# Patient Record
Sex: FEMALE | Race: WHITE | NOT HISPANIC OR LATINO | Employment: OTHER | URBAN - METROPOLITAN AREA
[De-identification: names, ages, dates, MRNs, and addresses within clinical notes are randomized per-mention and may not be internally consistent; named-entity substitution may affect disease eponyms.]

---

## 2017-02-03 DIAGNOSIS — K21.9 GASTROESOPHAGEAL REFLUX DISEASE, ESOPHAGITIS PRESENCE NOT SPECIFIED: ICD-10-CM

## 2017-02-03 RX ORDER — PANTOPRAZOLE SODIUM 40 MG/1
TABLET, DELAYED RELEASE ORAL
Qty: 90 TABLET | Refills: 3 | Status: SHIPPED | OUTPATIENT
Start: 2017-02-03 | End: 2018-04-25 | Stop reason: SDUPTHER

## 2017-03-17 RX ORDER — MONTELUKAST SODIUM 10 MG/1
TABLET ORAL
Qty: 30 TABLET | Refills: 5 | Status: SHIPPED | OUTPATIENT
Start: 2017-03-17 | End: 2017-10-05 | Stop reason: SDUPTHER

## 2017-06-16 DIAGNOSIS — I10 ESSENTIAL HYPERTENSION: ICD-10-CM

## 2017-06-16 DIAGNOSIS — I10 UNCONTROLLED HYPERTENSION: ICD-10-CM

## 2017-06-16 RX ORDER — ENALAPRIL MALEATE 20 MG/1
TABLET ORAL
Qty: 180 TABLET | Refills: 0 | Status: SHIPPED | OUTPATIENT
Start: 2017-06-16 | End: 2017-08-08 | Stop reason: SDUPTHER

## 2017-06-16 RX ORDER — METOPROLOL TARTRATE 50 MG/1
TABLET ORAL
Qty: 270 TABLET | Refills: 0 | Status: SHIPPED | OUTPATIENT
Start: 2017-06-16 | End: 2017-11-22 | Stop reason: SDUPTHER

## 2017-06-16 RX ORDER — CLONIDINE HYDROCHLORIDE 0.1 MG/1
TABLET ORAL
Qty: 180 TABLET | Refills: 0 | Status: SHIPPED | OUTPATIENT
Start: 2017-06-16 | End: 2018-04-25 | Stop reason: SDUPTHER

## 2017-06-19 ENCOUNTER — PATIENT MESSAGE (OUTPATIENT)
Dept: FAMILY MEDICINE | Facility: CLINIC | Age: 72
End: 2017-06-19

## 2017-06-20 ENCOUNTER — OFFICE VISIT (OUTPATIENT)
Dept: ALLERGY | Facility: CLINIC | Age: 72
End: 2017-06-20
Payer: MEDICARE

## 2017-06-20 ENCOUNTER — LAB VISIT (OUTPATIENT)
Dept: LAB | Facility: HOSPITAL | Age: 72
End: 2017-06-20
Attending: ALLERGY & IMMUNOLOGY
Payer: MEDICARE

## 2017-06-20 VITALS
HEIGHT: 66 IN | WEIGHT: 164.88 LBS | HEART RATE: 64 BPM | SYSTOLIC BLOOD PRESSURE: 170 MMHG | BODY MASS INDEX: 26.5 KG/M2 | DIASTOLIC BLOOD PRESSURE: 90 MMHG

## 2017-06-20 DIAGNOSIS — L30.9 DERMATITIS: ICD-10-CM

## 2017-06-20 DIAGNOSIS — J32.9 RECURRENT SINUS INFECTIONS: ICD-10-CM

## 2017-06-20 DIAGNOSIS — J31.0 RHINITIS, CHRONIC: Primary | ICD-10-CM

## 2017-06-20 DIAGNOSIS — J31.0 RHINITIS, CHRONIC: ICD-10-CM

## 2017-06-20 LAB
IGA SERPL-MCNC: 139 MG/DL
IGG SERPL-MCNC: 1139 MG/DL
IGM SERPL-MCNC: 143 MG/DL

## 2017-06-20 PROCEDURE — 1159F MED LIST DOCD IN RCRD: CPT | Mod: S$GLB,,, | Performed by: ALLERGY & IMMUNOLOGY

## 2017-06-20 PROCEDURE — 86317 IMMUNOASSAY INFECTIOUS AGENT: CPT

## 2017-06-20 PROCEDURE — 86003 ALLG SPEC IGE CRUDE XTRC EA: CPT | Mod: 59

## 2017-06-20 PROCEDURE — 86003 ALLG SPEC IGE CRUDE XTRC EA: CPT

## 2017-06-20 PROCEDURE — 82787 IGG 1 2 3 OR 4 EACH: CPT | Mod: 59

## 2017-06-20 PROCEDURE — 82784 ASSAY IGA/IGD/IGG/IGM EACH: CPT | Mod: 59

## 2017-06-20 PROCEDURE — 82785 ASSAY OF IGE: CPT

## 2017-06-20 PROCEDURE — 99204 OFFICE O/P NEW MOD 45 MIN: CPT | Mod: S$GLB,,, | Performed by: ALLERGY & IMMUNOLOGY

## 2017-06-20 PROCEDURE — 36415 COLL VENOUS BLD VENIPUNCTURE: CPT | Mod: PO

## 2017-06-20 PROCEDURE — 82784 ASSAY IGA/IGD/IGG/IGM EACH: CPT

## 2017-06-20 PROCEDURE — 1125F AMNT PAIN NOTED PAIN PRSNT: CPT | Mod: S$GLB,,, | Performed by: ALLERGY & IMMUNOLOGY

## 2017-06-20 PROCEDURE — 99999 PR PBB SHADOW E&M-EST. PATIENT-LVL III: CPT | Mod: PBBFAC,,, | Performed by: ALLERGY & IMMUNOLOGY

## 2017-06-20 RX ORDER — DOXYCYCLINE HYCLATE 100 MG
100 TABLET ORAL 2 TIMES DAILY
Qty: 42 TABLET | Refills: 0 | Status: SHIPPED | OUTPATIENT
Start: 2017-06-20 | End: 2017-07-11

## 2017-06-20 NOTE — PROGRESS NOTES
Subjective:       Patient ID: Megha Ladd is a 71 y.o. female.    Chief Complaint:  Other (eczema, starting 11/23 sinus infection, treated with amoxicillin and clyndiamycin, then saw ENT in APril and was given Augmentin and Clyndamycin again.  Has greenish brown nasal drainage.  Sinus pressure and headaches  Had Prevnar and Pneumovax.)      70 yo woman presents for new patient evaluation of recurrent to chronic sinus infection. She states she started in October with congestion, pressure and drainage. She saw PCP in November and given amoxil and helped some but did not clear. Then in December went to an ENT and given clinda 300. She got nauseated and felt bad on it so had to stop. She continued with symptoms and in April went back to ENT and given Augmentin but she remembered she had bad reaction with severe burning of throat, esophagus and stomach years ago so he changed to clinda again. She only took 3 doses and got dizzy, nausea, joint pain and facial pain so stopped. She went to different ENT and told she needed surgery and felt he did not listen. She went to Dr petersen for ENT for 30 years but he just retired. She still has terrible PND< congestion, ears itch, stuffy nose and some sneeze but no runny nose. No itchy eyes or nose. Some SOB but not now. She has h/o eczema and has had on ears, eyes lids and sides of head and Cetaphil cream has helped. Zyrtec prn also helped eczema. She takes montelukast daily, Advair 115 HFA 1 puff daily and increases to 2 puff BID when has SOB. In 1970's she had allergy test and did shots and did help. Doxycycline and amoxil are 2 antibiotics she has tolerate din past. She has no known foods insect or latex allergy. Never had sinus surgery.         Environmental History: see history section for home environment  Review of Systems   Constitutional: Positive for fatigue. Negative for appetite change, chills and fever.   HENT: Positive for congestion, postnasal drip, sinus pressure  and sneezing. Negative for ear discharge, ear pain, facial swelling, nosebleeds, rhinorrhea, sore throat, trouble swallowing and voice change.    Eyes: Negative for discharge, redness, itching and visual disturbance.   Respiratory: Positive for cough and shortness of breath. Negative for choking, chest tightness and wheezing.    Cardiovascular: Negative for chest pain, palpitations and leg swelling.   Gastrointestinal: Negative for abdominal distention, abdominal pain, constipation, diarrhea, nausea and vomiting.   Genitourinary: Negative for difficulty urinating.   Musculoskeletal: Negative for arthralgias, gait problem, joint swelling and myalgias.   Skin: Positive for rash. Negative for color change.   Neurological: Negative for dizziness, syncope, weakness, light-headedness and headaches.   Hematological: Negative for adenopathy. Does not bruise/bleed easily.   Psychiatric/Behavioral: Negative for agitation, behavioral problems, confusion and sleep disturbance. The patient is not nervous/anxious.         Objective:    Physical Exam   Constitutional: She is oriented to person, place, and time. She appears well-developed and well-nourished. No distress.   HENT:   Head: Normocephalic and atraumatic.   Right Ear: Hearing, tympanic membrane, external ear and ear canal normal.   Left Ear: Hearing, tympanic membrane, external ear and ear canal normal.   Nose: No mucosal edema, rhinorrhea, sinus tenderness or septal deviation. No epistaxis. Right sinus exhibits no maxillary sinus tenderness and no frontal sinus tenderness. Left sinus exhibits no maxillary sinus tenderness and no frontal sinus tenderness.   Mouth/Throat: Uvula is midline, oropharynx is clear and moist and mucous membranes are normal. No uvula swelling.   Eyes: Conjunctivae are normal. Right eye exhibits no discharge. Left eye exhibits no discharge.   Neck: Normal range of motion. No thyromegaly present.   Cardiovascular: Normal rate, regular rhythm and  normal heart sounds.    No murmur heard.  Pulmonary/Chest: Effort normal and breath sounds normal. No respiratory distress. She has no wheezes.   Abdominal: Soft. She exhibits no distension. There is no tenderness.   Musculoskeletal: Normal range of motion. She exhibits no edema or tenderness.   Lymphadenopathy:     She has no cervical adenopathy.   Neurological: She is alert and oriented to person, place, and time.   Skin: Skin is warm and dry. No rash noted. No erythema.   Psychiatric: She has a normal mood and affect. Her behavior is normal. Judgment and thought content normal.   Nursing note and vitals reviewed.      Laboratory:   none performed   Assessment:       1. Rhinitis, chronic    2. Recurrent sinus infections    3. Dermatitis         Plan:       1. Labs today immunocaps and immune system  2. Doxycycline 100 BID for 21 days for chronic sinusitis  3. continue Flonase 1 SNE BID and montelukast daily  4. Phone review

## 2017-06-21 LAB — IGE SERPL-ACNC: <35 IU/ML

## 2017-06-22 ENCOUNTER — TELEPHONE (OUTPATIENT)
Dept: FAMILY MEDICINE | Facility: CLINIC | Age: 72
End: 2017-06-22

## 2017-06-22 DIAGNOSIS — I10 HYPERTENSION, ESSENTIAL: Primary | ICD-10-CM

## 2017-06-22 LAB
A ALTERNATA IGE QN: <0.35 KU/L
A FUMIGATUS IGE QN: <0.35 KU/L
ALLERGEN MAPLE/SYCAMORE IGE: <0.35 KU/L
ALLERGEN PENICILLIUM IGE: <0.35 KU/L
ALLERGEN WALNUT TREE IGE: <0.35 KU/L
ALLERGEN WHITE PINE TREE IGE: <0.35 KU/L
ALLERGEN WILLOW IGE: <0.35 KU/L
BAHIA GRASS IGE QN: <0.35 KU/L
BALD CYPRESS IGE QN: <0.35 KU/L
BERMUDA GRASS IGE QN: <0.35 KU/L
C GLOBOSUM IGE QN: <0.35 KU/L
C HERBARUM IGE QN: <0.35 KU/L
C LUNATA IGE QN: <0.35 KU/L
CAT DANDER IGE QN: <0.35 KU/L
COMMON RAGWEED IGE QN: <0.35 KU/L
COTTONWOOD IGE QN: <0.35 KU/L
D FARINAE IGE QN: <0.35 KU/L
D PTERONYSS IGE QN: <0.35 KU/L
DEPRECATED A ALTERNATA IGE RAST QL: NORMAL
DEPRECATED A FUMIGATUS IGE RAST QL: NORMAL
DEPRECATED BAHIA GRASS IGE RAST QL: NORMAL
DEPRECATED BALD CYPRESS IGE RAST QL: NORMAL
DEPRECATED BERMUDA GRASS IGE RAST QL: NORMAL
DEPRECATED C GLOBOSUM IGE RAST QL: NORMAL
DEPRECATED C HERBARUM IGE RAST QL: NORMAL
DEPRECATED C LUNATA IGE RAST QL: NORMAL
DEPRECATED CAT DANDER IGE RAST QL: NORMAL
DEPRECATED COMMON RAGWEED IGE RAST QL: NORMAL
DEPRECATED COTTONWOOD IGE RAST QL: NORMAL
DEPRECATED D FARINAE IGE RAST QL: NORMAL
DEPRECATED D PTERONYSS IGE RAST QL: NORMAL
DEPRECATED DOG DANDER IGE RAST QL: NORMAL
DEPRECATED ENGL PLANTAIN IGE RAST QL: NORMAL
DEPRECATED JOHNSON GRASS IGE RAST QL: NORMAL
DEPRECATED MARSH ELDER IGE RAST QL: NORMAL
DEPRECATED MUGWORT IGE RAST QL: NORMAL
DEPRECATED PECAN/HICK TREE IGE RAST QL: NORMAL
DEPRECATED ROACH IGE RAST QL: NORMAL
DEPRECATED S ROSTRATA IGE RAST QL: NORMAL
DEPRECATED SALTWORT IGE RAST QL: NORMAL
DEPRECATED SILVER BIRCH IGE RAST QL: NORMAL
DEPRECATED TIMOTHY IGE RAST QL: NORMAL
DEPRECATED WHITE OAK IGE RAST QL: NORMAL
DOG DANDER IGE QN: <0.35 KU/L
ENGL PLANTAIN IGE QN: <0.35 KU/L
IGG1 SER-MCNC: 786 MG/DL
IGG2 SER-MCNC: 221 MG/DL
IGG3 SER-MCNC: 23 MG/DL
IGG4 SER-MCNC: 59 MG/DL
JOHNSON GRASS IGE QN: <0.35 KU/L
MAPLE/SYCAMORE CLASS: NORMAL
MARSH ELDER IGE QN: <0.35 KU/L
MUGWORT IGE QN: <0.35 KU/L
PECAN/HICK TREE IGE QN: <0.35 KU/L
PENICILLIUM CLASS: NORMAL
RAGWEED, WESTERN IGE: <0.35 KU/L
RAGWEED, WESTERN, CLASS: NORMAL
ROACH IGE QN: <0.35 KU/L
S ROSTRATA IGE QN: <0.35 KU/L
SALTWORT IGE QN: <0.35 KU/L
SILVER BIRCH IGE QN: <0.35 KU/L
TIMOTHY IGE QN: <0.35 KU/L
WALNUT TREE CLASS: NORMAL
WHITE OAK IGE QN: <0.35 KU/L
WHITE PINE CLASS: NORMAL
WILLOW CLASS: NORMAL

## 2017-06-22 RX ORDER — AMLODIPINE BESYLATE 2.5 MG/1
2.5 TABLET ORAL DAILY
Qty: 30 TABLET | Refills: 5 | Status: SHIPPED | OUTPATIENT
Start: 2017-06-22 | End: 2017-07-27 | Stop reason: SINTOL

## 2017-06-22 NOTE — TELEPHONE ENCOUNTER
Amlodipine 2.5mg daily sent to [facility].  We may need a higher dose so I suggest bp check with me in 4 weeks.

## 2017-06-22 NOTE — TELEPHONE ENCOUNTER
informed.  Please send in amlodipine to [facility] [address].       Alfred Mejia MD 16 hours ago (6:12 PM)      Looks like we need to put her on amlodipine.     Documentation       Alexia Menchaca LPN routed conversation to Alfred Mejia MD 18 hours ago (4:21 PM)      You routed conversation to Alfred Mejia MD 19 hours ago (2:33 PM)      You 19 hours ago (2:32 PM)      Patient had an apt with Dr Jj 6-20-17. Since we called saying she needed a blood pressure check she asked them to take it. 1709/90 pulse 64

## 2017-06-28 LAB
C DIPHTHERIAE AB SER IA-ACNC: 0.13 IU/ML
C TETANI AB SER-ACNC: 0.94 IU/ML
DEPRECATED S PNEUM 1 IGG SER-MCNC: 16.2 MCG/ML
DEPRECATED S PNEUM12 IGG SER-MCNC: 0.4 MCG/ML
DEPRECATED S PNEUM14 IGG SER-MCNC: 2 MCG/ML
DEPRECATED S PNEUM19 IGG SER-MCNC: 2.9 MCG/ML
DEPRECATED S PNEUM23 IGG SER-MCNC: 4.5 MCG/ML
DEPRECATED S PNEUM3 IGG SER-MCNC: 0.8 MCG/ML
DEPRECATED S PNEUM4 IGG SER-MCNC: 0.7 MCG/ML
DEPRECATED S PNEUM5 IGG SER-MCNC: 8.1 MCG/ML
DEPRECATED S PNEUM8 IGG SER-MCNC: 2.2 MCG/ML
DEPRECATED S PNEUM9 IGG SER-MCNC: 2.4 MCG/ML
HAEM INFLU B IGG SER-MCNC: 0.97 MG/L
S PNEUM DA 18C IGG SER-MCNC: 2.5 MCG/ML
S PNEUM DA 6B IGG SER-MCNC: 1.6 MCG/ML
S PNEUM DA 7F IGG SER-MCNC: 15.6 MCG/ML
S PNEUM DA 9V IGG SER-MCNC: 1 MCG/ML

## 2017-06-29 ENCOUNTER — DOCUMENTATION ONLY (OUTPATIENT)
Dept: FAMILY MEDICINE | Facility: CLINIC | Age: 72
End: 2017-06-29

## 2017-06-29 ENCOUNTER — TELEPHONE (OUTPATIENT)
Dept: ALLERGY | Facility: CLINIC | Age: 72
End: 2017-06-29

## 2017-06-29 NOTE — TELEPHONE ENCOUNTER
Please let her know all allergy tests are negative and immune system is entirely normal. Hopefully this is just bad infection which has never gotten treated adequately and the doxycycline will do that. She should finish all antibiotics and . continue Flonase 1 SNE BID and montelukast daily

## 2017-06-29 NOTE — PROGRESS NOTES
Pre-Visit Chart Review  For Appointment Scheduled on 7-31-17    Health Maintenance Due   Topic Date Due    DEXA SCAN  06/19/2017    Mammogram  08/06/2017

## 2017-07-05 ENCOUNTER — DOCUMENTATION ONLY (OUTPATIENT)
Dept: FAMILY MEDICINE | Facility: CLINIC | Age: 72
End: 2017-07-05

## 2017-07-05 NOTE — PROGRESS NOTES
Pre-Visit Chart Review  For Appointment Scheduled on 8-8-17    Health Maintenance Due   Topic Date Due    DEXA SCAN  06/19/2017    Mammogram  08/06/2017

## 2017-07-26 ENCOUNTER — TELEPHONE (OUTPATIENT)
Dept: FAMILY MEDICINE | Facility: CLINIC | Age: 72
End: 2017-07-26

## 2017-07-26 NOTE — TELEPHONE ENCOUNTER
Patient dropped off note- spoke to patient- she started amlodipne 7/6/17- before that she had been on Doxycycline for 3 wks per Dr. Jj- she took last dose 7/15/17- on 7/21/17 patient started with diarrhea, nausea, joint/muscle pain, sharp stomach pain, flushing, fatigue and increased night urination- fluids include water, broth, jello and apple juice- diet has been crackers, pretzels-tried eating but gets diarrhea and nausea right away-taking probiotics- she read side effects of Amlodipine and her symptoms are included - she wants to know if she should stop the med.

## 2017-08-08 ENCOUNTER — OFFICE VISIT (OUTPATIENT)
Dept: FAMILY MEDICINE | Facility: CLINIC | Age: 72
End: 2017-08-08
Payer: MEDICARE

## 2017-08-08 ENCOUNTER — DOCUMENTATION ONLY (OUTPATIENT)
Dept: FAMILY MEDICINE | Facility: CLINIC | Age: 72
End: 2017-08-08

## 2017-08-08 VITALS
SYSTOLIC BLOOD PRESSURE: 122 MMHG | HEART RATE: 56 BPM | OXYGEN SATURATION: 96 % | WEIGHT: 156.75 LBS | RESPIRATION RATE: 12 BRPM | TEMPERATURE: 98 F | HEIGHT: 66 IN | DIASTOLIC BLOOD PRESSURE: 94 MMHG | BODY MASS INDEX: 25.19 KG/M2

## 2017-08-08 DIAGNOSIS — I10 HYPERTENSION, POOR CONTROL: ICD-10-CM

## 2017-08-08 DIAGNOSIS — I10 ESSENTIAL HYPERTENSION: ICD-10-CM

## 2017-08-08 DIAGNOSIS — R10.9 ABDOMINAL PAIN, UNSPECIFIED LOCATION: ICD-10-CM

## 2017-08-08 DIAGNOSIS — Z12.31 ENCOUNTER FOR SCREENING MAMMOGRAM FOR BREAST CANCER: ICD-10-CM

## 2017-08-08 DIAGNOSIS — R11.0 NAUSEA: ICD-10-CM

## 2017-08-08 DIAGNOSIS — Z78.0 MENOPAUSE: ICD-10-CM

## 2017-08-08 DIAGNOSIS — R19.7 DIARRHEA, UNSPECIFIED TYPE: Primary | ICD-10-CM

## 2017-08-08 PROCEDURE — 1159F MED LIST DOCD IN RCRD: CPT | Mod: S$GLB,,, | Performed by: FAMILY MEDICINE

## 2017-08-08 PROCEDURE — 99499 UNLISTED E&M SERVICE: CPT | Mod: S$GLB,,, | Performed by: FAMILY MEDICINE

## 2017-08-08 PROCEDURE — 3080F DIAST BP >= 90 MM HG: CPT | Mod: S$GLB,,, | Performed by: FAMILY MEDICINE

## 2017-08-08 PROCEDURE — 99999 PR PBB SHADOW E&M-EST. PATIENT-LVL IV: CPT | Mod: PBBFAC,,, | Performed by: FAMILY MEDICINE

## 2017-08-08 PROCEDURE — 3008F BODY MASS INDEX DOCD: CPT | Mod: S$GLB,,, | Performed by: FAMILY MEDICINE

## 2017-08-08 PROCEDURE — 99214 OFFICE O/P EST MOD 30 MIN: CPT | Mod: S$GLB,,, | Performed by: FAMILY MEDICINE

## 2017-08-08 PROCEDURE — 1125F AMNT PAIN NOTED PAIN PRSNT: CPT | Mod: S$GLB,,, | Performed by: FAMILY MEDICINE

## 2017-08-08 PROCEDURE — 3074F SYST BP LT 130 MM HG: CPT | Mod: S$GLB,,, | Performed by: FAMILY MEDICINE

## 2017-08-08 RX ORDER — ENALAPRIL MALEATE 20 MG/1
20 TABLET ORAL 2 TIMES DAILY
Qty: 180 TABLET | Refills: 0 | Status: SHIPPED | OUTPATIENT
Start: 2017-08-08 | End: 2018-04-25 | Stop reason: SDUPTHER

## 2017-08-08 RX ORDER — LOPERAMIDE HYDROCHLORIDE AND SIMETHICONE 2; 125 MG/1; MG/1
2 TABLET ORAL 4 TIMES DAILY PRN
COMMUNITY
End: 2018-06-04

## 2017-08-08 RX ORDER — HYDROCHLOROTHIAZIDE 12.5 MG/1
12.5 TABLET ORAL DAILY
Qty: 30 TABLET | Refills: 11 | Status: SHIPPED | OUTPATIENT
Start: 2017-08-08 | End: 2017-11-28 | Stop reason: SDUPTHER

## 2017-08-08 NOTE — PATIENT INSTRUCTIONS
Controlling High Blood Pressure  High blood pressure (hypertension) is often called the silent killer. This is because many people who have it dont know it. High blood pressure is defined as 140/90 mm Hg or higher. Know your blood pressure and remember to check it regularly. Doing so can save your life. Here are some things you can do to help control your blood pressure.    Choose heart-healthy foods  · Select low-salt, low-fat foods. Limit sodium intake to 2,400 mg per day or the amount suggested by your healthcare provider.  · Limit canned, dried, cured, packaged, and fast foods. These can contain a lot of salt.  · Eat 8 to 10 servings of fruits and vegetables every day.  · Choose lean meats, fish, or chicken.  · Eat whole-grain pasta, brown rice, and beans.  · Eat 2 to 3 servings of low-fat or fat-free dairy products.  · Ask your doctor about the DASH eating plan. This plan helps reduce blood pressure.  · When you go to a restaurant, ask that your meal be prepared with no added salt.  Maintain a healthy weight  · Ask your healthcare provider how many calories to eat a day. Then stick to that number.  · Ask your healthcare provider what weight range is healthiest for you. If you are overweight, a weight loss of only 3% to 5% of your body weight can help lower blood pressure. Generally, a good weight loss goal is to lose 10% of your body weight in a year.  · Limit snacks and sweets.  · Get regular exercise.  Get up and get active  · Choose activities you enjoy. Find ones you can do with friends or family. This includes bicycling, dancing, walking, and jogging.  · Park farther away from building entrances.  · Use stairs instead of the elevator.  · When you can, walk or bike instead of driving.  · [place] leaves, garden, or do household repairs.  · Be active at a moderate to vigorous level of physical activity for at least 40 minutes for a minimum of 3 to 4 days a week.   Manage stress  · Make time to relax and enjoy  life. Find time to laugh.  · Communicate your concerns with your loved ones and your healthcare provider.  · Visit with family and friends, and keep up with hobbies.  Limit alcohol and quit smoking  · Men should have no more than 2 drinks per day.  · Women should have no more than 1 drink per day.  · Talk with your healthcare provider about quitting smoking. Smoking significantly increases your risk for heart disease and stroke. Ask your healthcare provider about community smoking cessation programs and other options.  Medicines  If lifestyle changes arent enough, your healthcare provider may prescribe high blood pressure medicine. Take all medicines as prescribed. If you have any questions about your medicines, ask your healthcare provider before stopping or changing them.   Date Last Reviewed: 4/27/2016  © 3632-4395 [employer] [place]. [address] [place] [address], [place] [address]. All rights reserved. This information is not intended as a substitute for professional medical care. Always follow your healthcare professional's instructions.

## 2017-08-08 NOTE — PROGRESS NOTES
Pre-Visit Chart Review  For Appointment Scheduled on 08/08/2017      Health Maintenance Due   Topic Date Due    DEXA SCAN  06/19/2017    Influenza Vaccine  08/01/2017    Mammogram  08/06/2017

## 2017-08-08 NOTE — PROGRESS NOTES
Pre-Visit Chart Review  For Appointment Scheduled on 8-8-17    Health Maintenance Due   Topic Date Due    DEXA SCAN  06/19/2017    Influenza Vaccine  08/01/2017    Mammogram  08/06/2017

## 2017-08-08 NOTE — PROGRESS NOTES
Subjective:       Patient ID: Megha Ladd is a 71 y.o. female.    Chief Complaint: Diarrhea and Hypertension    71-year-old female coming in for several problems.  She has been having problems with chronic sinusitis and over the last several months is been on a series of antibiotics both here and at her ENTs office that included amoxicillin, clindamycin, and most recently doxycycline.  Shortly after completing the doxycycline given by Dr. Jj she developed nausea and then approximately 8-9 days later began experiencing diarrhea.  During this same period of time her blood pressure was noted to be elevated and she was placed on amlodipine about a week before the nausea started.  When the diarrhea began she started reading side effects and found that the amlodipine could be responsible so she stopped taking the amlodipine after checking in with us.  The nausea and diarrhea continued however and she's been off of the amlodipine for nearly 2 weeks now.  She has not seen any blood in the diarrhea but it is very loose and watery and she's had generalized abdominal pain most notably in the epigastric and right upper quadrants areas.  She is also had some pain in the right subscapular area which she had attributed to her chronic back pain and she had an epidural injection in the lumbar area by Dr. Taylor about a week ago.  In addition to all of the above she is taking Celebrex along with Protonix to protect the stomach.  She's been using Imodium to slow the diarrhea.  She is on metoprolol taking 75 mg twice a day, enalapril taking 20 mg twice a day, and uses clonidine when necessary elevated blood pressure.  She is not on a diuretic.  Her pulse is in the low to mid 50s so she could not take verapamil or diltiazem and is still concerned that the amlodipine may have been responsible for her GI symptoms.    Past Medical History:  No date: Asthma  No date: GERD (gastroesophageal reflux disease)  No date: Hyperlipidemia  No  date: Hypertension  No date: Hypothyroid  No date: Lumbago  No date: Neuromuscular disorder  No date: Sinusitis  No date: Ulcer    Past Surgical History:  No date: APPENDECTOMY  No date:  SECTION  No date: LUMBAR EPIDURAL INJECTION  No date: PARTIAL HYSTERECTOMY      Current Outpatient Prescriptions:     acetaminophen (TYLENOL) 500 mg Cap, Take 1,000 mg by mouth 3 (three) times daily as needed. Takes with tramadol, Disp: , Rfl:     cartilage-collagen-bor-hyalur (MOVE FREE ULTRA, BORON,) 40-5-3.3 mg Tab, Take 1 tablet by mouth once daily., Disp: , Rfl:     celecoxib (CELEBREX) 200 MG capsule, Take 200 mg by mouth daily as needed. Every day, Disp: , Rfl:     cetirizine (ZYRTEC) 10 MG tablet, Take 10 mg by mouth daily as needed for Allergies., Disp: , Rfl:     cloNIDine (CATAPRES) 0.1 MG tablet, TAKE 1 TABLET TWICE DAILY AS NEEDED FOR BLOOD PRESSURE GREATER THAN 180 SYSTOLIC  DIASTOLIC., Disp: 180 tablet, Rfl: 0    cycloSPORINE (RESTASIS) 0.05 % ophthalmic emulsion, Place 1 drop into both eyes 2 (two) times daily as needed. Twice a day, Disp: , Rfl:     enalapril (VASOTEC) 20 MG tablet, Take 1 tablet (20 mg total) by mouth 2 (two) times daily., Disp: 180 tablet, Rfl: 0    fluticasone (FLONASE) 50 mcg/actuation nasal spray, SPRAY 1 SPRAY IN EACH NOSTRIL 2 TIMES A DAY (Patient taking differently: SPRAY 1 SPRAY IN EACH NOSTRIL qd), Disp: 16 g, Rfl: 11    fluticasone-salmeterol (ADVAIR HFA) 115-21 mcg/actuation HFAA, Inhale 2 puffs into the lungs 2 (two) times daily. (Patient taking differently: Inhale 2 puffs into the lungs 2 (two) times daily as needed. ), Disp: 3 Inhaler, Rfl: 3    guaifenesin (MUCINEX) 600 mg tp12, Take 1 tablet by mouth 2 (two) times daily as needed., Disp: , Rfl:     LACTOBACILLUS RHAMNOSUS GG (CULTURELLE ORAL), Take 1 capsule by mouth daily as needed. 20 Billion, Disp: , Rfl:     loperamide-simethicone (IMODIUM MULTI-SYMPTOM RELIEF) 2-125 mg Tab, Take 2 tablets by mouth 4  (four) times daily as needed (up to 4 tabs in 24 hours)., Disp: , Rfl:     metaxalone (SKELAXIN) 800 MG tablet, Take 800 mg by mouth every 8 (eight) hours as needed. Every 6 hours PRN, Disp: , Rfl:     metoprolol tartrate (LOPRESSOR) 50 MG tablet, TAKE 1 AND 1/2 TABLETS TWICE DAILY, Disp: 270 tablet, Rfl: 0    montelukast (SINGULAIR) 10 mg tablet, TAKE ONE TABLET BY MOUTH IN THE EVENING, Disp: 30 tablet, Rfl: 5    ondansetron (ZOFRAN-ODT) 4 MG TbDL, Take 2 tablets (8 mg total) by mouth every 8 (eight) hours as needed. (Patient taking differently: Take 4 mg by mouth every 8 (eight) hours as needed. ), Disp: 60 tablet, Rfl: 0    pantoprazole (PROTONIX) 40 MG tablet, TAKE 1 TABLET ONE TIME DAILY, Disp: 90 tablet, Rfl: 3    pravastatin (PRAVACHOL) 40 MG tablet, Take 1 tablet (40 mg total) by mouth every other day. (Patient taking differently: Take 40 mg by mouth once daily. ), Disp: 45 tablet, Rfl: 3    pregabalin (LYRICA) 75 MG capsule, Take 75 mg by mouth 2 (two) times daily as needed. , Disp: , Rfl:     sodium chloride (JAYSHREE 128) 5 % ophthalmic ointment, Place into both eyes as needed. Twice a day, Disp: , Rfl:     thyroid, pork, (ARMOUR THYROID) 90 mg Tab, Take 1 tablet (90 mg total) by mouth once daily., Disp: 90 tablet, Rfl: 2    tramadol (ULTRAM) 50 mg tablet, Take 50 mg by mouth every 6 (six) hours as needed. Every 6 hours, Disp: , Rfl:     UNABLE TO FIND, Apply topically once daily. Loren Gaston MD medical strength skin healing facial redness repair for sensitive skin, Disp: , Rfl:         Review of Systems   Constitutional: Positive for fatigue. Negative for chills and fever.   Respiratory: Negative for cough, chest tightness and shortness of breath.    Cardiovascular: Negative for chest pain and palpitations.   Gastrointestinal: Positive for abdominal pain, diarrhea and nausea. Negative for abdominal distention, anal bleeding, blood in stool and vomiting.   Genitourinary: Negative for dysuria,  frequency and hematuria.   Skin: Negative for color change and rash.       Objective:      Physical Exam   Constitutional: She is oriented to person, place, and time. She appears well-developed and well-nourished. No distress.   Mildly elevated blood pressure  Normal weight with BMI 25.3.  She is down 8.6 pounds from her last visit with me August 29, 2016  She is in no distress   HENT:   Head: Normocephalic and atraumatic.   Cardiovascular: Normal rate, regular rhythm and normal heart sounds.  Exam reveals no gallop and no friction rub.    No murmur heard.  Pulmonary/Chest: Effort normal and breath sounds normal. No respiratory distress. She has no wheezes. She has no rales. She exhibits no tenderness.   Abdominal: Soft. Bowel sounds are normal. She exhibits no shifting dullness, no distension, no pulsatile liver, no fluid wave, no abdominal bruit, no ascites, no pulsatile midline mass and no mass. There is no hepatosplenomegaly. There is tenderness in the right upper quadrant and epigastric area. There is no rigidity, no rebound, no guarding, no CVA tenderness, no tenderness at McBurney's point and negative Box's sign (Tender in the right upper quadrant but does not have a true Box sign).   Neurological: She is alert and oriented to person, place, and time.   Skin: She is not diaphoretic.   Psychiatric: She has a normal mood and affect. Her behavior is normal.   Nursing note and vitals reviewed.      Assessment:       1. Diarrhea, unspecified type    2. Nausea    3. Abdominal pain, unspecified location    4. Hypertension, poor control    5. Encounter for screening mammogram for breast cancer    6. Menopause    7. Essential hypertension        Plan:       1. Diarrhea, unspecified type  Concerned for C. difficile colitis following multiple antibiotics and particularly clindamycin.  Symptoms are also consistent with possible cholecystitis.  Since symptoms did not resolve after more than a week off the amlodipine  I doubt that it was causative but she is uncomfortable with trying it again.  - CULTURE, STOOL; Future  - Clostridium difficile EIA; Future  - H. pylori antigen, stool; Future  - Giardia / Cryptosporidum, EIA; Future  - Rotavirus antigen, stool; Future  - Stool Exam-Ova,Cysts,Parasites; Future  - WBC, Stool; Future  - CBC auto differential; Future  - Comprehensive metabolic panel; Future    2. Nausea  - H. pylori antigen, stool; Future  - CBC auto differential; Future  - Comprehensive metabolic panel; Future    3. Abdominal pain, unspecified location  Recommend discontinue Celebrex until evaluation is completed.  She may continue the Protonix.  - Amylase; Future  - CBC auto differential; Future  - Comprehensive metabolic panel; Future  - US Abdomen Complete; Future    4. Hypertension, poor control  Continue enalapril and metoprolol and add low-dose HCTZ.  If successfully can be combined with the enalapril at a later date.  - hydrochlorothiazide (HYDRODIURIL) 12.5 MG Tab; Take 1 tablet (12.5 mg total) by mouth once daily.  Dispense: 30 tablet; Refill: 11    5. Encounter for screening mammogram for breast cancer  [facility]  - Mammo Digital Screening Bilat with CAD; Future    6. Menopause  - DXA Bone Density Spine And Hip; Future    7. Essential hypertension  - enalapril (VASOTEC) 20 MG tablet; Take 1 tablet (20 mg total) by mouth 2 (two) times daily.  Dispense: 180 tablet; Refill: 0         Patient readiness: acceptance and barriers:none    During the course of the visit the patient was educated and counseled about the following:     Hypertension:   Medication: add low dose hctz 12.5mg daily.  Dietary sodium restriction.  Regular aerobic exercise.    Goals: Hypertension: Reduce Blood Pressure    Did patient meet goals/outcomes: No    The following self management tools provided: blood pressure log    Patient Instructions (the written plan) was given to the patient/family.     Time spent with patient: 30  minutes

## 2017-08-08 NOTE — PROGRESS NOTES
Pre-Visit Chart Review  For Appointment Scheduled on 8-30-17    Health Maintenance Due   Topic Date Due    DEXA SCAN  06/19/2017    Influenza Vaccine  08/01/2017    Mammogram  08/06/2017

## 2017-08-11 ENCOUNTER — PATIENT MESSAGE (OUTPATIENT)
Dept: FAMILY MEDICINE | Facility: CLINIC | Age: 72
End: 2017-08-11

## 2017-08-11 ENCOUNTER — HOSPITAL ENCOUNTER (OUTPATIENT)
Dept: RADIOLOGY | Facility: CLINIC | Age: 72
Discharge: HOME | End: 2017-08-11
Attending: FAMILY MEDICINE
Payer: MEDICARE

## 2017-08-11 DIAGNOSIS — Z78.0 MENOPAUSE: ICD-10-CM

## 2017-08-11 DIAGNOSIS — R10.9 ABDOMINAL PAIN, UNSPECIFIED LOCATION: ICD-10-CM

## 2017-08-11 PROCEDURE — 76700 US EXAM ABDOM COMPLETE: CPT | Mod: 26,,, | Performed by: RADIOLOGY

## 2017-08-14 ENCOUNTER — PATIENT MESSAGE (OUTPATIENT)
Dept: FAMILY MEDICINE | Facility: CLINIC | Age: 72
End: 2017-08-14

## 2017-08-16 ENCOUNTER — PATIENT MESSAGE (OUTPATIENT)
Dept: FAMILY MEDICINE | Facility: CLINIC | Age: 72
End: 2017-08-16

## 2017-09-13 RX ORDER — FLUTICASONE PROPIONATE AND SALMETEROL XINAFOATE 115; 21 UG/1; UG/1
2 AEROSOL, METERED RESPIRATORY (INHALATION) 2 TIMES DAILY
Qty: 3 INHALER | Refills: 11 | Status: SHIPPED | OUTPATIENT
Start: 2017-09-13 | End: 2017-12-23 | Stop reason: SDUPTHER

## 2017-09-13 NOTE — TELEPHONE ENCOUNTER
Patient called back- wants 30 day supply-tried stopping the Advair and found out she does need it per .

## 2017-09-13 NOTE — TELEPHONE ENCOUNTER
----- Message from Estrella Fuller sent at 9/13/2017  3:58 PM CDT -----  Contact: spouse  1. What is the name of the medication you are requesting? ADVAIR HFA  2. What is the dose? 115-21 mcg/actuation   3. How do you take the medication? Orally, topically, etc?Inhalation  4. How often do you take this medication?Inhale 2 puffs into the lungs 2 (two) times daily  5. Do you need a 30 day or 90 day supply?90  6. How many refills are you requesting? Up to dr  7. What is your preferred pharmacy and location of the pharmacy?   [facility] [address] [place], [place] - [address] [facility]  [address] [place], [place] [address] [address]  Phone: [phone] Fax: [phone]      8. Who can we contact with further questions? [phone](please notify when sent)

## 2017-09-21 ENCOUNTER — OFFICE VISIT (OUTPATIENT)
Dept: ALLERGY | Facility: CLINIC | Age: 72
End: 2017-09-21
Payer: MEDICARE

## 2017-09-21 VITALS — BODY MASS INDEX: 24.73 KG/M2 | HEART RATE: 74 BPM | OXYGEN SATURATION: 98 % | HEIGHT: 66 IN | WEIGHT: 153.88 LBS

## 2017-09-21 DIAGNOSIS — J45.31 MILD PERSISTENT ASTHMA WITH ACUTE EXACERBATION: Primary | ICD-10-CM

## 2017-09-21 DIAGNOSIS — R19.7 DIARRHEA: Primary | ICD-10-CM

## 2017-09-21 DIAGNOSIS — J31.0 OTHER CHRONIC RHINITIS: ICD-10-CM

## 2017-09-21 PROCEDURE — 99999 PR PBB SHADOW E&M-EST. PATIENT-LVL II: CPT | Mod: PBBFAC,,, | Performed by: ALLERGY & IMMUNOLOGY

## 2017-09-21 PROCEDURE — 3008F BODY MASS INDEX DOCD: CPT | Mod: S$GLB,,, | Performed by: ALLERGY & IMMUNOLOGY

## 2017-09-21 PROCEDURE — 99499 UNLISTED E&M SERVICE: CPT | Mod: S$GLB,,, | Performed by: ALLERGY & IMMUNOLOGY

## 2017-09-21 PROCEDURE — 99214 OFFICE O/P EST MOD 30 MIN: CPT | Mod: S$GLB,,, | Performed by: ALLERGY & IMMUNOLOGY

## 2017-09-21 PROCEDURE — 1159F MED LIST DOCD IN RCRD: CPT | Mod: S$GLB,,, | Performed by: ALLERGY & IMMUNOLOGY

## 2017-09-21 RX ORDER — PREDNISONE 10 MG/1
30 TABLET ORAL DAILY
Qty: 9 TABLET | Refills: 0 | Status: SHIPPED | OUTPATIENT
Start: 2017-09-21 | End: 2017-09-24

## 2017-09-21 RX ORDER — LEVALBUTEROL TARTRATE 45 UG/1
1-2 AEROSOL, METERED ORAL EVERY 4 HOURS PRN
Qty: 1 INHALER | Refills: 2 | Status: SHIPPED | OUTPATIENT
Start: 2017-09-21 | End: 2017-11-28

## 2017-09-21 NOTE — PROGRESS NOTES
Subjective:       Patient ID: Megha Ladd is a 71 y.o. female.    Chief Complaint:  Wheezing (asthma symptoms, )      70 yo woman presents for continued evaluation of chronic rhinitis and asthma. She was last seen 6/20/17. She had chronic sinus infection then treated with 3 weeks doxy. She reports this worked great, sinus haven't been that clear in long time. She also had labs then with negative immunocaps and normal immune system. She did start in July with diarrhea. Has seen PCP and now GO and several tests. GI is looking for c rogers thinks Augmentin in past could have caused and if not then will get colonoscopy. She was feeling really well. In August she talked to Dr Mejia and decided to hold her Advair to see if she needed it anymore. Within days she got tight, SOB and cough. She still has wet cough and some wheeze and SOB but better since resume Advair. She takes the HFA 11 1 puff BID> she has had hoarseness from Advair in past, was worse with disc. She is now using a spacer and feels it helps. she does not have any xopenex, in past would fill and never need and throw away but does feel she needs now. She has some congestion right now but not bad sinus issues. She is also on montelukast daily.     Prior History taken 6/20/17: new patient evaluation of recurrent to chronic sinus infection. She states she started in October with congestion, pressure and drainage. She saw PCP in November and given amoxil and helped some but did not clear. Then in December went to an ENT and given clinda 300. She got nauseated and felt bad on it so had to stop. She continued with symptoms and in April went back to ENT and given Augmentin but she remembered she had bad reaction with severe burning of throat, esophagus and stomach years ago so he changed to clinda again. She only took 3 doses and got dizzy, nausea, joint pain and facial pain so stopped. She went to different ENT and told she needed surgery and felt he did not  listen. She went to Dr petersen for ENT for 30 years but he just retired. She still has terrible PND< congestion, ears itch, stuffy nose and some sneeze but no runny nose. No itchy eyes or nose. Some SOB but not now. She has h/o eczema and has had on ears, eyes lids and sides of head and Cetaphil cream has helped. Zyrtec prn also helped eczema. She takes montelukast daily, Advair 115 HFA 1 puff daily and increases to 2 puff BID when has SOB. In 1970's she had allergy test and did shots and did help. Doxycycline and amoxil are 2 antibiotics she has tolerate din past. She has no known foods insect or latex allergy. Never had sinus surgery.       Wheezing    Associated symptoms include coughing, a rash and shortness of breath. Pertinent negatives include no abdominal pain, chest pain, chills, diarrhea, ear pain, fever, headaches, rhinorrhea, sore throat or vomiting.       Environmental History: see history section for home environment  Review of Systems   Constitutional: Positive for fatigue. Negative for appetite change, chills and fever.   HENT: Positive for congestion, postnasal drip, sinus pressure and sneezing. Negative for ear discharge, ear pain, facial swelling, nosebleeds, rhinorrhea, sore throat, trouble swallowing and voice change.    Eyes: Negative for discharge, redness, itching and visual disturbance.   Respiratory: Positive for cough, shortness of breath and wheezing. Negative for choking and chest tightness.    Cardiovascular: Negative for chest pain, palpitations and leg swelling.   Gastrointestinal: Negative for abdominal distention, abdominal pain, constipation, diarrhea, nausea and vomiting.   Genitourinary: Negative for difficulty urinating.   Musculoskeletal: Negative for arthralgias, gait problem, joint swelling and myalgias.   Skin: Positive for rash. Negative for color change.   Neurological: Negative for dizziness, syncope, weakness, light-headedness and headaches.   Hematological: Negative for  adenopathy. Does not bruise/bleed easily.   Psychiatric/Behavioral: Negative for agitation, behavioral problems, confusion and sleep disturbance. The patient is not nervous/anxious.         Objective:    Physical Exam   Constitutional: She is oriented to person, place, and time. She appears well-developed and well-nourished. No distress.   HENT:   Head: Normocephalic and atraumatic.   Right Ear: Hearing, tympanic membrane, external ear and ear canal normal.   Left Ear: Hearing, tympanic membrane, external ear and ear canal normal.   Nose: No mucosal edema, rhinorrhea, sinus tenderness or septal deviation. No epistaxis. Right sinus exhibits no maxillary sinus tenderness and no frontal sinus tenderness. Left sinus exhibits no maxillary sinus tenderness and no frontal sinus tenderness.   Mouth/Throat: Uvula is midline, oropharynx is clear and moist and mucous membranes are normal. No uvula swelling.   Eyes: Conjunctivae are normal. Right eye exhibits no discharge. Left eye exhibits no discharge.   Neck: Normal range of motion. No thyromegaly present.   Cardiovascular: Normal rate, regular rhythm and normal heart sounds.    No murmur heard.  Pulmonary/Chest: Effort normal and breath sounds normal. No respiratory distress. She has no wheezes.   Abdominal: Soft. She exhibits no distension. There is no tenderness.   Musculoskeletal: Normal range of motion. She exhibits no edema or tenderness.   Lymphadenopathy:     She has no cervical adenopathy.   Neurological: She is alert and oriented to person, place, and time.   Skin: Skin is warm and dry. No rash noted. No erythema.   Psychiatric: She has a normal mood and affect. Her behavior is normal. Judgment and thought content normal.   Nursing note and vitals reviewed.      Laboratory:   none performed   Assessment:       1. Mild persistent asthma with acute exacerbation    2. Other chronic rhinitis         Plan:       1. Resume Advair 110 2 puffs BID via spacer  2. xopenex 2  puffs every 4 hours as needed  3. prednisone 30 mg daily for 3 days  4. continue Flonase 1 SNE BID and montelukast daily  5. Once back to baseline needs PFT

## 2017-10-05 DIAGNOSIS — E03.9 HYPOTHYROIDISM, UNSPECIFIED TYPE: Primary | ICD-10-CM

## 2017-10-05 DIAGNOSIS — E78.5 HYPERLIPIDEMIA, UNSPECIFIED HYPERLIPIDEMIA TYPE: ICD-10-CM

## 2017-10-05 RX ORDER — MONTELUKAST SODIUM 10 MG/1
TABLET ORAL
Qty: 30 TABLET | Refills: 5 | Status: SHIPPED | OUTPATIENT
Start: 2017-10-05 | End: 2018-04-11 | Stop reason: SDUPTHER

## 2017-10-05 RX ORDER — THYROID, PORCINE 90 MG/1
TABLET ORAL
Qty: 30 TABLET | Refills: 0 | Status: SHIPPED | OUTPATIENT
Start: 2017-10-05 | End: 2017-11-28 | Stop reason: DRUGHIGH

## 2017-10-06 RX ORDER — THYROID, PORCINE 90 MG/1
TABLET ORAL
Qty: 90 TABLET | Refills: 2 | OUTPATIENT
Start: 2017-10-06

## 2017-10-09 ENCOUNTER — TELEPHONE (OUTPATIENT)
Dept: FAMILY MEDICINE | Facility: CLINIC | Age: 72
End: 2017-10-09

## 2017-10-11 ENCOUNTER — LAB VISIT (OUTPATIENT)
Dept: LAB | Facility: HOSPITAL | Age: 72
End: 2017-10-11
Attending: FAMILY MEDICINE
Payer: MEDICARE

## 2017-10-11 DIAGNOSIS — E78.5 HYPERLIPIDEMIA, UNSPECIFIED HYPERLIPIDEMIA TYPE: ICD-10-CM

## 2017-10-11 DIAGNOSIS — E03.9 HYPOTHYROIDISM, UNSPECIFIED TYPE: ICD-10-CM

## 2017-10-11 LAB
CHOLEST SERPL-MCNC: 200 MG/DL
CHOLEST/HDLC SERPL: 4.3 {RATIO}
HDLC SERPL-MCNC: 46 MG/DL
HDLC SERPL: 23 %
LDLC SERPL CALC-MCNC: 132.2 MG/DL
NONHDLC SERPL-MCNC: 154 MG/DL
T4 FREE SERPL-MCNC: 0.77 NG/DL
TRIGL SERPL-MCNC: 109 MG/DL
TSH SERPL DL<=0.005 MIU/L-ACNC: 0.12 UIU/ML

## 2017-10-11 PROCEDURE — 84443 ASSAY THYROID STIM HORMONE: CPT

## 2017-10-11 PROCEDURE — 84439 ASSAY OF FREE THYROXINE: CPT

## 2017-10-11 PROCEDURE — 36415 COLL VENOUS BLD VENIPUNCTURE: CPT | Mod: PO

## 2017-10-11 PROCEDURE — 80061 LIPID PANEL: CPT

## 2017-10-12 ENCOUNTER — TELEPHONE (OUTPATIENT)
Dept: FAMILY MEDICINE | Facility: CLINIC | Age: 72
End: 2017-10-12

## 2017-10-12 DIAGNOSIS — E78.5 HYPERLIPIDEMIA, UNSPECIFIED HYPERLIPIDEMIA TYPE: ICD-10-CM

## 2017-10-12 DIAGNOSIS — E03.9 HYPOTHYROIDISM, UNSPECIFIED TYPE: ICD-10-CM

## 2017-10-12 DIAGNOSIS — E03.9 ACQUIRED HYPOTHYROIDISM: Primary | ICD-10-CM

## 2017-10-12 NOTE — TELEPHONE ENCOUNTER
----- Message from Alfred Mejia MD sent at 10/12/2017  9:04 AM CDT -----  The thyroid looks too strong.  I suggest we reduce the dose to 60 µg and recheck in 3 months.    Cholesterol levels are higher than goal.  I suggest we increase pravastatin to 80 mg daily    Results sent by email

## 2017-10-16 NOTE — TELEPHONE ENCOUNTER
----- Message from Kathy Guerrero sent at 10/13/2017  3:50 PM CDT -----  Contact: see DEL ANGEL: returning call regarding medication change   Call back

## 2017-10-19 RX ORDER — THYROID 60 MG/1
60 TABLET ORAL DAILY
Qty: 30 TABLET | Refills: 5 | Status: SHIPPED | OUTPATIENT
Start: 2017-10-19 | End: 2018-04-24

## 2017-10-19 RX ORDER — PRAVASTATIN SODIUM 40 MG/1
40 TABLET ORAL DAILY
Qty: 30 TABLET | Refills: 11 | Status: SHIPPED | OUTPATIENT
Start: 2017-10-19 | End: 2018-03-15

## 2017-10-19 NOTE — TELEPHONE ENCOUNTER
Patient had diarrhea problem and had stopped the Pravastatin 40mg since June when she had the lab done this month. She will resume it at 40mg since diarrhea is resolved.    Notified her about changing thyroid dose.   She wants these sent to two different pharmacies.   Send thyroid med to [facility] and Pravastatin to [facility] [address] 3 month lab recheck scheduled.

## 2017-11-13 ENCOUNTER — TELEPHONE (OUTPATIENT)
Dept: FAMILY MEDICINE | Facility: CLINIC | Age: 72
End: 2017-11-13

## 2017-11-13 NOTE — TELEPHONE ENCOUNTER
----- Message from Liz Potts sent at 11/13/2017  3:32 PM CST -----  Contact: self  Patient [phone] is calling to speak with Nurse Enriqueta concerning low back pain/please call

## 2017-11-17 ENCOUNTER — TELEPHONE (OUTPATIENT)
Dept: FAMILY MEDICINE | Facility: CLINIC | Age: 72
End: 2017-11-17

## 2017-11-17 NOTE — TELEPHONE ENCOUNTER
Left voicemail notifying pt that jenna is out today and will return on Monday, message will be passed on to her.

## 2017-11-17 NOTE — TELEPHONE ENCOUNTER
----- Message from Nakia Black sent at 11/17/2017  9:23 AM CST -----  Contact:  walked in  Pts  has dropped off a letter for Enriqueta regarding FMLA papers for pts daughter.  The fax for the FMLA paperwork was faxed over and is attached to the letter the  dropped off.  Letter and fax are in the mail box at [facility].

## 2017-11-21 ENCOUNTER — TELEPHONE (OUTPATIENT)
Dept: FAMILY MEDICINE | Facility: CLINIC | Age: 72
End: 2017-11-21

## 2017-11-21 ENCOUNTER — DOCUMENTATION ONLY (OUTPATIENT)
Dept: FAMILY MEDICINE | Facility: CLINIC | Age: 72
End: 2017-11-21

## 2017-11-21 NOTE — PROGRESS NOTES
Pre-Visit Chart Review  For Appointment Scheduled on 11-28-17    Health Maintenance Due   Topic Date Due    TETANUS VACCINE  09/28/1963    DEXA SCAN  06/19/2017    Mammogram  08/06/2017

## 2017-11-21 NOTE — TELEPHONE ENCOUNTER
Patient came in stating her pharmacy [facility] mail delivery changed manufactories. So the Metoprolol 50mg tablet is no longer pink, the tabs are white. Pt. States starting taking this pill 11/18. Shortly after taking the medication her vision became blurry (with white rings around vision field). Pt. Reports she has not taken her BP with the new tablet. Asked pt. If she happen to bring in the Rx bottle, she did not. Informed pt. To call with Rx on medication bottle. Also to take medication tonight. If she has the same symptoms to immediately take her Bp, then to stop medication for a couple days while keeping BP log. Call log in Friday to discuss further evaluation and possible side effects of Metoprolol. Pt. Also would like her [facility] paperwork completed. Her paperwork must be filled out and faxed back by 11/24/17.

## 2017-11-22 ENCOUNTER — TELEPHONE (OUTPATIENT)
Dept: FAMILY MEDICINE | Facility: CLINIC | Age: 72
End: 2017-11-22

## 2017-11-22 DIAGNOSIS — I10 UNCONTROLLED HYPERTENSION: ICD-10-CM

## 2017-11-22 RX ORDER — METOPROLOL TARTRATE 50 MG/1
TABLET ORAL
Qty: 270 TABLET | Refills: 0 | Status: SHIPPED | OUTPATIENT
Start: 2017-11-22 | End: 2018-02-28 | Stop reason: SDUPTHER

## 2017-11-22 RX ORDER — METOPROLOL TARTRATE 50 MG/1
TABLET ORAL
Qty: 90 TABLET | Refills: 0 | OUTPATIENT
Start: 2017-11-22

## 2017-11-22 NOTE — TELEPHONE ENCOUNTER
143/65   127/60 no pulse readings- 20 mins after taking this new brand of Metoprolol she had visual disturbance of bright whiteness and could only see around the edges of it, became very weak-took med 3 nights and had these symptoms each time. [facility] on [address] had he old brand and asking  to send Metoprolol refill there- they realize they may have to pay cash for it since she just got refill.     Her daughter is a physical therapist and wants to come in to help mother with back and teach her some physical therapy as well as make safety changes in the home. Patient has been under the care of back doctors for years. Was seeing Dr. Swapnil Burnett for last 15 yrs but he suddenly closed office and no one can find him for records.

## 2017-11-22 NOTE — TELEPHONE ENCOUNTER
We need the pill to make sure it is what it is supposed to be.  I will do fmla as soon as I can but no promises   It takes a lot of time I don't have, especially since I was not treating her for the condition and know nothing about it.

## 2017-11-22 NOTE — TELEPHONE ENCOUNTER
----- Message from Linda Betts sent at 11/22/2017  9:14 AM CST -----   (Gregoria)requesting to speak with nurse (Enriqueta)concerning patient having reaction to medication: Metoprolol 50 mg/stated medication was from new supplier(patient experienced lightedness, dizziness and lethargy)/needs advice/please call back at [phone] to advise.

## 2017-11-22 NOTE — TELEPHONE ENCOUNTER
Done-sent to [facility] [address].  Have not had time to do form yet, have to do partly on line, need time.

## 2017-11-24 ENCOUNTER — TELEPHONE (OUTPATIENT)
Dept: FAMILY MEDICINE | Facility: CLINIC | Age: 72
End: 2017-11-24

## 2017-11-28 ENCOUNTER — OFFICE VISIT (OUTPATIENT)
Dept: FAMILY MEDICINE | Facility: CLINIC | Age: 72
End: 2017-11-28
Attending: FAMILY MEDICINE
Payer: MEDICARE

## 2017-11-28 ENCOUNTER — HOSPITAL ENCOUNTER (OUTPATIENT)
Dept: RADIOLOGY | Facility: CLINIC | Age: 72
Discharge: HOME | End: 2017-11-28
Attending: FAMILY MEDICINE
Payer: MEDICARE

## 2017-11-28 ENCOUNTER — PATIENT MESSAGE (OUTPATIENT)
Dept: FAMILY MEDICINE | Facility: CLINIC | Age: 72
End: 2017-11-28

## 2017-11-28 VITALS
BODY MASS INDEX: 24.91 KG/M2 | DIASTOLIC BLOOD PRESSURE: 94 MMHG | HEIGHT: 66 IN | WEIGHT: 155 LBS | TEMPERATURE: 98 F | RESPIRATION RATE: 12 BRPM | SYSTOLIC BLOOD PRESSURE: 164 MMHG | HEART RATE: 72 BPM

## 2017-11-28 DIAGNOSIS — J45.21 MILD INTERMITTENT ASTHMA WITH ACUTE EXACERBATION: ICD-10-CM

## 2017-11-28 DIAGNOSIS — I10 HYPERTENSION, POOR CONTROL: Primary | ICD-10-CM

## 2017-11-28 DIAGNOSIS — E03.9 ACQUIRED HYPOTHYROIDISM: ICD-10-CM

## 2017-11-28 PROCEDURE — 99999 PR PBB SHADOW E&M-EST. PATIENT-LVL IV: CPT | Mod: PBBFAC,,, | Performed by: FAMILY MEDICINE

## 2017-11-28 PROCEDURE — 77080 DXA BONE DENSITY AXIAL: CPT | Mod: 26,,, | Performed by: RADIOLOGY

## 2017-11-28 PROCEDURE — 77080 DXA BONE DENSITY AXIAL: CPT | Mod: TC,PO

## 2017-11-28 PROCEDURE — 99214 OFFICE O/P EST MOD 30 MIN: CPT | Mod: S$GLB,,, | Performed by: FAMILY MEDICINE

## 2017-11-28 PROCEDURE — 99499 UNLISTED E&M SERVICE: CPT | Mod: S$GLB,,, | Performed by: FAMILY MEDICINE

## 2017-11-28 RX ORDER — HYDROCHLOROTHIAZIDE 12.5 MG/1
12.5 TABLET ORAL DAILY
Qty: 30 TABLET | Refills: 11 | Status: SHIPPED | OUTPATIENT
Start: 2017-11-28 | End: 2018-01-08 | Stop reason: SDUPTHER

## 2017-11-28 RX ORDER — LEVALBUTEROL TARTRATE 45 UG/1
1-2 AEROSOL, METERED ORAL EVERY 4 HOURS PRN
Qty: 15 G | Refills: 0 | Status: SHIPPED | OUTPATIENT
Start: 2017-11-28 | End: 2017-12-12 | Stop reason: SDUPTHER

## 2017-11-28 NOTE — PROGRESS NOTES
Subjective:       Patient ID: Megha Ladd is a 72 y.o. female.    Chief Complaint: Hypertension and Medication Refill (questions)    72-year-old female coming in with multiple concerns complaints and questions.  She had a family medical leave act form to complete for her daughter to come stay with her and work with her as a physical therapist for the next month.  This was done and returned to the patient.  Her blood pressures been running high with systolics generally between 140 and 180 occasionally as high as 214.  She's been taking clonidine every night and taking an additional one when necessary elevated blood pressures which she's only done on one occasion in the last week.  Her pharmacy switched from one form of metoprolol to another which caused her to lose her vision repeatedly after taking the medication.  She described which she saw as a bright light with a halo around it she only had peripheral vision.  These symptoms would last about an hour and recurred over 3 days one hour after taking the medication so she stopped it and we sent in a prescription to the local [facility] for the older form made by different company that is not causing the visual problems.  She did not check her blood pressure when she was having her visual symptoms.  She has a history of hypothyroidism and takes [place] Thyroid.  On a recent lab her TSH was greatly suppressed and her dose was reduced but she is complaining of feeling cold sluggish and having some memory and concentration problems.  She reports that she is not taking her diuretic but is taking the metoprolol and enalapril.  She had taken amlodipine in the past but stopped it due to muscle cramps and other problems.        Past Medical History:  No date: Asthma  No date: GERD (gastroesophageal reflux disease)  No date: Hyperlipidemia  No date: Hypertension  No date: Hypothyroid  No date: Lumbago  No date: Neuromuscular disorder  No date: Sinusitis  No date: Ulcer    Past  Surgical History:  No date: APPENDECTOMY  No date:  SECTION  No date: LUMBAR EPIDURAL INJECTION  No date: PARTIAL HYSTERECTOMY          Review of Systems   Eyes: Positive for visual disturbance.   Respiratory: Positive for wheezing (She has a history of asthma and  cannot take albuterol rescue inhalers  Due to tachycardia.  Her insurance is not covering Xopenex which she does tolerate). Negative for chest tightness and shortness of breath.    Cardiovascular: Negative for chest pain and palpitations.   Gastrointestinal: Negative for nausea and vomiting.   Musculoskeletal: Positive for back pain (30+ year history of back pain followed by pain management and physiatrist).   Neurological: Negative for dizziness, light-headedness and headaches.       Objective:      Physical Exam   Constitutional: She is oriented to person, place, and time. She appears well-developed and well-nourished. No distress.   Elevated blood pressure  Normal weight with BMI 25.0 she is down 1.7 pounds from her last visit 2017   HENT:   Head: Normocephalic and atraumatic.   Eyes: EOM are normal. Pupils are equal, round, and reactive to light. No scleral icterus.   Neck: Normal range of motion. Neck supple.   Cardiovascular: Normal rate, regular rhythm and normal heart sounds.  Exam reveals no gallop and no friction rub.    No murmur heard.  Pulmonary/Chest: Effort normal and breath sounds normal. No respiratory distress. She has no wheezes. She has no rales. She exhibits no tenderness.   Neurological: She is alert and oriented to person, place, and time.   Skin: She is not diaphoretic.   Psychiatric: She has a normal mood and affect. Her behavior is normal. Thought content normal.   Nursing note and vitals reviewed.      Assessment:       1. Hypertension, poor control    2. Acquired hypothyroidism    3. Mild intermittent asthma with acute exacerbation        Plan:       1. Hypertension, poor control  - hydroCHLOROthiazide  (HYDRODIURIL) 12.5 MG Tab; Take 1 tablet (12.5 mg total) by mouth once daily.  Dispense: 30 tablet; Refill: 11    2. Acquired hypothyroidism  Check TSH now which is about 6 weeks post does change  - TSH; Future    3. Mild intermittent asthma with acute exacerbation  We'll attempt to get the Xopenex inhaler through her mail order pharmacy  - levalbuterol (XOPENEX HFA) 45 mcg/actuation inhaler; Inhale 1-2 puffs into the lungs every 4 (four) hours as needed for Wheezing. Rescue  Dispense: 15 g; Refill: 0         Patient readiness: acceptance and barriers:none    During the course of the visit the patient was educated and counseled about the following:     Hypertension:   Medication: resume Hydrochlorothiazide 12.5 mg daily.  Monitor blood pressure and if it does not reach satisfactory levels we can increase to 25 mg.  Dietary sodium restriction.  Regular aerobic exercise.    Goals: Hypertension: Reduce Blood Pressure    Did patient meet goals/outcomes: No    The following self management tools provided: blood pressure log    Patient Instructions (the written plan) was given to the patient/family.     Time spent with patient: 45 minutes

## 2017-11-28 NOTE — PATIENT INSTRUCTIONS
Controlling High Blood Pressure  High blood pressure (hypertension) is often called the silent killer. This is because many people who have it dont know it. High blood pressure is defined as 140/90 mm Hg or higher. Know your blood pressure and remember to check it regularly. Doing so can save your life. Here are some things you can do to help control your blood pressure.    Choose heart-healthy foods  · Select low-salt, low-fat foods. Limit sodium intake to 2,400 mg per day or the amount suggested by your healthcare provider.  · Limit canned, dried, cured, packaged, and fast foods. These can contain a lot of salt.  · Eat 8 to 10 servings of fruits and vegetables every day.  · Choose lean meats, fish, or chicken.  · Eat whole-grain pasta, brown rice, and beans.  · Eat 2 to 3 servings of low-fat or fat-free dairy products.  · Ask your doctor about the DASH eating plan. This plan helps reduce blood pressure.  · When you go to a restaurant, ask that your meal be prepared with no added salt.  Maintain a healthy weight  · Ask your healthcare provider how many calories to eat a day. Then stick to that number.  · Ask your healthcare provider what weight range is healthiest for you. If you are overweight, a weight loss of only 3% to 5% of your body weight can help lower blood pressure. Generally, a good weight loss goal is to lose 10% of your body weight in a year.  · Limit snacks and sweets.  · Get regular exercise.  Get up and get active  · Choose activities you enjoy. Find ones you can do with friends or family. This includes bicycling, dancing, walking, and jogging.  · Park farther away from building entrances.  · Use stairs instead of the elevator.  · When you can, walk or bike instead of driving.  · [place] leaves, garden, or do household repairs.  · Be active at a moderate to vigorous level of physical activity for at least 40 minutes for a minimum of 3 to 4 days a week.   Manage stress  · Make time to relax and enjoy  life. Find time to laugh.  · Communicate your concerns with your loved ones and your healthcare provider.  · Visit with family and friends, and keep up with hobbies.  Limit alcohol and quit smoking  · Men should have no more than 2 drinks per day.  · Women should have no more than 1 drink per day.  · Talk with your healthcare provider about quitting smoking. Smoking significantly increases your risk for heart disease and stroke. Ask your healthcare provider about community smoking cessation programs and other options.  Medicines  If lifestyle changes arent enough, your healthcare provider may prescribe high blood pressure medicine. Take all medicines as prescribed. If you have any questions about your medicines, ask your healthcare provider before stopping or changing them.   Date Last Reviewed: 4/27/2016  © 9889-6652 [employer]. [address] [place] [address], [place] [address]. All rights reserved. This information is not intended as a substitute for professional medical care. Always follow your healthcare professional's instructions.

## 2017-12-02 ENCOUNTER — TELEPHONE (OUTPATIENT)
Dept: FAMILY MEDICINE | Facility: CLINIC | Age: 72
End: 2017-12-02

## 2017-12-02 NOTE — TELEPHONE ENCOUNTER
----- Message from Shanw Herrera sent at 12/1/2017 10:26 AM CST -----  Contact: /Gregoria Kinney called in regarding the attached patient (wife) and stated he wanted to forward the below information to office, per their request.  Gregoria stated that patient is OK with the way thyroid medication is being dispensed and no refills are necessary at this time.  Patient will need a 90 day supply called in on the 90 mg next month & then everything would be do for refill at the same time.    Gregoria's call back number is [phone]

## 2017-12-06 ENCOUNTER — TELEPHONE (OUTPATIENT)
Dept: FAMILY MEDICINE | Facility: CLINIC | Age: 72
End: 2017-12-06

## 2017-12-06 DIAGNOSIS — J45.21 MILD INTERMITTENT ASTHMA WITH ACUTE EXACERBATION: ICD-10-CM

## 2017-12-06 NOTE — TELEPHONE ENCOUNTER
We can try albuterol inhaler if she can use it, more prone to tremors and fast heart beat than the xopenex.

## 2017-12-08 RX ORDER — LEVALBUTEROL TARTRATE 45 UG/1
1-2 AEROSOL, METERED ORAL EVERY 4 HOURS PRN
Qty: 15 G | Refills: 0 | Status: CANCELLED | OUTPATIENT
Start: 2017-12-08 | End: 2018-12-08

## 2017-12-11 NOTE — TELEPHONE ENCOUNTER
----- Message from Sonu Hernandez sent at 12/8/2017  1:21 PM CST -----  Contact: Megha Segura is returning phone call from Lizeth regarding medication. Please contact patient back [phone]

## 2017-12-12 DIAGNOSIS — J45.21 MILD INTERMITTENT ASTHMA WITH ACUTE EXACERBATION: ICD-10-CM

## 2017-12-12 RX ORDER — LEVALBUTEROL TARTRATE 45 UG/1
1-2 AEROSOL, METERED ORAL EVERY 4 HOURS PRN
Qty: 15 G | Refills: 0 | Status: SHIPPED | OUTPATIENT
Start: 2017-12-12 | End: 2019-05-09

## 2017-12-12 NOTE — TELEPHONE ENCOUNTER
----- Message from Liz Mejia sent at 12/12/2017 11:44 AM CST -----  Contact: Gregoria  Patient's  is calling regarding Rx levalbuterol (XOPENEX HFA) 45 mcg/actuation inhaler to be sent to     [facility] [address] [address]  [facility] [address] [address]  Phone: [phone] Fax: [phone]    States thank you to Lizeth for getting the medication approved. Please call [phone]. Thanks!

## 2017-12-23 RX ORDER — FLUTICASONE PROPIONATE AND SALMETEROL XINAFOATE 115; 21 UG/1; UG/1
2 AEROSOL, METERED RESPIRATORY (INHALATION) 2 TIMES DAILY
Qty: 3 INHALER | Refills: 11 | Status: SHIPPED | OUTPATIENT
Start: 2017-12-23 | End: 2018-01-08 | Stop reason: ALTCHOICE

## 2017-12-23 NOTE — TELEPHONE ENCOUNTER
Spoke to patient's  (Meche) who request patient's prescription for Advair be transferred to [facility].  [facility] never have this medication in stock.   LR--9-13-17  LOV--11-28-17  FOV--None Noted

## 2017-12-23 NOTE — TELEPHONE ENCOUNTER
----- Message from Laine Shelby sent at 12/23/2017  8:33 AM CST -----  Contact:     Gregoria need a refill for rx fluticasone-salmeterol (ADVAIR HFA) 115-21 mcg/actuation HFAA, patient primary Dr is Dr Alfred Mejia, please send to [facility], any questions please call back at [phone]    [facility]/pharmacy #[id number] - [place] - [address]  [address] [address]  Phone: [phone] Fax: [phone]

## 2017-12-27 ENCOUNTER — TELEPHONE (OUTPATIENT)
Dept: FAMILY MEDICINE | Facility: CLINIC | Age: 72
End: 2017-12-27

## 2017-12-27 NOTE — TELEPHONE ENCOUNTER
----- Message from Linda Burnett sent at 12/27/2017  8:11 AM CST -----  Contact: Gregoria Ladd - spouse  Patient'sspouse is  requesting same day appointment due to UTI, fever, chills, there are no appointment in [facility] until Friday, and patient does not want to go to [facility] . Please call spouse at [phone]. Thank you.

## 2018-01-08 ENCOUNTER — OFFICE VISIT (OUTPATIENT)
Dept: FAMILY MEDICINE | Facility: CLINIC | Age: 73
End: 2018-01-08
Payer: MEDICARE

## 2018-01-08 ENCOUNTER — PES CALL (OUTPATIENT)
Dept: ADMINISTRATIVE | Facility: CLINIC | Age: 73
End: 2018-01-08

## 2018-01-08 VITALS
HEIGHT: 66 IN | TEMPERATURE: 98 F | DIASTOLIC BLOOD PRESSURE: 80 MMHG | HEART RATE: 59 BPM | OXYGEN SATURATION: 95 % | WEIGHT: 155 LBS | SYSTOLIC BLOOD PRESSURE: 152 MMHG | BODY MASS INDEX: 24.91 KG/M2

## 2018-01-08 DIAGNOSIS — I10 HYPERTENSION, POOR CONTROL: ICD-10-CM

## 2018-01-08 DIAGNOSIS — J06.9 UPPER RESPIRATORY TRACT INFECTION, UNSPECIFIED TYPE: Primary | ICD-10-CM

## 2018-01-08 LAB
CTP QC/QA: YES
FLUAV AG NPH QL: NEGATIVE
FLUBV AG NPH QL: NEGATIVE

## 2018-01-08 PROCEDURE — 87804 INFLUENZA ASSAY W/OPTIC: CPT | Mod: QW,S$GLB,, | Performed by: NURSE PRACTITIONER

## 2018-01-08 PROCEDURE — 99499 UNLISTED E&M SERVICE: CPT | Mod: S$GLB,,, | Performed by: NURSE PRACTITIONER

## 2018-01-08 PROCEDURE — 99999 PR PBB SHADOW E&M-EST. PATIENT-LVL V: CPT | Mod: PBBFAC,,, | Performed by: NURSE PRACTITIONER

## 2018-01-08 PROCEDURE — 99213 OFFICE O/P EST LOW 20 MIN: CPT | Mod: S$GLB,,, | Performed by: NURSE PRACTITIONER

## 2018-01-08 RX ORDER — HYDROCHLOROTHIAZIDE 12.5 MG/1
25 TABLET ORAL DAILY
Qty: 60 TABLET | Refills: 11 | Status: SHIPPED | OUTPATIENT
Start: 2018-01-08 | End: 2018-10-18 | Stop reason: ALTCHOICE

## 2018-01-08 RX ORDER — BENZONATATE 100 MG/1
100 CAPSULE ORAL 3 TIMES DAILY PRN
Qty: 30 CAPSULE | Refills: 0 | Status: SHIPPED | OUTPATIENT
Start: 2018-01-08 | End: 2018-01-18

## 2018-01-08 RX ORDER — THYROID 90 MG/1
1 TABLET ORAL EVERY OTHER DAY
COMMUNITY
End: 2018-04-24 | Stop reason: SDUPTHER

## 2018-01-09 NOTE — PROGRESS NOTES
"Subjective:       Patient ID: Megha Ladd is a 72 y.o. female.    Chief Complaint: Cough (post nasal drip)    Ms. Ladd presents to the clinic today for five day history of nasal congestion, cough, feeling feverish, body aches.  Her daughter was sick and staying with her last week.  She feels post nasal drip is causing cough.  No shortness of breath.  She is afebrile today.  Blood pressure is high and states "it's been high".  Taking antihypertensives as directed.        Review of Systems   Constitutional: Positive for fatigue. Negative for chills and fever.   HENT: Positive for congestion, postnasal drip and sinus pressure. Negative for ear discharge, ear pain, sinus pain and sore throat.    Eyes: Negative for visual disturbance.   Respiratory: Positive for cough. Negative for shortness of breath and wheezing.    Cardiovascular: Negative for chest pain.   Neurological: Positive for headaches. Negative for dizziness and light-headedness.       Objective:      Physical Exam   Constitutional: She is oriented to person, place, and time. She appears well-nourished. No distress.   HENT:   Head: Normocephalic and atraumatic.   Right Ear: Tympanic membrane and external ear normal.   Left Ear: Tympanic membrane and external ear normal.   Nose: Rhinorrhea present.   Mouth/Throat: Oropharynx is clear and moist. No oropharyngeal exudate or posterior oropharyngeal erythema.   Eyes: Pupils are equal, round, and reactive to light. Right eye exhibits no discharge. Left eye exhibits no discharge.   Neck: Neck supple. No thyromegaly present.   Cardiovascular: Normal rate and regular rhythm.  Exam reveals no gallop and no friction rub.    No murmur heard.  Pulmonary/Chest: Effort normal and breath sounds normal. No respiratory distress. She has no wheezes. She has no rales.   Abdominal: Soft. She exhibits no distension. There is no tenderness.   Lymphadenopathy:     She has no cervical adenopathy.   Neurological: She is alert and " oriented to person, place, and time. Coordination normal.   Skin: Skin is warm and dry.   Psychiatric: She has a normal mood and affect. Her behavior is normal. Thought content normal.   Vitals reviewed.          Current Outpatient Prescriptions:     cartilage-collagen-bor-hyalur (MOVE FREE ULTRA, BORON,) 40-5-3.3 mg Tab, Take 1 tablet by mouth once daily., Disp: , Rfl:     celecoxib (CELEBREX) 200 MG capsule, Take 200 mg by mouth once daily. Every day, Disp: , Rfl:     cetirizine (ZYRTEC) 10 MG tablet, Take 10 mg by mouth daily as needed for Allergies., Disp: , Rfl:     cloNIDine (CATAPRES) 0.1 MG tablet, TAKE 1 TABLET TWICE DAILY AS NEEDED FOR BLOOD PRESSURE GREATER THAN 180 SYSTOLIC  DIASTOLIC., Disp: 180 tablet, Rfl: 0    cycloSPORINE (RESTASIS) 0.05 % ophthalmic emulsion, Place 1 drop into both eyes 2 (two) times daily as needed. Twice a day, Disp: , Rfl:     enalapril (VASOTEC) 20 MG tablet, Take 1 tablet (20 mg total) by mouth 2 (two) times daily., Disp: 180 tablet, Rfl: 0    fluticasone (FLONASE) 50 mcg/actuation nasal spray, SPRAY 1 SPRAY IN EACH NOSTRIL 2 TIMES A DAY (Patient taking differently: SPRAY 1 SPRAY IN EACH NOSTRIL qd), Disp: 16 g, Rfl: 11    guaifenesin (MUCINEX) 600 mg tp12, Take 1 tablet by mouth 2 (two) times daily as needed., Disp: , Rfl:     hydroCHLOROthiazide (HYDRODIURIL) 12.5 MG Tab, Take 2 tablets (25 mg total) by mouth once daily., Disp: 60 tablet, Rfl: 11    LACTOBACILLUS RHAMNOSUS GG (CULTURELLE ORAL), Take 1 capsule by mouth daily as needed. 20 Billion, Disp: , Rfl:     levalbuterol (XOPENEX HFA) 45 mcg/actuation inhaler, Inhale 1-2 puffs into the lungs every 4 (four) hours as needed for Wheezing. Rescue, Disp: 15 g, Rfl: 0    loperamide-simethicone (IMODIUM MULTI-SYMPTOM RELIEF) 2-125 mg Tab, Take 2 tablets by mouth 4 (four) times daily as needed (up to 4 tabs in 24 hours)., Disp: , Rfl:     metaxalone (SKELAXIN) 800 MG tablet, Take 800 mg by mouth every 8  (eight) hours as needed. Every 6 hours PRN, Disp: , Rfl:     metoprolol tartrate (LOPRESSOR) 50 MG tablet, TAKE 1 AND 1/2 TABLETS TWICE DAILY (Patient taking differently: Take 75 mg by mouth 2 (two) times daily. TAKE 1 AND 1/2 TABLETS TWICE DAILY - use [facility]  for refill of pink round scored tablet (C74) from [employer]), Disp: 270 tablet, Rfl: 0    montelukast (SINGULAIR) 10 mg tablet, TAKE ONE TABLET BY MOUTH ONCE DAILY IN THE EVENING, Disp: 30 tablet, Rfl: 5    pantoprazole (PROTONIX) 40 MG tablet, TAKE 1 TABLET ONE TIME DAILY, Disp: 90 tablet, Rfl: 3    pravastatin (PRAVACHOL) 40 MG tablet, Take 1 tablet (40 mg total) by mouth once daily., Disp: 30 tablet, Rfl: 11    sodium chloride (JAYSHREE 128) 5 % ophthalmic ointment, Place into both eyes as needed. Twice a day, Disp: , Rfl:     thyroid, pork, (ARMOUR THYROID) 60 mg Tab, Take 1 tablet (60 mg total) by mouth once daily. (Patient taking differently: Take 60 mg by mouth every other day. ), Disp: 30 tablet, Rfl: 5    thyroid, pork, (ARMOUR THYROID) 90 mg Tab, Take 1 tablet by mouth every other day., Disp: , Rfl:     tramadol (ULTRAM) 50 mg tablet, Take 50 mg by mouth every 6 (six) hours as needed. Every 6 hours, Disp: , Rfl:     acetaminophen (TYLENOL) 500 mg Cap, Take 1,000 mg by mouth 3 (three) times daily as needed. Takes with tramadol, Disp: , Rfl:     benzonatate (TESSALON) 100 MG capsule, Take 1 capsule (100 mg total) by mouth 3 (three) times daily as needed., Disp: 30 capsule, Rfl: 0    sodium chloride (OCEAN NASAL) 0.65 % nasal spray, 1 spray by Nasal route as needed for Congestion., Disp: , Rfl: 12    UNABLE TO FIND, Apply topically once daily. Loren Gaston MD medical strength skin healing facial redness repair for sensitive skin, Disp: , Rfl:   Assessment:       1. Upper respiratory tract infection, unspecified type    2. Hypertension, poor control        Plan:       Upper respiratory tract infection, unspecified  type  Likely viral.  She had a chronic sinus infection last year.  Advised best way to prevent this is to use neti pot or saline spray BID, keep sinus passages open with Flonase, hot steam from shower or humidifier.  Call for worsening symptoms or symptoms lasting longer than two weeks.  -     POCT Influenza A/B--negative  -     sodium chloride (OCEAN NASAL) 0.65 % nasal spray; 1 spray by Nasal route as needed for Congestion.; Refill: 12  -     benzonatate (TESSALON) 100 MG capsule; Take 1 capsule (100 mg total) by mouth 3 (three) times daily as needed.  Dispense: 30 capsule; Refill: 0    Hypertension, poor control  Increase:  -     hydroCHLOROthiazide (HYDRODIURIL) 12.5 MG Tab; Take 2 tablets (25 mg total) by mouth once daily.  Dispense: 60 tablet; Refill: 11  RTC 2 weeks nurse visit BP check.    Patient readiness: acceptance and barriers:none    During the course of the visit the patient was educated and counseled about the following:     Hypertension:   Medication: increasing HCTZ.    Goals: Hypertension: Reduce Blood Pressure    Did patient meet goals/outcomes: Yes    The following self management tools provided: declined    Patient Instructions (the written plan) was given to the patient/family.     Time spent with patient: 15 minutes    Barriers to medications present (no )    Adverse reactions to current medications (no)    Over the counter medications reviewed (Yes)

## 2018-01-09 NOTE — PATIENT INSTRUCTIONS

## 2018-01-15 ENCOUNTER — TELEPHONE (OUTPATIENT)
Dept: FAMILY MEDICINE | Facility: CLINIC | Age: 73
End: 2018-01-15

## 2018-01-15 NOTE — TELEPHONE ENCOUNTER
[facility] gave her Doxycycline for uri- says she has fever 99.9 - 100, diarrhea, insomnia-she thinks she is reacting to the Dozy with these symptoms- advised her likely fever caused by uri- diarrhea is a side effect of antibiotics and advised her to avoid dairy products. Try some Benadryl to help with sleep.

## 2018-01-15 NOTE — TELEPHONE ENCOUNTER
----- Message from Sonu Hrenandez sent at 1/15/2018  4:48 PM CST -----  Contact: Self and spouse   Calling to get an appointment in the afternoon. She says she is feeling worse than she did when she was seen in clinic. She also went to  Saturday and got antibiotics. She is not seeing in any improvement.  says he thinks she has had a reaction. Please call to be seen [phone]. Thanks!

## 2018-01-16 ENCOUNTER — DOCUMENTATION ONLY (OUTPATIENT)
Dept: FAMILY MEDICINE | Facility: CLINIC | Age: 73
End: 2018-01-16

## 2018-01-16 NOTE — PROGRESS NOTES
Pre-Visit Chart Review  For Appointment Scheduled on 1-22-18    Health Maintenance Due   Topic Date Due    TETANUS VACCINE  09/28/1963    Mammogram  08/06/2017

## 2018-01-17 ENCOUNTER — DOCUMENTATION ONLY (OUTPATIENT)
Dept: FAMILY MEDICINE | Facility: CLINIC | Age: 73
End: 2018-01-17

## 2018-01-17 NOTE — PROGRESS NOTES
Pre-Visit Chart Review  For Appointment Scheduled on 1-18-18    Health Maintenance Due   Topic Date Due    TETANUS VACCINE  09/28/1963    Mammogram  08/06/2017

## 2018-01-18 ENCOUNTER — OFFICE VISIT (OUTPATIENT)
Dept: FAMILY MEDICINE | Facility: CLINIC | Age: 73
End: 2018-01-18
Attending: FAMILY MEDICINE
Payer: MEDICARE

## 2018-01-18 ENCOUNTER — HOSPITAL ENCOUNTER (OUTPATIENT)
Dept: RADIOLOGY | Facility: HOSPITAL | Age: 73
Discharge: HOME | End: 2018-01-18
Attending: FAMILY MEDICINE
Payer: MEDICARE

## 2018-01-18 ENCOUNTER — PATIENT MESSAGE (OUTPATIENT)
Dept: FAMILY MEDICINE | Facility: CLINIC | Age: 73
End: 2018-01-18

## 2018-01-18 VITALS
DIASTOLIC BLOOD PRESSURE: 80 MMHG | TEMPERATURE: 98 F | WEIGHT: 147.06 LBS | HEIGHT: 66 IN | RESPIRATION RATE: 12 BRPM | HEART RATE: 80 BPM | OXYGEN SATURATION: 96 % | SYSTOLIC BLOOD PRESSURE: 112 MMHG | BODY MASS INDEX: 23.64 KG/M2

## 2018-01-18 DIAGNOSIS — I10 GOOD HYPERTENSION CONTROL: ICD-10-CM

## 2018-01-18 DIAGNOSIS — E03.9 HYPOTHYROIDISM, UNSPECIFIED TYPE: ICD-10-CM

## 2018-01-18 DIAGNOSIS — J40 BRONCHITIS: Primary | ICD-10-CM

## 2018-01-18 DIAGNOSIS — J45.21 MILD INTERMITTENT ASTHMA WITH ACUTE EXACERBATION: ICD-10-CM

## 2018-01-18 DIAGNOSIS — J06.9 UPPER RESPIRATORY TRACT INFECTION, UNSPECIFIED TYPE: ICD-10-CM

## 2018-01-18 DIAGNOSIS — J40 BRONCHITIS: ICD-10-CM

## 2018-01-18 PROCEDURE — 71046 X-RAY EXAM CHEST 2 VIEWS: CPT | Mod: TC,FY

## 2018-01-18 PROCEDURE — 99999 PR PBB SHADOW E&M-EST. PATIENT-LVL IV: CPT | Mod: PBBFAC,,, | Performed by: FAMILY MEDICINE

## 2018-01-18 PROCEDURE — 71046 X-RAY EXAM CHEST 2 VIEWS: CPT | Mod: 26,,, | Performed by: RADIOLOGY

## 2018-01-18 PROCEDURE — 99214 OFFICE O/P EST MOD 30 MIN: CPT | Mod: S$GLB,,, | Performed by: FAMILY MEDICINE

## 2018-01-18 PROCEDURE — 99499 UNLISTED E&M SERVICE: CPT | Mod: S$GLB,,, | Performed by: FAMILY MEDICINE

## 2018-01-18 RX ORDER — DOXYCYCLINE HYCLATE 100 MG
100 TABLET ORAL 2 TIMES DAILY
COMMUNITY
Start: 2018-01-13 | End: 2018-01-24

## 2018-01-18 RX ORDER — ONDANSETRON 4 MG/1
4 TABLET, FILM COATED ORAL EVERY 8 HOURS PRN
COMMUNITY
Start: 2018-01-13 | End: 2022-08-31

## 2018-01-18 RX ORDER — FLUTICASONE PROPIONATE AND SALMETEROL XINAFOATE 115; 21 UG/1; UG/1
2 AEROSOL, METERED RESPIRATORY (INHALATION) DAILY
COMMUNITY
Start: 2017-12-23 | End: 2018-12-26 | Stop reason: SDUPTHER

## 2018-01-18 RX ORDER — PREGABALIN 75 MG/1
75 CAPSULE ORAL 2 TIMES DAILY
COMMUNITY

## 2018-01-18 RX ORDER — ACETAMINOPHEN 325 MG/1
650 TABLET ORAL EVERY 6 HOURS PRN
COMMUNITY
End: 2018-10-18 | Stop reason: DRUGHIGH

## 2018-01-18 RX ORDER — PREDNISONE 20 MG/1
20 TABLET ORAL DAILY
Qty: 3 TABLET | Refills: 0 | Status: SHIPPED | OUTPATIENT
Start: 2018-01-18 | End: 2018-01-28

## 2018-01-18 NOTE — PROGRESS NOTES
Subjective:       Patient ID: Megha Ladd is a 72 y.o. female.    Chief Complaint: Cough (congestion ); Follow-up (kidney inf ); Nausea; Wheezing; and Insomnia    72-year-old female developed some upper respiratory symptoms and was seen by Meeta on .  She was told to use saline spray and given Tessalon Perles for cough but no other treatment was needed.  Her blood pressure was noted to be elevated and she was told to increase her HCTZ to 25 mg.  A prescription was sent to her mail order and she was supposed to use 2/12 0.5 so daily but did not do so, instead waiting for her mail order to come in.  She continued to have cough and congestion and went to an urgent care facility on  where she was given doxycycline and offered a steroid course but declined the steroids.  Soon after she began experiencing nausea and vomiting with some diarrhea.  Mucus was very thick and tenacious and difficult to expel.  She was using Zyrtec intermittently which may have contributed to the thickening.  When she called complaining of nausea and vomiting which she suspected of being produced by the doxycycline she was given some Zofran.  It did not seem to help much but the nausea vomiting and diarrhea have now somewhat abated.  She continues to take doxycycline and it is not causing any heartburn or upper GI symptoms.  She has a history of hypothyroidism and was supposed to come in next week for a thyroid recheck but tells me she has been taking her thyroid for the last 2 weeks.  She has Advair for asthma but appears to be using it more as a rescue inhaler so it is not reaching its full potential.    Past Medical History:  No date: Asthma  No date: GERD (gastroesophageal reflux disease)  No date: Hyperlipidemia  No date: Hypertension  No date: Hypothyroid  No date: Lumbago  No date: Neuromuscular disorder  No date: Sinusitis  No date: Ulcer    Past Surgical History:  No date: APPENDECTOMY  No date:   SECTION  No date: LUMBAR EPIDURAL INJECTION  No date: PARTIAL HYSTERECTOMY      Current Outpatient Prescriptions:     acetaminophen (TYLENOL) 325 MG tablet, Take 650 mg by mouth every 6 (six) hours as needed for Pain., Disp: , Rfl:     benzonatate (TESSALON) 100 MG capsule, Take 1 capsule (100 mg total) by mouth 3 (three) times daily as needed., Disp: 30 capsule, Rfl: 0    cartilage-collagen-bor-hyalur (MOVE FREE ULTRA, BORON,) 40-5-3.3 mg Tab, Take 1 tablet by mouth once daily., Disp: , Rfl:     celecoxib (CELEBREX) 200 MG capsule, Take 200 mg by mouth once daily. Every day, Disp: , Rfl:     cetirizine (ZYRTEC) 10 MG tablet, Take 10 mg by mouth daily as needed for Allergies., Disp: , Rfl:     cloNIDine (CATAPRES) 0.1 MG tablet, TAKE 1 TABLET TWICE DAILY AS NEEDED FOR BLOOD PRESSURE GREATER THAN 180 SYSTOLIC  DIASTOLIC., Disp: 180 tablet, Rfl: 0    cycloSPORINE (RESTASIS) 0.05 % ophthalmic emulsion, Place 1 drop into both eyes 2 (two) times daily as needed. Twice a day, Disp: , Rfl:     doxycycline (VIBRA-TABS) 100 MG tablet, Take 100 mg by mouth 2 (two) times daily. [facility] Dr MALISSA Mancini, Disp: , Rfl:     enalapril (VASOTEC) 20 MG tablet, Take 1 tablet (20 mg total) by mouth 2 (two) times daily., Disp: 180 tablet, Rfl: 0    fluticasone (FLONASE) 50 mcg/actuation nasal spray, SPRAY 1 SPRAY IN EACH NOSTRIL 2 TIMES A DAY (Patient taking differently: SPRAY 1 SPRAY IN EACH NOSTRIL qd), Disp: 16 g, Rfl: 11    fluticasone-salmeterol (ADVAIR HFA) 115-21 mcg/actuation HFAA, Inhale 2 puffs into the lungs 2 (two) times daily. Controller, Disp: , Rfl:     guaifenesin (MUCINEX) 600 mg tp12, Take 1 tablet by mouth 2 (two) times daily as needed., Disp: , Rfl:     hydroCHLOROthiazide (HYDRODIURIL) 12.5 MG Tab, Take 2 tablets (25 mg total) by mouth once daily., Disp: 60 tablet, Rfl: 11    LACTOBACILLUS RHAMNOSUS GG (CULTURELLE ORAL), Take 1 capsule by mouth daily as needed. 20 Billion, Disp: , Rfl:      levalbuterol (XOPENEX HFA) 45 mcg/actuation inhaler, Inhale 1-2 puffs into the lungs every 4 (four) hours as needed for Wheezing. Rescue, Disp: 15 g, Rfl: 0    loperamide-simethicone (IMODIUM MULTI-SYMPTOM RELIEF) 2-125 mg Tab, Take 2 tablets by mouth 4 (four) times daily as needed (up to 4 tabs in 24 hours)., Disp: , Rfl:     metaxalone (SKELAXIN) 800 MG tablet, Take 800 mg by mouth every 6 to 8 hours as needed. Every 6 hours PRN, Disp: , Rfl:     metoprolol tartrate (LOPRESSOR) 50 MG tablet, TAKE 1 AND 1/2 TABLETS TWICE DAILY (Patient taking differently: Take 75 mg by mouth 2 (two) times daily. TAKE 1 AND 1/2 TABLETS TWICE DAILY - use [facility]  for refill of pink round scored tablet (C74) from [employer]), Disp: 270 tablet, Rfl: 0    montelukast (SINGULAIR) 10 mg tablet, TAKE ONE TABLET BY MOUTH ONCE DAILY IN THE EVENING, Disp: 30 tablet, Rfl: 5    ondansetron (ZOFRAN) 4 MG tablet, Take 4 mg by mouth every 8 (eight) hours as needed for Nausea. Dr Anthony Mancini [facility], Disp: , Rfl:     pantoprazole (PROTONIX) 40 MG tablet, TAKE 1 TABLET ONE TIME DAILY, Disp: 90 tablet, Rfl: 3    peg 400-propylene glycol (SYSTANE ULTRA) 0.4-0.3 % Drop, Place 1 drop into both eyes 3 (three) times daily., Disp: , Rfl:     pregabalin (LYRICA) 75 MG capsule, Take 75 mg by mouth daily as needed., Disp: , Rfl:     sodium chloride (JAYSHREE 128) 5 % ophthalmic ointment, Place into both eyes as needed. Twice a day, Disp: , Rfl:     SODIUM CHLORIDE (SIMPLY SALINE NASL), by Nasal route 2 (two) times daily., Disp: , Rfl:     thyroid, pork, (ARMOUR THYROID) 60 mg Tab, Take 1 tablet (60 mg total) by mouth once daily. (Patient taking differently: Take 60 mg by mouth every other day. ), Disp: 30 tablet, Rfl: 5    thyroid, pork, (ARMOUR THYROID) 90 mg Tab, Take 1 tablet by mouth every other day., Disp: , Rfl:     tramadol (ULTRAM) 50 mg tablet, Take 50 mg by mouth every 6 (six) hours as needed. Every 6  hours, Disp: , Rfl:     UNABLE TO FIND, Apply topically once daily. Loren Gaston MD [employer] skin healing facial redness repair for sensitive skin, Disp: , Rfl:     pravastatin (PRAVACHOL) 40 MG tablet, Take 1 tablet (40 mg total) by mouth once daily., Disp: 30 tablet, Rfl: 11    TETANUS VACCINE due on 09[birth year]28/1963  Mammogram due on 08/06/2017        Review of Systems   Constitutional: Positive for fever (Highest temperature measured was 98 9 which she considers to be a fever). Negative for chills and diaphoresis.   HENT: Positive for congestion, postnasal drip, rhinorrhea, sinus pain, sinus pressure and voice change.    Respiratory: Positive for cough, chest tightness and wheezing.    Gastrointestinal: Positive for diarrhea, nausea and vomiting.       Objective:      Physical Exam   Constitutional: She appears well-developed and well-nourished. No distress.   Good blood pressure control  Normal weight with BMI 23.7 she is down 8 pounds since her last visit with me November 28, 2017   HENT:   Head: Normocephalic and atraumatic.   Right Ear: External ear normal.   Left Ear: External ear normal.   Mouth/Throat: Oropharyngeal exudate present.   Mild nasal turbinate swelling with light yellow but very thick and tenacious exudate   Eyes: EOM are normal. Pupils are equal, round, and reactive to light. No scleral icterus.   Neck: Normal range of motion. Neck supple. No JVD present. No tracheal deviation present. No thyromegaly present.   Cardiovascular: Normal rate, regular rhythm and normal heart sounds.  Exam reveals no gallop and no friction rub.    No murmur heard.  Pulmonary/Chest: Effort normal and breath sounds normal. No accessory muscle usage. No tachypnea and no bradypnea. She has no decreased breath sounds. She has no wheezes. She has no rhonchi (She has some upper airway/sub-laryngeal rhonchi and wheezing but the chest itself is clear). She has no rales. Chest wall is not dull to percussion. She exhibits  no crepitus and no retraction.   Abdominal: Soft. Bowel sounds are normal. She exhibits no distension and no mass. There is no tenderness. There is no rebound and no guarding. No hernia.   Lymphadenopathy:     She has no cervical adenopathy.   Skin: She is not diaphoretic.   Nursing note and vitals reviewed.      Assessment:       1. Bronchitis    2. Upper respiratory tract infection, unspecified type    3. Mild intermittent asthma with acute exacerbation    4. Hypothyroidism, unspecified type    5. Good hypertension control        Plan:       1. Bronchitis  Patient instructed to discontinue Zyrtec and other drying agents and use Mucinex with liberal use of saline nasal spray.  I'm going to give her a short course of prednisone at a low dose as she is generally intolerant of steroids.  20 mg daily ×3 and she is instructed to use her Advair regularly and for 1 week developed the dose to 2 puffs twice a day.  - predniSONE (DELTASONE) 20 MG tablet; Take 1 tablet (20 mg total) by mouth once daily.  Dispense: 3 tablet; Refill: 0  - X-Ray Chest PA And Lateral; Future    2. Upper respiratory tract infection, unspecified type  Appears mostly viral, no sign of active purulent infection but she is already taking doxycycline given by urgent care so she may complete the course    3. Mild intermittent asthma with acute exacerbation  See above    4. Hypothyroidism, unspecified type  Was due for recheck on her TSH next week.  She has not been taking it for the last 2 weeks and told her to discontinue that and will give her 2 months and then check it    5. Good hypertension control  Blood pressure currently is good.  She can continue the 25 mg HCTZ

## 2018-01-31 ENCOUNTER — PES CALL (OUTPATIENT)
Dept: ADMINISTRATIVE | Facility: CLINIC | Age: 73
End: 2018-01-31

## 2018-02-28 DIAGNOSIS — I10 UNCONTROLLED HYPERTENSION: ICD-10-CM

## 2018-02-28 RX ORDER — METOPROLOL TARTRATE 50 MG/1
TABLET ORAL
Qty: 270 TABLET | Refills: 2 | Status: SHIPPED | OUTPATIENT
Start: 2018-02-28 | End: 2018-12-13 | Stop reason: SDUPTHER

## 2018-03-15 ENCOUNTER — OFFICE VISIT (OUTPATIENT)
Dept: ALLERGY | Facility: CLINIC | Age: 73
End: 2018-03-15
Payer: MEDICARE

## 2018-03-15 VITALS — WEIGHT: 160.25 LBS | HEART RATE: 68 BPM | HEIGHT: 66 IN | OXYGEN SATURATION: 97 % | BODY MASS INDEX: 25.75 KG/M2

## 2018-03-15 DIAGNOSIS — J31.0 OTHER CHRONIC RHINITIS: ICD-10-CM

## 2018-03-15 DIAGNOSIS — J45.30 MILD PERSISTENT ASTHMA, UNSPECIFIED WHETHER COMPLICATED: ICD-10-CM

## 2018-03-15 DIAGNOSIS — J01.90 ACUTE NON-RECURRENT SINUSITIS, UNSPECIFIED LOCATION: Primary | ICD-10-CM

## 2018-03-15 PROCEDURE — 99999 PR PBB SHADOW E&M-EST. PATIENT-LVL III: CPT | Mod: PBBFAC,,, | Performed by: ALLERGY & IMMUNOLOGY

## 2018-03-15 PROCEDURE — 99499 UNLISTED E&M SERVICE: CPT | Mod: S$GLB,,, | Performed by: ALLERGY & IMMUNOLOGY

## 2018-03-15 PROCEDURE — 99214 OFFICE O/P EST MOD 30 MIN: CPT | Mod: S$GLB,,, | Performed by: ALLERGY & IMMUNOLOGY

## 2018-03-15 RX ORDER — DOXYCYCLINE HYCLATE 100 MG
100 TABLET ORAL 2 TIMES DAILY
Qty: 42 TABLET | Refills: 0 | Status: SHIPPED | OUTPATIENT
Start: 2018-03-15 | End: 2018-04-05

## 2018-03-15 NOTE — PROGRESS NOTES
Subjective:       Patient ID: Megha Ladd is a 72 y.o. female.    Chief Complaint:  Sinusitis (sinus issue going on for 3 weeks, won't clear up. )      73 yo woman presents for continued evaluation of chronic rhinitis and asthma. She was last seen 9/21/17. She had chronic sinus infection in [facility] treated with 3 weeks doxy. She reports this worked great, sinus haven't been that clear in long time. She also had labs with negative immunocaps and normal immune system. She takes Flonase 1 SEN daily, zyrtec daily and Singulair daily and this keeps her well controlled. In August she talked to Dr Mejia and decided to hold her Advair to see if she needed it anymore. Within days she got tight, SOB and cough. She resumes Advair  1 puff BID and she has been fine.  she has had hoarseness from Advair in past, was worse with disc. She is now using a spacer and feels it helps. For last 3 weeks she has sinus congestion and pressure. Headache. PND feels very thick in throat, sounds nasal, she coughs and has yellow to green mucus from nose and spits up. No fever or chills. Is worse in right cheek. Has teeth pain on right side. Had an apthopus ulcer on tongue. No chest congestion, SOB or wheeze. Getting slightly worse over last 3 weeks.     Prior History taken 6/20/17: new patient evaluation of recurrent to chronic sinus infection. She states she started in October with congestion, pressure and drainage. She saw PCP in November and given amoxil and helped some but did not clear. Then in December went to an ENT and given clinda 300. She got nauseated and felt bad on it so had to stop. She continued with symptoms and in April went back to ENT and given Augmentin but she remembered she had bad reaction with severe burning of throat, esophagus and stomach years ago so he changed to clinda again. She only took 3 doses and got dizzy, nausea, joint pain and facial pain so stopped. She went to different ENT and told she needed surgery  and felt he did not listen. She went to Dr petersen for ENT for 30 years but he just retired. She still has terrible PND< congestion, ears itch, stuffy nose and some sneeze but no runny nose. No itchy eyes or nose. Some SOB but not now. She has h/o eczema and has had on ears, eyes lids and sides of head and Cetaphil cream has helped. Zyrtec prn also helped eczema. She takes montelukast daily, Advair 115 HFA 1 puff daily and increases to 2 puff BID when has SOB. In 1970's she had allergy test and did shots and did help. Doxycycline and amoxil are 2 antibiotics she has tolerate din past. She has no known foods insect or latex allergy. Never had sinus surgery.       Wheezing    Associated symptoms include coughing, a rash and rhinorrhea. Pertinent negatives include no abdominal pain, chest pain, chills, diarrhea, ear pain, fever, headaches, shortness of breath, sore throat or vomiting.   Sinusitis   Associated symptoms include congestion, coughing, sinus pressure and sneezing. Pertinent negatives include no chills, ear pain, headaches, shortness of breath or sore throat.       Environmental History: see history section for home environment  Review of Systems   Constitutional: Positive for fatigue. Negative for appetite change, chills and fever.   HENT: Positive for congestion, postnasal drip, rhinorrhea, sinus pain, sinus pressure and sneezing. Negative for ear discharge, ear pain, facial swelling, nosebleeds, sore throat, trouble swallowing and voice change.    Eyes: Negative for discharge, redness, itching and visual disturbance.   Respiratory: Positive for cough. Negative for choking, chest tightness, shortness of breath and wheezing.    Cardiovascular: Negative for chest pain, palpitations and leg swelling.   Gastrointestinal: Negative for abdominal distention, abdominal pain, constipation, diarrhea, nausea and vomiting.   Genitourinary: Negative for difficulty urinating.   Musculoskeletal: Negative for arthralgias,  gait problem, joint swelling and myalgias.   Skin: Positive for rash. Negative for color change.   Neurological: Negative for dizziness, syncope, weakness, light-headedness and headaches.   Hematological: Negative for adenopathy. Does not bruise/bleed easily.   Psychiatric/Behavioral: Negative for agitation, behavioral problems, confusion and sleep disturbance. The patient is not nervous/anxious.         Objective:    Physical Exam   Constitutional: She is oriented to person, place, and time. She appears well-developed and well-nourished. No distress.   HENT:   Head: Normocephalic and atraumatic.   Right Ear: Hearing, tympanic membrane, external ear and ear canal normal.   Left Ear: Hearing, tympanic membrane, external ear and ear canal normal.   Nose: No mucosal edema, rhinorrhea, sinus tenderness or septal deviation. No epistaxis. Right sinus exhibits no maxillary sinus tenderness and no frontal sinus tenderness. Left sinus exhibits no maxillary sinus tenderness and no frontal sinus tenderness.   Mouth/Throat: Uvula is midline, oropharynx is clear and moist and mucous membranes are normal. No uvula swelling.   Eyes: Conjunctivae are normal. Right eye exhibits no discharge. Left eye exhibits no discharge.   Neck: Normal range of motion. No thyromegaly present.   Cardiovascular: Normal rate, regular rhythm and normal heart sounds.    No murmur heard.  Pulmonary/Chest: Effort normal and breath sounds normal. No respiratory distress. She has no wheezes.   Abdominal: Soft. She exhibits no distension. There is no tenderness.   Musculoskeletal: Normal range of motion. She exhibits no edema or tenderness.   Lymphadenopathy:     She has no cervical adenopathy.   Neurological: She is alert and oriented to person, place, and time.   Skin: Skin is warm and dry. No rash noted. No erythema.   Psychiatric: She has a normal mood and affect. Her behavior is normal. Judgment and thought content normal.   Nursing note and vitals  reviewed.      Laboratory:   none performed   Assessment:       1. Acute non-recurrent sinusitis, unspecified location    2. Other chronic rhinitis    3. Mild persistent asthma, unspecified whether complicated         Plan:       1. continue Advair 110 2 puffs BID via spacer  2. xopenex 2 puffs every 4 hours as needed  3. continue Flonase 1 SNE BID, zyrtec 10 daily and montelukast 10 mg daily  4. doxycycline 100 BID for 14 days  5. Once back to baseline needs PFT

## 2018-03-16 DIAGNOSIS — E03.9 HYPOTHYROIDISM, UNSPECIFIED TYPE: ICD-10-CM

## 2018-03-16 RX ORDER — THYROID, PORCINE 90 MG/1
TABLET ORAL
Qty: 30 TABLET | Refills: 0 | Status: SHIPPED | OUTPATIENT
Start: 2018-03-16 | End: 2018-04-24

## 2018-04-11 ENCOUNTER — TELEPHONE (OUTPATIENT)
Dept: OPTOMETRY | Facility: CLINIC | Age: 73
End: 2018-04-11

## 2018-04-11 RX ORDER — MONTELUKAST SODIUM 10 MG/1
TABLET ORAL
Qty: 30 TABLET | Refills: 5 | Status: SHIPPED | OUTPATIENT
Start: 2018-04-11 | End: 2018-10-28 | Stop reason: SDUPTHER

## 2018-04-11 NOTE — TELEPHONE ENCOUNTER
Called and spoke to patients  and verified she was not seen by Dr. Sykes. He is aware he called the wrong office.   Derrick Ford, OA [facility]     ----- Message from Arsalan Sykes OD sent at 4/11/2018 10:28 AM CDT -----  Contact:  Gregoria Li, I've never seen this patient before. Please call  and inquire.       ----- Message -----  From: Guillermo Carter  Sent: 4/11/2018   9:51 AM  To: Arsalan Sykes OD        ----- Message -----  From: Lilian Marsh  Sent: 4/11/2018   9:17 AM  To: Mony Arriaza Staff    Patient was in and the doctor wrote a script for cycloSPORINE (RESTASIS) 0.05 % ophthalmic emulsion and you faxed over to [facility].  Normally she gets a 3 month supply but she only got a two month supply. Can you correct it this time, please call the  back at [phone].  Thank you!

## 2018-04-13 ENCOUNTER — TELEPHONE (OUTPATIENT)
Dept: FAMILY MEDICINE | Facility: CLINIC | Age: 73
End: 2018-04-13

## 2018-04-18 ENCOUNTER — TELEPHONE (OUTPATIENT)
Dept: FAMILY MEDICINE | Facility: CLINIC | Age: 73
End: 2018-04-18

## 2018-04-18 DIAGNOSIS — E78.5 HYPERLIPIDEMIA, UNSPECIFIED HYPERLIPIDEMIA TYPE: Primary | ICD-10-CM

## 2018-04-18 NOTE — TELEPHONE ENCOUNTER
Lipid order in.  Best fasting but can be done either way.  Non fasting tends to affect the triglycerides more than the cholesterol and that is less critical.

## 2018-04-18 NOTE — TELEPHONE ENCOUNTER
----- Message from Mirian Bailey sent at 4/18/2018  9:33 AM CDT -----  Contact:    needs to know if patient can get orders put in for cholesterol labs and needs to know if she needs to fast for the TSH lab       Please call to advise [phone]       Summary of Care Report The Summary of Care report has been created to help improve care coordination. Users with access to [employer] or [address] (Web-based application) may access additional patient information including the Discharge Summary. If you are not currently a [address] user and need more information, please call the number listed below in the [place] [address] section and ask to be connected with Medical Records. Facility Information Name Address Phone [address] [address] [phone] Patient Information Patient Name Sex  Lou Bravo ([id number]) Female 1962 Discharge Information Admitting Provider Service Area Unit Jennifer Parrish MD / [facility] [address] / [phone] Discharge Provider Discharge Date/Time Discharge Disposition Destination (none) 3/13/2017 (Pending) [facility] (none) Patient Language Language ENGLISH [13] Problem List as of 3/13/2017  Date Reviewed: 3/11/2017 Codes Priority Class Noted - Resolved Headache ICD-10-CM: R51 ICD-9-CM: 784.0   3/11/2017 - Present You are allergic to the following Allergen Reactions Percocet (Oxycodone-Acetaminophen) Itching Current Discharge Medication List  
  
START taking these medications Dose & Instructions Dispensing Information Comments  
 cyclobenzaprine 10 mg tablet Commonly known as:  FLEXERIL Dose:  5 mg Take 0.5 Tabs by mouth three (3) times daily as needed for Muscle Spasm(s). Quantity:  30 Tab Refills:  0  
   
 methylPREDNISolone 4 mg tablet Commonly known as:  Vignesh Lainez Take 1 tablets 3 times a day for 2 days, then take 1 tablet 2 times a day for 2 days, then take 1 tablet daily for 2 days then stop Quantity:  1 Dose Pack Refills:  0 CONTINUE these medications which have NOT CHANGED Dose & Instructions Dispensing Information Comments ALPRAZolam 2 mg XR tablet Commonly known as:  XANAX XR Dose:  2 mg Take 2 mg by mouth every morning. Refills:  0  
   
 * dextroamphetamine-amphetamine 30 mg tablet Commonly known as:  ADDERALL Dose:  30 mg Take 30 mg by mouth daily. Dextroamphetamine-amphetamine 30 mg tab administration instructions: Take one tab orally every morning and 1/2 tab (15 mg) every afternoon) Refills:  0  
   
 * dextroamphetamine-amphetamine 30 mg tablet Commonly known as:  ADDERALL Dose:  15 mg Take 15 mg by mouth daily (with lunch). Dextroamphetamine-amphetamine 30 mg tab administration instructions: Take one tab orally every morning and 1/2 tab (15 mg) every afternoon) Refills:  0 FLUoxetine 10 mg tablet Commonly known as:  PROzac Dose:  20 mg Take 20 mg by mouth daily. Refills:  0  
   
 ibuprofen 200 mg tablet Commonly known as:  MOTRIN Dose:  600 mg Take 600 mg by mouth every twelve (12) hours as needed for Pain. Refills:  0  
   
 levothyroxine 25 mcg tablet Commonly known as:  SYNTHROID Dose:  25 mcg Take 25 mcg by mouth Daily (before breakfast). Refills:  0  
   
 temazepam 30 mg capsule Commonly known as:  RESTORIL Take  by mouth nightly as needed for Sleep. Refills:  0  
   
 * Notice: This list has 2 medication(s) that are the same as other medications prescribed for you. Read the directions carefully, and ask your doctor or other care provider to review them with you. STOP taking these medications Comments  
 butalbital-acetaminophen-caff -40 mg per capsule Commonly known as:  FIORICET  
   
   
 traMADol 50 mg tablet Commonly known as:  ULTRAM  
   
   
  
  
  
Current Immunizations Name Date Influenza Vaccine (Quad) PF 3/13/2017 Follow-up Information Follow up With Details Comments Contact Info Phys Other, MD   Patient can only remember the practice name and not the physician Discharge Instructions None Chart Review Routing History Recipient Method Report Sent By Flavio Mcghee Fax: [phone] Fax [facility] IP AMB RESULT REPORT IMAGING Adolfo Kirkland [place], [place] [[id number]] 2/28/2017 12:43 PM 02/28/2017

## 2018-04-19 ENCOUNTER — TELEPHONE (OUTPATIENT)
Dept: FAMILY MEDICINE | Facility: CLINIC | Age: 73
End: 2018-04-19

## 2018-04-19 NOTE — TELEPHONE ENCOUNTER
----- Message from Shawn Herrera sent at 4/19/2018  8:10 AM CDT -----  Contact: /Gregoria Kinney called in regarding the attached patient (wife) and wanted to see if office could add an order, to existing labs, for patient to have her cholesterol checked?  Gregoria stated patient did not go get labs done yesterday because no one called them???  Gregoria's call back number is [phone]

## 2018-04-20 ENCOUNTER — LAB VISIT (OUTPATIENT)
Dept: LAB | Facility: HOSPITAL | Age: 73
End: 2018-04-20
Attending: FAMILY MEDICINE
Payer: MEDICARE

## 2018-04-20 DIAGNOSIS — E03.9 ACQUIRED HYPOTHYROIDISM: ICD-10-CM

## 2018-04-20 DIAGNOSIS — E78.5 HYPERLIPIDEMIA, UNSPECIFIED HYPERLIPIDEMIA TYPE: ICD-10-CM

## 2018-04-20 LAB
CHOLEST SERPL-MCNC: 229 MG/DL
CHOLEST/HDLC SERPL: 3.6 {RATIO}
HDLC SERPL-MCNC: 64 MG/DL
HDLC SERPL: 27.9 %
LDLC SERPL CALC-MCNC: 144.4 MG/DL
NONHDLC SERPL-MCNC: 165 MG/DL
T4 FREE SERPL-MCNC: 0.76 NG/DL
TRIGL SERPL-MCNC: 103 MG/DL
TSH SERPL DL<=0.005 MIU/L-ACNC: 4.03 UIU/ML

## 2018-04-20 PROCEDURE — 36415 COLL VENOUS BLD VENIPUNCTURE: CPT | Mod: PO

## 2018-04-20 PROCEDURE — 80061 LIPID PANEL: CPT

## 2018-04-20 PROCEDURE — 84439 ASSAY OF FREE THYROXINE: CPT

## 2018-04-20 PROCEDURE — 84443 ASSAY THYROID STIM HORMONE: CPT

## 2018-04-24 ENCOUNTER — TELEPHONE (OUTPATIENT)
Dept: FAMILY MEDICINE | Facility: CLINIC | Age: 73
End: 2018-04-24

## 2018-04-24 DIAGNOSIS — E03.9 ACQUIRED HYPOTHYROIDISM: Primary | ICD-10-CM

## 2018-04-24 RX ORDER — THYROID 90 MG/1
1 TABLET ORAL
Qty: 90 TABLET | Refills: 1 | Status: SHIPPED | OUTPATIENT
Start: 2018-04-24 | End: 2018-07-17 | Stop reason: SDUPTHER

## 2018-04-24 NOTE — TELEPHONE ENCOUNTER
[place] thyroid is unpredictable, that's why few endocrine specialists will use it.  Would recommend change to levothyroxine.  If not the next step up is armour 120

## 2018-04-24 NOTE — TELEPHONE ENCOUNTER
She is taking 60 alternating with 90 every other day, we need to change to 90 daily and discontinue the 60.  Recheck tsh in two months.

## 2018-04-24 NOTE — TELEPHONE ENCOUNTER
----- Message from Alfred Mejia MD sent at 4/23/2018  7:53 PM CDT -----  Her TSH is still borderline low but improved from 3 months previously.  No changes are needed unless she is having symptoms of hypothyroidism, cold intolerance, constipation, slowed thinking and memory problems, etc.    Cholesterol is mildly elevated.  [place] risk score is 12.2 so it is recommended that she go on a statin medication.    Results sent by email

## 2018-04-24 NOTE — TELEPHONE ENCOUNTER
Results discussed with - he reports patient is cold intolerant-always freezing- does have slowed thinking-no other symptoms. Patient declines statin- low fat diet mailed.

## 2018-04-25 DIAGNOSIS — I10 UNCONTROLLED HYPERTENSION: ICD-10-CM

## 2018-04-25 DIAGNOSIS — I10 ESSENTIAL HYPERTENSION: ICD-10-CM

## 2018-04-25 DIAGNOSIS — K21.9 GASTROESOPHAGEAL REFLUX DISEASE, ESOPHAGITIS PRESENCE NOT SPECIFIED: ICD-10-CM

## 2018-04-25 RX ORDER — ENALAPRIL MALEATE 20 MG/1
TABLET ORAL
Qty: 180 TABLET | Refills: 2 | Status: SHIPPED | OUTPATIENT
Start: 2018-04-25 | End: 2019-02-19 | Stop reason: SDUPTHER

## 2018-04-25 RX ORDER — PANTOPRAZOLE SODIUM 40 MG/1
TABLET, DELAYED RELEASE ORAL
Qty: 90 TABLET | Refills: 3 | Status: SHIPPED | OUTPATIENT
Start: 2018-04-25 | End: 2019-02-19 | Stop reason: SDUPTHER

## 2018-04-25 RX ORDER — CLONIDINE HYDROCHLORIDE 0.1 MG/1
TABLET ORAL
Qty: 180 TABLET | Refills: 3 | Status: SHIPPED | OUTPATIENT
Start: 2018-04-25 | End: 2018-06-21 | Stop reason: SDUPTHER

## 2018-06-04 ENCOUNTER — OFFICE VISIT (OUTPATIENT)
Dept: FAMILY MEDICINE | Facility: CLINIC | Age: 73
End: 2018-06-04
Payer: MEDICARE

## 2018-06-04 ENCOUNTER — DOCUMENTATION ONLY (OUTPATIENT)
Dept: FAMILY MEDICINE | Facility: CLINIC | Age: 73
End: 2018-06-04

## 2018-06-04 DIAGNOSIS — I10 UNCONTROLLED HYPERTENSION: Primary | ICD-10-CM

## 2018-06-04 DIAGNOSIS — H53.9 VISUAL DISTURBANCE: ICD-10-CM

## 2018-06-04 PROCEDURE — 99214 OFFICE O/P EST MOD 30 MIN: CPT | Mod: S$GLB,,, | Performed by: PHYSICIAN ASSISTANT

## 2018-06-04 PROCEDURE — 3078F DIAST BP <80 MM HG: CPT | Mod: CPTII,S$GLB,, | Performed by: PHYSICIAN ASSISTANT

## 2018-06-04 PROCEDURE — 99499 UNLISTED E&M SERVICE: CPT | Mod: S$PBB,,, | Performed by: PHYSICIAN ASSISTANT

## 2018-06-04 PROCEDURE — 99999 PR PBB SHADOW E&M-EST. PATIENT-LVL V: CPT | Mod: PBBFAC,,, | Performed by: PHYSICIAN ASSISTANT

## 2018-06-04 PROCEDURE — 3077F SYST BP >= 140 MM HG: CPT | Mod: CPTII,S$GLB,, | Performed by: PHYSICIAN ASSISTANT

## 2018-06-04 RX ORDER — NIFEDIPINE 30 MG/1
30 TABLET, EXTENDED RELEASE ORAL DAILY
Qty: 30 TABLET | Refills: 0 | Status: SHIPPED | OUTPATIENT
Start: 2018-06-04 | End: 2018-06-15

## 2018-06-04 NOTE — PROGRESS NOTES
Subjective:       Patient ID: Megha Ladd is a 72 y.o. female.    Chief Complaint: Hypertension    HPI   Patient is a 72 year old  female presenting to the clinic with concern of visual disturbance- glaring white lights since Friday that was occurring twice daily andlast 15-20 minutes. Patient reports she would go lay down and symptoms would eventually go away. She denies any headaches, blurry vision, nausea, vomiting. Patient felt like it may have been related to her pressure (although she did not check this) and decided to increase her clonidine 0.1mg to twice daily instead of taking it based on parameters >180 systolic, >105 diastolic as indicated on the bottle. She does report having an elevated BP at Dr. Taylor's office (pain management) 1 week ago while getting an injection.   Review of Systems   Constitutional: Negative for activity change, appetite change, chills, diaphoresis, fatigue and fever.   HENT: Negative for congestion, postnasal drip and rhinorrhea.    Respiratory: Negative.  Negative for cough, shortness of breath and wheezing.    Cardiovascular: Negative.  Negative for chest pain.   Gastrointestinal: Negative for abdominal pain, blood in stool, constipation, diarrhea, nausea and vomiting.   Genitourinary: Negative for dysuria, frequency, hematuria and urgency.   Musculoskeletal: Negative.    Skin: Negative.  Negative for color change and rash.   Neurological: Negative for dizziness and syncope.   Psychiatric/Behavioral: Negative for agitation, behavioral problems and confusion.       Objective:      Physical Exam   Constitutional: Vital signs are normal. She appears well-developed and well-nourished. No distress.   HENT:   Head: Normocephalic and atraumatic.   Right Ear: Hearing, tympanic membrane, external ear and ear canal normal.   Left Ear: Hearing, tympanic membrane, external ear and ear canal normal.   Nose: Nose normal.   Mouth/Throat: Uvula is midline, oropharynx is clear and moist  and mucous membranes are normal.   Eyes: Conjunctivae, EOM and lids are normal.   Cardiovascular: Normal rate, regular rhythm, S1 normal, S2 normal and normal heart sounds.  Exam reveals no gallop.    No murmur heard.  Pulses:       Radial pulses are 2+ on the right side, and 2+ on the left side.   Pulmonary/Chest: Effort normal and breath sounds normal. No respiratory distress. She has no wheezes. She has no rhonchi.   Skin: Skin is warm and dry. She is not diaphoretic.   Psychiatric: She has a normal mood and affect. Her speech is normal and behavior is normal. Judgment and thought content normal. Cognition and memory are normal.       Assessment:       1. Uncontrolled hypertension    2. Visual disturbance        Plan:       Megha was seen today for hypertension.    Diagnoses and all orders for this visit:    Uncontrolled hypertension  Stop clonidine 0.1mg BID  Continue HCTZ 25mg daily  Metoprolol 75mg BID  Continue Enalapril 20mg BID    After consulting with Dr. Mejia, advised patient to add:  Nifedipine 30mg daily. Of note, she did have n/v with amlodipine so if she is intolerant to nifedipine we may try hydralazine.    Visual disturbance  Has apt with Dr. Woods tomorrow @ [facility]    Other orders  -     NIFEdipine (PROCARDIA-XL) 30 MG (OSM) 24 hr tablet; Take 1 tablet (30 mg total) by mouth once daily.    Patient readiness: acceptance and barriers:none    During the course of the visit the patient was educated and counseled about the following:     Hypertension:   Medication: no change.    Goals: Hypertension: Reduce Blood Pressure    Did patient meet goals/outcomes: No    The following self management tools provided: declined    Patient Instructions (the written plan) was given to the patient/family.     Time spent with patient: 25 minutes    Barriers to medications present (no )    Adverse reactions to current medications (no)    Over the counter medications reviewed (Yes)

## 2018-06-04 NOTE — PROGRESS NOTES
Pre-Visit Chart Review  For Appointment Scheduled on 06/04/18    Health Maintenance Due   Topic Date Due    TETANUS VACCINE  09/28/1963    Mammogram  08/06/2017

## 2018-06-05 VITALS
TEMPERATURE: 98 F | BODY MASS INDEX: 25.26 KG/M2 | HEART RATE: 57 BPM | HEIGHT: 66 IN | WEIGHT: 157.19 LBS | DIASTOLIC BLOOD PRESSURE: 76 MMHG | SYSTOLIC BLOOD PRESSURE: 176 MMHG

## 2018-06-14 DIAGNOSIS — Z12.39 SCREENING FOR BREAST CANCER: Primary | ICD-10-CM

## 2018-06-15 ENCOUNTER — OFFICE VISIT (OUTPATIENT)
Dept: FAMILY MEDICINE | Facility: CLINIC | Age: 73
End: 2018-06-15
Attending: FAMILY MEDICINE
Payer: MEDICARE

## 2018-06-15 VITALS
HEIGHT: 66 IN | SYSTOLIC BLOOD PRESSURE: 166 MMHG | HEART RATE: 66 BPM | DIASTOLIC BLOOD PRESSURE: 88 MMHG | WEIGHT: 156.06 LBS | TEMPERATURE: 99 F | BODY MASS INDEX: 25.08 KG/M2 | RESPIRATION RATE: 14 BRPM | OXYGEN SATURATION: 96 %

## 2018-06-15 DIAGNOSIS — I10 HYPERTENSION, POOR CONTROL: Primary | ICD-10-CM

## 2018-06-15 PROCEDURE — 3079F DIAST BP 80-89 MM HG: CPT | Mod: CPTII,S$GLB,, | Performed by: FAMILY MEDICINE

## 2018-06-15 PROCEDURE — 3077F SYST BP >= 140 MM HG: CPT | Mod: CPTII,S$GLB,, | Performed by: FAMILY MEDICINE

## 2018-06-15 PROCEDURE — 99213 OFFICE O/P EST LOW 20 MIN: CPT | Mod: S$GLB,,, | Performed by: FAMILY MEDICINE

## 2018-06-15 PROCEDURE — 99499 UNLISTED E&M SERVICE: CPT | Mod: S$PBB,,, | Performed by: FAMILY MEDICINE

## 2018-06-15 PROCEDURE — 99999 PR PBB SHADOW E&M-EST. PATIENT-LVL IV: CPT | Mod: PBBFAC,,, | Performed by: FAMILY MEDICINE

## 2018-06-15 RX ORDER — HYDRALAZINE HYDROCHLORIDE 25 MG/1
25 TABLET, FILM COATED ORAL EVERY 12 HOURS
Qty: 60 TABLET | Refills: 5 | Status: SHIPPED | OUTPATIENT
Start: 2018-06-15 | End: 2018-07-17

## 2018-06-15 NOTE — PROGRESS NOTES
Subjective:       Patient ID: Megha Ladd is a 72 y.o. female.    Chief Complaint: Discuss B/P medication    72-year-old female coming in to follow-up on high blood pressure.  She was on enalapril 20 mg twice a day, metoprolol 50 mg 1-1/2 twice a day and using hydrochlorothiazide 12.52 daily with clonidine for when necessary elevations of blood pressure.  Blood pressure was not controlled she was having some ocular migraine so she presented to the office where she was confirmed to be high.  She was placed on nifedipine 30 mg daily but began experiencing dizziness lightheadedness blurred vision and orthostatic dizziness.  She stopped taking them nifedipine and started taking clonidine once daily.  She also reduce the hydrochlorothiazide to 12.5 mg a day because she was having too much bladder urgency on the twice a day dosing.  She's having no symptoms now but she is not checking her blood pressure at home because she lost her blood pressure cuff.    Past Medical History:  No date: Asthma  No date: GERD (gastroesophageal reflux disease)  No date: Hyperlipidemia  No date: Hypertension  No date: Hypothyroid  No date: Lumbago  No date: Neuromuscular disorder  No date: Sinusitis  No date: Ulcer    Past Surgical History:  No date: APPENDECTOMY  No date:  SECTION  No date: LUMBAR EPIDURAL INJECTION  No date: PARTIAL HYSTERECTOMY    Current Outpatient Prescriptions on File Prior to Visit:  acetaminophen (TYLENOL) 325 MG tablet, Take 650 mg by mouth every 6 (six) hours as needed for Pain., Disp: , Rfl:   cartilage-collagen-bor-hyalur (MOVE FREE ULTRA, BORON,) 40-5-3.3 mg Tab, Take 1 tablet by mouth once daily., Disp: , Rfl:   celecoxib (CELEBREX) 200 MG capsule, Take 200 mg by mouth once daily. Every day, Disp: , Rfl:   cetirizine (ZYRTEC) 10 MG tablet, Take 10 mg by mouth daily as needed for Allergies., Disp: , Rfl:   cloNIDine (CATAPRES) 0.1 MG tablet, TAKE 1 TABLET TWICE DAILY AS NEEDED FOR BLOOD PRESSURE   GREATER  THAN 180 SYSTOLIC  DIASTOLIC, Disp: 180 tablet, Rfl: 3  cycloSPORINE (RESTASIS) 0.05 % ophthalmic emulsion, Place 1 drop into both eyes 2 (two) times daily as needed. Twice a day, Disp: , Rfl:   enalapril (VASOTEC) 20 MG tablet, TAKE 1 TABLET TWICE DAILY, Disp: 180 tablet, Rfl: 2  fluticasone (FLONASE) 50 mcg/actuation nasal spray, SPRAY 1 SPRAY IN EACH NOSTRIL 2 TIMES A DAY (Patient taking differently: SPRAY 1 SPRAY IN EACH NOSTRIL qd), Disp: 16 g, Rfl: 11  fluticasone-salmeterol (ADVAIR HFA) 115-21 mcg/actuation HFAA, Inhale 2 puffs into the lungs 2 (two) times daily. Controller, Disp: , Rfl:   guaifenesin (MUCINEX) 600 mg tp12, Take 1 tablet by mouth 2 (two) times daily as needed., Disp: , Rfl:   hydroCHLOROthiazide (HYDRODIURIL) 12.5 MG Tab, Take 2 tablets (25 mg total) by mouth once daily. (Patient taking differently: Take 12.5 mg by mouth once daily. ), Disp: 60 tablet, Rfl: 11  LACTOBACILLUS RHAMNOSUS GG (CULTURELLE ORAL), Take 1 capsule by mouth daily as needed. 20 Billion, Disp: , Rfl:   levalbuterol (XOPENEX HFA) 45 mcg/actuation inhaler, Inhale 1-2 puffs into the lungs every 4 (four) hours as needed for Wheezing. Rescue, Disp: 15 g, Rfl: 0  metaxalone (SKELAXIN) 800 MG tablet, Take 800 mg by mouth every 6 to 8 hours as needed. Every 6 hours PRN, Disp: , Rfl:   metoprolol tartrate (LOPRESSOR) 50 MG tablet, TAKE ONE & ONE-HALF TABLETS BY MOUTH TWICE DAILY, Disp: 270 tablet, Rfl: 2  montelukast (SINGULAIR) 10 mg tablet, TAKE ONE TABLET BY MOUTH ONCE DAILY IN THE EVENING, Disp: 30 tablet, Rfl: 5  ondansetron (ZOFRAN) 4 MG tablet, Take 4 mg by mouth every 8 (eight) hours as needed for Nausea. Dr [facility], Disp: , Rfl:   pantoprazole (PROTONIX) 40 MG tablet, TAKE 1 TABLET EVERY DAY, Disp: 90 tablet, Rfl: 3  peg 400-propylene glycol (SYSTANE ULTRA) 0.4-0.3 % Drop, Place 1 drop into both eyes 3 (three) times daily., Disp: , Rfl:   pregabalin (LYRICA) 75 MG capsule, Take 75 mg  by mouth daily as needed., Disp: , Rfl:   sodium chloride (JAYSHREE 128) 5 % ophthalmic ointment, Place into both eyes as needed. Twice a day, Disp: , Rfl:   SODIUM CHLORIDE (SIMPLY SALINE NASL), by Nasal route 2 (two) times daily., Disp: , Rfl:   thyroid, pork, (ARMOUR THYROID) 90 mg Tab, Take 1 tablet (90 mg total) by mouth before breakfast., Disp: 90 tablet, Rfl: 1  tramadol (ULTRAM) 50 mg tablet, Take 50 mg by mouth every 6 (six) hours as needed. Every 6 hours, Disp: , Rfl:   UNABLE TO FIND, Apply topically once daily. Loren Gaston MD medical strength skin healing facial redness repair for sensitive skin, Disp: , Rfl:         Review of Systems   Eyes: Negative for visual disturbance.   Respiratory: Negative for chest tightness and shortness of breath.    Cardiovascular: Negative for chest pain and palpitations.   Neurological: Positive for dizziness and light-headedness. Negative for headaches.       Objective:      Physical Exam   Constitutional: She is oriented to person, place, and time. She appears well-developed and well-nourished. No distress.   Elevated blood pressure  Normal weight with a BMI of 25.2 she is up 9 pounds from her last visit with me January 18, 2018   Neurological: She is alert and oriented to person, place, and time.   Skin: She is not diaphoretic.   Psychiatric: She has a normal mood and affect. Her behavior is normal. Judgment and thought content normal.   Nursing note and vitals reviewed.      Assessment:       1. Hypertension, poor control        Plan:       1. Hypertension, poor control  Agree with reduction of HCTZ back to 12.5 mg once a day and discontinuance of nifedipine.  Maximiliano is start low dose hydralazine 25 mg twice a day.  Recheck blood pressure in one month.  Patient is coming in for a TSH in the near future and will check a BMP as well.  - hydrALAZINE (APRESOLINE) 25 MG tablet; Take 1 tablet (25 mg total) by mouth every 12 (twelve) hours.  Dispense: 60 tablet; Refill: 5  - Basic  metabolic panel; Future

## 2018-06-21 DIAGNOSIS — I10 UNCONTROLLED HYPERTENSION: ICD-10-CM

## 2018-06-21 RX ORDER — CLONIDINE HYDROCHLORIDE 0.1 MG/1
TABLET ORAL
Qty: 60 TABLET | Refills: 0 | Status: SHIPPED | OUTPATIENT
Start: 2018-06-21 | End: 2019-09-24

## 2018-06-21 NOTE — TELEPHONE ENCOUNTER
----- Message from Chantal Salgado sent at 6/21/2018  3:47 PM CDT -----  Contact: self [phone]  Type:  RX Refill Request    Who Called:  self  Refill or New Rx:  refill  RX Name and Strength:  cloNIDine (CATAPRES) 0.1 MG tablet  How is the patient currently taking it? (ex. 1XDay):  1 twice daily  Is this a 30 day or 90 day RX:  30  Preferred Pharmacy with phone number:    [facility] [address] [place], [place] - [address] [facility]  [address] [place] [address]  Phone: [phone] Fax: [phone]  Local or Mail Order:  local  Ordering Provider:  Dr Jackie Jc Call Back Number:  [phone]  Additional Information:  Thank you!

## 2018-06-26 ENCOUNTER — LAB VISIT (OUTPATIENT)
Dept: LAB | Facility: HOSPITAL | Age: 73
End: 2018-06-26
Attending: FAMILY MEDICINE
Payer: MEDICARE

## 2018-06-26 DIAGNOSIS — I10 HYPERTENSION, POOR CONTROL: ICD-10-CM

## 2018-06-26 DIAGNOSIS — E03.9 ACQUIRED HYPOTHYROIDISM: ICD-10-CM

## 2018-06-26 LAB
ANION GAP SERPL CALC-SCNC: 10 MMOL/L
BUN SERPL-MCNC: 13 MG/DL
CALCIUM SERPL-MCNC: 9.4 MG/DL
CHLORIDE SERPL-SCNC: 91 MMOL/L
CO2 SERPL-SCNC: 29 MMOL/L
CREAT SERPL-MCNC: 0.9 MG/DL
EST. GFR  (AFRICAN AMERICAN): >60 ML/MIN/1.73 M^2
EST. GFR  (NON AFRICAN AMERICAN): >60 ML/MIN/1.73 M^2
GLUCOSE SERPL-MCNC: 114 MG/DL
POTASSIUM SERPL-SCNC: 3.7 MMOL/L
SODIUM SERPL-SCNC: 130 MMOL/L
T4 FREE SERPL-MCNC: 0.85 NG/DL
TSH SERPL DL<=0.005 MIU/L-ACNC: 0.27 UIU/ML

## 2018-06-26 PROCEDURE — 84439 ASSAY OF FREE THYROXINE: CPT

## 2018-06-26 PROCEDURE — 36415 COLL VENOUS BLD VENIPUNCTURE: CPT | Mod: PO

## 2018-06-26 PROCEDURE — 84443 ASSAY THYROID STIM HORMONE: CPT

## 2018-06-26 PROCEDURE — 80048 BASIC METABOLIC PNL TOTAL CA: CPT

## 2018-07-17 ENCOUNTER — OFFICE VISIT (OUTPATIENT)
Dept: FAMILY MEDICINE | Facility: CLINIC | Age: 73
End: 2018-07-17
Attending: FAMILY MEDICINE
Payer: MEDICARE

## 2018-07-17 VITALS
DIASTOLIC BLOOD PRESSURE: 80 MMHG | OXYGEN SATURATION: 97 % | HEIGHT: 66 IN | HEART RATE: 60 BPM | WEIGHT: 156.06 LBS | RESPIRATION RATE: 16 BRPM | BODY MASS INDEX: 25.08 KG/M2 | TEMPERATURE: 98 F | SYSTOLIC BLOOD PRESSURE: 122 MMHG

## 2018-07-17 DIAGNOSIS — I10 GOOD HYPERTENSION CONTROL: Primary | ICD-10-CM

## 2018-07-17 DIAGNOSIS — E03.9 ACQUIRED HYPOTHYROIDISM: ICD-10-CM

## 2018-07-17 PROCEDURE — 99213 OFFICE O/P EST LOW 20 MIN: CPT | Mod: S$GLB,,, | Performed by: FAMILY MEDICINE

## 2018-07-17 PROCEDURE — 99999 PR PBB SHADOW E&M-EST. PATIENT-LVL IV: CPT | Mod: PBBFAC,,, | Performed by: FAMILY MEDICINE

## 2018-07-17 PROCEDURE — 3074F SYST BP LT 130 MM HG: CPT | Mod: CPTII,S$GLB,, | Performed by: FAMILY MEDICINE

## 2018-07-17 PROCEDURE — 3079F DIAST BP 80-89 MM HG: CPT | Mod: CPTII,S$GLB,, | Performed by: FAMILY MEDICINE

## 2018-07-17 RX ORDER — THYROID 60 MG/1
TABLET ORAL
Qty: 30 TABLET | Refills: 11
Start: 2018-07-17 | End: 2019-03-26 | Stop reason: SDUPTHER

## 2018-07-17 RX ORDER — THYROID 90 MG/1
TABLET ORAL
Qty: 90 TABLET | Refills: 1
Start: 2018-07-17 | End: 2019-04-01 | Stop reason: SDUPTHER

## 2018-07-17 NOTE — PROGRESS NOTES
Subjective:       Patient ID: Megha Ladd is a 72 y.o. female.    Chief Complaint: Follow-up (thyroid and bp)    72-year-old female coming in for recheck of high blood pressure and thyroid.  She was started on hydralazine on her last visit when she did not tolerate nifedipine and had reduced her HCTZ from 25 mg to 12.5 mg.  Soon after starting the hydralazine she began experiencing dizziness and lightheadedness with a feeling of impending syncope associated with facial rash and flushing and a generalized arthritis.  She stopped the medication about 6 days ago and all symptoms have resolved at this time.  She also is having problems with her thyroid with hair thinning, cold sensitivity and other hypothyroid symptoms when she takes 60 mg of [place] Thyroid but at 90 mg her TSH was overly suppressed at 0.2.  She has noted improvement in her hypothyroid symptoms since starting on the 90s.  There is no in between dose so she still has 60s and is willing to alternate 60s and 90s every other day.      Review of Systems   Musculoskeletal: Positive for arthralgias.   Skin: Positive for color change and rash.   Neurological: Positive for dizziness and light-headedness. Negative for headaches.       Objective:      Physical Exam   Constitutional: She appears well-developed and well-nourished. No distress.   Initial blood pressure mildly elevated but repeat was good.  Weight is stable with a BMI of 25.2 and no change in weight since her Sherrie 15, 2018 visit   Skin: Skin is warm and dry. No rash noted. She is not diaphoretic. No erythema. No pallor.   Nursing note and vitals reviewed.      Assessment:       1. Good hypertension control    2. Acquired hypothyroidism        Plan:       1. Good hypertension control  Continue with metoprolol, enalapril, and hydrochlorothiazide 12.5 mg.  Recheck in 3 months    2. Acquired hypothyroidism  Alternate 90 mg and 60 mg every other day  - TSH; Future  - T4, free; Future  - T3, free;  Future

## 2018-07-24 ENCOUNTER — PES CALL (OUTPATIENT)
Dept: ADMINISTRATIVE | Facility: CLINIC | Age: 73
End: 2018-07-24

## 2018-08-08 ENCOUNTER — DOCUMENTATION ONLY (OUTPATIENT)
Dept: FAMILY MEDICINE | Facility: CLINIC | Age: 73
End: 2018-08-08

## 2018-08-08 NOTE — PROGRESS NOTES
Pre-Visit Chart Review  For Appointment Scheduled on 8-10-18    Health Maintenance Due   Topic Date Due    TETANUS VACCINE  09/28/1963    Mammogram  08/06/2017    Influenza Vaccine  08/01/2018

## 2018-08-10 ENCOUNTER — LAB VISIT (OUTPATIENT)
Dept: LAB | Facility: HOSPITAL | Age: 73
End: 2018-08-10
Attending: FAMILY MEDICINE
Payer: MEDICARE

## 2018-08-10 ENCOUNTER — OFFICE VISIT (OUTPATIENT)
Dept: FAMILY MEDICINE | Facility: CLINIC | Age: 73
End: 2018-08-10
Attending: FAMILY MEDICINE
Payer: MEDICARE

## 2018-08-10 VITALS
BODY MASS INDEX: 23.81 KG/M2 | HEART RATE: 59 BPM | WEIGHT: 148.13 LBS | HEIGHT: 66 IN | TEMPERATURE: 98 F | SYSTOLIC BLOOD PRESSURE: 106 MMHG | RESPIRATION RATE: 16 BRPM | OXYGEN SATURATION: 98 % | DIASTOLIC BLOOD PRESSURE: 60 MMHG

## 2018-08-10 DIAGNOSIS — K52.9 GASTROENTERITIS: Primary | ICD-10-CM

## 2018-08-10 DIAGNOSIS — R55 SYNCOPE, UNSPECIFIED SYNCOPE TYPE: ICD-10-CM

## 2018-08-10 DIAGNOSIS — K52.9 GASTROENTERITIS: ICD-10-CM

## 2018-08-10 DIAGNOSIS — R42 DIZZINESS: ICD-10-CM

## 2018-08-10 DIAGNOSIS — E86.0 DEHYDRATION: ICD-10-CM

## 2018-08-10 DIAGNOSIS — E87.8 ELECTROLYTE ABNORMALITY: ICD-10-CM

## 2018-08-10 DIAGNOSIS — E03.9 ACQUIRED HYPOTHYROIDISM: ICD-10-CM

## 2018-08-10 DIAGNOSIS — E87.1 HYPONATREMIA: Primary | ICD-10-CM

## 2018-08-10 LAB
ANION GAP SERPL CALC-SCNC: 9 MMOL/L
BASOPHILS # BLD AUTO: 0.09 K/UL
BASOPHILS NFR BLD: 1.2 %
BUN SERPL-MCNC: 10 MG/DL
CALCIUM SERPL-MCNC: 9.5 MG/DL
CHLORIDE SERPL-SCNC: 86 MMOL/L
CO2 SERPL-SCNC: 30 MMOL/L
CREAT SERPL-MCNC: 0.9 MG/DL
DIFFERENTIAL METHOD: ABNORMAL
EOSINOPHIL # BLD AUTO: 0.6 K/UL
EOSINOPHIL NFR BLD: 8.4 %
ERYTHROCYTE [DISTWIDTH] IN BLOOD BY AUTOMATED COUNT: 12.9 %
EST. GFR  (AFRICAN AMERICAN): >60 ML/MIN/1.73 M^2
EST. GFR  (NON AFRICAN AMERICAN): >60 ML/MIN/1.73 M^2
GLUCOSE SERPL-MCNC: 90 MG/DL
HCT VFR BLD AUTO: 36.3 %
HGB BLD-MCNC: 12.5 G/DL
IMM GRANULOCYTES # BLD AUTO: 0.04 K/UL
IMM GRANULOCYTES NFR BLD AUTO: 0.5 %
LYMPHOCYTES # BLD AUTO: 1.6 K/UL
LYMPHOCYTES NFR BLD: 21.4 %
MCH RBC QN AUTO: 31.3 PG
MCHC RBC AUTO-ENTMCNC: 34.4 G/DL
MCV RBC AUTO: 91 FL
MONOCYTES # BLD AUTO: 0.8 K/UL
MONOCYTES NFR BLD: 10.3 %
NEUTROPHILS # BLD AUTO: 4.4 K/UL
NEUTROPHILS NFR BLD: 58.2 %
NRBC BLD-RTO: 0 /100 WBC
PLATELET # BLD AUTO: 282 K/UL
PMV BLD AUTO: 10.2 FL
POTASSIUM SERPL-SCNC: 3.5 MMOL/L
RBC # BLD AUTO: 3.99 M/UL
SODIUM SERPL-SCNC: 125 MMOL/L
T3FREE SERPL-MCNC: 2.3 PG/ML
T4 FREE SERPL-MCNC: 0.82 NG/DL
TSH SERPL DL<=0.005 MIU/L-ACNC: 0.59 UIU/ML
TSH SERPL DL<=0.005 MIU/L-ACNC: 0.59 UIU/ML
WBC # BLD AUTO: 7.51 K/UL

## 2018-08-10 PROCEDURE — 99214 OFFICE O/P EST MOD 30 MIN: CPT | Mod: S$GLB,,, | Performed by: FAMILY MEDICINE

## 2018-08-10 PROCEDURE — 3074F SYST BP LT 130 MM HG: CPT | Mod: CPTII,S$GLB,, | Performed by: FAMILY MEDICINE

## 2018-08-10 PROCEDURE — 36415 COLL VENOUS BLD VENIPUNCTURE: CPT | Mod: PO

## 2018-08-10 PROCEDURE — 84443 ASSAY THYROID STIM HORMONE: CPT

## 2018-08-10 PROCEDURE — 84439 ASSAY OF FREE THYROXINE: CPT

## 2018-08-10 PROCEDURE — 85025 COMPLETE CBC W/AUTO DIFF WBC: CPT

## 2018-08-10 PROCEDURE — 99999 PR PBB SHADOW E&M-EST. PATIENT-LVL III: CPT | Mod: PBBFAC,,, | Performed by: FAMILY MEDICINE

## 2018-08-10 PROCEDURE — 3078F DIAST BP <80 MM HG: CPT | Mod: CPTII,S$GLB,, | Performed by: FAMILY MEDICINE

## 2018-08-10 PROCEDURE — 84481 FREE ASSAY (FT-3): CPT

## 2018-08-10 PROCEDURE — 80048 BASIC METABOLIC PNL TOTAL CA: CPT

## 2018-08-10 RX ORDER — DIPHENOXYLATE HYDROCHLORIDE AND ATROPINE SULFATE 2.5; .025 MG/1; MG/1
1 TABLET ORAL 4 TIMES DAILY PRN
Qty: 20 TABLET | Refills: 0 | Status: SHIPPED | OUTPATIENT
Start: 2018-08-10 | End: 2018-08-20

## 2018-08-10 RX ORDER — LOPERAMIDE HYDROCHLORIDE 2 MG/1
2 CAPSULE ORAL 4 TIMES DAILY PRN
COMMUNITY

## 2018-08-10 RX ORDER — MECLIZINE HYDROCHLORIDE 25 MG/1
25 TABLET ORAL 3 TIMES DAILY PRN
Qty: 60 TABLET | Refills: 2 | Status: SHIPPED | OUTPATIENT
Start: 2018-08-10 | End: 2023-02-03 | Stop reason: SDUPTHER

## 2018-08-10 NOTE — PROGRESS NOTES
Subjective:       Patient ID: Megha Ladd is a 72 y.o. female.    Chief Complaint: Loss of Consciousness and Fatigue    72-year-old female seen in July with dizziness and near-syncope secondary to hypotension.  Her diuretic was reduced from 12.5 mg of HCTZ twice daily to once daily and she did well but soon after some family members from [place] came to visit she began experiencing watery diarrhea with severe cramping and nausea but no vomiting.  This caused her to feel lightheaded and she had 1 actual episode of syncope.  Symptoms improved after 7-10 days but recurred again a few days later improved and recurred again.  During the worse points she stopped taking the HCTZ altogether but resumed taking it quickly and did not stay off very long.  She also feels physically weak and has difficulty ambulating.  She has had no problems with palpitations no chest pain no shortness of breath no headaches but does have some dizziness and some orthostatic dizziness.  None of the visiting family members had been ill prior to their visit and none of them became ill after her onset.  They did report that there was some GI problem running around there home state but none of them had been ill.  Back in  the patient was found to be mildly hyperthyroid and her Armor thyroid dose was reduced.  She is due for a follow-up.    Past Medical History:  No date: Asthma  No date: GERD (gastroesophageal reflux disease)  No date: Hyperlipidemia  No date: Hypertension  No date: Hypothyroid  No date: Lumbago  No date: Neuromuscular disorder  No date: Sinusitis  No date: Ulcer    Past Surgical History:  No date: APPENDECTOMY  No date:  SECTION  No date: LUMBAR EPIDURAL INJECTION  No date: PARTIAL HYSTERECTOMY    Current Outpatient Prescriptions on File Prior to Visit:  acetaminophen (TYLENOL) 325 MG tablet, Take 650 mg by mouth every 6 (six) hours as needed for Pain., Disp: , Rfl:   cartilage-collagen-bor-hyalur (MOVE FREE ULTRA,  BORON,) 40-5-3.3 mg Tab, Take 1 tablet by mouth once daily., Disp: , Rfl:   celecoxib (CELEBREX) 200 MG capsule, Take 200 mg by mouth once daily. Every day, Disp: , Rfl:   cetirizine (ZYRTEC) 10 MG tablet, Take 10 mg by mouth daily as needed for Allergies., Disp: , Rfl:   cloNIDine (CATAPRES) 0.1 MG tablet, TAKE 1 TABLET TWICE DAILY AS NEEDED FOR BLOOD PRESSURE  GREATER  THAN 180 SYSTOLIC  DIASTOLIC, Disp: 60 tablet, Rfl: 0  cycloSPORINE (RESTASIS) 0.05 % ophthalmic emulsion, Place 1 drop into both eyes 2 (two) times daily as needed. Twice a day, Disp: , Rfl:   enalapril (VASOTEC) 20 MG tablet, TAKE 1 TABLET TWICE DAILY, Disp: 180 tablet, Rfl: 2  fluticasone (FLONASE) 50 mcg/actuation nasal spray, SPRAY 1 SPRAY IN EACH NOSTRIL 2 TIMES A DAY (Patient taking differently: SPRAY 1 SPRAY IN EACH NOSTRIL qd), Disp: 16 g, Rfl: 11  fluticasone-salmeterol (ADVAIR HFA) 115-21 mcg/actuation HFAA, Inhale 2 puffs into the lungs once daily. Controller , Disp: , Rfl:   guaifenesin (MUCINEX) 600 mg tp12, Take 1 tablet by mouth 2 (two) times daily as needed., Disp: , Rfl:   hydroCHLOROthiazide (HYDRODIURIL) 12.5 MG Tab, Take 2 tablets (25 mg total) by mouth once daily. (Patient taking differently: Take 12.5 mg by mouth once daily. ), Disp: 60 tablet, Rfl: 11  LACTOBACILLUS RHAMNOSUS GG (CULTURELLE ORAL), Take 1 capsule by mouth daily as needed. 20 Billion, Disp: , Rfl:   levalbuterol (XOPENEX HFA) 45 mcg/actuation inhaler, Inhale 1-2 puffs into the lungs every 4 (four) hours as needed for Wheezing. Rescue, Disp: 15 g, Rfl: 0  metaxalone (SKELAXIN) 800 MG tablet, Take 800 mg by mouth every 6 to 8 hours as needed. Every 6 hours PRN, Disp: , Rfl:   metoprolol tartrate (LOPRESSOR) 50 MG tablet, TAKE ONE & ONE-HALF TABLETS BY MOUTH TWICE DAILY, Disp: 270 tablet, Rfl: 2  montelukast (SINGULAIR) 10 mg tablet, TAKE ONE TABLET BY MOUTH ONCE DAILY IN THE EVENING, Disp: 30 tablet, Rfl: 5  ondansetron (ZOFRAN) 4 MG tablet, Take 4 mg by  mouth every 8 (eight) hours as needed for Nausea. Dr [facility], Disp: , Rfl:   pantoprazole (PROTONIX) 40 MG tablet, TAKE 1 TABLET EVERY DAY, Disp: 90 tablet, Rfl: 3  peg 400-propylene glycol (SYSTANE ULTRA) 0.4-0.3 % Drop, Place 1 drop into both eyes 3 (three) times daily., Disp: , Rfl:   pregabalin (LYRICA) 75 MG capsule, Take 75 mg by mouth daily as needed., Disp: , Rfl:   sodium chloride (JAYSHREE 128) 5 % ophthalmic ointment, Place into both eyes as needed. Twice a day, Disp: , Rfl:   SODIUM CHLORIDE (SIMPLY SALINE NASL), by Nasal route 2 (two) times daily as needed. , Disp: , Rfl:   thyroid, pork, (ARMOUR THYROID) 60 mg Tab, Take one every other morning alternating with 90 mg, Disp: 30 tablet, Rfl: 11  thyroid, pork, (ARMOUR THYROID) 90 mg Tab, Take one every other morning alternating with 60 mg, Disp: 90 tablet, Rfl: 1  tramadol (ULTRAM) 50 mg tablet, Take 50 mg by mouth every 6 (six) hours as needed. Every 6 hours, Disp: , Rfl:   UNABLE TO FIND, Apply topically once daily. Loren Gaston MD [facility] skin healing facial redness repair for sensitive skin, Disp: , Rfl:     No current facility-administered medications on file prior to visit.                 Review of Systems   Constitutional: Positive for diaphoresis (Associated with severe abdominal cramping). Negative for chills and fever.   Respiratory: Negative for chest tightness and shortness of breath.    Cardiovascular: Negative for chest pain and palpitations.   Gastrointestinal: Positive for abdominal pain, diarrhea and nausea. Negative for abdominal distention, anal bleeding, blood in stool, constipation and vomiting.   Genitourinary: Negative for dysuria, frequency and hematuria.   Musculoskeletal: Positive for myalgias.   Neurological: Positive for dizziness, syncope, weakness and light-headedness. Negative for headaches.       Objective:      Physical Exam   Constitutional: She is oriented to person, place, and time. She appears  well-developed and well-nourished. No distress.   Initial blood pressure good, she does have a somewhat of an orthostatic drop but did not affect the diastolic very much.  She is not tachycardic  Good weight with BMI of 23.9 she is down 8 lb from her last visit July 17, 2018   HENT:   Head: Normocephalic and atraumatic.   Eyes: EOM are normal. Pupils are equal, round, and reactive to light. No scleral icterus.   Cardiovascular: Normal rate, regular rhythm and normal heart sounds.  Exam reveals no gallop and no friction rub.    No murmur heard.  Pulmonary/Chest: Effort normal and breath sounds normal. No respiratory distress. She has no wheezes. She has no rales. She exhibits no tenderness.   Abdominal: Soft. Bowel sounds are normal. She exhibits no distension and no mass. There is no tenderness. There is no rebound and no guarding.   Neurological: She is alert and oriented to person, place, and time.   Skin: Skin is dry. She is not diaphoretic. No erythema. No pallor.   Psychiatric: She has a normal mood and affect. Her behavior is normal. Judgment and thought content normal.   Nursing note and vitals reviewed.      Assessment:       1. Gastroenteritis    2. Syncope, unspecified syncope type    3. Dehydration    4. Dizziness    5. Acquired hypothyroidism    6. Electrolyte abnormality        Plan:       1. Gastroenteritis  Discontinue the HCTZ until 7-10 days after diarrhea has resolved  - diphenoxylate-atropine 2.5-0.025 mg (LOMOTIL) 2.5-0.025 mg per tablet; Take 1 tablet by mouth 4 (four) times daily as needed for Diarrhea.  Dispense: 20 tablet; Refill: 0  - CBC auto differential; Future  - Clostridium difficile EIA; Future  - Stool culture; Future  - Giardia / Cryptosporidum, EIA; Future  - Occult blood x 1, stool; Future  - Rotavirus antigen, stool; Future  - Stool Exam-Ova,Cysts,Parasites; Future  - WBC, Stool; Future    2. Syncope, unspecified syncope type  See above  - Basic metabolic panel; Future  - CBC auto  differential; Future    3. Dehydration  See above  - Basic metabolic panel; Future    4. Dizziness  - meclizine (ANTIVERT) 25 mg tablet; Take 1 tablet (25 mg total) by mouth 3 (three) times daily as needed.  Dispense: 60 tablet; Refill: 2    5. Acquired hypothyroidism  Recheck TSH in see if she still hyperthyroid on her present dose  - TSH; Future    6. Electrolyte abnormality  - Basic metabolic panel; Future

## 2018-08-15 ENCOUNTER — LAB VISIT (OUTPATIENT)
Dept: LAB | Facility: HOSPITAL | Age: 73
End: 2018-08-15
Attending: FAMILY MEDICINE
Payer: MEDICARE

## 2018-08-15 DIAGNOSIS — K52.9 GASTROENTERITIS: ICD-10-CM

## 2018-08-15 PROCEDURE — 87046 STOOL CULTR AEROBIC BACT EA: CPT | Mod: 59

## 2018-08-15 PROCEDURE — 87045 FECES CULTURE AEROBIC BACT: CPT

## 2018-08-15 PROCEDURE — 87427 SHIGA-LIKE TOXIN AG IA: CPT

## 2018-08-15 PROCEDURE — 87209 SMEAR COMPLEX STAIN: CPT

## 2018-08-15 PROCEDURE — 89055 LEUKOCYTE ASSESSMENT FECAL: CPT

## 2018-08-15 PROCEDURE — 87425 ROTAVIRUS AG IA: CPT

## 2018-08-16 ENCOUNTER — PATIENT OUTREACH (OUTPATIENT)
Dept: ADMINISTRATIVE | Facility: HOSPITAL | Age: 73
End: 2018-08-16

## 2018-08-16 LAB
O+P STL TRI STN: NORMAL
RV AG STL QL IA.RAPID: NEGATIVE
WBC #/AREA STL HPF: NORMAL /[HPF]

## 2018-08-17 ENCOUNTER — LAB VISIT (OUTPATIENT)
Dept: LAB | Facility: HOSPITAL | Age: 73
End: 2018-08-17
Attending: FAMILY MEDICINE
Payer: MEDICARE

## 2018-08-17 DIAGNOSIS — E87.1 HYPONATREMIA: ICD-10-CM

## 2018-08-17 LAB
ANION GAP SERPL CALC-SCNC: 11 MMOL/L
BUN SERPL-MCNC: 15 MG/DL
CALCIUM SERPL-MCNC: 9.3 MG/DL
CHLORIDE SERPL-SCNC: 95 MMOL/L
CO2 SERPL-SCNC: 29 MMOL/L
CREAT SERPL-MCNC: 0.8 MG/DL
E COLI SXT1 STL QL IA: NEGATIVE
E COLI SXT2 STL QL IA: NEGATIVE
EST. GFR  (AFRICAN AMERICAN): >60 ML/MIN/1.73 M^2
EST. GFR  (NON AFRICAN AMERICAN): >60 ML/MIN/1.73 M^2
GLUCOSE SERPL-MCNC: 86 MG/DL
POTASSIUM SERPL-SCNC: 3.4 MMOL/L
SODIUM SERPL-SCNC: 135 MMOL/L

## 2018-08-17 PROCEDURE — 36415 COLL VENOUS BLD VENIPUNCTURE: CPT | Mod: PO

## 2018-08-17 PROCEDURE — 80048 BASIC METABOLIC PNL TOTAL CA: CPT

## 2018-08-20 LAB — BACTERIA STL CULT: NORMAL

## 2018-09-13 ENCOUNTER — TELEPHONE (OUTPATIENT)
Dept: ALLERGY | Facility: CLINIC | Age: 73
End: 2018-09-13

## 2018-09-13 NOTE — TELEPHONE ENCOUNTER
----- Message from Ailyn Ardon LPN sent at 9/13/2018  8:49 AM CDT -----  Contact: Gregoria Ladd (Spouse)  Pt states, she has sinus infection with green mucous. Only thing that has worked in the past was what Dr Jj prescribed - 3 week course of doxycyclinie she thinks.      Pt made appt for 9/26 but would like to get started on antibiotic before then to get ahead of this infection. Wants to know if you could send in.         ----- Message -----  From: Pia Ann  Sent: 9/12/2018   9:19 AM  To: Анна Ladd (Spouse) calling to schedule an appointment for sinus allergies. Please advise. They would prefer an appointment after 2:30pm.   Call back    Thanks!

## 2018-09-13 NOTE — TELEPHONE ENCOUNTER
Congestion, post nasal drip less then a week without fever is unlikely to be bacterial and more likely viral. Can use Mucinex 1200 mg BID to thin mucus to get it out, can use Flonase 2 sprays each side for inflammation. Hold zyrtec as can thicken mucus. Lots of fluids.  If not better after 10 day ze then call clinic. Gave her the long course antibiotics in March because had gone on for 3 weeks, frequently starts as virus and only sometimes does progress to bacterial infection

## 2018-09-13 NOTE — TELEPHONE ENCOUNTER
Pt's  states that she hasn't had a fever just lots of congestion and some pnd. She does have zyrtec.

## 2018-09-13 NOTE — TELEPHONE ENCOUNTER
Spoke to pt's , told him Dr Jj said:     Congestion, post nasal drip less then a week without fever is unlikely to be bacterial and more likely viral. Can use Mucinex 1200 mg BID to thin mucus to get it out, can use Flonase 2 sprays each side for inflammation. Hold zyrtec as can thicken mucus. Lots of fluids.  If not better after 10 day ze then call clinic. Gave her the long course antibiotics in March because had gone on for 3 weeks, frequently starts as virus and only sometimes does progress to bacterial infection      pts  repeated instructions back and expressed understanding.

## 2018-09-13 NOTE — TELEPHONE ENCOUNTER
I need much more information  How long has she been sick, is she congested head or chest/ green mucus from where? Any fever? Any other symptoms?   I do not do antibiotics to get ahead of infection, if it seems like bacterial infection will prescribe but if it seems more viral will not

## 2018-09-18 ENCOUNTER — OFFICE VISIT (OUTPATIENT)
Dept: ALLERGY | Facility: CLINIC | Age: 73
End: 2018-09-18
Payer: MEDICARE

## 2018-09-18 VITALS — WEIGHT: 158.5 LBS | HEART RATE: 74 BPM | OXYGEN SATURATION: 99 % | BODY MASS INDEX: 25.47 KG/M2 | HEIGHT: 66 IN

## 2018-09-18 DIAGNOSIS — J31.0 CHRONIC RHINITIS: Primary | ICD-10-CM

## 2018-09-18 DIAGNOSIS — L29.9 PRURITUS: ICD-10-CM

## 2018-09-18 DIAGNOSIS — J32.9 RECURRENT SINUS INFECTIONS: ICD-10-CM

## 2018-09-18 DIAGNOSIS — J45.30 MILD PERSISTENT ASTHMA, UNSPECIFIED WHETHER COMPLICATED: ICD-10-CM

## 2018-09-18 PROCEDURE — 99213 OFFICE O/P EST LOW 20 MIN: CPT | Mod: PBBFAC,PO | Performed by: ALLERGY & IMMUNOLOGY

## 2018-09-18 PROCEDURE — 1101F PT FALLS ASSESS-DOCD LE1/YR: CPT | Mod: CPTII,,, | Performed by: ALLERGY & IMMUNOLOGY

## 2018-09-18 PROCEDURE — 99214 OFFICE O/P EST MOD 30 MIN: CPT | Mod: S$PBB,,, | Performed by: ALLERGY & IMMUNOLOGY

## 2018-09-18 PROCEDURE — 99999 PR PBB SHADOW E&M-EST. PATIENT-LVL III: CPT | Mod: PBBFAC,,, | Performed by: ALLERGY & IMMUNOLOGY

## 2018-09-18 NOTE — PROGRESS NOTES
Subjective:       Patient ID: Megha Ladd is a 72 y.o. female.    Chief Complaint:  Follow-up (sinus issues, f/u mychart message. now has itching. )      73 yo woman presents for continued evaluation of chronic rhinitis and asthma. She was last seen 3/15/18. She had chronic sinus infection in past treated with 3 weeks antibiotics. She got sick 5 days ago and was worried so called. She was advised to hold zyrtec and use Mucinex 1200 mg BID and Flonase 2 SEN daily as well as fluids and rest. She has done this. After few days felt like was too dry so cut Mucinex to 600 mg BID. Then felt like could not sleep at night so 600 mg once daily. She is much better, less congestion and thinner mucus but still some and green. No fever. She also is itching and not sure if because off zyrtec or virus. Her rosacea has also flare don face. She just wanted to be sure she was on correct track.   Still on Singulair daily as well as Advair  1 puff BI No chest congestion, SOB or wheeze. .     Prior History taken 6/20/17: new patient evaluation of recurrent to chronic sinus infection. She states she started in October with congestion, pressure and drainage. She saw PCP in November and given amoxil and helped some but did not clear. Then in December went to an ENT and given clinda 300. She got nauseated and felt bad on it so had to stop. She continued with symptoms and in April went back to ENT and given Augmentin but she remembered she had bad reaction with severe burning of throat, esophagus and stomach years ago so he changed to clinda again. She only took 3 doses and got dizzy, nausea, joint pain and facial pain so stopped. She went to different ENT and told she needed surgery and felt he did not listen. She went to Dr petersen for ENT for 30 years but he just retired. She still has terrible PND< congestion, ears itch, stuffy nose and some sneeze but no runny nose. No itchy eyes or nose. Some SOB but not now. She has h/o eczema  and has had on ears, eyes lids and sides of head and Cetaphil cream has helped. Zyrtec prn also helped eczema. She takes montelukast daily, Advair 115 HFA 1 puff daily and increases to 2 puff BID when has SOB. In 1970's she had allergy test and did shots and did help. Doxycycline and amoxil are 2 antibiotics she has tolerate din past. She has no known foods insect or latex allergy. Never had sinus surgery.       Sinusitis   Associated symptoms include congestion, coughing, sinus pressure and sneezing. Pertinent negatives include no chills, ear pain, headaches, shortness of breath or sore throat.   Wheezing    Associated symptoms include coughing, a rash and rhinorrhea. Pertinent negatives include no abdominal pain, chest pain, chills, diarrhea, ear pain, fever, headaches, shortness of breath, sore throat or vomiting.       Environmental History: see history section for home environment  Review of Systems   Constitutional: Positive for fatigue. Negative for appetite change, chills and fever.   HENT: Positive for congestion, postnasal drip, rhinorrhea, sinus pressure, sinus pain and sneezing. Negative for ear discharge, ear pain, facial swelling, nosebleeds, sore throat, trouble swallowing and voice change.    Eyes: Negative for discharge, redness, itching and visual disturbance.   Respiratory: Positive for cough. Negative for choking, chest tightness, shortness of breath and wheezing.    Cardiovascular: Negative for chest pain, palpitations and leg swelling.   Gastrointestinal: Negative for abdominal distention, abdominal pain, constipation, diarrhea, nausea and vomiting.   Genitourinary: Negative for difficulty urinating.   Musculoskeletal: Negative for arthralgias, gait problem, joint swelling and myalgias.   Skin: Positive for rash. Negative for color change.   Neurological: Negative for dizziness, syncope, weakness, light-headedness and headaches.   Hematological: Negative for adenopathy. Does not bruise/bleed  easily.   Psychiatric/Behavioral: Negative for agitation, behavioral problems, confusion and sleep disturbance. The patient is not nervous/anxious.         Objective:    Physical Exam   Constitutional: She is oriented to person, place, and time. She appears well-developed and well-nourished. No distress.   HENT:   Head: Normocephalic and atraumatic.   Right Ear: Hearing, tympanic membrane, external ear and ear canal normal.   Left Ear: Hearing, tympanic membrane, external ear and ear canal normal.   Nose: No mucosal edema, rhinorrhea, sinus tenderness or septal deviation. No epistaxis. Right sinus exhibits no maxillary sinus tenderness and no frontal sinus tenderness. Left sinus exhibits no maxillary sinus tenderness and no frontal sinus tenderness.   Mouth/Throat: Uvula is midline, oropharynx is clear and moist and mucous membranes are normal. No uvula swelling.   Eyes: Conjunctivae are normal. Right eye exhibits no discharge. Left eye exhibits no discharge.   Neck: Normal range of motion. No thyromegaly present.   Cardiovascular: Normal rate, regular rhythm and normal heart sounds.   No murmur heard.  Pulmonary/Chest: Effort normal and breath sounds normal. No respiratory distress. She has no wheezes.   Abdominal: Soft. She exhibits no distension. There is no tenderness.   Musculoskeletal: Normal range of motion. She exhibits no edema or tenderness.   Lymphadenopathy:     She has no cervical adenopathy.   Neurological: She is alert and oriented to person, place, and time.   Skin: Skin is warm and dry. No rash noted. No erythema.   Psychiatric: She has a normal mood and affect. Her behavior is normal. Judgment and thought content normal.   Nursing note and vitals reviewed.      Laboratory:   none performed   Assessment:       1. Chronic rhinitis    2. Recurrent sinus infections    3. Mild persistent asthma, unspecified whether complicated    4. Pruritus         Plan:       1. discussed viral URI with pt and advised  as sh is getting better is likely virus and may take another week to resolve. continue Mucinex 600 daily and Flonase 2 SEN daily. Can use benadryl prn for itch or Sarna. Once mucus is clear can resume zyrtec  2. continue Advair 110 2 puffs BID via spacer  3. xopenex 2 puffs every 4 hours as needed  4. continue Flonase 1 SNE BID, and montelukast 10 mg daily

## 2018-10-03 ENCOUNTER — PATIENT OUTREACH (OUTPATIENT)
Dept: ADMINISTRATIVE | Facility: HOSPITAL | Age: 73
End: 2018-10-03

## 2018-10-03 NOTE — LETTER
October 15, 2018    Megha Ladd  [address] [place] [address] [address] [place] [id number]             [facility]  [address]  [facility] [address]  Phone: [phone] [facility] is committed to your overall health.  To help you get the most out of each of your visits, we will review your information to make sure you are up to date on all of your recommended tests and/or procedures.       Dr. Mejia has found that your chart shows you may be due for Mammogram.     If you have had any of the above done at another facility, please bring the records or information with you so that your record at [facility] will be complete.  If you would like to schedule any of these, please contact the clinic at [phone].     If you are currently taking medication, please bring it with you to your appointment for review.     Also, if you have any type of Advanced Directives, please bring them with you to your office visit so we may scan them into your chart.     Thank You,     Your [facility] Team,   Dr Jackie Ramirez LPN Clinical Care Coordinator   [facility]   [address] [facility] [place] [address]   Phone [phone]   Fax [phone]

## 2018-10-12 ENCOUNTER — DOCUMENTATION ONLY (OUTPATIENT)
Dept: FAMILY MEDICINE | Facility: CLINIC | Age: 73
End: 2018-10-12

## 2018-10-12 NOTE — PROGRESS NOTES
Pre-Visit Chart Review  For Appointment Scheduled on 10-18-18    Health Maintenance Due   Topic Date Due    TETANUS VACCINE  09[birth year]28/1963    Mammogram  08/06/2017    Influenza Vaccine  08/01/2018

## 2018-10-18 ENCOUNTER — LAB VISIT (OUTPATIENT)
Dept: LAB | Facility: HOSPITAL | Age: 73
End: 2018-10-18
Attending: FAMILY MEDICINE
Payer: MEDICARE

## 2018-10-18 ENCOUNTER — OFFICE VISIT (OUTPATIENT)
Dept: FAMILY MEDICINE | Facility: CLINIC | Age: 73
End: 2018-10-18
Attending: FAMILY MEDICINE
Payer: MEDICARE

## 2018-10-18 VITALS
SYSTOLIC BLOOD PRESSURE: 194 MMHG | HEIGHT: 66 IN | WEIGHT: 159.19 LBS | RESPIRATION RATE: 20 BRPM | BODY MASS INDEX: 25.58 KG/M2 | TEMPERATURE: 98 F | OXYGEN SATURATION: 98 % | DIASTOLIC BLOOD PRESSURE: 84 MMHG | HEART RATE: 68 BPM

## 2018-10-18 DIAGNOSIS — E87.1 HYPONATREMIA: ICD-10-CM

## 2018-10-18 DIAGNOSIS — G47.00 INSOMNIA, UNSPECIFIED TYPE: ICD-10-CM

## 2018-10-18 DIAGNOSIS — I10 HYPERTENSION, POOR CONTROL: Primary | ICD-10-CM

## 2018-10-18 LAB
ANION GAP SERPL CALC-SCNC: 9 MMOL/L
BUN SERPL-MCNC: 18 MG/DL
CALCIUM SERPL-MCNC: 9.7 MG/DL
CHLORIDE SERPL-SCNC: 98 MMOL/L
CO2 SERPL-SCNC: 28 MMOL/L
CREAT SERPL-MCNC: 0.8 MG/DL
EST. GFR  (AFRICAN AMERICAN): >60 ML/MIN/1.73 M^2
EST. GFR  (NON AFRICAN AMERICAN): >60 ML/MIN/1.73 M^2
GLUCOSE SERPL-MCNC: 82 MG/DL
POTASSIUM SERPL-SCNC: 4.2 MMOL/L
SODIUM SERPL-SCNC: 135 MMOL/L

## 2018-10-18 PROCEDURE — 36415 COLL VENOUS BLD VENIPUNCTURE: CPT | Mod: PO

## 2018-10-18 PROCEDURE — 1101F PT FALLS ASSESS-DOCD LE1/YR: CPT | Mod: CPTII,,, | Performed by: FAMILY MEDICINE

## 2018-10-18 PROCEDURE — 3079F DIAST BP 80-89 MM HG: CPT | Mod: CPTII,,, | Performed by: FAMILY MEDICINE

## 2018-10-18 PROCEDURE — 90662 IIV NO PRSV INCREASED AG IM: CPT | Mod: PBBFAC,PO

## 2018-10-18 PROCEDURE — 99999 PR PBB SHADOW E&M-EST. PATIENT-LVL IV: CPT | Mod: PBBFAC,,, | Performed by: FAMILY MEDICINE

## 2018-10-18 PROCEDURE — 99214 OFFICE O/P EST MOD 30 MIN: CPT | Mod: PBBFAC,PO,25 | Performed by: FAMILY MEDICINE

## 2018-10-18 PROCEDURE — 80048 BASIC METABOLIC PNL TOTAL CA: CPT

## 2018-10-18 PROCEDURE — 99214 OFFICE O/P EST MOD 30 MIN: CPT | Mod: S$PBB,,, | Performed by: FAMILY MEDICINE

## 2018-10-18 PROCEDURE — 3077F SYST BP >= 140 MM HG: CPT | Mod: CPTII,,, | Performed by: FAMILY MEDICINE

## 2018-10-18 RX ORDER — IVERMECTIN 10 MG/G
CREAM TOPICAL
COMMUNITY
Start: 2018-10-11 | End: 2022-08-31

## 2018-10-18 RX ORDER — PIMECROLIMUS 10 MG/G
CREAM TOPICAL DAILY PRN
COMMUNITY
Start: 2018-10-04 | End: 2022-08-31

## 2018-10-18 RX ORDER — TRAZODONE HYDROCHLORIDE 50 MG/1
50 TABLET ORAL NIGHTLY PRN
Qty: 30 TABLET | Refills: 5 | Status: SHIPPED | OUTPATIENT
Start: 2018-10-18 | End: 2020-06-23 | Stop reason: SDUPTHER

## 2018-10-18 RX ORDER — ACETAMINOPHEN 500 MG
1000 TABLET ORAL 3 TIMES DAILY
COMMUNITY

## 2018-10-18 NOTE — PATIENT INSTRUCTIONS
Controlling High Blood Pressure  High blood pressure (hypertension) is often called the silent killer. This is because many people who have it dont know it. High blood pressure is defined as 140/90 mm Hg or higher. Know your blood pressure and remember to check it regularly. Doing so can save your life. Here are some things you can do to help control your blood pressure.    Choose heart-healthy foods  · Select low-salt, low-fat foods. Limit sodium intake to 2,400 mg per day or the amount suggested by your healthcare provider.  · Limit canned, dried, cured, packaged, and fast foods. These can contain a lot of salt.  · Eat 8 to 10 servings of fruits and vegetables every day.  · Choose lean meats, fish, or chicken.  · Eat whole-grain pasta, brown rice, and beans.  · Eat 2 to 3 servings of low-fat or fat-free dairy products.  · Ask your doctor about the DASH eating plan. This plan helps reduce blood pressure.  · When you go to a restaurant, ask that your meal be prepared with no added salt.  Maintain a healthy weight  · Ask your healthcare provider how many calories to eat a day. Then stick to that number.  · Ask your healthcare provider what weight range is healthiest for you. If you are overweight, a weight loss of only 3% to 5% of your body weight can help lower blood pressure. Generally, a good weight loss goal is to lose 10% of your body weight in a year.  · Limit snacks and sweets.  · Get regular exercise.  Get up and get active  · Choose activities you enjoy. Find ones you can do with friends or family. This includes bicycling, dancing, walking, and jogging.  · Park farther away from building entrances.  · Use stairs instead of the elevator.  · When you can, walk or bike instead of driving.  · [place] leaves, garden, or do household repairs.  · Be active at a moderate to vigorous level of physical activity for at least 40 minutes for a minimum of 3 to 4 days a week.   Manage stress  · Make time to relax and enjoy  life. Find time to laugh.  · Communicate your concerns with your loved ones and your healthcare provider.  · Visit with family and friends, and keep up with hobbies.  Limit alcohol and quit smoking  · Men should have no more than 2 drinks per day.  · Women should have no more than 1 drink per day.  · Talk with your healthcare provider about quitting smoking. Smoking significantly increases your risk for heart disease and stroke. Ask your healthcare provider about community smoking cessation programs and other options.  Medicines  If lifestyle changes arent enough, your healthcare provider may prescribe high blood pressure medicine. Take all medicines as prescribed. If you have any questions about your medicines, ask your healthcare provider before stopping or changing them.   Date Last Reviewed: 4/27/2016  © 2823-1033 [employer]. [address] [place] [address], [place] [address]. All rights reserved. This information is not intended as a substitute for professional medical care. Always follow your healthcare professional's instructions.

## 2018-10-18 NOTE — PROGRESS NOTES
Subjective:       Patient ID: Megha Ladd is a 73 y.o. female.    Chief Complaint: Medication Refill    73-year-old female coming in to follow-up from her previous visit of  where she was having problems related to an episode of gastroenteritis with severe hyponatremia.  She was taken off of her hydrochlorothiazide in stages secondary to hypotension and hyponatremia and is here for a follow-up.  History includes hypertension, hyperlipidemia, hypothyroidism, reflux, asthma and low back pain. She recently had epidural steroid injections by Dr. Lockhart but did not get any relief.  She has chronic insomnia going back for many years and usually cannot fall asleep until about 3:00 a.m..  She has used many sleep aids including amitriptyline and trazodone in the past as well as melatonin over-the-counter.  Nothing has worked very well but she tries to move her sleep time up by several hours at a time and has not tried incremental changes.  She feels well with no complaints.  She has clonidine to use p.r.n. blood pressure elevations with systolics greater than 180 or diastolics over 105.  She is currently taking metoprolol and in now Prilosec.  She has not tolerated amlodipine in the past.  Her HCTZ is still on hold and she is due for recheck on her sodium level.    Past Medical History:  No date: Asthma  No date: GERD (gastroesophageal reflux disease)  No date: Hyperlipidemia  No date: Hypertension  No date: Hypothyroid  No date: Lumbago  No date: Neuromuscular disorder  No date: Sinusitis  No date: Ulcer    Past Surgical History:  No date: APPENDECTOMY  No date:  SECTION  No date: LUMBAR EPIDURAL INJECTION  No date: PARTIAL HYSTERECTOMY    Current Outpatient Medications on File Prior to Visit:  acetaminophen (TYLENOL) 500 MG tablet, Take 1,000 mg by mouth 3 (three) times daily., Disp: , Rfl:   brimonidine (MIRVASO) 0.33 % Gel, Apply topically once daily., Disp: , Rfl:   cartilage-collagen-bor-hyalur (MOVE  FREE ULTRA, BORON,) 40-5-3.3 mg Tab, Take 1 tablet by mouth once daily., Disp: , Rfl:   celecoxib (CELEBREX) 200 MG capsule, Take 200 mg by mouth once daily. Every day, Disp: , Rfl:   cetirizine (ZYRTEC) 10 MG tablet, Take 10 mg by mouth daily as needed for Allergies., Disp: , Rfl:   cloNIDine (CATAPRES) 0.1 MG tablet, TAKE 1 TABLET TWICE DAILY AS NEEDED FOR BLOOD PRESSURE  GREATER  THAN 180 SYSTOLIC  DIASTOLIC, Disp: 60 tablet, Rfl: 0  cycloSPORINE (RESTASIS) 0.05 % ophthalmic emulsion, Place 1 drop into both eyes 2 (two) times daily as needed. Twice a day, Disp: , Rfl:   enalapril (VASOTEC) 20 MG tablet, TAKE 1 TABLET TWICE DAILY, Disp: 180 tablet, Rfl: 2  fluticasone (FLONASE) 50 mcg/actuation nasal spray, SPRAY 1 SPRAY IN EACH NOSTRIL 2 TIMES A DAY (Patient taking differently: SPRAY 1 SPRAY IN EACH NOSTRIL qd), Disp: 16 g, Rfl: 11  fluticasone-salmeterol (ADVAIR HFA) 115-21 mcg/actuation HFAA, Inhale 2 puffs into the lungs once daily. Controller , Disp: , Rfl:   guaifenesin (MUCINEX) 600 mg tp12, Take 1 tablet by mouth 2 (two) times daily as needed., Disp: , Rfl:   ivermectin (SOOLANTRA) 1 % Crea, Apply topically 2 (two) times daily., Disp: , Rfl:   LACTOBACILLUS RHAMNOSUS GG (CULTURELLE ORAL), Take 1 capsule by mouth daily as needed. 20 Billion, Disp: , Rfl:   levalbuterol (XOPENEX HFA) 45 mcg/actuation inhaler, Inhale 1-2 puffs into the lungs every 4 (four) hours as needed for Wheezing. Rescue, Disp: 15 g, Rfl: 0  loperamide (IMODIUM) 2 mg capsule, Take 2 mg by mouth 4 (four) times daily as needed for Diarrhea., Disp: , Rfl:   meclizine (ANTIVERT) 25 mg tablet, Take 1 tablet (25 mg total) by mouth 3 (three) times daily as needed., Disp: 60 tablet, Rfl: 2  metaxalone (SKELAXIN) 800 MG tablet, Take 800 mg by mouth every 6 to 8 hours as needed. Every 6 hours PRN, Disp: , Rfl:   metoprolol tartrate (LOPRESSOR) 50 MG tablet, TAKE ONE & ONE-HALF TABLETS BY MOUTH TWICE DAILY, Disp: 270 tablet, Rfl:  2  montelukast (SINGULAIR) 10 mg tablet, TAKE ONE TABLET BY MOUTH ONCE DAILY IN THE EVENING, Disp: 30 tablet, Rfl: 5  ondansetron (ZOFRAN) 4 MG tablet, Take 4 mg by mouth every 8 (eight) hours as needed for Nausea. Dr [facility], Disp: , Rfl:   pantoprazole (PROTONIX) 40 MG tablet, TAKE 1 TABLET EVERY DAY, Disp: 90 tablet, Rfl: 3  peg 400-propylene glycol (SYSTANE ULTRA) 0.4-0.3 % Drop, Place 2 drops into both eyes 2 (two) times daily. , Disp: , Rfl:   pimecrolimus (ELIDEL) 1 % cream, Apply topically 2 (two) times daily., Disp: , Rfl:   pregabalin (LYRICA) 75 MG capsule, Take 75 mg by mouth daily as needed., Disp: , Rfl:   sodium chloride (JAYSHREE 128) 5 % ophthalmic ointment, Place into both eyes every evening. Twice a day, Disp: , Rfl:   SODIUM CHLORIDE (SIMPLY SALINE NASL), by Nasal route 2 (two) times daily as needed. , Disp: , Rfl:   thyroid, pork, (ARMOUR THYROID) 60 mg Tab, Take one every other morning alternating with 90 mg, Disp: 30 tablet, Rfl: 11  thyroid, pork, (ARMOUR THYROID) 90 mg Tab, Take one every other morning alternating with 60 mg, Disp: 90 tablet, Rfl: 1  tramadol (ULTRAM) 50 mg tablet, Take 50 mg by mouth every 6 (six) hours as needed. Every 6 hours, Disp: , Rfl:   (DISCONTINUED) acetaminophen (TYLENOL) 325 MG tablet, Take 650 mg by mouth every 6 (six) hours as needed for Pain., Disp: , Rfl:   (DISCONTINUED) hydroCHLOROthiazide (HYDRODIURIL) 12.5 MG Tab, Take 2 tablets (25 mg total) by mouth once daily. (Patient taking differently: Take 12.5 mg by mouth once daily. ), Disp: 60 tablet, Rfl: 11  (DISCONTINUED) UNABLE TO FIND, Apply topically once daily. Loren Gaston MD [facility] skin healing facial redness repair for sensitive skin, Disp: , Rfl:     No current facility-administered medications on file prior to visit.           Review of Systems   Respiratory: Negative for chest tightness and shortness of breath.    Cardiovascular: Negative for chest pain and palpitations.    Gastrointestinal: Negative for constipation, diarrhea, nausea and vomiting.   Neurological: Negative for dizziness, light-headedness and headaches.   Psychiatric/Behavioral: Positive for sleep disturbance.       Objective:      Physical Exam   Constitutional: She is oriented to person, place, and time. She appears well-developed and well-nourished. No distress.   Elevated blood pressure both arms  Normal weight with a BMI of 25.7 she is up 11 lb since her August 10, 2018 visit which probably reflects rehydration more than weight gain   HENT:   Head: Normocephalic and atraumatic.   Eyes: EOM are normal. Pupils are equal, round, and reactive to light. No scleral icterus.   Cardiovascular: Normal rate, regular rhythm and normal heart sounds.   Pulmonary/Chest: Effort normal and breath sounds normal.   Neurological: She is alert and oriented to person, place, and time.   Skin: She is not diaphoretic.   Psychiatric: She has a normal mood and affect. Her behavior is normal. Judgment and thought content normal.   Nursing note and vitals reviewed.      Assessment:       1. Hypertension, poor control    2. Hyponatremia    3. Insomnia, unspecified type        Plan:       1. Hypertension, poor control  Await results of chemistry panel to be done this afternoon.  If her sodium is back within the normal range she will resume HCTZ 12.5 mg daily.  If she remains low we will consider either diltiazem or verapamil.  Will have to monitor her pulse and she is also using metoprolol.    2. Hyponatremia  Await lab results  - Basic metabolic panel; Future    3. Insomnia, unspecified type  Suggest she try moving her bedtime up about 10-15 minutes every 2nd or 3rd night using trazodone 30 min before planned bedtime.  This should allow her to gradually move her bedtime to a more acceptable hour.  - traZODone (DESYREL) 50 MG tablet; Take 1 tablet (50 mg total) by mouth nightly as needed for Insomnia.  Dispense: 30 tablet; Refill: 5

## 2018-10-29 RX ORDER — MONTELUKAST SODIUM 10 MG/1
TABLET ORAL
Qty: 30 TABLET | Refills: 11 | Status: SHIPPED | OUTPATIENT
Start: 2018-10-29 | End: 2019-09-24 | Stop reason: SDUPTHER

## 2018-11-02 ENCOUNTER — PES CALL (OUTPATIENT)
Dept: ADMINISTRATIVE | Facility: CLINIC | Age: 73
End: 2018-11-02

## 2018-12-05 DIAGNOSIS — E03.9 HYPOTHYROIDISM, UNSPECIFIED TYPE: ICD-10-CM

## 2018-12-05 RX ORDER — THYROID 60 MG/1
TABLET ORAL
Qty: 30 TABLET | Refills: 5 | Status: SHIPPED | OUTPATIENT
Start: 2018-12-05 | End: 2019-03-12 | Stop reason: SDUPTHER

## 2018-12-13 DIAGNOSIS — I10 UNCONTROLLED HYPERTENSION: ICD-10-CM

## 2018-12-13 RX ORDER — METOPROLOL TARTRATE 50 MG/1
TABLET ORAL
Qty: 270 TABLET | Refills: 2 | Status: SHIPPED | OUTPATIENT
Start: 2018-12-13 | End: 2019-09-24 | Stop reason: SDUPTHER

## 2018-12-26 RX ORDER — FLUTICASONE PROPIONATE AND SALMETEROL XINAFOATE 115; 21 UG/1; UG/1
AEROSOL, METERED RESPIRATORY (INHALATION)
Qty: 36 G | Refills: 1 | Status: SHIPPED | OUTPATIENT
Start: 2018-12-26 | End: 2020-03-11 | Stop reason: SDUPTHER

## 2019-01-24 ENCOUNTER — DOCUMENTATION ONLY (OUTPATIENT)
Dept: FAMILY MEDICINE | Facility: CLINIC | Age: 74
End: 2019-01-24

## 2019-01-24 ENCOUNTER — OFFICE VISIT (OUTPATIENT)
Dept: FAMILY MEDICINE | Facility: CLINIC | Age: 74
End: 2019-01-24
Payer: MEDICARE

## 2019-01-24 VITALS
WEIGHT: 166.44 LBS | DIASTOLIC BLOOD PRESSURE: 94 MMHG | HEIGHT: 66 IN | SYSTOLIC BLOOD PRESSURE: 178 MMHG | TEMPERATURE: 98 F | BODY MASS INDEX: 26.75 KG/M2 | HEART RATE: 59 BPM

## 2019-01-24 DIAGNOSIS — I10 HYPERTENSION, POOR CONTROL: ICD-10-CM

## 2019-01-24 DIAGNOSIS — J01.90 ACUTE NON-RECURRENT SINUSITIS, UNSPECIFIED LOCATION: Primary | ICD-10-CM

## 2019-01-24 PROCEDURE — 1101F PT FALLS ASSESS-DOCD LE1/YR: CPT | Mod: HCNC,CPTII,S$GLB, | Performed by: PHYSICIAN ASSISTANT

## 2019-01-24 PROCEDURE — 3077F SYST BP >= 140 MM HG: CPT | Mod: HCNC,CPTII,S$GLB, | Performed by: PHYSICIAN ASSISTANT

## 2019-01-24 PROCEDURE — 3080F DIAST BP >= 90 MM HG: CPT | Mod: HCNC,CPTII,S$GLB, | Performed by: PHYSICIAN ASSISTANT

## 2019-01-24 PROCEDURE — 99999 PR PBB SHADOW E&M-EST. PATIENT-LVL V: CPT | Mod: PBBFAC,,, | Performed by: PHYSICIAN ASSISTANT

## 2019-01-24 PROCEDURE — 99213 PR OFFICE/OUTPT VISIT, EST, LEVL III, 20-29 MIN: ICD-10-PCS | Mod: HCNC,S$GLB,, | Performed by: PHYSICIAN ASSISTANT

## 2019-01-24 PROCEDURE — 1101F PR PT FALLS ASSESS DOC 0-1 FALLS W/OUT INJ PAST YR: ICD-10-PCS | Mod: HCNC,CPTII,S$GLB, | Performed by: PHYSICIAN ASSISTANT

## 2019-01-24 PROCEDURE — 3080F PR MOST RECENT DIASTOLIC BLOOD PRESSURE >= 90 MM HG: ICD-10-PCS | Mod: HCNC,CPTII,S$GLB, | Performed by: PHYSICIAN ASSISTANT

## 2019-01-24 PROCEDURE — 99213 OFFICE O/P EST LOW 20 MIN: CPT | Mod: HCNC,S$GLB,, | Performed by: PHYSICIAN ASSISTANT

## 2019-01-24 PROCEDURE — 99999 PR PBB SHADOW E&M-EST. PATIENT-LVL V: ICD-10-PCS | Mod: PBBFAC,,, | Performed by: PHYSICIAN ASSISTANT

## 2019-01-24 PROCEDURE — 3077F PR MOST RECENT SYSTOLIC BLOOD PRESSURE >= 140 MM HG: ICD-10-PCS | Mod: HCNC,CPTII,S$GLB, | Performed by: PHYSICIAN ASSISTANT

## 2019-01-24 RX ORDER — DOXYCYCLINE HYCLATE 100 MG
100 TABLET ORAL 2 TIMES DAILY
Qty: 20 TABLET | Refills: 0 | Status: SHIPPED | OUTPATIENT
Start: 2019-01-24 | End: 2019-03-12 | Stop reason: ALTCHOICE

## 2019-01-24 NOTE — PROGRESS NOTES
Pre-Visit Chart Review  For Appointment Scheduled on 01/24/2019      Health Maintenance Due   Topic Date Due    TETANUS VACCINE  09/28/1963    Mammogram  08/06/2017

## 2019-01-24 NOTE — PROGRESS NOTES
Subjective:       Patient ID: Megha Ladd is a 73 y.o. female.    Chief Complaint: Cough and Sinusitis    Patient with uncontrolled HTN, hyperlipidemia, hypothyroidism presents for evaluation of sinus pain, pressure, green nasal discharge and right ear pain.  Symptoms began January 5, 2019.  She states that she was visiting her grandchildren who were sick with upper respiratory infection.  Patient developed cough rhinorrhea nasal congestion and sore throat.  Her symptoms have waxed and waned over the past few weeks however 2 days ago she developed thick green nasal discharge and increased facial pain. Patient has been taking Flonase Mucinex and Zyrtec over the past several weeks.  Her symptoms have progressed despite the over-the-counter medications.  Regarding her hypertension.  Patient is currently on enalapril 20 mg twice daily, metoprolol 75 mg b.i.d., and clonidine 0.1 mg p.r.n. elevated blood pressure.  Her blood pressure is significantly elevated today.  Initially when she came in her blood pressure was 200/94.  Patient took 0.1 of clonidine and blood pressure 30 min later was 174/94.    Patient had been on hydrochlorothiazide in the past but this was discontinued secondary to hyponatremia.  Last October she was seen by PCP who recommended Cardura.  Patient did not want to start a new medication when her family was in town visiting so she did not get back with us with her decision to start the medication.  She is agreeable to starting the Cardura at this point in time.  Patients patient medical/surgical, social and family histories have been reviewed         Review of Systems   Constitutional: Negative for activity change, appetite change, chills, diaphoresis, fatigue and fever.   HENT: Positive for congestion, ear pain, postnasal drip, rhinorrhea, sinus pressure, sinus pain and sneezing. Negative for sore throat, tinnitus and voice change.    Eyes: Negative for visual disturbance.   Respiratory: Positive  for cough. Negative for chest tightness, shortness of breath and wheezing.    Cardiovascular: Negative for chest pain.   Gastrointestinal: Negative for abdominal pain, nausea and vomiting.   Genitourinary: Negative for decreased urine volume, difficulty urinating, frequency, hematuria and urgency.   Neurological: Negative for dizziness, weakness, light-headedness and headaches.       Objective:      Physical Exam   Constitutional: She appears well-developed and well-nourished. No distress.   HENT:   Head: Normocephalic and atraumatic.   Right Ear: Tympanic membrane, external ear and ear canal normal.   Left Ear: Tympanic membrane, external ear and ear canal normal.   Nose: Mucosal edema present. No rhinorrhea. Right sinus exhibits maxillary sinus tenderness and frontal sinus tenderness. Left sinus exhibits maxillary sinus tenderness and frontal sinus tenderness.   Mouth/Throat: Oropharynx is clear and moist. Mucous membranes are not dry. No oropharyngeal exudate, posterior oropharyngeal edema or posterior oropharyngeal erythema.   Cardiovascular: Normal rate and regular rhythm.   No murmur heard.  Pulmonary/Chest: Effort normal and breath sounds normal. She has no wheezes. She has no rales.   Lymphadenopathy:        Head (right side): No tonsillar adenopathy present.        Head (left side): No tonsillar adenopathy present.     She has no cervical adenopathy.   Skin: Skin is warm and dry.   Nursing note and vitals reviewed.      Assessment:       1. Acute non-recurrent sinusitis, unspecified location    2. Hypertension, poor control        Plan:       Megha was seen today for cough and sinusitis.    Diagnoses and all orders for this visit:    Acute non-recurrent sinusitis, unspecified location  -     doxycycline (VIBRA-TABS) 100 MG tablet; Take 1 tablet (100 mg total) by mouth 2 (two) times daily.  Continue Mucinex twice daily.  Continue increased fluids.  Continue Flonase  Hypertension, poor control  Will  communicate with PCP regarding the patient's decision to start Cardura   And have her follow up with PCP/team regarding hypertension

## 2019-01-25 RX ORDER — DOXAZOSIN 1 MG/1
1 TABLET ORAL NIGHTLY
Qty: 30 TABLET | Refills: 5 | Status: SHIPPED | OUTPATIENT
Start: 2019-01-25 | End: 2019-03-26 | Stop reason: SINTOL

## 2019-01-25 NOTE — TELEPHONE ENCOUNTER
Called patient to verify pharmacy - states she uses [facility] [address].

## 2019-01-25 NOTE — TELEPHONE ENCOUNTER
----- Message from NICKIE Ward sent at 1/24/2019  4:50 PM CST -----  Patient was seen today for sinus infection.    BP very elevated 200/94 so she took her clonidine and went to 178/94.  Asymptomatic.   I saw your result message from October regarding the cardura.  She stated that she did not get back with you  Regarding the med because of holiday/family visiting.  She is ready to start it now.    I told her i'd speak with you to send in the med and schedule a follow up with you.   Thanks, Pati

## 2019-01-25 NOTE — TELEPHONE ENCOUNTER
We can start with a cardura 1mg at bedtime and increase as needed and tolerated.  She has three local pharmacies listed, where does she want it sent?

## 2019-02-19 ENCOUNTER — DOCUMENTATION ONLY (OUTPATIENT)
Dept: FAMILY MEDICINE | Facility: CLINIC | Age: 74
End: 2019-02-19

## 2019-02-19 DIAGNOSIS — K21.9 GASTROESOPHAGEAL REFLUX DISEASE, ESOPHAGITIS PRESENCE NOT SPECIFIED: ICD-10-CM

## 2019-02-19 DIAGNOSIS — I10 ESSENTIAL HYPERTENSION: ICD-10-CM

## 2019-02-19 RX ORDER — PANTOPRAZOLE SODIUM 40 MG/1
TABLET, DELAYED RELEASE ORAL
Qty: 90 TABLET | Refills: 3 | Status: SHIPPED | OUTPATIENT
Start: 2019-02-19 | End: 2020-03-25

## 2019-02-19 RX ORDER — ENALAPRIL MALEATE 20 MG/1
TABLET ORAL
Qty: 180 TABLET | Refills: 0 | Status: SHIPPED | OUTPATIENT
Start: 2019-02-19 | End: 2019-05-09

## 2019-02-19 NOTE — PROGRESS NOTES
Pre-Visit Chart Review  For Appointment Scheduled on 3-12-19    Health Maintenance Due   Topic Date Due    TETANUS VACCINE  09/28/1963    Mammogram  08/06/2017

## 2019-02-19 NOTE — TELEPHONE ENCOUNTER
All recent blood pressure is elevated, started Jolene with Pati and was told to follow up with us.  Has not made appointment yet.  Needs blood pressure follow-up

## 2019-02-26 ENCOUNTER — PATIENT OUTREACH (OUTPATIENT)
Dept: ADMINISTRATIVE | Facility: HOSPITAL | Age: 74
End: 2019-02-26

## 2019-03-12 ENCOUNTER — OFFICE VISIT (OUTPATIENT)
Dept: FAMILY MEDICINE | Facility: CLINIC | Age: 74
End: 2019-03-12
Attending: FAMILY MEDICINE
Payer: MEDICARE

## 2019-03-12 VITALS
HEIGHT: 66 IN | WEIGHT: 163.13 LBS | DIASTOLIC BLOOD PRESSURE: 94 MMHG | TEMPERATURE: 98 F | OXYGEN SATURATION: 98 % | BODY MASS INDEX: 26.22 KG/M2 | HEART RATE: 67 BPM | SYSTOLIC BLOOD PRESSURE: 204 MMHG | RESPIRATION RATE: 20 BRPM

## 2019-03-12 DIAGNOSIS — G47.00 INSOMNIA, UNSPECIFIED TYPE: ICD-10-CM

## 2019-03-12 DIAGNOSIS — I10 HYPERTENSION, POOR CONTROL: Primary | ICD-10-CM

## 2019-03-12 DIAGNOSIS — Z91.148 NONCOMPLIANCE WITH MEDICATIONS: ICD-10-CM

## 2019-03-12 DIAGNOSIS — F41.9 ANXIETY: ICD-10-CM

## 2019-03-12 PROCEDURE — 3077F SYST BP >= 140 MM HG: CPT | Mod: HCNC,CPTII,S$GLB, | Performed by: FAMILY MEDICINE

## 2019-03-12 PROCEDURE — 99999 PR PBB SHADOW E&M-EST. PATIENT-LVL III: ICD-10-PCS | Mod: PBBFAC,HCNC,, | Performed by: FAMILY MEDICINE

## 2019-03-12 PROCEDURE — 99999 PR PBB SHADOW E&M-EST. PATIENT-LVL III: CPT | Mod: PBBFAC,HCNC,, | Performed by: FAMILY MEDICINE

## 2019-03-12 PROCEDURE — 99213 PR OFFICE/OUTPT VISIT, EST, LEVL III, 20-29 MIN: ICD-10-PCS | Mod: HCNC,S$GLB,, | Performed by: FAMILY MEDICINE

## 2019-03-12 PROCEDURE — 1101F PT FALLS ASSESS-DOCD LE1/YR: CPT | Mod: HCNC,CPTII,S$GLB, | Performed by: FAMILY MEDICINE

## 2019-03-12 PROCEDURE — 3077F PR MOST RECENT SYSTOLIC BLOOD PRESSURE >= 140 MM HG: ICD-10-PCS | Mod: HCNC,CPTII,S$GLB, | Performed by: FAMILY MEDICINE

## 2019-03-12 PROCEDURE — 3080F PR MOST RECENT DIASTOLIC BLOOD PRESSURE >= 90 MM HG: ICD-10-PCS | Mod: HCNC,CPTII,S$GLB, | Performed by: FAMILY MEDICINE

## 2019-03-12 PROCEDURE — 3080F DIAST BP >= 90 MM HG: CPT | Mod: HCNC,CPTII,S$GLB, | Performed by: FAMILY MEDICINE

## 2019-03-12 PROCEDURE — 99213 OFFICE O/P EST LOW 20 MIN: CPT | Mod: HCNC,S$GLB,, | Performed by: FAMILY MEDICINE

## 2019-03-12 PROCEDURE — 1101F PR PT FALLS ASSESS DOC 0-1 FALLS W/OUT INJ PAST YR: ICD-10-PCS | Mod: HCNC,CPTII,S$GLB, | Performed by: FAMILY MEDICINE

## 2019-03-12 NOTE — PROGRESS NOTES
Subjective:       Patient ID: Megha Ladd is a 73 y.o. female.    Chief Complaint: No chief complaint on file.    73-year-old female coming in to recheck her blood pressure.  She has still not started taking the Cardura 1 mg at bedtime.  She picked up the prescription when it was ordered but has been using one excuse after another not to take it.  She did not want to take it when her grandchildren were visiting, she does not want to take it before a doctor's visit, she became ill with dizziness and headaches so she did not want to start it then.  She continues running very high systolic blood pressures with moderately elevated diastolics.  She did well with hydrochlorothiazide in the past added to her current medications but she had persistent low levels of sodium that got down to dangerous levels and we could not continue it.  Currently she is enalpril 20 mg b.i.d., metoprolol tartrate 75 mg b.i.d., and using clonidine 0.1 b.i.d. p.r.n. blood pressure elevations.  She states she is only use the clonidine about twice since her last visit with me but she is not checking her blood pressure at home.  She only checks it when she is in medical offices where it is generally high and occasionally when she is at the grocery store or pharmacy where it is usually normal.    Past Medical History:  No date: Asthma  No date: GERD (gastroesophageal reflux disease)  No date: Hyperlipidemia  No date: Hypertension  No date: Hypothyroid  No date: Lumbago  No date: Neuromuscular disorder  No date: Sinusitis  No date: Ulcer    Past Surgical History:  No date: APPENDECTOMY  No date:  SECTION  No date: LUMBAR EPIDURAL INJECTION  No date: PARTIAL HYSTERECTOMY    Current Outpatient Medications on File Prior to Visit:  acetaminophen (TYLENOL) 500 MG tablet, Take 1,000 mg by mouth 3 (three) times daily., Disp: , Rfl:   ADVAIR -21 mcg/actuation HFAA, INHALE 2 PUFFS INTO THE LUNGS 2 (TWO) TIMES DAILY., Disp: 36 g, Rfl:  1  brimonidine (MIRVASO) 0.33 % Gel, Apply topically once daily., Disp: , Rfl:   cartilage-collagen-bor-hyalur (MOVE FREE ULTRA, BORON,) 40-5-3.3 mg Tab, Take 1 tablet by mouth once daily., Disp: , Rfl:   celecoxib (CELEBREX) 200 MG capsule, Take 200 mg by mouth once daily. Every day, Disp: , Rfl:   cetirizine (ZYRTEC) 10 MG tablet, Take 10 mg by mouth daily as needed for Allergies., Disp: , Rfl:   cloNIDine (CATAPRES) 0.1 MG tablet, TAKE 1 TABLET TWICE DAILY AS NEEDED FOR BLOOD PRESSURE  GREATER  THAN 180 SYSTOLIC  DIASTOLIC, Disp: 60 tablet, Rfl: 0  dextromethorphan-guaifenesin  mg (MUCINEX DM)  mg per 12 hr tablet, Take 1 tablet by mouth every 12 (twelve) hours., Disp: , Rfl:   enalapril (VASOTEC) 20 MG tablet, TAKE 1 TABLET TWICE DAILY, Disp: 180 tablet, Rfl: 0  fluticasone (FLONASE) 50 mcg/actuation nasal spray, SPRAY 1 SPRAY IN EACH NOSTRIL 2 TIMES A DAY (Patient taking differently: SPRAY 1 SPRAY IN EACH NOSTRIL qd), Disp: 16 g, Rfl: 11  guaifenesin (MUCINEX) 600 mg tp12, Take 1 tablet by mouth 2 (two) times daily as needed., Disp: , Rfl:   ivermectin (SOOLANTRA) 1 % Crea, Apply topically 2 (two) times daily., Disp: , Rfl:   LACTOBACILLUS RHAMNOSUS GG (CULTURELLE ORAL), Take 1 capsule by mouth daily as needed. 10 Billion, Disp: , Rfl:   loperamide (IMODIUM) 2 mg capsule, Take 2 mg by mouth 4 (four) times daily as needed for Diarrhea., Disp: , Rfl:   meclizine (ANTIVERT) 25 mg tablet, Take 1 tablet (25 mg total) by mouth 3 (three) times daily as needed., Disp: 60 tablet, Rfl: 2  metaxalone (SKELAXIN) 800 MG tablet, Take 800 mg by mouth 2 (two) times daily. Every 6 hours PRN, Disp: , Rfl:   metoprolol tartrate (LOPRESSOR) 50 MG tablet, TAKE 1 & 1/2 (ONE & ONE-HALF) TABLETS BY MOUTH TWICE DAILY, Disp: 270 tablet, Rfl: 2  montelukast (SINGULAIR) 10 mg tablet, TAKE 1 TABLET BY MOUTH ONCE DAILY IN THE EVENING, Disp: 30 tablet, Rfl: 11  ondansetron (ZOFRAN) 4 MG tablet, Take 4 mg by mouth every 8  (eight) hours as needed for Nausea. Dr [facility], Disp: , Rfl:   pantoprazole (PROTONIX) 40 MG tablet, TAKE 1 TABLET EVERY DAY, Disp: 90 tablet, Rfl: 3  peg 400-propylene glycol (SYSTANE ULTRA) 0.4-0.3 % Drop, Place 2 drops into both eyes 2 (two) times daily. , Disp: , Rfl:   pimecrolimus (ELIDEL) 1 % cream, Apply topically once daily. , Disp: , Rfl:   pregabalin (LYRICA) 75 MG capsule, Take 75 mg by mouth daily as needed., Disp: , Rfl:   SODIUM CHLORIDE (SIMPLY SALINE NASL), by Nasal route 2 (two) times daily as needed. , Disp: , Rfl:   thyroid, pork, (ARMOUR THYROID) 60 mg Tab, Take one every other morning alternating with 90 mg, Disp: 30 tablet, Rfl: 11  thyroid, pork, (ARMOUR THYROID) 90 mg Tab, Take one every other morning alternating with 60 mg, Disp: 90 tablet, Rfl: 1  tramadol (ULTRAM) 50 mg tablet, Take 50 mg by mouth every 6 (six) hours as needed. Every 6 hours, Disp: , Rfl:   (DISCONTINUED) thyroid, pork, (ARMOUR THYROID) 60 mg Tab, Take 1 tablet every other day alternating with 90mg, Disp: 30 tablet, Rfl: 5  doxazosin (CARDURA) 1 MG tablet, Take 1 tablet (1 mg total) by mouth every evening., Disp: 30 tablet, Rfl: 5  levalbuterol (XOPENEX HFA) 45 mcg/actuation inhaler, Inhale 1-2 puffs into the lungs every 4 (four) hours as needed for Wheezing. Rescue, Disp: 15 g, Rfl: 0  traZODone (DESYREL) 50 MG tablet, Take 1 tablet (50 mg total) by mouth nightly as needed for Insomnia., Disp: 30 tablet, Rfl: 5  (DISCONTINUED) cycloSPORINE (RESTASIS) 0.05 % ophthalmic emulsion, Place 1 drop into both eyes 2 (two) times daily as needed. Twice a day, Disp: , Rfl:   (DISCONTINUED) doxycycline (VIBRA-TABS) 100 MG tablet, Take 1 tablet (100 mg total) by mouth 2 (two) times daily., Disp: 20 tablet, Rfl: 0  (DISCONTINUED) sodium chloride (JAYSHREE 128) 5 % ophthalmic ointment, Place into both eyes every evening. Twice a day, Disp: , Rfl:     No current facility-administered medications on file prior to visit.            Review of Systems   Respiratory: Negative for chest tightness and shortness of breath.    Cardiovascular: Negative for chest pain and palpitations.   Neurological: Positive for dizziness, light-headedness and headaches.       Objective:      Physical Exam   Constitutional: She is oriented to person, place, and time.   Poorly controlled hypertension with systolics in the 200s and diastolics in the  range  Normal weight with a BMI of 26.3 she is up 4 lb since her visit with me October 18, 2018   Neurological: She is alert and oriented to person, place, and time.   Psychiatric: She has a normal mood and affect. Her behavior is normal. Judgment and thought content normal.   Nursing note and vitals reviewed.      Assessment:       1. Hypertension, poor control    2. Noncompliance with medications    3. Anxiety    4. Insomnia, unspecified type        Plan:       1. Hypertension, poor control  She does seem to have an element of white coat hypertension but she is not checking her blood pressure regularly at home so it is hard to be certain.  She does attribute a lot of her blood pressure problems to anxiety and in the past has use lorazepam and other agents most of which are on the [facility] list.  She was given trazodone to help with sleep which should help with anxiety also but she has not started taking it either.  Patient instructed to start taking the Cardura 1 mg at bedtime as ordered and see me in two weeks for blood pressure check.  She was instructed to start taking her blood pressures at home at least daily and bring in her blood pressure log when she comes in.  We should be able to increase the Cardura to improve her control.  We might be able to increase her metoprolol but her pulse is currently in the 60s so I am not sure she would tolerate much more.    2. Noncompliance with medications    3. Anxiety  If she does not achieve sufficient relief of anxiety with the trazodone given for sleep we can  consider using BuSpar but I do not want to take too many new medications at one time    4. Insomnia, unspecified type

## 2019-03-15 ENCOUNTER — DOCUMENTATION ONLY (OUTPATIENT)
Dept: FAMILY MEDICINE | Facility: CLINIC | Age: 74
End: 2019-03-15

## 2019-03-15 NOTE — PROGRESS NOTES
Pre-Visit Chart Review  For Appointment Scheduled on 3-26-19    Health Maintenance Due   Topic Date Due    TETANUS VACCINE  09/28/1963    Mammogram  08/06/2017

## 2019-03-26 ENCOUNTER — OFFICE VISIT (OUTPATIENT)
Dept: FAMILY MEDICINE | Facility: CLINIC | Age: 74
End: 2019-03-26
Attending: FAMILY MEDICINE
Payer: MEDICARE

## 2019-03-26 VITALS
OXYGEN SATURATION: 97 % | BODY MASS INDEX: 26.82 KG/M2 | TEMPERATURE: 98 F | HEIGHT: 66 IN | RESPIRATION RATE: 16 BRPM | DIASTOLIC BLOOD PRESSURE: 80 MMHG | SYSTOLIC BLOOD PRESSURE: 172 MMHG | HEART RATE: 63 BPM | WEIGHT: 166.88 LBS

## 2019-03-26 DIAGNOSIS — I10 HYPERTENSION, ESSENTIAL: Primary | ICD-10-CM

## 2019-03-26 DIAGNOSIS — E03.9 ACQUIRED HYPOTHYROIDISM: ICD-10-CM

## 2019-03-26 PROCEDURE — 3077F PR MOST RECENT SYSTOLIC BLOOD PRESSURE >= 140 MM HG: ICD-10-PCS | Mod: HCNC,CPTII,S$GLB, | Performed by: FAMILY MEDICINE

## 2019-03-26 PROCEDURE — 3079F DIAST BP 80-89 MM HG: CPT | Mod: HCNC,CPTII,S$GLB, | Performed by: FAMILY MEDICINE

## 2019-03-26 PROCEDURE — 99999 PR PBB SHADOW E&M-EST. PATIENT-LVL III: CPT | Mod: PBBFAC,HCNC,, | Performed by: FAMILY MEDICINE

## 2019-03-26 PROCEDURE — 99214 OFFICE O/P EST MOD 30 MIN: CPT | Mod: HCNC,S$GLB,, | Performed by: FAMILY MEDICINE

## 2019-03-26 PROCEDURE — 3079F PR MOST RECENT DIASTOLIC BLOOD PRESSURE 80-89 MM HG: ICD-10-PCS | Mod: HCNC,CPTII,S$GLB, | Performed by: FAMILY MEDICINE

## 2019-03-26 PROCEDURE — 99214 PR OFFICE/OUTPT VISIT, EST, LEVL IV, 30-39 MIN: ICD-10-PCS | Mod: HCNC,S$GLB,, | Performed by: FAMILY MEDICINE

## 2019-03-26 PROCEDURE — 1101F PT FALLS ASSESS-DOCD LE1/YR: CPT | Mod: HCNC,CPTII,S$GLB, | Performed by: FAMILY MEDICINE

## 2019-03-26 PROCEDURE — 3077F SYST BP >= 140 MM HG: CPT | Mod: HCNC,CPTII,S$GLB, | Performed by: FAMILY MEDICINE

## 2019-03-26 PROCEDURE — 1101F PR PT FALLS ASSESS DOC 0-1 FALLS W/OUT INJ PAST YR: ICD-10-PCS | Mod: HCNC,CPTII,S$GLB, | Performed by: FAMILY MEDICINE

## 2019-03-26 PROCEDURE — 99999 PR PBB SHADOW E&M-EST. PATIENT-LVL III: ICD-10-PCS | Mod: PBBFAC,HCNC,, | Performed by: FAMILY MEDICINE

## 2019-03-26 RX ORDER — THYROID 60 MG/1
TABLET ORAL
Qty: 90 TABLET | Refills: 3 | Status: SHIPPED | OUTPATIENT
Start: 2019-03-26 | End: 2019-09-24 | Stop reason: DRUGHIGH

## 2019-03-26 RX ORDER — TRIAMTERENE/HYDROCHLOROTHIAZID 37.5-25 MG
TABLET ORAL
Qty: 90 TABLET | Refills: 1 | Status: SHIPPED | OUTPATIENT
Start: 2019-03-26 | End: 2019-04-04 | Stop reason: SDUPTHER

## 2019-03-26 NOTE — PROGRESS NOTES
Subjective:       Patient ID: Megha Ladd is a 73 y.o. female.    Chief Complaint: Hypertension    73-year-old female coming back in for follow-up on hypertension.  At her last visit  we added a low dose of Cardura 1 mg HS in an attempt to improve on her systolic hypertension.  She continues on enalapril 20 mg b.i.d., metoprolol 50 mg 1-1/2 b.i.d., and clonidine 0.1 mg b.i.d. p.r.n. elevated blood pressure.  She had been taken off of diuretics because of repeated episodes of hyponatremia.  She reports since starting the Cardura she has gained weight and has had significant problems with lower extremity edema that pills up during the day and somewhat decreases at night.  She is also having to get up 3-4 times a night to urinate that may be related to the edema. She has been having significant dizziness and lightheadedness and sometimes feels like fainting and has sudden episodes of weakness and headaches.  She has neck shoulder and jaw pain, back pain and has been coughing with a dry nonproductive cough.  All of these symptoms started since she began using the doxazosin.  She reports in the past that she had used maxzide taking 1/2 every other day with tolerable hyponatremia where as we had to discontinue HCTZ low-dose because of persistent severe hyponatremia.  I reviewed the records in this was the case although there was in fact some hyponatremia it was not as severe as which she had had recently.  She did have better blood pressures at that time.    Past Medical History:  No date: Asthma  No date: GERD (gastroesophageal reflux disease)  No date: Hyperlipidemia  No date: Hypertension  No date: Hypothyroid  No date: Lumbago  No date: Neuromuscular disorder  No date: Sinusitis  No date: Ulcer    Past Surgical History:  No date: APPENDECTOMY  No date:  SECTION  No date: LUMBAR EPIDURAL INJECTION  No date: PARTIAL HYSTERECTOMY    Current Outpatient Medications on File Prior to Visit:  acetaminophen  (TYLENOL) 500 MG tablet, Take 1,000 mg by mouth 3 (three) times daily., Disp: , Rfl:   ADVAIR -21 mcg/actuation HFAA, INHALE 2 PUFFS INTO THE LUNGS 2 (TWO) TIMES DAILY., Disp: 36 g, Rfl: 1  brimonidine (MIRVASO) 0.33 % Gel, Apply topically once daily., Disp: , Rfl:   cartilage-collagen-bor-hyalur (MOVE FREE ULTRA, BORON,) 40-5-3.3 mg Tab, Take 1 tablet by mouth once daily., Disp: , Rfl:   celecoxib (CELEBREX) 200 MG capsule, Take 200 mg by mouth once daily. Every day, Disp: , Rfl:   cetirizine (ZYRTEC) 10 MG tablet, Take 10 mg by mouth daily as needed for Allergies., Disp: , Rfl:   cloNIDine (CATAPRES) 0.1 MG tablet, TAKE 1 TABLET TWICE DAILY AS NEEDED FOR BLOOD PRESSURE  GREATER  THAN 180 SYSTOLIC  DIASTOLIC, Disp: 60 tablet, Rfl: 0  dextromethorphan-guaifenesin  mg (MUCINEX DM)  mg per 12 hr tablet, Take 1 tablet by mouth 2 (two) times daily as needed. , Disp: , Rfl:   enalapril (VASOTEC) 20 MG tablet, TAKE 1 TABLET TWICE DAILY, Disp: 180 tablet, Rfl: 0  fluticasone (FLONASE) 50 mcg/actuation nasal spray, SPRAY 1 SPRAY IN EACH NOSTRIL 2 TIMES A DAY (Patient taking differently: SPRAY 1 SPRAY IN EACH NOSTRIL qd), Disp: 16 g, Rfl: 11  guaifenesin (MUCINEX) 600 mg tp12, Take 1 tablet by mouth 2 (two) times daily as needed., Disp: , Rfl:   ivermectin (SOOLANTRA) 1 % Crea, Apply topically 2 (two) times daily., Disp: , Rfl:   LACTOBACILLUS RHAMNOSUS GG (CULTURELLE ORAL), Take 1 capsule by mouth daily as needed. 10 Billion, Disp: , Rfl:   loperamide (IMODIUM) 2 mg capsule, Take 2 mg by mouth 4 (four) times daily as needed for Diarrhea., Disp: , Rfl:   meclizine (ANTIVERT) 25 mg tablet, Take 1 tablet (25 mg total) by mouth 3 (three) times daily as needed., Disp: 60 tablet, Rfl: 2  metaxalone (SKELAXIN) 800 MG tablet, Take 800 mg by mouth 2 (two) times daily. Every 6 hours PRN, Disp: , Rfl:   metoprolol tartrate (LOPRESSOR) 50 MG tablet, TAKE 1 & 1/2 (ONE & ONE-HALF) TABLETS BY MOUTH TWICE DAILY,  Disp: 270 tablet, Rfl: 2  montelukast (SINGULAIR) 10 mg tablet, TAKE 1 TABLET BY MOUTH ONCE DAILY IN THE EVENING, Disp: 30 tablet, Rfl: 11  ondansetron (ZOFRAN) 4 MG tablet, Take 4 mg by mouth every 8 (eight) hours as needed for Nausea. Dr [facility], Disp: , Rfl:   pantoprazole (PROTONIX) 40 MG tablet, TAKE 1 TABLET EVERY DAY, Disp: 90 tablet, Rfl: 3  peg 400-propylene glycol (SYSTANE ULTRA) 0.4-0.3 % Drop, Place 2 drops into both eyes 2 (two) times daily. , Disp: , Rfl:   pimecrolimus (ELIDEL) 1 % cream, Apply topically once daily. , Disp: , Rfl:   pregabalin (LYRICA) 75 MG capsule, Take 75 mg by mouth once daily. , Disp: , Rfl:   SODIUM CHLORIDE (SIMPLY SALINE NASL), by Nasal route 2 (two) times daily as needed. , Disp: , Rfl:   thyroid, pork, (ARMOUR THYROID) 90 mg Tab, Take one every other morning alternating with 60 mg, Disp: 90 tablet, Rfl: 1  tramadol (ULTRAM) 50 mg tablet, Take 50 mg by mouth every 6 (six) hours as needed. Every 6 hours, Disp: , Rfl:   (DISCONTINUED) doxazosin (CARDURA) 1 MG tablet, Take 1 tablet (1 mg total) by mouth every evening., Disp: 30 tablet, Rfl: 5  (DISCONTINUED) thyroid, pork, (ARMOUR THYROID) 60 mg Tab, Take one every other morning alternating with 90 mg, Disp: 30 tablet, Rfl: 11  levalbuterol (XOPENEX HFA) 45 mcg/actuation inhaler, Inhale 1-2 puffs into the lungs every 4 (four) hours as needed for Wheezing. Rescue, Disp: 15 g, Rfl: 0  traZODone (DESYREL) 50 MG tablet, Take 1 tablet (50 mg total) by mouth nightly as needed for Insomnia., Disp: 30 tablet, Rfl: 5    No current facility-administered medications on file prior to visit.         Review of Systems   Constitutional: Positive for unexpected weight change. Negative for chills and fever.   Respiratory: Positive for cough.    Cardiovascular: Positive for leg swelling.   Genitourinary: Positive for frequency and urgency.   Musculoskeletal: Positive for back pain, myalgias and neck pain.   Neurological:  Positive for dizziness, light-headedness and headaches.       Objective:      Physical Exam   Constitutional: She is oriented to person, place, and time. She appears well-developed and well-nourished. No distress.   Poor blood pressure control  Normal weight with a BMI of 26.9 she is up 3.7 lb from her March 12, 2019 visit   Cardiovascular: Normal rate, regular rhythm and normal heart sounds.   Pulmonary/Chest: Effort normal and breath sounds normal.   No coughing noted   Neurological: She is alert and oriented to person, place, and time.   Skin: Skin is warm and dry. She is not diaphoretic.   Psychiatric: She has a normal mood and affect. Her behavior is normal. Judgment and thought content normal.   Nursing note and vitals reviewed.      Assessment:       1. Hypertension, essential    2. Acquired hypothyroidism    3. BMI 26.0-26.9,adult        Plan:       1. Hypertension, essential  Discontinue the doxazosin 1 mg at bedtime and start Maxzide 25-,1/2 every other day.  Recheck blood pressure in about four weeks and continue keeping home log  - triamterene-hydrochlorothiazide 37.5-25 mg (MAXZIDE-25) 37.5-25 mg per tablet; Take 1/2 every other day  Dispense: 90 tablet; Refill: 1    2. Acquired hypothyroidism  - thyroid, pork, (ARMOUR THYROID) 60 mg Tab; Take one every other morning alternating with 90 mg  Dispense: 90 tablet; Refill: 3    3. BMI 26.0-26.9,adult           Patient readiness: acceptance and barriers:readiness    During the course of the visit the patient was educated and counseled about the following:     Hypertension:   Medication: See above.  Dietary sodium restriction.  Regular aerobic exercise.    Goals: Hypertension: Reduce Blood Pressure    Did patient meet goals/outcomes: No    The following self management tools provided: blood pressure log    Patient Instructions (the written plan) was given to the patient/family.     Time spent with patient: 55 minutes more than half of which involved  counseling regarding medication side effects, effects and pharmacokinetics, .

## 2019-04-01 DIAGNOSIS — E03.9 ACQUIRED HYPOTHYROIDISM: ICD-10-CM

## 2019-04-01 RX ORDER — THYROID 90 MG/1
TABLET ORAL
Qty: 45 TABLET | Refills: 1 | Status: SHIPPED | OUTPATIENT
Start: 2019-04-01 | End: 2019-09-24 | Stop reason: DRUGHIGH

## 2019-04-02 ENCOUNTER — TELEPHONE (OUTPATIENT)
Dept: FAMILY MEDICINE | Facility: CLINIC | Age: 74
End: 2019-04-02

## 2019-04-02 NOTE — TELEPHONE ENCOUNTER
----- Message from Chantal Damian sent at 4/2/2019 12:11 PM CDT -----  Type:  RX Refill Request    Who Called:  Megha  Refill or New Rx:  refill  RX Name and Strength:  [place] thyroid 60 mg  How is the patient currently taking it? (ex. 1XDay):  Once every other day  Is this a 30 day or 90 day RX:  90  Preferred Pharmacy with phone number:   [facility] - [facility] [address]  [address] [facility] [address]  Phone: [phone] Fax: [phone]  Local or Mail Order:  Mail  Ordering Provider:  Dr Jackie Jc Call Back Number:  [phone]  Additional Information:  Thank you!

## 2019-04-04 DIAGNOSIS — I10 HYPERTENSION, ESSENTIAL: ICD-10-CM

## 2019-04-04 RX ORDER — TRIAMTERENE/HYDROCHLOROTHIAZID 37.5-25 MG
TABLET ORAL
Qty: 90 TABLET | Refills: 1 | Status: SHIPPED | OUTPATIENT
Start: 2019-04-04 | End: 2020-03-20

## 2019-04-04 NOTE — TELEPHONE ENCOUNTER
Called Tati and spoke to patient- [facility] mailed out 23 pills- she can pick it up at [facility].

## 2019-04-04 NOTE — TELEPHONE ENCOUNTER
----- Message from Endy Esqueda sent at 4/4/2019 12:30 PM CDT -----  Contact: pt  Type:  Pharmacy Calling to Clarify an RX    Name of Caller:  pt  [facility]  Phone: [phone]    Prescription Name:  triamterene-hydrochlorothiazide 37.5-25 mg (MAXZIDE-25) 37.5-25 mg per tablet  What do they need to clarify?:  The pharmacy that the Rx was sent to is unable to fill  Best Call Back Number:  [phone]  Additional Information:  The pt is requesting this to be sent to the pharmacy above.

## 2019-04-04 NOTE — TELEPHONE ENCOUNTER
----- Message from Megha Rodgers sent at 4/4/2019  2:02 PM CDT -----  Type:  Pharmacy Calling to Clarify an RX    Name of Caller:  Cj  [facility]  Prescription Name:  triamterene-hydrochlorothiazide 37.5-25 mg (MAXZIDE-25) 37.5-25 mg per tablet  What do they need to clarify?:  [phone]   Ref [id number]  Best Call Back Number:    Additional Information:  Refill request was sent  03/26 and today received a cancellation request ... Prescription was mailed / if pt is no longer going to be taking then they can call to advise pt ?

## 2019-04-11 ENCOUNTER — PATIENT OUTREACH (OUTPATIENT)
Dept: ADMINISTRATIVE | Facility: HOSPITAL | Age: 74
End: 2019-04-11

## 2019-04-11 NOTE — LETTER
April 23, 2019    Megha Ladd  [address] [place] [address] [address] [place] [address]             [facility]  [address]  [facility] [address]  Phone: [phone] Dear [facility] is committed to your overall health and would like to ensure that you are up to date on your recommended test and/or procedures.   Alfred Mejia MD  has found that your chart shows you may be due for the following:     MAMMOGRAM     If you have had any of the above done at another facility, please let us know so that we may obtain copies from that facility.  If you have a copy of these records, please provide a copy for us to scan into your chart.  You are welcome to request that the report be faxed to us at  ([phone]).     Otherwise, please schedule these appointments at your earliest convenience by calling [phone] or going to [email or web address].     If you have an upcoming scheduled appointment for the item above, please disregard this letter.     Sincerely,   Your [facility]   MD Melissa Hyatt LPN Clinical Care Coordinator   [facility]   [address] [place] [address]   Phone [phone]   Fax [phone]

## 2019-04-11 NOTE — LETTER
April 11, 2019    [facility] IMAG[facility]             [facility]  [address]  [facility] [address]  Phone: [phone] April 11, 2019     Patient: Megha Ladd    YOB: 1945   Date of Visit: 4/11/2019       To Whom It May Concern:      [facility]    We are seeing Megha Ladd in the clinic today at [facility].  Alfred Mejia MD is their PCP.  She/He has an outstanding lab/procedure at this time when reviewing their chart.  To help with our Health Maintenance records will you please supply the following:      [x]  Mammogram                                                []  Colonoscopy   []  Pap Smear                                                  []  Outside Lab Results   []  Dexa scans                                                   []  Eye Exam   []  Foot Exam                                                     [] Other___________   []  Outside Immunizations                                               Please Fax to [facility] at [phone]          If you have any questions or concerns, please don't hesitate to call.    Sincerely,        Crys Alarcon LPN

## 2019-04-22 ENCOUNTER — DOCUMENTATION ONLY (OUTPATIENT)
Dept: FAMILY MEDICINE | Facility: CLINIC | Age: 74
End: 2019-04-22

## 2019-04-22 NOTE — PROGRESS NOTES
Pre-Visit Chart Review  For Appointment Scheduled on 4-25-19    Health Maintenance Due   Topic Date Due    TETANUS VACCINE  09/28/1963    Mammogram  08/06/2017

## 2019-04-23 ENCOUNTER — PATIENT MESSAGE (OUTPATIENT)
Dept: FAMILY MEDICINE | Facility: CLINIC | Age: 74
End: 2019-04-23

## 2019-04-25 ENCOUNTER — PATIENT OUTREACH (OUTPATIENT)
Dept: ADMINISTRATIVE | Facility: HOSPITAL | Age: 74
End: 2019-04-25

## 2019-04-25 NOTE — LETTER
May 3, 2019    Megha Ladd  [address] [place] [address] [address] [place] [address]             [facility]  [address]  [facility] [address]  Phone: [phone] Dear [facility] is committed to your overall health and would like to ensure that you are up to date on your recommended test and/or procedures.   Alfred Mejia MD  has found that your chart shows you may be due for the following:     MAMMOGRAM     If you have had any of the above done at another facility, please let us know so that we may obtain copies from that facility.  If you have a copy of these records, please provide a copy for us to scan into your chart.  You are welcome to request that the report be faxed to us at  ([phone]).     Otherwise, please schedule these appointments at your earliest convenience by calling [phone] or going to [email or web address].     If you have an upcoming scheduled appointment for the item above, please disregard this letter.     Sincerely,   [facility]   MD Melissa Hyatt LPN Clinical Care Coordinator   [facility]   [address] [place] [address]   Phone [phone]   Fax [phone]

## 2019-04-26 ENCOUNTER — LAB VISIT (OUTPATIENT)
Dept: LAB | Facility: HOSPITAL | Age: 74
End: 2019-04-26
Attending: FAMILY MEDICINE
Payer: MEDICARE

## 2019-04-26 DIAGNOSIS — Z36.3 ENCOUNTER FOR ROUTINE SCREENING FOR MALFORMATION USING ULTRASONICS: Primary | ICD-10-CM

## 2019-04-26 LAB
ANION GAP SERPL CALC-SCNC: 10 MMOL/L (ref 8–16)
BUN SERPL-MCNC: 14 MG/DL (ref 8–23)
CALCIUM SERPL-MCNC: 9.8 MG/DL (ref 8.7–10.5)
CHLORIDE SERPL-SCNC: 95 MMOL/L (ref 95–110)
CO2 SERPL-SCNC: 29 MMOL/L (ref 23–29)
CREAT SERPL-MCNC: 0.8 MG/DL (ref 0.5–1.4)
EST. GFR  (AFRICAN AMERICAN): >60 ML/MIN/1.73 M^2
EST. GFR  (NON AFRICAN AMERICAN): >60 ML/MIN/1.73 M^2
GLUCOSE SERPL-MCNC: 85 MG/DL (ref 70–110)
POTASSIUM SERPL-SCNC: 4.5 MMOL/L (ref 3.5–5.1)
SODIUM SERPL-SCNC: 134 MMOL/L (ref 136–145)

## 2019-04-26 PROCEDURE — 80048 BASIC METABOLIC PNL TOTAL CA: CPT | Mod: HCNC

## 2019-04-26 PROCEDURE — 36415 COLL VENOUS BLD VENIPUNCTURE: CPT | Mod: HCNC,PO

## 2019-04-30 ENCOUNTER — LAB VISIT (OUTPATIENT)
Dept: LAB | Facility: HOSPITAL | Age: 74
End: 2019-04-30
Attending: FAMILY MEDICINE
Payer: MEDICARE

## 2019-04-30 DIAGNOSIS — E87.1 HYPOSMOLALITY SYNDROME: Primary | ICD-10-CM

## 2019-04-30 LAB — SODIUM SERPL-SCNC: 133 MMOL/L (ref 136–145)

## 2019-04-30 PROCEDURE — 36415 COLL VENOUS BLD VENIPUNCTURE: CPT | Mod: HCNC,PO

## 2019-04-30 PROCEDURE — 84295 ASSAY OF SERUM SODIUM: CPT | Mod: HCNC

## 2019-05-01 ENCOUNTER — DOCUMENTATION ONLY (OUTPATIENT)
Dept: FAMILY MEDICINE | Facility: CLINIC | Age: 74
End: 2019-05-01

## 2019-05-01 NOTE — PROGRESS NOTES
Pre-Visit Chart Review  For Appointment Scheduled on 5-9-19    Health Maintenance Due   Topic Date Due    TETANUS VACCINE  09/28/1963    Mammogram  08/06/2017

## 2019-05-09 ENCOUNTER — OFFICE VISIT (OUTPATIENT)
Dept: FAMILY MEDICINE | Facility: CLINIC | Age: 74
End: 2019-05-09
Attending: FAMILY MEDICINE
Payer: MEDICARE

## 2019-05-09 VITALS
TEMPERATURE: 98 F | HEART RATE: 63 BPM | OXYGEN SATURATION: 98 % | BODY MASS INDEX: 26.4 KG/M2 | DIASTOLIC BLOOD PRESSURE: 80 MMHG | SYSTOLIC BLOOD PRESSURE: 164 MMHG | RESPIRATION RATE: 16 BRPM | HEIGHT: 66 IN | WEIGHT: 164.25 LBS

## 2019-05-09 DIAGNOSIS — I10 HYPERTENSION, ESSENTIAL: Primary | ICD-10-CM

## 2019-05-09 PROCEDURE — 3077F SYST BP >= 140 MM HG: CPT | Mod: HCNC,CPTII,S$GLB, | Performed by: FAMILY MEDICINE

## 2019-05-09 PROCEDURE — 3077F PR MOST RECENT SYSTOLIC BLOOD PRESSURE >= 140 MM HG: ICD-10-PCS | Mod: HCNC,CPTII,S$GLB, | Performed by: FAMILY MEDICINE

## 2019-05-09 PROCEDURE — 99999 PR PBB SHADOW E&M-EST. PATIENT-LVL IV: CPT | Mod: PBBFAC,HCNC,, | Performed by: FAMILY MEDICINE

## 2019-05-09 PROCEDURE — 1101F PT FALLS ASSESS-DOCD LE1/YR: CPT | Mod: HCNC,CPTII,S$GLB, | Performed by: FAMILY MEDICINE

## 2019-05-09 PROCEDURE — 3079F DIAST BP 80-89 MM HG: CPT | Mod: HCNC,CPTII,S$GLB, | Performed by: FAMILY MEDICINE

## 2019-05-09 PROCEDURE — 99213 PR OFFICE/OUTPT VISIT, EST, LEVL III, 20-29 MIN: ICD-10-PCS | Mod: HCNC,S$GLB,, | Performed by: FAMILY MEDICINE

## 2019-05-09 PROCEDURE — 99999 PR PBB SHADOW E&M-EST. PATIENT-LVL IV: ICD-10-PCS | Mod: PBBFAC,HCNC,, | Performed by: FAMILY MEDICINE

## 2019-05-09 PROCEDURE — 1101F PR PT FALLS ASSESS DOC 0-1 FALLS W/OUT INJ PAST YR: ICD-10-PCS | Mod: HCNC,CPTII,S$GLB, | Performed by: FAMILY MEDICINE

## 2019-05-09 PROCEDURE — 3079F PR MOST RECENT DIASTOLIC BLOOD PRESSURE 80-89 MM HG: ICD-10-PCS | Mod: HCNC,CPTII,S$GLB, | Performed by: FAMILY MEDICINE

## 2019-05-09 PROCEDURE — 99213 OFFICE O/P EST LOW 20 MIN: CPT | Mod: HCNC,S$GLB,, | Performed by: FAMILY MEDICINE

## 2019-05-09 RX ORDER — FELODIPINE 2.5 MG/1
2.5 TABLET, EXTENDED RELEASE ORAL DAILY
Qty: 30 TABLET | Refills: 5 | Status: SHIPPED | OUTPATIENT
Start: 2019-05-09 | End: 2019-05-20 | Stop reason: SINTOL

## 2019-05-09 NOTE — PATIENT INSTRUCTIONS
Controlling High Blood Pressure  High blood pressure (hypertension) is often called the silent killer. This is because many people who have it dont know it. High blood pressure is defined as 140/90 mm Hg or higher. Know your blood pressure and remember to check it regularly. Doing so can save your life. Here are some things you can do to help control your blood pressure.    Choose heart-healthy foods  · Select low-salt, low-fat foods. Limit sodium intake to 2,400 mg per day or the amount suggested by your healthcare provider.  · Limit canned, dried, cured, packaged, and fast foods. These can contain a lot of salt.  · Eat 8 to 10 servings of fruits and vegetables every day.  · Choose lean meats, fish, or chicken.  · Eat whole-grain pasta, brown rice, and beans.  · Eat 2 to 3 servings of low-fat or fat-free dairy products.  · Ask your doctor about the DASH eating plan. This plan helps reduce blood pressure.  · When you go to a restaurant, ask that your meal be prepared with no added salt.  Maintain a healthy weight  · Ask your healthcare provider how many calories to eat a day. Then stick to that number.  · Ask your healthcare provider what weight range is healthiest for you. If you are overweight, a weight loss of only 3% to 5% of your body weight can help lower blood pressure. Generally, a good weight loss goal is to lose 10% of your body weight in a year.  · Limit snacks and sweets.  · Get regular exercise.  Get up and get active  · Choose activities you enjoy. Find ones you can do with friends or family. This includes bicycling, dancing, walking, and jogging.  · Park farther away from building entrances.  · Use stairs instead of the elevator.  · When you can, walk or bike instead of driving.  · Pollard leaves, garden, or do household repairs.  · Be active at a moderate to vigorous level of physical activity for at least 40 minutes for a minimum of 3 to 4 days a week.   Manage stress  · Make time to relax and enjoy  life. Find time to laugh.  · Communicate your concerns with your loved ones and your healthcare provider.  · Visit with family and friends, and keep up with hobbies.  Limit alcohol and quit smoking  · Men should have no more than 2 drinks per day.  · Women should have no more than 1 drink per day.  · Talk with your healthcare provider about quitting smoking. Smoking significantly increases your risk for heart disease and stroke. Ask your healthcare provider about community smoking cessation programs and other options.  Medicines  If lifestyle changes arent enough, your healthcare provider may prescribe high blood pressure medicine. Take all medicines as prescribed. If you have any questions about your medicines, ask your healthcare provider before stopping or changing them.   Date Last Reviewed: 4/27/2016  © 1538-7171 [employer]. [address] [place] [address], [place] [address]. All rights reserved. This information is not intended as a substitute for professional medical care. Always follow your healthcare professional's instructions.

## 2019-05-09 NOTE — PROGRESS NOTES
Subjective:       Patient ID: Megha Ladd is a 73 y.o. female.    Chief Complaint: Hypertension    73-year-old female comes in for follow-up of blood pressure.  On her last visit  we had resumed a diuretic after having repeated problems with hyponatremia by using 1/2 of the Dyazide taken every other day.  She has been tolerating this dose without any problems with her sodium running in the mid 130s, just slightly below normal and stable.  Her blood pressure for the most part has been running systolics in the 180s during that time.  She continues to take metoprolol 1-1/2 of a 50 mg twice daily and enalapril 20 mg twice daily.  Previously she had problems with amlodipine causing nausea and vomiting as well as diarrhea and on nifedipine 30 mg she had problems with dizziness orthostatic hypotension and blurred vision.  She has numerous other drug sensitivities.    We had a discussion of the [place] research recently released showing a possible association between Ace inhibitors including her enalapril and an increased risk of lung cancer.  She agrees in principal to switching to an ARB agent if we can find one that she tolerates.  We also discussed the recent recall of several of the ARB he is due to contamination of the product but not because of problem with the medication itself.  The contaminated products or off the market and resulting shortages are no longer a problem.    Past Medical History:  No date: Asthma  No date: GERD (gastroesophageal reflux disease)  No date: Hyperlipidemia  No date: Hypertension  No date: Hypothyroid  No date: Lumbago  No date: Neuromuscular disorder  No date: Sinusitis  No date: Ulcer    Past Surgical History:  No date: APPENDECTOMY  No date:  SECTION  No date: LUMBAR EPIDURAL INJECTION  No date: PARTIAL HYSTERECTOMY    Current Outpatient Medications on File Prior to Visit:  acetaminophen (TYLENOL) 500 MG tablet, Take 1,000 mg by mouth 3 (three) times daily., Disp: ,  Rfl:   ADVAIR -21 mcg/actuation HFAA, INHALE 2 PUFFS INTO THE LUNGS 2 (TWO) TIMES DAILY., Disp: 36 g, Rfl: 1  brimonidine (MIRVASO) 0.33 % Gel, Apply topically as needed. , Disp: , Rfl:   cartilage-collagen-bor-hyalur (MOVE FREE ULTRA, BORON,) 40-5-3.3 mg Tab, Take 1 tablet by mouth once daily., Disp: , Rfl:   celecoxib (CELEBREX) 200 MG capsule, Take 200 mg by mouth once daily. Every day, Disp: , Rfl:   cetirizine (ZYRTEC) 10 MG tablet, Take 10 mg by mouth daily as needed for Allergies., Disp: , Rfl:   cloNIDine (CATAPRES) 0.1 MG tablet, TAKE 1 TABLET TWICE DAILY AS NEEDED FOR BLOOD PRESSURE  GREATER  THAN 180 SYSTOLIC  DIASTOLIC, Disp: 60 tablet, Rfl: 0  dextromethorphan-guaifenesin  mg (MUCINEX DM)  mg per 12 hr tablet, Take 1 tablet by mouth 2 (two) times daily as needed. , Disp: , Rfl:   fluticasone (FLONASE) 50 mcg/actuation nasal spray, SPRAY 1 SPRAY IN EACH NOSTRIL 2 TIMES A DAY (Patient taking differently: SPRAY 1 SPRAY IN EACH NOSTRIL qd), Disp: 16 g, Rfl: 11  guaifenesin (MUCINEX) 600 mg tp12, Take 1 tablet by mouth 2 (two) times daily as needed., Disp: , Rfl:   ivermectin (SOOLANTRA) 1 % Crea, Apply topically as needed. , Disp: , Rfl:   LACTOBACILLUS RHAMNOSUS GG (CULTURELLE ORAL), Take 1 capsule by mouth daily as needed. 10 Billion, Disp: , Rfl:   loperamide (IMODIUM) 2 mg capsule, Take 2 mg by mouth 4 (four) times daily as needed for Diarrhea., Disp: , Rfl:   meclizine (ANTIVERT) 25 mg tablet, Take 1 tablet (25 mg total) by mouth 3 (three) times daily as needed., Disp: 60 tablet, Rfl: 2  metaxalone (SKELAXIN) 800 MG tablet, Take 800 mg by mouth 2 (two) times daily. Every 6 hours PRN, Disp: , Rfl:   metoprolol tartrate (LOPRESSOR) 50 MG tablet, TAKE 1 & 1/2 (ONE & ONE-HALF) TABLETS BY MOUTH TWICE DAILY, Disp: 270 tablet, Rfl: 2  montelukast (SINGULAIR) 10 mg tablet, TAKE 1 TABLET BY MOUTH ONCE DAILY IN THE EVENING, Disp: 30 tablet, Rfl: 11  ondansetron (ZOFRAN) 4 MG tablet, Take  4 mg by mouth every 8 (eight) hours as needed for Nausea. Dr [facility], Disp: , Rfl:   pantoprazole (PROTONIX) 40 MG tablet, TAKE 1 TABLET EVERY DAY, Disp: 90 tablet, Rfl: 3  peg 400-propylene glycol (SYSTANE ULTRA) 0.4-0.3 % Drop, Place 2 drops into both eyes 2 (two) times daily. , Disp: , Rfl:   pimecrolimus (ELIDEL) 1 % cream, Apply topically once daily. , Disp: , Rfl:   pregabalin (LYRICA) 75 MG capsule, Take 75 mg by mouth once daily. , Disp: , Rfl:   SODIUM CHLORIDE (SIMPLY SALINE NASL), by Nasal route 2 (two) times daily as needed. , Disp: , Rfl:   thyroid, pork, (ARMOUR THYROID) 60 mg Tab, Take one every other morning alternating with 90 mg, Disp: 90 tablet, Rfl: 3  thyroid, pork, (ARMOUR THYROID) 90 mg Tab, Take one every other morning alternating with 60 mg, Disp: 45 tablet, Rfl: 1  tramadol (ULTRAM) 50 mg tablet, Take 50 mg by mouth every 6 (six) hours as needed. Every 6 hours, Disp: , Rfl:   traZODone (DESYREL) 50 MG tablet, Take 1 tablet (50 mg total) by mouth nightly as needed for Insomnia., Disp: 30 tablet, Rfl: 5  triamterene-hydrochlorothiazide 37.5-25 mg (MAXZIDE-25) 37.5-25 mg per tablet, Take 1/2 every other day, Disp: 90 tablet, Rfl: 1  (DISCONTINUED) enalapril (VASOTEC) 20 MG tablet, TAKE 1 TABLET TWICE DAILY, Disp: 180 tablet, Rfl: 0  (DISCONTINUED) levalbuterol (XOPENEX HFA) 45 mcg/actuation inhaler, Inhale 1-2 puffs into the lungs every 4 (four) hours as needed for Wheezing. Rescue, Disp: 15 g, Rfl: 0    No current facility-administered medications on file prior to visit.         Review of Systems   Respiratory: Negative for chest tightness and shortness of breath.    Neurological: Negative for dizziness, light-headedness and headaches.       Objective:      Physical Exam   Constitutional: She is oriented to person, place, and time. She appears well-developed and well-nourished. No distress.   Mild increase in the systolic blood pressure  Normal weight with a BMI of 26.5  she is down 2.7 lb from her March 26, 2019 visit   Cardiovascular: Normal rate, regular rhythm and normal heart sounds.   Neurological: She is alert and oriented to person, place, and time.   Skin: She is not diaphoretic.   Psychiatric: She has a normal mood and affect. Her behavior is normal. Judgment and thought content normal.   Nursing note and vitals reviewed.      Assessment:       1. Hypertension, essential        Plan:       1. Hypertension, essential  We are going to start her on Felodipine 2.5 mg at a very low dose and hold the enalapril.  If she tolerates the Felodipine and is insufficiently controlled we can increase the strength of it or replace the enalapril with valsartan starting with a low dose.  Due to her severe drug sensitivities I do not want to start two agents at the same time or if we have side effect issues we will not know which one was responsible.  We will plan to see her back in the office in one month and I have asked her to call me with a progress report in one week on the Felodipine.  - felodipine (PLENDIL) 2.5 MG Tb24; Take 1 tablet (2.5 mg total) by mouth once daily.  Dispense: 30 tablet; Refill: 5

## 2019-05-20 ENCOUNTER — CLINICAL SUPPORT (OUTPATIENT)
Dept: FAMILY MEDICINE | Facility: CLINIC | Age: 74
End: 2019-05-20
Payer: MEDICARE

## 2019-05-20 ENCOUNTER — TELEPHONE (OUTPATIENT)
Dept: FAMILY MEDICINE | Facility: CLINIC | Age: 74
End: 2019-05-20

## 2019-05-20 DIAGNOSIS — I10 HYPERTENSION, UNSPECIFIED TYPE: Primary | ICD-10-CM

## 2019-05-20 DIAGNOSIS — I10 HYPERTENSION, ESSENTIAL: Primary | ICD-10-CM

## 2019-05-20 PROCEDURE — 99999 PR PBB SHADOW E&M-EST. PATIENT-LVL I: ICD-10-PCS | Mod: PBBFAC,HCNC,,

## 2019-05-20 PROCEDURE — 99999 PR PBB SHADOW E&M-EST. PATIENT-LVL I: CPT | Mod: PBBFAC,HCNC,,

## 2019-05-20 RX ORDER — VALSARTAN 320 MG/1
320 TABLET ORAL DAILY
Qty: 30 TABLET | Refills: 3 | Status: SHIPPED | OUTPATIENT
Start: 2019-05-20 | End: 2019-05-21

## 2019-05-20 NOTE — TELEPHONE ENCOUNTER
73-year-old female was at Dr. Lockhart's office and found to have a quite high blood pressure.  She had been taking the Felodipine started at her last visit but had to discontinue it due to nausea, cross throat, sore in the tongue, coughing, head and facial pressure, postnasal drip, flushing, gas and constipation and muscle in back pain. The symptoms all resolved within a few days of discontinuing the Felodipine.  She continues to take metoprolol and 1/2 of a max side.  She took a 0.1 mg clonidine at 3:00 p.m. and was given another one today when she presented with a blood pressure of 194/98 and a pulse of 60.  30 min after the 2nd clonidine she had a blood pressure of 160/98.  15 min after that I checked her blood pressure and obtained 156/74.  She has not tolerated any of the calcium blockers for similar side effects.  She has a number of other medications she has not tolerated.  She has been taken off of in now Prilosec due to the potential lung cancer link.    We are going to start her on valsartan 320 mg daily and recheck blood pressure in two weeks.

## 2019-05-21 ENCOUNTER — PATIENT OUTREACH (OUTPATIENT)
Dept: ADMINISTRATIVE | Facility: HOSPITAL | Age: 74
End: 2019-05-21

## 2019-05-21 VITALS — DIASTOLIC BLOOD PRESSURE: 74 MMHG | SYSTOLIC BLOOD PRESSURE: 156 MMHG

## 2019-05-21 RX ORDER — VALSARTAN 160 MG/1
320 TABLET ORAL DAILY
Qty: 60 TABLET | Refills: 3 | Status: SHIPPED | OUTPATIENT
Start: 2019-05-21 | End: 2019-06-04

## 2019-05-21 NOTE — TELEPHONE ENCOUNTER
----- Message from Linda Betts sent at 5/21/2019  9:55 AM CDT -----  Type:  Pharmacy Calling concerning an RX    Name of Caller:  Soraida  Pharmacy Name:  [facility]  Prescription Name:  valsartan (DIOVAN) 320 MG tablet  What do they need to clarify?: substitute 320 mg for 160 mg (does not have 320 mg in stock)  Best Call Back Number:  [phone]  Additional Information:

## 2019-05-21 NOTE — LETTER
May 29, 2019    Megha Ladd  [address] [place] [address] Dr Demarcus CAST [id number]             [facility]  [address]  [facility] [address]  Phone: [phone] Dear Linda [facility] is committed to your overall health and would like to ensure that you are up to date on your recommended test and/or procedures.   Alfred Mejia MD  has found that your chart shows you may be due for the following:     MAMMOGRAM     If you have had any of the above done at another facility, please let us know so that we may obtain copies from that facility.  If you have a copy of these records, please provide a copy for us to scan into your chart.  You are welcome to request that the report be faxed to us at  ([phone]).     Otherwise, please schedule these appointments at your earliest convenience by calling [phone] or going to [email or web address].     If you have an upcoming scheduled appointment for the item above, please disregard this letter.     Sincerely,   Your [facility]   MD Melissa Hyatt LPN Clinical Care Coordinator   [address] [place] [address]   S[place] [address]   [phone]

## 2019-05-27 ENCOUNTER — DOCUMENTATION ONLY (OUTPATIENT)
Dept: FAMILY MEDICINE | Facility: CLINIC | Age: 74
End: 2019-05-27

## 2019-05-27 NOTE — PROGRESS NOTES
Pre-Visit Chart Review  For Appointment Scheduled on 6-4-19    Health Maintenance Due   Topic Date Due    TETANUS VACCINE  09/28/1963    Mammogram  08/06/2017

## 2019-06-04 ENCOUNTER — OFFICE VISIT (OUTPATIENT)
Dept: FAMILY MEDICINE | Facility: CLINIC | Age: 74
End: 2019-06-04
Attending: FAMILY MEDICINE
Payer: MEDICARE

## 2019-06-04 VITALS
OXYGEN SATURATION: 97 % | SYSTOLIC BLOOD PRESSURE: 122 MMHG | WEIGHT: 164.44 LBS | TEMPERATURE: 98 F | DIASTOLIC BLOOD PRESSURE: 84 MMHG | RESPIRATION RATE: 16 BRPM | BODY MASS INDEX: 26.43 KG/M2 | HEIGHT: 66 IN | HEART RATE: 59 BPM

## 2019-06-04 DIAGNOSIS — I10 GOOD HYPERTENSION CONTROL: Primary | ICD-10-CM

## 2019-06-04 DIAGNOSIS — J31.0 CHRONIC RHINITIS: ICD-10-CM

## 2019-06-04 PROCEDURE — 3079F PR MOST RECENT DIASTOLIC BLOOD PRESSURE 80-89 MM HG: ICD-10-PCS | Mod: HCNC,CPTII,S$GLB, | Performed by: FAMILY MEDICINE

## 2019-06-04 PROCEDURE — 3074F PR MOST RECENT SYSTOLIC BLOOD PRESSURE < 130 MM HG: ICD-10-PCS | Mod: HCNC,CPTII,S$GLB, | Performed by: FAMILY MEDICINE

## 2019-06-04 PROCEDURE — 1101F PT FALLS ASSESS-DOCD LE1/YR: CPT | Mod: HCNC,CPTII,S$GLB, | Performed by: FAMILY MEDICINE

## 2019-06-04 PROCEDURE — 99999 PR PBB SHADOW E&M-EST. PATIENT-LVL IV: ICD-10-PCS | Mod: PBBFAC,HCNC,, | Performed by: FAMILY MEDICINE

## 2019-06-04 PROCEDURE — 99213 PR OFFICE/OUTPT VISIT, EST, LEVL III, 20-29 MIN: ICD-10-PCS | Mod: HCNC,S$GLB,, | Performed by: FAMILY MEDICINE

## 2019-06-04 PROCEDURE — 1101F PR PT FALLS ASSESS DOC 0-1 FALLS W/OUT INJ PAST YR: ICD-10-PCS | Mod: HCNC,CPTII,S$GLB, | Performed by: FAMILY MEDICINE

## 2019-06-04 PROCEDURE — 99499 UNLISTED E&M SERVICE: CPT | Mod: HCNC,S$GLB,, | Performed by: FAMILY MEDICINE

## 2019-06-04 PROCEDURE — 3079F DIAST BP 80-89 MM HG: CPT | Mod: HCNC,CPTII,S$GLB, | Performed by: FAMILY MEDICINE

## 2019-06-04 PROCEDURE — 99213 OFFICE O/P EST LOW 20 MIN: CPT | Mod: HCNC,S$GLB,, | Performed by: FAMILY MEDICINE

## 2019-06-04 PROCEDURE — 99499 RISK ADDL DX/OHS AUDIT: ICD-10-PCS | Mod: HCNC,S$GLB,, | Performed by: FAMILY MEDICINE

## 2019-06-04 PROCEDURE — 99999 PR PBB SHADOW E&M-EST. PATIENT-LVL IV: CPT | Mod: PBBFAC,HCNC,, | Performed by: FAMILY MEDICINE

## 2019-06-04 PROCEDURE — 3074F SYST BP LT 130 MM HG: CPT | Mod: HCNC,CPTII,S$GLB, | Performed by: FAMILY MEDICINE

## 2019-06-04 RX ORDER — VALSARTAN 320 MG/1
320 TABLET ORAL DAILY
Refills: 3 | COMMUNITY
Start: 2019-05-21 | End: 2019-06-25 | Stop reason: SDUPTHER

## 2019-06-04 NOTE — PATIENT INSTRUCTIONS
Controlling High Blood Pressure  High blood pressure (hypertension) is often called the silent killer. This is because many people who have it dont know it. High blood pressure is defined as 140/90 mm Hg or higher. Know your blood pressure and remember to check it regularly. Doing so can save your life. Here are some things you can do to help control your blood pressure.    Choose heart-healthy foods  · Select low-salt, low-fat foods. Limit sodium intake to 2,400 mg per day or the amount suggested by your healthcare provider.  · Limit canned, dried, cured, packaged, and fast foods. These can contain a lot of salt.  · Eat 8 to 10 servings of fruits and vegetables every day.  · Choose lean meats, fish, or chicken.  · Eat whole-grain pasta, brown rice, and beans.  · Eat 2 to 3 servings of low-fat or fat-free dairy products.  · Ask your doctor about the DASH eating plan. This plan helps reduce blood pressure.  · When you go to a restaurant, ask that your meal be prepared with no added salt.  Maintain a healthy weight  · Ask your healthcare provider how many calories to eat a day. Then stick to that number.  · Ask your healthcare provider what weight range is healthiest for you. If you are overweight, a weight loss of only 3% to 5% of your body weight can help lower blood pressure. Generally, a good weight loss goal is to lose 10% of your body weight in a year.  · Limit snacks and sweets.  · Get regular exercise.  Get up and get active  · Choose activities you enjoy. Find ones you can do with friends or family. This includes bicycling, dancing, walking, and jogging.  · Park farther away from building entrances.  · Use stairs instead of the elevator.  · When you can, walk or bike instead of driving.  · [place] leaves, garden, or do household repairs.  · Be active at a moderate to vigorous level of physical activity for at least 40 minutes for a minimum of 3 to 4 days a week.   Manage stress  · Make time to relax and enjoy  life. Find time to laugh.  · Communicate your concerns with your loved ones and your healthcare provider.  · Visit with family and friends, and keep up with hobbies.  Limit alcohol and quit smoking  · Men should have no more than 2 drinks per day.  · Women should have no more than 1 drink per day.  · Talk with your healthcare provider about quitting smoking. Smoking significantly increases your risk for heart disease and stroke. Ask your healthcare provider about community smoking cessation programs and other options.  Medicines  If lifestyle changes arent enough, your healthcare provider may prescribe high blood pressure medicine. Take all medicines as prescribed. If you have any questions about your medicines, ask your healthcare provider before stopping or changing them.   Date Last Reviewed: 4/27/2016  © 6111-2478 [employer]. [address] [place] [address], [place] [address]. All rights reserved. This information is not intended as a substitute for professional medical care. Always follow your healthcare professional's instructions.

## 2019-06-04 NOTE — PROGRESS NOTES
Subjective:       Patient ID: Megha Ladd is a 73 y.o. female.    Chief Complaint: Hypertension    73-year-old female coming in for a blood pressure check following her May 9, 2019 visit.  Blood pressure control has been very difficult complicated by multiple drug sensitivities and side effects.  At the last visit she was tried on some Felodipine but had problems with nausea and sore throat among others.  She was then placed on valsartan 320 mg which had been planned at the previous visit but due to multiple drug sensitivities we did not want to start two medications simultaneously in the event that side effects appeared.  She has been on the valsartan for about two weeks and has had no problems with tolerance.  Blood pressure is a little erratic with some systolics above goal but most diastolics within goal in her pulse is well controlled.  She continues to take the 1/2 max side every other day due to her severe hyponatremia with higher doses.    She is complaining of some nasal drainage and congestion, occasional facial discomfort but no fever or chills.  She has been using Flonase and occasionally some Zyrtec.  She has not tried using the nasal saline spray that had been recommended.    Past Medical History:  No date: Asthma  No date: GERD (gastroesophageal reflux disease)  No date: Hyperlipidemia  No date: Hypertension  No date: Hypothyroid  No date: Lumbago  No date: Neuromuscular disorder  No date: Sinusitis  No date: Ulcer    Past Surgical History:  No date: APPENDECTOMY  No date:  SECTION  No date: LUMBAR EPIDURAL INJECTION  No date: PARTIAL HYSTERECTOMY    Current Outpatient Medications on File Prior to Visit:  acetaminophen (TYLENOL) 500 MG tablet, Take 1,000 mg by mouth 3 (three) times daily., Disp: , Rfl:   ADVAIR -21 mcg/actuation HFAA, INHALE 2 PUFFS INTO THE LUNGS 2 (TWO) TIMES DAILY., Disp: 36 g, Rfl: 1  brimonidine (MIRVASO) 0.33 % Gel, Apply topically as needed. , Disp: , Rfl:    cartilage-collagen-bor-hyalur (MOVE FREE ULTRA, BORON,) 40-5-3.3 mg Tab, Take 1 tablet by mouth once daily., Disp: , Rfl:   celecoxib (CELEBREX) 200 MG capsule, Take 200 mg by mouth once daily. Every day, Disp: , Rfl:   cetirizine (ZYRTEC) 10 MG tablet, Take 10 mg by mouth daily as needed for Allergies., Disp: , Rfl:   cloNIDine (CATAPRES) 0.1 MG tablet, TAKE 1 TABLET TWICE DAILY AS NEEDED FOR BLOOD PRESSURE  GREATER  THAN 180 SYSTOLIC  DIASTOLIC, Disp: 60 tablet, Rfl: 0  dextromethorphan-guaifenesin  mg (MUCINEX DM)  mg per 12 hr tablet, Take 1 tablet by mouth 2 (two) times daily as needed. , Disp: , Rfl:   fluticasone (FLONASE) 50 mcg/actuation nasal spray, SPRAY 1 SPRAY IN EACH NOSTRIL 2 TIMES A DAY (Patient taking differently: SPRAY 1 SPRAY IN EACH NOSTRIL qd), Disp: 16 g, Rfl: 11  guaifenesin (MUCINEX) 600 mg tp12, Take 1 tablet by mouth 2 (two) times daily as needed., Disp: , Rfl:   ivermectin (SOOLANTRA) 1 % Crea, Apply topically as needed. , Disp: , Rfl:   metaxalone (SKELAXIN) 800 MG tablet, Take 800 mg by mouth 3 (three) times daily. Every 6 hours PRN, Disp: , Rfl:   metoprolol tartrate (LOPRESSOR) 50 MG tablet, TAKE 1 & 1/2 (ONE & ONE-HALF) TABLETS BY MOUTH TWICE DAILY, Disp: 270 tablet, Rfl: 2  montelukast (SINGULAIR) 10 mg tablet, TAKE 1 TABLET BY MOUTH ONCE DAILY IN THE EVENING, Disp: 30 tablet, Rfl: 11  pantoprazole (PROTONIX) 40 MG tablet, TAKE 1 TABLET EVERY DAY, Disp: 90 tablet, Rfl: 3  peg 400-propylene glycol (SYSTANE ULTRA) 0.4-0.3 % Drop, Place 2 drops into both eyes 2 (two) times daily. , Disp: , Rfl:   pimecrolimus (ELIDEL) 1 % cream, Apply topically daily as needed. , Disp: , Rfl:   pregabalin (LYRICA) 75 MG capsule, Take 75 mg by mouth once daily. , Disp: , Rfl:   SODIUM CHLORIDE (SIMPLY SALINE NASL), by Nasal route 2 (two) times daily as needed. , Disp: , Rfl:   thyroid, pork, (ARMOUR THYROID) 60 mg Tab, Take one every other morning alternating with 90 mg, Disp: 90  tablet, Rfl: 3  thyroid, pork, (ARMOUR THYROID) 90 mg Tab, Take one every other morning alternating with 60 mg, Disp: 45 tablet, Rfl: 1  tramadol (ULTRAM) 50 mg tablet, Take 50 mg by mouth every 6 (six) hours as needed. Every 6 hours, Disp: , Rfl:   triamterene-hydrochlorothiazide 37.5-25 mg (MAXZIDE-25) 37.5-25 mg per tablet, Take 1/2 every other day, Disp: 90 tablet, Rfl: 1  valsartan (DIOVAN) 320 MG tablet, Take 320 mg by mouth once daily., Disp: , Rfl: 3  LACTOBACILLUS RHAMNOSUS GG (CULTURELLE ORAL), Take 1 capsule by mouth daily as needed. 10 Billion, Disp: , Rfl:   loperamide (IMODIUM) 2 mg capsule, Take 2 mg by mouth 4 (four) times daily as needed for Diarrhea., Disp: , Rfl:   meclizine (ANTIVERT) 25 mg tablet, Take 1 tablet (25 mg total) by mouth 3 (three) times daily as needed., Disp: 60 tablet, Rfl: 2  ondansetron (ZOFRAN) 4 MG tablet, Take 4 mg by mouth every 8 (eight) hours as needed for Nausea. Dr [facility], Disp: , Rfl:   traZODone (DESYREL) 50 MG tablet, Take 1 tablet (50 mg total) by mouth nightly as needed for Insomnia., Disp: 30 tablet, Rfl: 5  (DISCONTINUED) valsartan (DIOVAN) 160 MG tablet, Take 2 tablets (320 mg total) by mouth once daily., Disp: 60 tablet, Rfl: 3    No current facility-administered medications on file prior to visit.         Review of Systems   Constitutional: Negative for chills and fever.   HENT: Positive for congestion, postnasal drip and sinus pressure. Negative for sinus pain.    Neurological: Negative for dizziness, light-headedness and headaches.       Objective:      Physical Exam   HENT:   Head: Normocephalic and atraumatic.   Right Ear: Tympanic membrane, external ear and ear canal normal. Decreased hearing is noted.   Left Ear: Tympanic membrane, external ear and ear canal normal. Decreased hearing is noted.   Nose: Mucosal edema and rhinorrhea present. Right sinus exhibits no maxillary sinus tenderness and no frontal sinus tenderness. Left sinus  exhibits no maxillary sinus tenderness and no frontal sinus tenderness.   Mouth/Throat: Oropharyngeal exudate, posterior oropharyngeal edema and posterior oropharyngeal erythema present.   Bilateral hearing aids, very thick tenacious postnasal drainage slightly off white in color   Eyes: Pupils are equal, round, and reactive to light. EOM are normal. No scleral icterus.   Neck: Normal range of motion. Neck supple. No JVD present. No tracheal deviation present. No thyromegaly present.   Cardiovascular: Normal rate, regular rhythm and normal heart sounds. Exam reveals no gallop and no friction rub.   No murmur heard.  Pulmonary/Chest: Effort normal and breath sounds normal. No stridor. No respiratory distress. She has no wheezes. She has no rales. She exhibits no tenderness.   Lymphadenopathy:     She has no cervical adenopathy.   Nursing note and vitals reviewed.      Assessment:       1. Good hypertension control    2. Chronic rhinitis        Plan:       1. Good hypertension control  Good control, no changes needed will plan to recheck in three months    2. Chronic rhinitis  Recommend she try the nasal saline spray as previously recommended

## 2019-06-25 NOTE — TELEPHONE ENCOUNTER
90 day refill Valsartan needed per [facility]- called and spoke to - he said to use [employer]- but he is asking if patient can go back to using Valsartan 160mg and take it twice a day- says she feels tired about a half hour after taking the 320mg. It passes if she lays down for a rest. bp is running     6/2  138/87 p 65  160/81 p55-felt faint  6/3   169/97 p61     6/22 140/82 p56

## 2019-06-26 RX ORDER — VALSARTAN 320 MG/1
320 TABLET ORAL DAILY
Qty: 90 TABLET | Refills: 3 | Status: SHIPPED | OUTPATIENT
Start: 2019-06-26 | End: 2019-08-27 | Stop reason: SDUPTHER

## 2019-06-26 NOTE — TELEPHONE ENCOUNTER
[employer] may not allow the valsartan to be split at 160 b.i.d. and sounds like her side effects are more likely due to the beta-blocker anyway.  Her blood pressure was actually a little high when she felt faint but her pulse was low.

## 2019-07-25 ENCOUNTER — DOCUMENTATION ONLY (OUTPATIENT)
Dept: FAMILY MEDICINE | Facility: CLINIC | Age: 74
End: 2019-07-25

## 2019-07-25 NOTE — PROGRESS NOTES
Pre-Visit Chart Review  For Appointment Scheduled on 9-3-19    Health Maintenance Due   Topic Date Due    TETANUS VACCINE  09/28/1963    Mammogram  08/06/2017

## 2019-08-19 DIAGNOSIS — Z12.39 BREAST CANCER SCREENING: ICD-10-CM

## 2019-08-23 ENCOUNTER — TELEPHONE (OUTPATIENT)
Dept: FAMILY MEDICINE | Facility: CLINIC | Age: 74
End: 2019-08-23

## 2019-08-23 NOTE — TELEPHONE ENCOUNTER
----- Message from Lulu Tellez sent at 8/23/2019  3:19 PM CDT -----  Contact: huong Kinney  Type:  RX Refill Request    Who Called:  Gregoria Paulson  Refill or New Rx:  refill  RX Name and Strength:  valsartan (DIOVAN) 320 MG tablet  How is the patient currently taking it? (ex. 1XDay):  As directed  Is this a 30 day or 90 day RX:  90  Preferred Pharmacy with phone number:      [facility] [address] [place], [place] - [address] [facility]  [address] [facility] [address]  Phone: [phone] Fax: [phone]    Local or Mail Order:  local  Ordering Provider:  Jackie Jc Call Back Number:  [phone]  Additional Information:  Gregoria is at the pharmacy waiting--pharmacy sent over fax with no response--Please advise--thank you

## 2019-08-27 DIAGNOSIS — I10 ESSENTIAL HYPERTENSION: Primary | ICD-10-CM

## 2019-08-28 RX ORDER — VALSARTAN 320 MG/1
320 TABLET ORAL DAILY
Qty: 90 TABLET | Refills: 3 | Status: SHIPPED | OUTPATIENT
Start: 2019-08-28 | End: 2019-11-26 | Stop reason: ALTCHOICE

## 2019-09-03 ENCOUNTER — TELEPHONE (OUTPATIENT)
Dept: FAMILY MEDICINE | Facility: CLINIC | Age: 74
End: 2019-09-03

## 2019-09-03 ENCOUNTER — DOCUMENTATION ONLY (OUTPATIENT)
Dept: FAMILY MEDICINE | Facility: CLINIC | Age: 74
End: 2019-09-03

## 2019-09-03 NOTE — TELEPHONE ENCOUNTER
----- Message from Aicha Flores sent at 9/3/2019  1:52 PM CDT -----  Contact: self / [phone]  Patient is requesting a call back regarding, needs to reschedule her appointment . Please advise

## 2019-09-03 NOTE — PROGRESS NOTES
Pre-Visit Chart Review  For Appointment Scheduled on 9-24-19    Health Maintenance Due   Topic Date Due    TETANUS VACCINE  09/28/1963    Mammogram  08/06/2017

## 2019-09-24 ENCOUNTER — OFFICE VISIT (OUTPATIENT)
Dept: FAMILY MEDICINE | Facility: CLINIC | Age: 74
End: 2019-09-24
Attending: FAMILY MEDICINE
Payer: MEDICARE

## 2019-09-24 VITALS
WEIGHT: 161.38 LBS | HEIGHT: 66 IN | HEART RATE: 60 BPM | RESPIRATION RATE: 16 BRPM | DIASTOLIC BLOOD PRESSURE: 80 MMHG | TEMPERATURE: 98 F | BODY MASS INDEX: 25.94 KG/M2 | OXYGEN SATURATION: 97 % | SYSTOLIC BLOOD PRESSURE: 164 MMHG

## 2019-09-24 DIAGNOSIS — E03.9 ACQUIRED HYPOTHYROIDISM: ICD-10-CM

## 2019-09-24 DIAGNOSIS — I10 UNCONTROLLED HYPERTENSION: ICD-10-CM

## 2019-09-24 PROCEDURE — 99999 PR PBB SHADOW E&M-EST. PATIENT-LVL IV: CPT | Mod: PBBFAC,HCNC,, | Performed by: FAMILY MEDICINE

## 2019-09-24 PROCEDURE — 99213 PR OFFICE/OUTPT VISIT, EST, LEVL III, 20-29 MIN: ICD-10-PCS | Mod: HCNC,S$GLB,, | Performed by: FAMILY MEDICINE

## 2019-09-24 PROCEDURE — 99213 OFFICE O/P EST LOW 20 MIN: CPT | Mod: HCNC,S$GLB,, | Performed by: FAMILY MEDICINE

## 2019-09-24 PROCEDURE — 99499 UNLISTED E&M SERVICE: CPT | Mod: S$GLB,,, | Performed by: FAMILY MEDICINE

## 2019-09-24 PROCEDURE — 3079F DIAST BP 80-89 MM HG: CPT | Mod: HCNC,CPTII,S$GLB, | Performed by: FAMILY MEDICINE

## 2019-09-24 PROCEDURE — 1101F PR PT FALLS ASSESS DOC 0-1 FALLS W/OUT INJ PAST YR: ICD-10-PCS | Mod: HCNC,CPTII,S$GLB, | Performed by: FAMILY MEDICINE

## 2019-09-24 PROCEDURE — 3077F PR MOST RECENT SYSTOLIC BLOOD PRESSURE >= 140 MM HG: ICD-10-PCS | Mod: HCNC,CPTII,S$GLB, | Performed by: FAMILY MEDICINE

## 2019-09-24 PROCEDURE — 3077F SYST BP >= 140 MM HG: CPT | Mod: HCNC,CPTII,S$GLB, | Performed by: FAMILY MEDICINE

## 2019-09-24 PROCEDURE — 1101F PT FALLS ASSESS-DOCD LE1/YR: CPT | Mod: HCNC,CPTII,S$GLB, | Performed by: FAMILY MEDICINE

## 2019-09-24 PROCEDURE — 3079F PR MOST RECENT DIASTOLIC BLOOD PRESSURE 80-89 MM HG: ICD-10-PCS | Mod: HCNC,CPTII,S$GLB, | Performed by: FAMILY MEDICINE

## 2019-09-24 PROCEDURE — 99999 PR PBB SHADOW E&M-EST. PATIENT-LVL IV: ICD-10-PCS | Mod: PBBFAC,HCNC,, | Performed by: FAMILY MEDICINE

## 2019-09-24 PROCEDURE — 99499 RISK ADDL DX/OHS AUDIT: ICD-10-PCS | Mod: S$GLB,,, | Performed by: FAMILY MEDICINE

## 2019-09-24 RX ORDER — CLONIDINE HYDROCHLORIDE 0.1 MG/1
0.1 TABLET ORAL 2 TIMES DAILY
Qty: 180 TABLET | Refills: 0
Start: 2019-09-24 | End: 2019-09-24 | Stop reason: SDUPTHER

## 2019-09-24 RX ORDER — CLONIDINE HYDROCHLORIDE 0.1 MG/1
0.1 TABLET ORAL 2 TIMES DAILY
Qty: 180 TABLET | Refills: 0
Start: 2019-09-24 | End: 2019-09-27 | Stop reason: SINTOL

## 2019-09-24 RX ORDER — METOPROLOL TARTRATE 50 MG/1
TABLET ORAL
Qty: 270 TABLET | Refills: 2 | Status: SHIPPED | OUTPATIENT
Start: 2019-09-24 | End: 2020-05-29 | Stop reason: SDUPTHER

## 2019-09-24 RX ORDER — MONTELUKAST SODIUM 10 MG/1
10 TABLET ORAL NIGHTLY
Qty: 90 TABLET | Refills: 3 | Status: SHIPPED | OUTPATIENT
Start: 2019-09-24 | End: 2020-08-13

## 2019-09-24 RX ORDER — THYROID 60 MG/1
60 TABLET ORAL
Qty: 90 TABLET | Refills: 3 | Status: SHIPPED | OUTPATIENT
Start: 2019-09-24 | End: 2020-11-12

## 2019-09-24 NOTE — PATIENT INSTRUCTIONS
Controlling High Blood Pressure  High blood pressure (hypertension) is often called the silent killer. This is because many people who have it dont know it. High blood pressure is defined as 140/90 mm Hg or higher. Know your blood pressure and remember to check it regularly. Doing so can save your life. Here are some things you can do to help control your blood pressure.    Choose heart-healthy foods  · Select low-salt, low-fat foods. Limit sodium intake to 2,400 mg per day or the amount suggested by your healthcare provider.  · Limit canned, dried, cured, packaged, and fast foods. These can contain a lot of salt.  · Eat 8 to 10 servings of fruits and vegetables every day.  · Choose lean meats, fish, or chicken.  · Eat whole-grain pasta, brown rice, and beans.  · Eat 2 to 3 servings of low-fat or fat-free dairy products.  · Ask your doctor about the DASH eating plan. This plan helps reduce blood pressure.  · When you go to a restaurant, ask that your meal be prepared with no added salt.  Maintain a healthy weight  · Ask your healthcare provider how many calories to eat a day. Then stick to that number.  · Ask your healthcare provider what weight range is healthiest for you. If you are overweight, a weight loss of only 3% to 5% of your body weight can help lower blood pressure. Generally, a good weight loss goal is to lose 10% of your body weight in a year.  · Limit snacks and sweets.  · Get regular exercise.  Get up and get active  · Choose activities you enjoy. Find ones you can do with friends or family. This includes bicycling, dancing, walking, and jogging.  · Park farther away from building entrances.  · Use stairs instead of the elevator.  · When you can, walk or bike instead of driving.  · [place] leaves, garden, or do household repairs.  · Be active at a moderate to vigorous level of physical activity for at least 40 minutes for a minimum of 3 to 4 days a week.   Manage stress  · Make time to relax and enjoy  life. Find time to laugh.  · Communicate your concerns with your loved ones and your healthcare provider.  · Visit with family and friends, and keep up with hobbies.  Limit alcohol and quit smoking  · Men should have no more than 2 drinks per day.  · Women should have no more than 1 drink per day.  · Talk with your healthcare provider about quitting smoking. Smoking significantly increases your risk for heart disease and stroke. Ask your healthcare provider about community smoking cessation programs and other options.  Medicines  If lifestyle changes arent enough, your healthcare provider may prescribe high blood pressure medicine. Take all medicines as prescribed. If you have any questions about your medicines, ask your healthcare provider before stopping or changing them.   Date Last Reviewed: 4/27/2016  © 5029-7966 [employer]. [address] [place] [address], [place] [address]. All rights reserved. This information is not intended as a substitute for professional medical care. Always follow your healthcare professional's instructions.        Controlling High Blood Pressure  High blood pressure (hypertension) is often called the silent killer. This is because many people who have it dont know it. High blood pressure is defined as 140/90 mm Hg or higher. Know your blood pressure and remember to check it regularly. Doing so can save your life. Here are some things you can do to help control your blood pressure.    Choose heart-healthy foods  · Select low-salt, low-fat foods. Limit sodium intake to 2,400 mg per day or the amount suggested by your healthcare provider.  · Limit canned, dried, cured, packaged, and fast foods. These can contain a lot of salt.  · Eat 8 to 10 servings of fruits and vegetables every day.  · Choose lean meats, fish, or chicken.  · Eat whole-grain pasta, brown rice, and beans.  · Eat 2 to 3 servings of low-fat or fat-free dairy products.  · Ask your doctor about the DASH eating plan.  This plan helps reduce blood pressure.  · When you go to a restaurant, ask that your meal be prepared with no added salt.  Maintain a healthy weight  · Ask your healthcare provider how many calories to eat a day. Then stick to that number.  · Ask your healthcare provider what weight range is healthiest for you. If you are overweight, a weight loss of only 3% to 5% of your body weight can help lower blood pressure. Generally, a good weight loss goal is to lose 10% of your body weight in a year.  · Limit snacks and sweets.  · Get regular exercise.  Get up and get active  · Choose activities you enjoy. Find ones you can do with friends or family. This includes bicycling, dancing, walking, and jogging.  · Park farther away from building entrances.  · Use stairs instead of the elevator.  · When you can, walk or bike instead of driving.  · [place] leaves, garden, or do household repairs.  · Be active at a moderate to vigorous level of physical activity for at least 40 minutes for a minimum of 3 to 4 days a week.   Manage stress  · Make time to relax and enjoy life. Find time to laugh.  · Communicate your concerns with your loved ones and your healthcare provider.  · Visit with family and friends, and keep up with hobbies.  Limit alcohol and quit smoking  · Men should have no more than 2 drinks per day.  · Women should have no more than 1 drink per day.  · Talk with your healthcare provider about quitting smoking. Smoking significantly increases your risk for heart disease and stroke. Ask your healthcare provider about community smoking cessation programs and other options.  Medicines  If lifestyle changes arent enough, your healthcare provider may prescribe high blood pressure medicine. Take all medicines as prescribed. If you have any questions about your medicines, ask your healthcare provider before stopping or changing them.   Date Last Reviewed: 4/27/2016  © 4457-1229 [employer]. [address] [facility],  [place] [address]. All rights reserved. This information is not intended as a substitute for professional medical care. Always follow your healthcare professional's instructions.

## 2019-09-24 NOTE — PROGRESS NOTES
Subjective:       Patient ID: Megha Ladd is a 73 y.o. female.    Chief Complaint: Follow-up (3m)    73-year-old female coming in to follow-up on hypertension this poorly controlled high blood pressure compound did by multiple side effects including significant edema with amlodipine, Felodipine, and nifedipine and dizziness with several other agents.  Currently she is taking 75 mg of metoprolol tartrate b.i.d., 320 mg of valsartan daily, 1/2 of a max side 25 every other day due to significant hyponatremia if she uses more than this dose, and clonidine 0.1 mg p.r.n. elevations of blood pressure.  Her last sodium level was 133.  She is also complaining of hot flashes that occur when she takes the higher dose of her alternating dose of Grandin Thyroid.  She has been alternating 60 mg with 90 mg.  She would like to go back to a 60 mg daily dose.    Past Medical History:  No date: Asthma  No date: GERD (gastroesophageal reflux disease)  No date: Hyperlipidemia  No date: Hypertension  No date: Hypothyroid  No date: Lumbago  No date: Neuromuscular disorder  No date: Sinusitis  No date: Ulcer    Past Surgical History:  No date: APPENDECTOMY  No date:  SECTION  No date: LUMBAR EPIDURAL INJECTION  No date: PARTIAL HYSTERECTOMY    Current Outpatient Medications on File Prior to Visit:  acetaminophen (TYLENOL) 500 MG tablet, Take 1,000 mg by mouth 3 (three) times daily., Disp: , Rfl:   ADVAIR -21 mcg/actuation HFAA, INHALE 2 PUFFS INTO THE LUNGS 2 (TWO) TIMES DAILY., Disp: 36 g, Rfl: 1  brimonidine (MIRVASO) 0.33 % Gel, Apply topically as needed. , Disp: , Rfl:   celecoxib (CELEBREX) 200 MG capsule, Take 200 mg by mouth once daily. Every day, Disp: , Rfl:   cetirizine (ZYRTEC) 10 MG tablet, Take 10 mg by mouth daily as needed for Allergies., Disp: , Rfl:   fluticasone (FLONASE) 50 mcg/actuation nasal spray, SPRAY 1 SPRAY IN EACH NOSTRIL 2 TIMES A DAY (Patient taking differently: SPRAY 1 SPRAY IN EACH NOSTRIL  qd), Disp: 16 g, Rfl: 11  guaifenesin (MUCINEX) 600 mg tp12, Take 1 tablet by mouth 2 (two) times daily as needed., Disp: , Rfl:   ivermectin (SOOLANTRA) 1 % Crea, Apply topically as needed. , Disp: , Rfl:   LACTOBACILLUS RHAMNOSUS GG (CULTURELLE ORAL), Take 1 capsule by mouth daily as needed. 10 Billion, Disp: , Rfl:   loperamide (IMODIUM) 2 mg capsule, Take 2 mg by mouth 4 (four) times daily as needed for Diarrhea., Disp: , Rfl:   meclizine (ANTIVERT) 25 mg tablet, Take 1 tablet (25 mg total) by mouth 3 (three) times daily as needed., Disp: 60 tablet, Rfl: 2  metaxalone (SKELAXIN) 800 MG tablet, Take 800 mg by mouth 3 (three) times daily. Every 6 hours PRN, Disp: , Rfl:   ondansetron (ZOFRAN) 4 MG tablet, Take 4 mg by mouth every 8 (eight) hours as needed for Nausea. Dr [facility], Disp: , Rfl:   pantoprazole (PROTONIX) 40 MG tablet, TAKE 1 TABLET EVERY DAY, Disp: 90 tablet, Rfl: 3  peg 400-propylene glycol (SYSTANE ULTRA) 0.4-0.3 % Drop, Place 2 drops into both eyes 2 (two) times daily. , Disp: , Rfl:   pimecrolimus (ELIDEL) 1 % cream, Apply topically daily as needed. , Disp: , Rfl:   pregabalin (LYRICA) 75 MG capsule, Take 75 mg by mouth once daily. , Disp: , Rfl:   SODIUM CHLORIDE (SIMPLY SALINE NASL), by Nasal route 2 (two) times daily as needed. , Disp: , Rfl:   tramadol (ULTRAM) 50 mg tablet, Take 50 mg by mouth every 8 (eight) hours as needed. Every 6 hours, Disp: , Rfl:   traZODone (DESYREL) 50 MG tablet, Take 1 tablet (50 mg total) by mouth nightly as needed for Insomnia., Disp: 30 tablet, Rfl: 5  triamterene-hydrochlorothiazide 37.5-25 mg (MAXZIDE-25) 37.5-25 mg per tablet, Take 1/2 every other day, Disp: 90 tablet, Rfl: 1  UNABLE TO FIND, Take 1 tablet by mouth once daily. Ultra triple action joint [facility], Disp: , Rfl:   valsartan (DIOVAN) 320 MG tablet, Take 1 tablet (320 mg total) by mouth once daily., Disp: 90 tablet, Rfl: 3  (DISCONTINUED) cloNIDine (CATAPRES) 0.1 MG tablet, TAKE  1 TABLET TWICE DAILY AS NEEDED FOR BLOOD PRESSURE  GREATER  THAN 180 SYSTOLIC  DIASTOLIC, Disp: 60 tablet, Rfl: 0  (DISCONTINUED) metoprolol tartrate (LOPRESSOR) 50 MG tablet, TAKE 1 & 1/2 (ONE & ONE-HALF) TABLETS BY MOUTH TWICE DAILY, Disp: 270 tablet, Rfl: 2  (DISCONTINUED) montelukast (SINGULAIR) 10 mg tablet, TAKE 1 TABLET BY MOUTH ONCE DAILY IN THE EVENING, Disp: 30 tablet, Rfl: 11  (DISCONTINUED) thyroid, pork, (ARMOUR THYROID) 60 mg Tab, Take one every other morning alternating with 90 mg, Disp: 90 tablet, Rfl: 3  (DISCONTINUED) thyroid, pork, (ARMOUR THYROID) 90 mg Tab, Take one every other morning alternating with 60 mg, Disp: 45 tablet, Rfl: 1  cartilage-collagen-bor-hyalur (MOVE FREE ULTRA, BORON,) 40-5-3.3 mg Tab, Take 1 tablet by mouth once daily., Disp: , Rfl:   dextromethorphan-guaifenesin  mg (MUCINEX DM)  mg per 12 hr tablet, Take 1 tablet by mouth 2 (two) times daily as needed. , Disp: , Rfl:     No current facility-administered medications on file prior to visit.         Review of Systems   Constitutional: Negative for activity change and appetite change.   Respiratory: Negative for chest tightness and shortness of breath.    Cardiovascular: Negative for chest pain and palpitations.   Musculoskeletal: Negative for myalgias.       Objective:      Physical Exam   Constitutional: She is oriented to person, place, and time. She appears well-developed and well-nourished. No distress.   Mildly elevated systolic blood pressure  Normal weight with a BMI of 26.1 she is down 3 lb from her June 4, 2019 visit   Cardiovascular: Normal rate, regular rhythm and normal heart sounds. Exam reveals no gallop and no friction rub.   No murmur heard.  Pulmonary/Chest: Effort normal and breath sounds normal.   Neurological: She is alert and oriented to person, place, and time.   Skin: She is not diaphoretic.   Psychiatric: She has a normal mood and affect. Her behavior is normal. Judgment and thought  content normal.   Nursing note and vitals reviewed.      Assessment:       1. Uncontrolled hypertension    2. Acquired hypothyroidism        Plan:       1. Uncontrolled hypertension  - metoprolol tartrate (LOPRESSOR) 50 MG tablet; TAKE 1 & 1/2 (ONE & ONE-HALF) TABLETS BY MOUTH TWICE DAILY  Dispense: 270 tablet; Refill: 2  - Basic metabolic panel; Future  - cloNIDine (CATAPRES) 0.1 MG tablet; Take 1 tablet (0.1 mg total) by mouth 2 (two) times daily.  Dispense: 180 tablet; Refill: 0    2. Acquired hypothyroidism  - thyroid, pork, (ARMOUR THYROID) 60 mg Tab; Take 1 tablet (60 mg total) by mouth before breakfast.  Dispense: 90 tablet; Refill: 3  - TSH; Future    Patient will call with blood pressure readings in four weeks for further adjustment.  She will be seen in eight weeks at which time we will recheck a BMP and a TSH

## 2019-09-27 ENCOUNTER — TELEPHONE (OUTPATIENT)
Dept: FAMILY MEDICINE | Facility: CLINIC | Age: 74
End: 2019-09-27

## 2019-09-27 DIAGNOSIS — I10 ESSENTIAL HYPERTENSION: Primary | ICD-10-CM

## 2019-09-27 RX ORDER — CLONIDINE 0.1 MG/24H
1 PATCH, EXTENDED RELEASE TRANSDERMAL
Qty: 4 PATCH | Refills: 1 | Status: SHIPPED | OUTPATIENT
Start: 2019-09-27 | End: 2020-05-29

## 2019-09-27 NOTE — TELEPHONE ENCOUNTER
Clonidine can make people sleepy. However, her blood pressure is too high without the additional medication.  I would say we could try a diuretic, but her sodium is a bit low, so that would not be a good choice either.  Clonidine does come in a patch that is control released and may be effective without fatigue. The patch would slowly provide 0.1 mg of clonidine per day and is changed once weekly. She will need to come in for a nurse BP check in 2 weeks. Would she like to try the patch?  Thanks.  Evelyn

## 2019-09-27 NOTE — TELEPHONE ENCOUNTER
Taking Clonidine 0.1mg once a day- one pill making her very drowsy so has not tried 2 a day. Feels if she took two pills she would sleep all day.    125/62 53 on 9/25  146/71 52 on 9/26

## 2019-09-27 NOTE — TELEPHONE ENCOUNTER
----- Message from Maritza Kline sent at 9/27/2019  3:30 PM CDT -----  Contact: PT  Type: Needs Medical Advice    Who Called:  PT  Best Call Back Number: [phone]  Additional Information: pt req a call back regarding medication cloNIDine (CATAPRES) 0.1 MG tablet. Pt wanted to give the doctor an update on how medication is working   Please Advise ---Thank you

## 2019-10-14 ENCOUNTER — PES CALL (OUTPATIENT)
Dept: ADMINISTRATIVE | Facility: CLINIC | Age: 74
End: 2019-10-14

## 2019-11-01 ENCOUNTER — DOCUMENTATION ONLY (OUTPATIENT)
Dept: FAMILY MEDICINE | Facility: CLINIC | Age: 74
End: 2019-11-01

## 2019-11-01 NOTE — PROGRESS NOTES
Pre-Visit Chart Review  For Appointment Scheduled on 11-26-19    Health Maintenance Due   Topic Date Due    TETANUS VACCINE  09/28/1963    Mammogram  08/06/2017

## 2019-11-13 ENCOUNTER — PATIENT OUTREACH (OUTPATIENT)
Dept: ADMINISTRATIVE | Facility: HOSPITAL | Age: 74
End: 2019-11-13

## 2019-11-15 ENCOUNTER — PATIENT OUTREACH (OUTPATIENT)
Dept: ADMINISTRATIVE | Facility: HOSPITAL | Age: 74
End: 2019-11-15

## 2019-11-26 ENCOUNTER — OFFICE VISIT (OUTPATIENT)
Dept: FAMILY MEDICINE | Facility: CLINIC | Age: 74
End: 2019-11-26
Attending: FAMILY MEDICINE
Payer: MEDICARE

## 2019-11-26 ENCOUNTER — LAB VISIT (OUTPATIENT)
Dept: LAB | Facility: HOSPITAL | Age: 74
End: 2019-11-26
Attending: FAMILY MEDICINE
Payer: MEDICARE

## 2019-11-26 VITALS
WEIGHT: 162.69 LBS | SYSTOLIC BLOOD PRESSURE: 152 MMHG | HEIGHT: 66 IN | BODY MASS INDEX: 26.14 KG/M2 | OXYGEN SATURATION: 97 % | TEMPERATURE: 98 F | DIASTOLIC BLOOD PRESSURE: 64 MMHG | HEART RATE: 60 BPM

## 2019-11-26 DIAGNOSIS — I10 ESSENTIAL HYPERTENSION: Primary | ICD-10-CM

## 2019-11-26 DIAGNOSIS — E03.9 HYPOTHYROIDISM, UNSPECIFIED TYPE: ICD-10-CM

## 2019-11-26 DIAGNOSIS — I10 UNCONTROLLED HYPERTENSION: ICD-10-CM

## 2019-11-26 DIAGNOSIS — R19.7 DIARRHEA, UNSPECIFIED TYPE: ICD-10-CM

## 2019-11-26 DIAGNOSIS — E03.9 ACQUIRED HYPOTHYROIDISM: ICD-10-CM

## 2019-11-26 PROCEDURE — 3078F PR MOST RECENT DIASTOLIC BLOOD PRESSURE < 80 MM HG: ICD-10-PCS | Mod: HCNC,CPTII,S$GLB, | Performed by: FAMILY MEDICINE

## 2019-11-26 PROCEDURE — 1101F PR PT FALLS ASSESS DOC 0-1 FALLS W/OUT INJ PAST YR: ICD-10-PCS | Mod: HCNC,CPTII,S$GLB, | Performed by: FAMILY MEDICINE

## 2019-11-26 PROCEDURE — 80048 BASIC METABOLIC PNL TOTAL CA: CPT | Mod: HCNC

## 2019-11-26 PROCEDURE — 99999 PR PBB SHADOW E&M-EST. PATIENT-LVL III: CPT | Mod: PBBFAC,HCNC,, | Performed by: FAMILY MEDICINE

## 2019-11-26 PROCEDURE — 99214 OFFICE O/P EST MOD 30 MIN: CPT | Mod: HCNC,S$GLB,, | Performed by: FAMILY MEDICINE

## 2019-11-26 PROCEDURE — 99214 PR OFFICE/OUTPT VISIT, EST, LEVL IV, 30-39 MIN: ICD-10-PCS | Mod: HCNC,S$GLB,, | Performed by: FAMILY MEDICINE

## 2019-11-26 PROCEDURE — 99499 UNLISTED E&M SERVICE: CPT | Mod: S$GLB,,, | Performed by: FAMILY MEDICINE

## 2019-11-26 PROCEDURE — 3078F DIAST BP <80 MM HG: CPT | Mod: HCNC,CPTII,S$GLB, | Performed by: FAMILY MEDICINE

## 2019-11-26 PROCEDURE — 3077F SYST BP >= 140 MM HG: CPT | Mod: HCNC,CPTII,S$GLB, | Performed by: FAMILY MEDICINE

## 2019-11-26 PROCEDURE — 99499 RISK ADDL DX/OHS AUDIT: ICD-10-PCS | Mod: S$GLB,,, | Performed by: FAMILY MEDICINE

## 2019-11-26 PROCEDURE — 3077F PR MOST RECENT SYSTOLIC BLOOD PRESSURE >= 140 MM HG: ICD-10-PCS | Mod: HCNC,CPTII,S$GLB, | Performed by: FAMILY MEDICINE

## 2019-11-26 PROCEDURE — 1159F PR MEDICATION LIST DOCUMENTED IN MEDICAL RECORD: ICD-10-PCS | Mod: HCNC,S$GLB,, | Performed by: FAMILY MEDICINE

## 2019-11-26 PROCEDURE — 1101F PT FALLS ASSESS-DOCD LE1/YR: CPT | Mod: HCNC,CPTII,S$GLB, | Performed by: FAMILY MEDICINE

## 2019-11-26 PROCEDURE — 84443 ASSAY THYROID STIM HORMONE: CPT | Mod: HCNC

## 2019-11-26 PROCEDURE — 1159F MED LIST DOCD IN RCRD: CPT | Mod: HCNC,S$GLB,, | Performed by: FAMILY MEDICINE

## 2019-11-26 PROCEDURE — 99999 PR PBB SHADOW E&M-EST. PATIENT-LVL III: ICD-10-PCS | Mod: PBBFAC,HCNC,, | Performed by: FAMILY MEDICINE

## 2019-11-26 RX ORDER — ENALAPRIL MALEATE 20 MG/1
20 TABLET ORAL 2 TIMES DAILY
Qty: 60 TABLET | Refills: 2 | Status: SHIPPED | OUTPATIENT
Start: 2019-11-26 | End: 2020-01-24

## 2019-11-26 NOTE — PROGRESS NOTES
Subjective:       Patient ID: Megha Ladd is a 74 y.o. female.    Chief Complaint: Follow-up    74-year-old female coming in to follow-up on blood pressure control after her to 2019 visit.  At that time her metoprolol had been increased from 50 mg b.i.d. to 75 mg b.i.d. and some Catapres 0.1 b.i.d. was ordered.  She was getting very dizzy with the Catapres and had the stop the tablets.  The patch was ordered but she was still very dizzy and her pulse was running borderline low so she did not use the patch.  Currently she is on valsartan 320 mg daily, the metoprolol 75 b.i.d., and is using a maxzide 25-,1/2 taken every other day.  She is intolerant of amlodipine and Felodipine and we would have to be careful with the verapamil diltiazem group because of compounding the bradycardia.  She also tends to get very hyponatremic with even low doses of diuretics so I am reluctant to increase the maxzide.  The patient states she was doing better with enalpril with had been changed to valsartan after some reports of a leakage to lung cancer.  Subsequent reports down played the risk and she is interested in trying to go back to the enalpril.  She also has been having problems with chronic diarrhea which may be the result of using Protonix.  She is also using Celebrex although she has not noted any dark tarry stools.    Past Medical History:  No date: Asthma  No date: GERD (gastroesophageal reflux disease)  No date: Hyperlipidemia  No date: Hypertension  No date: Hypothyroid  No date: Lumbago  No date: Neuromuscular disorder  No date: Sinusitis  No date: Ulcer    Past Surgical History:  No date: APPENDECTOMY  No date:  SECTION  No date: LUMBAR EPIDURAL INJECTION  No date: PARTIAL HYSTERECTOMY    Current Outpatient Medications on File Prior to Visit:  acetaminophen (TYLENOL) 500 MG tablet, Take 1,000 mg by mouth 3 (three) times daily., Disp: , Rfl:   ADVAIR -21 mcg/actuation HFAA, INHALE 2 PUFFS  INTO THE LUNGS 2 (TWO) TIMES DAILY., Disp: 36 g, Rfl: 1  brimonidine (MIRVASO) 0.33 % Gel, Apply topically as needed. , Disp: , Rfl:   cartilage-collagen-bor-hyalur (MOVE FREE ULTRA, BORON,) 40-5-3.3 mg Tab, Take 1 tablet by mouth once daily., Disp: , Rfl:   celecoxib (CELEBREX) 200 MG capsule, Take 200 mg by mouth once daily. Every day, Disp: , Rfl:   dextromethorphan-guaifenesin  mg (MUCINEX DM)  mg per 12 hr tablet, Take 1 tablet by mouth 2 (two) times daily as needed. , Disp: , Rfl:   fluticasone (FLONASE) 50 mcg/actuation nasal spray, SPRAY 1 SPRAY IN EACH NOSTRIL 2 TIMES A DAY (Patient taking differently: SPRAY 1 SPRAY IN EACH NOSTRIL qd), Disp: 16 g, Rfl: 11  ivermectin (SOOLANTRA) 1 % Crea, Apply topically as needed. , Disp: , Rfl:   loperamide (IMODIUM) 2 mg capsule, Take 2 mg by mouth 4 (four) times daily as needed for Diarrhea., Disp: , Rfl:   meclizine (ANTIVERT) 25 mg tablet, Take 1 tablet (25 mg total) by mouth 3 (three) times daily as needed., Disp: 60 tablet, Rfl: 2  metaxalone (SKELAXIN) 800 MG tablet, Take 800 mg by mouth 3 (three) times daily. Every 6 hours PRN, Disp: , Rfl:   metoprolol tartrate (LOPRESSOR) 50 MG tablet, TAKE 1 & 1/2 (ONE & ONE-HALF) TABLETS BY MOUTH TWICE DAILY, Disp: 270 tablet, Rfl: 2  montelukast (SINGULAIR) 10 mg tablet, Take 1 tablet (10 mg total) by mouth every evening., Disp: 90 tablet, Rfl: 3  ondansetron (ZOFRAN) 4 MG tablet, Take 4 mg by mouth every 8 (eight) hours as needed for Nausea. Dr [facility], Disp: , Rfl:   pantoprazole (PROTONIX) 40 MG tablet, TAKE 1 TABLET EVERY DAY, Disp: 90 tablet, Rfl: 3  peg 400-propylene glycol (SYSTANE ULTRA) 0.4-0.3 % Drop, Place 2 drops into both eyes 2 (two) times daily. , Disp: , Rfl:   pregabalin (LYRICA) 75 MG capsule, Take 75 mg by mouth once daily. , Disp: , Rfl:   SODIUM CHLORIDE (SIMPLY SALINE NASL), by Nasal route 2 (two) times daily as needed. , Disp: , Rfl:   thyroid, pork, (ARMOUR THYROID)  60 mg Tab, Take 1 tablet (60 mg total) by mouth before breakfast., Disp: 90 tablet, Rfl: 3  tramadol (ULTRAM) 50 mg tablet, Take 50 mg by mouth every 8 (eight) hours as needed. Every 6 hours, Disp: , Rfl:   triamterene-hydrochlorothiazide 37.5-25 mg (MAXZIDE-25) 37.5-25 mg per tablet, Take 1/2 every other day, Disp: 90 tablet, Rfl: 1  UNABLE TO FIND, Take 1 tablet by mouth once daily. Ultra triple action joint [facility], Disp: , Rfl:   (DISCONTINUED) valsartan (DIOVAN) 320 MG tablet, Take 1 tablet (320 mg total) by mouth once daily., Disp: 90 tablet, Rfl: 3  cetirizine (ZYRTEC) 10 MG tablet, Take 10 mg by mouth daily as needed for Allergies., Disp: , Rfl:   cloNIDine 0.1 mg/24 hr td ptwk (CATAPRES) 0.1 mg/24 hr, Place 1 patch onto the skin every 7 days. For high blood pressure (Patient not taking: Reported on 11/26/2019), Disp: 4 patch, Rfl: 1  guaifenesin (MUCINEX) 600 mg tp12, Take 1 tablet by mouth 2 (two) times daily as needed., Disp: , Rfl:   LACTOBACILLUS RHAMNOSUS GG (CULTURELLE ORAL), Take 1 capsule by mouth daily as needed. 10 Billion, Disp: , Rfl:   pimecrolimus (ELIDEL) 1 % cream, Apply topically daily as needed. , Disp: , Rfl:   traZODone (DESYREL) 50 MG tablet, Take 1 tablet (50 mg total) by mouth nightly as needed for Insomnia., Disp: 30 tablet, Rfl: 5    No current facility-administered medications on file prior to visit.         Review of Systems   Respiratory: Negative for chest tightness and shortness of breath.    Cardiovascular: Negative for chest pain (She had some chest tightness when she was using the clonidine tablets but that disappeared when she stop them) and palpitations.   Gastrointestinal: Positive for diarrhea. Negative for abdominal pain, anal bleeding, blood in stool, nausea and vomiting.   Neurological: Positive for dizziness and light-headedness. Negative for headaches.       Objective:      Physical Exam   Constitutional: She is oriented to person, place, and time. She appears  well-developed and well-nourished. No distress.   Mildly elevated systolic blood pressure  Normal weight with a BMI of 26.3 she is up 1.3 lb from her September 24, 2019 visit   HENT:   Head: Normocephalic and atraumatic.   Eyes: Pupils are equal, round, and reactive to light. EOM are normal.   Cardiovascular: Normal rate, regular rhythm and normal heart sounds. Exam reveals no gallop and no friction rub.   No murmur heard.  Pulmonary/Chest: Effort normal and breath sounds normal.   Abdominal: Soft. Bowel sounds are normal.   Neurological: She is alert and oriented to person, place, and time.   Skin: She is not diaphoretic.   Psychiatric: She has a normal mood and affect. Her behavior is normal. Judgment and thought content normal.   Nursing note and vitals reviewed.      Assessment:       1. Essential hypertension    2. Diarrhea, unspecified type    3. Hypothyroidism, unspecified type        Plan:       1. Essential hypertension  Discontinue valsartan 320 mg and resume Vasotec 20 mg b.i.d..  Call me in two weeks with a blood pressure log, her log brought in today is consistent with I readings and it appears to be accurate  - enalapril (VASOTEC) 20 MG tablet; Take 1 tablet (20 mg total) by mouth 2 (two) times daily.  Dispense: 60 tablet; Refill: 2    2. Diarrhea, unspecified type  Hold the Protonix and see if that resolves the problem.  If not she may have to hold the Celebrex    3. Hypothyroidism, unspecified type  TSH and a BMP were drawn today the results of which are still pending following a reduction in her Armor Thyroid from 90 mg to 60 mg daily

## 2019-11-27 LAB
ANION GAP SERPL CALC-SCNC: 11 MMOL/L (ref 8–16)
BUN SERPL-MCNC: 13 MG/DL (ref 8–23)
CALCIUM SERPL-MCNC: 9.5 MG/DL (ref 8.7–10.5)
CHLORIDE SERPL-SCNC: 96 MMOL/L (ref 95–110)
CO2 SERPL-SCNC: 28 MMOL/L (ref 23–29)
CREAT SERPL-MCNC: 1 MG/DL (ref 0.5–1.4)
EST. GFR  (AFRICAN AMERICAN): >60 ML/MIN/1.73 M^2
EST. GFR  (NON AFRICAN AMERICAN): 55.6 ML/MIN/1.73 M^2
GLUCOSE SERPL-MCNC: 72 MG/DL (ref 70–110)
POTASSIUM SERPL-SCNC: 4.2 MMOL/L (ref 3.5–5.1)
SODIUM SERPL-SCNC: 135 MMOL/L (ref 136–145)
TSH SERPL DL<=0.005 MIU/L-ACNC: 1.29 UIU/ML (ref 0.4–4)

## 2020-01-23 DIAGNOSIS — I10 ESSENTIAL HYPERTENSION: ICD-10-CM

## 2020-01-24 RX ORDER — ENALAPRIL MALEATE 20 MG/1
TABLET ORAL
Qty: 180 TABLET | Refills: 0 | Status: SHIPPED | OUTPATIENT
Start: 2020-01-24 | End: 2020-05-29 | Stop reason: SDUPTHER

## 2020-01-24 NOTE — TELEPHONE ENCOUNTER
Her blood pressure was mildly elevated November 26, 2019 when we switched back from valsartan to in out pro.  She was supposed to call with blood pressure readings in two weeks, I am still waiting

## 2020-02-27 ENCOUNTER — TELEPHONE (OUTPATIENT)
Dept: FAMILY MEDICINE | Facility: CLINIC | Age: 75
End: 2020-02-27

## 2020-02-27 NOTE — TELEPHONE ENCOUNTER
----- Message from Candido Hernandez sent at 2/27/2020 11:10 AM CST -----  Contact: Gregoria  Type:  RX Refill Request    Who Called:  Patient's  Gregoria  Refill or New Rx:  refill  RX Name and Strength:  enalapril (VASOTEC) 20 MG tablet  How is the patient currently taking it? (ex. 1XDay):    Is this a 30 day or 90 day RX:    Preferred Pharmacy with phone number:  [facility] on [address]   Local or Mail Order:  local  Ordering Provider:  Dr.Taylor Jc Call Back Number:  [phone]  Additional Information:

## 2020-03-11 RX ORDER — FLUTICASONE PROPIONATE AND SALMETEROL XINAFOATE 115; 21 UG/1; UG/1
AEROSOL, METERED RESPIRATORY (INHALATION)
Qty: 36 G | Refills: 1 | Status: SHIPPED | OUTPATIENT
Start: 2020-03-11 | End: 2020-03-12 | Stop reason: SDUPTHER

## 2020-03-11 NOTE — TELEPHONE ENCOUNTER
----- Message from Endy Esqueda sent at 3/11/2020  3:22 PM CDT -----  Contact: pt  Gregoria  Type:  RX Refill Request    Who Called:  Gregoria  Refill or New Rx:  refill  RX Name and Strength:  ADVAIR -21 mcg/actuation HFAA  How is the patient currently taking it? (ex. 1XDay):    Is this a 30 day or 90 day RX:  90  Preferred Pharmacy with phone number:    [facility] - [place], [place] - [address] [facility]  [address] [facility] [address]  Phone: [phone] Fax: [phone]    [facility] [address] [place] - [address]  [address] [place] [address]  Phone: [phone] Fax: [phone]    [facility]/pharmacy #[id number] - [place] - [address]  [address]  [place] [address]  Phone: [phone] Fax: [phone]    Local or Mail Order:    Ordering Provider:    Best Call Back Number:  [phone]  Additional Information:

## 2020-03-12 RX ORDER — FLUTICASONE PROPIONATE AND SALMETEROL XINAFOATE 115; 21 UG/1; UG/1
AEROSOL, METERED RESPIRATORY (INHALATION)
Qty: 36 G | Refills: 1 | Status: SHIPPED | OUTPATIENT
Start: 2020-03-12 | End: 2021-07-20

## 2020-03-12 NOTE — TELEPHONE ENCOUNTER
----- Message from Amanda Moody sent at 3/12/2020  3:12 PM CDT -----  Type: Needs Medical Advice    Who Called:  patient  Best Call Back Number: [phone]  Additional Information: the patient's fluticasone propion-salmeterol 115-21 mcg/dose (ADVAIR HFA) 115-21 mcg/actuation HFAA inhaler was sent to [facility]. She wants the prescription to be sent to [facility]/pharmacy #[id number] - [place], [place] - [address]. Please give call back

## 2020-03-20 DIAGNOSIS — I10 HYPERTENSION, ESSENTIAL: ICD-10-CM

## 2020-03-20 RX ORDER — TRIAMTERENE/HYDROCHLOROTHIAZID 37.5-25 MG
TABLET ORAL
Qty: 25 TABLET | Refills: 0 | Status: SHIPPED | OUTPATIENT
Start: 2020-03-20 | End: 2020-06-23 | Stop reason: SDUPTHER

## 2020-03-25 DIAGNOSIS — K21.9 GASTROESOPHAGEAL REFLUX DISEASE, ESOPHAGITIS PRESENCE NOT SPECIFIED: ICD-10-CM

## 2020-03-25 RX ORDER — PANTOPRAZOLE SODIUM 40 MG/1
TABLET, DELAYED RELEASE ORAL
Qty: 90 TABLET | Refills: 3 | Status: SHIPPED | OUTPATIENT
Start: 2020-03-25 | End: 2021-04-30

## 2020-05-05 ENCOUNTER — PATIENT MESSAGE (OUTPATIENT)
Dept: ADMINISTRATIVE | Facility: HOSPITAL | Age: 75
End: 2020-05-05

## 2020-05-25 DIAGNOSIS — I10 ESSENTIAL HYPERTENSION: ICD-10-CM

## 2020-05-25 NOTE — TELEPHONE ENCOUNTER
Recent blood pressures are elevated, needs office visit and blood pressure check.  Did not follow-up with blood pressure readings after medication was changed in November.

## 2020-05-26 ENCOUNTER — TELEPHONE (OUTPATIENT)
Dept: FAMILY MEDICINE | Facility: CLINIC | Age: 75
End: 2020-05-26

## 2020-05-26 RX ORDER — ENALAPRIL MALEATE 20 MG/1
TABLET ORAL
Qty: 180 TABLET | Refills: 0 | OUTPATIENT
Start: 2020-05-26

## 2020-05-26 NOTE — TELEPHONE ENCOUNTER
----- Message from Anjelica Otto sent at 5/26/2020  3:34 PM CDT -----  Contact: Patient  Type:  Patient Returning Call    Who Called:  Patient  Who Left Message for Patient:  Camilla  Does the patient know what this is regarding?:  yes  Best Call Back Number:    Additional Information:  Call to pod, no answer.

## 2020-05-28 ENCOUNTER — PATIENT MESSAGE (OUTPATIENT)
Dept: ADMINISTRATIVE | Facility: OTHER | Age: 75
End: 2020-05-28

## 2020-05-29 ENCOUNTER — OFFICE VISIT (OUTPATIENT)
Dept: FAMILY MEDICINE | Facility: CLINIC | Age: 75
End: 2020-05-29
Payer: MEDICARE

## 2020-05-29 VITALS
WEIGHT: 161.63 LBS | OXYGEN SATURATION: 96 % | SYSTOLIC BLOOD PRESSURE: 162 MMHG | TEMPERATURE: 99 F | BODY MASS INDEX: 25.97 KG/M2 | HEART RATE: 76 BPM | DIASTOLIC BLOOD PRESSURE: 78 MMHG | HEIGHT: 66 IN

## 2020-05-29 DIAGNOSIS — I10 UNCONTROLLED HYPERTENSION: ICD-10-CM

## 2020-05-29 DIAGNOSIS — R53.83 FATIGUE, UNSPECIFIED TYPE: Primary | ICD-10-CM

## 2020-05-29 DIAGNOSIS — I10 ESSENTIAL HYPERTENSION: ICD-10-CM

## 2020-05-29 DIAGNOSIS — G47.9 SLEEP DIFFICULTIES: ICD-10-CM

## 2020-05-29 PROCEDURE — 1125F PR PAIN SEVERITY QUANTIFIED, PAIN PRESENT: ICD-10-PCS | Mod: HCNC,S$GLB,, | Performed by: PHYSICIAN ASSISTANT

## 2020-05-29 PROCEDURE — 3077F PR MOST RECENT SYSTOLIC BLOOD PRESSURE >= 140 MM HG: ICD-10-PCS | Mod: HCNC,CPTII,S$GLB, | Performed by: PHYSICIAN ASSISTANT

## 2020-05-29 PROCEDURE — 99214 OFFICE O/P EST MOD 30 MIN: CPT | Mod: HCNC,S$GLB,, | Performed by: PHYSICIAN ASSISTANT

## 2020-05-29 PROCEDURE — 99499 RISK ADDL DX/OHS AUDIT: ICD-10-PCS | Mod: S$GLB,,, | Performed by: PHYSICIAN ASSISTANT

## 2020-05-29 PROCEDURE — 1101F PT FALLS ASSESS-DOCD LE1/YR: CPT | Mod: HCNC,CPTII,S$GLB, | Performed by: PHYSICIAN ASSISTANT

## 2020-05-29 PROCEDURE — 3077F SYST BP >= 140 MM HG: CPT | Mod: HCNC,CPTII,S$GLB, | Performed by: PHYSICIAN ASSISTANT

## 2020-05-29 PROCEDURE — 1101F PR PT FALLS ASSESS DOC 0-1 FALLS W/OUT INJ PAST YR: ICD-10-PCS | Mod: HCNC,CPTII,S$GLB, | Performed by: PHYSICIAN ASSISTANT

## 2020-05-29 PROCEDURE — 99499 UNLISTED E&M SERVICE: CPT | Mod: S$GLB,,, | Performed by: PHYSICIAN ASSISTANT

## 2020-05-29 PROCEDURE — 1159F MED LIST DOCD IN RCRD: CPT | Mod: HCNC,S$GLB,, | Performed by: PHYSICIAN ASSISTANT

## 2020-05-29 PROCEDURE — 1125F AMNT PAIN NOTED PAIN PRSNT: CPT | Mod: HCNC,S$GLB,, | Performed by: PHYSICIAN ASSISTANT

## 2020-05-29 PROCEDURE — 99999 PR PBB SHADOW E&M-EST. PATIENT-LVL V: CPT | Mod: PBBFAC,HCNC,, | Performed by: PHYSICIAN ASSISTANT

## 2020-05-29 PROCEDURE — 99999 PR PBB SHADOW E&M-EST. PATIENT-LVL V: ICD-10-PCS | Mod: PBBFAC,HCNC,, | Performed by: PHYSICIAN ASSISTANT

## 2020-05-29 PROCEDURE — 1159F PR MEDICATION LIST DOCUMENTED IN MEDICAL RECORD: ICD-10-PCS | Mod: HCNC,S$GLB,, | Performed by: PHYSICIAN ASSISTANT

## 2020-05-29 PROCEDURE — 3078F PR MOST RECENT DIASTOLIC BLOOD PRESSURE < 80 MM HG: ICD-10-PCS | Mod: HCNC,CPTII,S$GLB, | Performed by: PHYSICIAN ASSISTANT

## 2020-05-29 PROCEDURE — 99214 PR OFFICE/OUTPT VISIT, EST, LEVL IV, 30-39 MIN: ICD-10-PCS | Mod: HCNC,S$GLB,, | Performed by: PHYSICIAN ASSISTANT

## 2020-05-29 PROCEDURE — 3078F DIAST BP <80 MM HG: CPT | Mod: HCNC,CPTII,S$GLB, | Performed by: PHYSICIAN ASSISTANT

## 2020-05-29 RX ORDER — METOPROLOL TARTRATE 50 MG/1
TABLET ORAL
Qty: 270 TABLET | Refills: 2 | Status: SHIPPED | OUTPATIENT
Start: 2020-05-29 | End: 2020-06-23 | Stop reason: SDUPTHER

## 2020-05-29 RX ORDER — ENALAPRIL MALEATE 20 MG/1
20 TABLET ORAL 2 TIMES DAILY
Qty: 180 TABLET | Refills: 0 | Status: CANCELLED | OUTPATIENT
Start: 2020-05-29

## 2020-05-29 RX ORDER — CLONIDINE HYDROCHLORIDE 0.1 MG/1
TABLET ORAL
Qty: 60 TABLET | Refills: 11 | Status: SHIPPED | OUTPATIENT
Start: 2020-05-29 | End: 2020-06-12

## 2020-05-29 RX ORDER — ENALAPRIL MALEATE 20 MG/1
20 TABLET ORAL 2 TIMES DAILY
Qty: 180 TABLET | Refills: 1 | Status: SHIPPED | OUTPATIENT
Start: 2020-05-29 | End: 2020-11-19

## 2020-05-29 NOTE — PROGRESS NOTES
Subjective:       Patient ID: Megha Ladd is a 74 y.o. female.    Chief Complaint: Hypertension    HPI   Pt. Doing well  Needs refills on meds  Review of Systems   Constitutional: Negative.  Negative for activity change, appetite change, chills, diaphoresis, fatigue, fever and unexpected weight change.   HENT: Negative.    Eyes: Negative.    Respiratory: Negative.  Negative for cough, shortness of breath and wheezing.    Cardiovascular: Negative.  Negative for chest pain and leg swelling.   Gastrointestinal: Negative.    Endocrine: Negative.    Genitourinary: Negative.    Musculoskeletal: Positive for arthralgias and gait problem.   Skin: Negative.  Negative for rash.       Objective:      Physical Exam   Constitutional: She is oriented to person, place, and time. She appears well-developed and well-nourished. No distress.   HENT:   Head: Normocephalic and atraumatic.   Right Ear: External ear normal.   Left Ear: External ear normal.   Nose: Nose normal.   Mouth/Throat: Oropharynx is clear and moist. No oropharyngeal exudate.   Eyes: Conjunctivae are normal. No scleral icterus.   Neck: Normal range of motion. Neck supple. No tracheal deviation present. No thyromegaly present.   Carotids clear   Cardiovascular: Normal rate, regular rhythm, normal heart sounds and intact distal pulses. Exam reveals no gallop and no friction rub.   No murmur heard.  Pulmonary/Chest: Effort normal and breath sounds normal. No stridor. No respiratory distress. She has no wheezes. She has no rales.   Abdominal: Soft. Bowel sounds are normal. She exhibits no distension and no mass. There is no tenderness. There is no rebound and no guarding. No hernia.   No organomegaly   Musculoskeletal: She exhibits no edema.   Lymphadenopathy:     She has no cervical adenopathy.   Neurological: She is alert and oriented to person, place, and time.   Skin: Skin is warm and dry. No rash noted.   Vitals reviewed.      Assessment:       1. Fatigue,  unspecified type    2. Essential hypertension    3. Uncontrolled hypertension    4. Sleep difficulties        Plan:       Megha was seen today for hypertension.    Diagnoses and all orders for this visit:    Fatigue, unspecified type  -     Comprehensive metabolic panel; Future  -     TSH; Future  -     CBC auto differential; Future  -     Urinalysis; Future    Essential hypertension  -     enalapril (VASOTEC) 20 MG tablet; Take 1 tablet (20 mg total) by mouth 2 (two) times daily.  -     cloNIDine (CATAPRES) 0.1 MG tablet; One or 2 tabs as directed for BP elevations  -     Comprehensive metabolic panel; Future  -     TSH; Future  -     CBC auto differential; Future  -     Urinalysis; Future    Uncontrolled hypertension  -     metoprolol tartrate (LOPRESSOR) 50 MG tablet; TAKE 1 & 1/2 (ONE & ONE-HALF) TABLETS BY MOUTH TWICE DAILY  -     cloNIDine (CATAPRES) 0.1 MG tablet; One or 2 tabs as directed for BP elevations  -     Comprehensive metabolic panel; Future  -     TSH; Future  -     CBC auto differential; Future  -     Urinalysis; Future    Sleep difficulties  -     Comprehensive metabolic panel; Future  -     TSH; Future  -     CBC auto differential; Future  -     Urinalysis; Future    Other orders  -     Cancel: enalapril (VASOTEC) 20 MG tablet; Take 1 tablet (20 mg total) by mouth 2 (two) times daily.    discussed otc's  Salt restriction

## 2020-06-01 ENCOUNTER — LAB VISIT (OUTPATIENT)
Dept: LAB | Facility: HOSPITAL | Age: 75
End: 2020-06-01
Attending: PHYSICIAN ASSISTANT
Payer: MEDICARE

## 2020-06-01 DIAGNOSIS — I10 UNCONTROLLED HYPERTENSION: ICD-10-CM

## 2020-06-01 DIAGNOSIS — G47.9 SLEEP DIFFICULTIES: ICD-10-CM

## 2020-06-01 DIAGNOSIS — R53.83 FATIGUE, UNSPECIFIED TYPE: ICD-10-CM

## 2020-06-01 DIAGNOSIS — I10 ESSENTIAL HYPERTENSION: ICD-10-CM

## 2020-06-01 LAB
BASOPHILS # BLD AUTO: 0.07 K/UL (ref 0–0.2)
BASOPHILS NFR BLD: 1 % (ref 0–1.9)
DIFFERENTIAL METHOD: ABNORMAL
EOSINOPHIL # BLD AUTO: 0.4 K/UL (ref 0–0.5)
EOSINOPHIL NFR BLD: 6 % (ref 0–8)
ERYTHROCYTE [DISTWIDTH] IN BLOOD BY AUTOMATED COUNT: 13.7 % (ref 11.5–14.5)
HCT VFR BLD AUTO: 39.8 % (ref 37–48.5)
HGB BLD-MCNC: 12.4 G/DL (ref 12–16)
IMM GRANULOCYTES # BLD AUTO: 0.02 K/UL (ref 0–0.04)
IMM GRANULOCYTES NFR BLD AUTO: 0.3 % (ref 0–0.5)
LYMPHOCYTES # BLD AUTO: 1.6 K/UL (ref 1–4.8)
LYMPHOCYTES NFR BLD: 23.3 % (ref 18–48)
MCH RBC QN AUTO: 30.5 PG (ref 27–31)
MCHC RBC AUTO-ENTMCNC: 31.2 G/DL (ref 32–36)
MCV RBC AUTO: 98 FL (ref 82–98)
MONOCYTES # BLD AUTO: 0.6 K/UL (ref 0.3–1)
MONOCYTES NFR BLD: 9.1 % (ref 4–15)
NEUTROPHILS # BLD AUTO: 4.3 K/UL (ref 1.8–7.7)
NEUTROPHILS NFR BLD: 60.3 % (ref 38–73)
NRBC BLD-RTO: 0 /100 WBC
PLATELET # BLD AUTO: 312 K/UL (ref 150–350)
PMV BLD AUTO: 10.8 FL (ref 9.2–12.9)
RBC # BLD AUTO: 4.07 M/UL (ref 4–5.4)
WBC # BLD AUTO: 7.04 K/UL (ref 3.9–12.7)

## 2020-06-01 PROCEDURE — 84443 ASSAY THYROID STIM HORMONE: CPT | Mod: HCNC

## 2020-06-01 PROCEDURE — 36415 COLL VENOUS BLD VENIPUNCTURE: CPT | Mod: HCNC,PO

## 2020-06-01 PROCEDURE — 84439 ASSAY OF FREE THYROXINE: CPT | Mod: HCNC

## 2020-06-01 PROCEDURE — 85025 COMPLETE CBC W/AUTO DIFF WBC: CPT | Mod: HCNC

## 2020-06-01 PROCEDURE — 80053 COMPREHEN METABOLIC PANEL: CPT | Mod: HCNC

## 2020-06-02 LAB
ALBUMIN SERPL BCP-MCNC: 3.8 G/DL (ref 3.5–5.2)
ALP SERPL-CCNC: 87 U/L (ref 55–135)
ALT SERPL W/O P-5'-P-CCNC: 10 U/L (ref 10–44)
ANION GAP SERPL CALC-SCNC: 8 MMOL/L (ref 8–16)
AST SERPL-CCNC: 14 U/L (ref 10–40)
BILIRUB SERPL-MCNC: 0.5 MG/DL (ref 0.1–1)
BUN SERPL-MCNC: 15 MG/DL (ref 8–23)
CALCIUM SERPL-MCNC: 9.4 MG/DL (ref 8.7–10.5)
CHLORIDE SERPL-SCNC: 97 MMOL/L (ref 95–110)
CO2 SERPL-SCNC: 29 MMOL/L (ref 23–29)
CREAT SERPL-MCNC: 0.9 MG/DL (ref 0.5–1.4)
EST. GFR  (AFRICAN AMERICAN): >60 ML/MIN/1.73 M^2
EST. GFR  (NON AFRICAN AMERICAN): >60 ML/MIN/1.73 M^2
GLUCOSE SERPL-MCNC: 93 MG/DL (ref 70–110)
POTASSIUM SERPL-SCNC: 4 MMOL/L (ref 3.5–5.1)
PROT SERPL-MCNC: 7.2 G/DL (ref 6–8.4)
SODIUM SERPL-SCNC: 134 MMOL/L (ref 136–145)
T4 FREE SERPL-MCNC: 0.84 NG/DL (ref 0.71–1.51)
TSH SERPL DL<=0.005 MIU/L-ACNC: 0.16 UIU/ML (ref 0.4–4)

## 2020-06-09 DIAGNOSIS — I10 UNCONTROLLED HYPERTENSION: ICD-10-CM

## 2020-06-09 DIAGNOSIS — I10 ESSENTIAL HYPERTENSION: ICD-10-CM

## 2020-06-09 NOTE — TELEPHONE ENCOUNTER
----- Message from Myrna Caceres sent at 6/9/2020  8:38 AM CDT -----  Contact: [facility]  Type:  Pharmacy Calling to Clarify an RX    Name of Caller:   Kavya  Pharmacy Name:  [facility]  Prescription Name:  cloNIDine (CATAPRES) 0.1 MG tablet  What do they need to clarify?:  ...  Best Call Back Number:  [phone]  Additional Information:  Need clarification

## 2020-06-10 ENCOUNTER — PATIENT OUTREACH (OUTPATIENT)
Dept: ADMINISTRATIVE | Facility: HOSPITAL | Age: 75
End: 2020-06-10

## 2020-06-10 NOTE — PROGRESS NOTES
Chart review completed 06/10/2020.  Care Everywhere updates requested and reviewed.  Immunizations reconciled. Media reviewed.     DIS, [facility], and [place] reviewed    PORTAL MESSAGE SENT WITH SCHEDULING LINK

## 2020-06-10 NOTE — LETTER
June 17, 2020    Megha DONAVAN Ladd  [address] [place] [address] [place] [address]             [facility]  [address]  [facility] [address]  Phone: [phone] [facility] is committed to your overall health.  To help you get the most out of each of your visits, we will review your information to make sure you are up to date on all of your recommended tests and/or procedures.       Dr. Alfred Mejia MD has found that your chart shows you may be due for a:     MAMMOGRAM (BREAST CANCER SCREENING)       If you have had any of the above done at another facility, please bring the records or information with you so that your record at [facility] will be complete.  If you would like to schedule any of these, please contact the clinic at [phone], OR USE THE SCHEDULING LINK PROVIDED IN THIS MESSAGE TO EASILY SCHEDULE YOUR MAMMOGRAM.       If you are currently taking medication, please bring it with you to your appointment for review.     Also, if you have any type of Advanced Directives, please bring them with you to your office visit so we may scan them into your chart.     Thank You,     Your [facility] Team,   MD Sue Rowley LPN Clinical Care Coordinator   [facility] [address] [place] [address]   Phone [phone]   Fax [phone]

## 2020-06-12 RX ORDER — CLONIDINE HYDROCHLORIDE 0.1 MG/1
TABLET ORAL
Qty: 60 TABLET | Refills: 11 | Status: SHIPPED | OUTPATIENT
Start: 2020-06-12 | End: 2023-09-01 | Stop reason: SDUPTHER

## 2020-06-12 NOTE — TELEPHONE ENCOUNTER
Received: Today   Message Contents   QIAN Peralta LPN   Caller: Unspecified (3 days ago,  8:44 AM)             Please call pharmacy using Dr Mejia's guide   Take one clonidine 0.1 tab if systolic above 180 or if diastolic above 105   Can repeat dose after 2 hrs. If BP remains above the 180 or 105 level    Previous Messages

## 2020-06-23 ENCOUNTER — TELEPHONE (OUTPATIENT)
Dept: FAMILY MEDICINE | Facility: CLINIC | Age: 75
End: 2020-06-23

## 2020-06-23 ENCOUNTER — OFFICE VISIT (OUTPATIENT)
Dept: FAMILY MEDICINE | Facility: CLINIC | Age: 75
End: 2020-06-23
Payer: MEDICARE

## 2020-06-23 VITALS
HEIGHT: 66 IN | TEMPERATURE: 98 F | DIASTOLIC BLOOD PRESSURE: 86 MMHG | SYSTOLIC BLOOD PRESSURE: 138 MMHG | OXYGEN SATURATION: 97 % | BODY MASS INDEX: 25.9 KG/M2 | WEIGHT: 161.19 LBS | HEART RATE: 70 BPM

## 2020-06-23 DIAGNOSIS — I10 ESSENTIAL HYPERTENSION: Primary | ICD-10-CM

## 2020-06-23 DIAGNOSIS — F51.01 PRIMARY INSOMNIA: ICD-10-CM

## 2020-06-23 DIAGNOSIS — G47.00 INSOMNIA, UNSPECIFIED TYPE: ICD-10-CM

## 2020-06-23 DIAGNOSIS — I10 HYPERTENSION, ESSENTIAL: ICD-10-CM

## 2020-06-23 DIAGNOSIS — I10 UNCONTROLLED HYPERTENSION: ICD-10-CM

## 2020-06-23 DIAGNOSIS — R53.83 FATIGUE, UNSPECIFIED TYPE: ICD-10-CM

## 2020-06-23 DIAGNOSIS — Z12.31 ENCOUNTER FOR SCREENING MAMMOGRAM FOR BREAST CANCER: ICD-10-CM

## 2020-06-23 PROCEDURE — 3075F SYST BP GE 130 - 139MM HG: CPT | Mod: HCNC,CPTII,S$GLB, | Performed by: PHYSICIAN ASSISTANT

## 2020-06-23 PROCEDURE — 99999 PR PBB SHADOW E&M-EST. PATIENT-LVL V: CPT | Mod: PBBFAC,HCNC,, | Performed by: PHYSICIAN ASSISTANT

## 2020-06-23 PROCEDURE — 1126F PR PAIN SEVERITY QUANTIFIED, NO PAIN PRESENT: ICD-10-PCS | Mod: HCNC,S$GLB,, | Performed by: PHYSICIAN ASSISTANT

## 2020-06-23 PROCEDURE — 1126F AMNT PAIN NOTED NONE PRSNT: CPT | Mod: HCNC,S$GLB,, | Performed by: PHYSICIAN ASSISTANT

## 2020-06-23 PROCEDURE — 1101F PR PT FALLS ASSESS DOC 0-1 FALLS W/OUT INJ PAST YR: ICD-10-PCS | Mod: HCNC,CPTII,S$GLB, | Performed by: PHYSICIAN ASSISTANT

## 2020-06-23 PROCEDURE — 3075F PR MOST RECENT SYSTOLIC BLOOD PRESS GE 130-139MM HG: ICD-10-PCS | Mod: HCNC,CPTII,S$GLB, | Performed by: PHYSICIAN ASSISTANT

## 2020-06-23 PROCEDURE — 99214 PR OFFICE/OUTPT VISIT, EST, LEVL IV, 30-39 MIN: ICD-10-PCS | Mod: HCNC,S$GLB,, | Performed by: PHYSICIAN ASSISTANT

## 2020-06-23 PROCEDURE — 1159F PR MEDICATION LIST DOCUMENTED IN MEDICAL RECORD: ICD-10-PCS | Mod: HCNC,S$GLB,, | Performed by: PHYSICIAN ASSISTANT

## 2020-06-23 PROCEDURE — 3079F PR MOST RECENT DIASTOLIC BLOOD PRESSURE 80-89 MM HG: ICD-10-PCS | Mod: HCNC,CPTII,S$GLB, | Performed by: PHYSICIAN ASSISTANT

## 2020-06-23 PROCEDURE — 3079F DIAST BP 80-89 MM HG: CPT | Mod: HCNC,CPTII,S$GLB, | Performed by: PHYSICIAN ASSISTANT

## 2020-06-23 PROCEDURE — 99214 OFFICE O/P EST MOD 30 MIN: CPT | Mod: HCNC,S$GLB,, | Performed by: PHYSICIAN ASSISTANT

## 2020-06-23 PROCEDURE — 1159F MED LIST DOCD IN RCRD: CPT | Mod: HCNC,S$GLB,, | Performed by: PHYSICIAN ASSISTANT

## 2020-06-23 PROCEDURE — 99999 PR PBB SHADOW E&M-EST. PATIENT-LVL V: ICD-10-PCS | Mod: PBBFAC,HCNC,, | Performed by: PHYSICIAN ASSISTANT

## 2020-06-23 PROCEDURE — 1101F PT FALLS ASSESS-DOCD LE1/YR: CPT | Mod: HCNC,CPTII,S$GLB, | Performed by: PHYSICIAN ASSISTANT

## 2020-06-23 RX ORDER — TRIAMTERENE/HYDROCHLOROTHIAZID 37.5-25 MG
TABLET ORAL
Qty: 25 TABLET | Refills: 12 | Status: SHIPPED | OUTPATIENT
Start: 2020-06-23 | End: 2021-09-24

## 2020-06-23 RX ORDER — METOPROLOL TARTRATE 50 MG/1
TABLET ORAL
Qty: 270 TABLET | Refills: 3 | Status: SHIPPED | OUTPATIENT
Start: 2020-06-23 | End: 2021-05-28 | Stop reason: SDUPTHER

## 2020-06-23 RX ORDER — TRIAMTERENE/HYDROCHLOROTHIAZID 37.5-25 MG
TABLET ORAL
Qty: 25 TABLET | Refills: 12 | Status: SHIPPED | OUTPATIENT
Start: 2020-06-23 | End: 2020-06-23 | Stop reason: SDUPTHER

## 2020-06-23 RX ORDER — TRAZODONE HYDROCHLORIDE 50 MG/1
50 TABLET ORAL NIGHTLY PRN
Qty: 30 TABLET | Refills: 5 | Status: SHIPPED | OUTPATIENT
Start: 2020-06-23 | End: 2022-08-31

## 2020-06-23 NOTE — PROGRESS NOTES
Subjective:       Patient ID: Megha Ladd is a 74 y.o. female.    Chief Complaint: Results    HPI   Pt. In for f/u on elevated BP  Pt. Feeling well  BP has returned to normal  Pt. Needs refills on meds  Pt. Has had some recent insomnia   Has Trazodone to take but is old and outdated   Pt. Is due mammogram   Labs current  Review of Systems   Constitutional: Positive for fatigue. Negative for activity change, appetite change, chills, diaphoresis, fever and unexpected weight change.   HENT: Negative.    Eyes: Negative.    Respiratory: Negative.  Negative for cough and shortness of breath.    Cardiovascular: Negative.  Negative for chest pain and leg swelling.   Gastrointestinal: Negative.    Endocrine: Negative.    Genitourinary: Negative.    Musculoskeletal: Negative.    Integumentary:  Negative.   Neurological: Negative.    Psychiatric/Behavioral: Positive for sleep disturbance.         Objective:      Physical Exam  Vitals signs reviewed.   Constitutional:       General: She is not in acute distress.     Appearance: Normal appearance. She is obese. She is not ill-appearing, toxic-appearing or diaphoretic.   HENT:      Head: Normocephalic and atraumatic.      Right Ear: Tympanic membrane, ear canal and external ear normal. There is no impacted cerumen.      Left Ear: Tympanic membrane, ear canal and external ear normal. There is no impacted cerumen.      Nose: Nose normal.      Mouth/Throat:      Mouth: Mucous membranes are moist.      Pharynx: Oropharynx is clear. No oropharyngeal exudate.   Eyes:      General: No scleral icterus.     Conjunctiva/sclera: Conjunctivae normal.   Neck:      Musculoskeletal: Normal range of motion and neck supple. No neck rigidity or muscular tenderness.      Vascular: No carotid bruit.   Cardiovascular:      Rate and Rhythm: Normal rate and regular rhythm.      Pulses: Normal pulses.      Heart sounds: Normal heart sounds. No murmur. No friction rub. No gallop.    Pulmonary:       Effort: Pulmonary effort is normal.      Breath sounds: Normal breath sounds.   Abdominal:      General: Abdomen is flat. Bowel sounds are normal. There is no distension.      Palpations: Abdomen is soft. There is no mass.      Tenderness: There is no abdominal tenderness. There is no guarding or rebound.      Hernia: No hernia is present.   Musculoskeletal: Normal range of motion.         General: No swelling or deformity.      Right lower leg: No edema.   Lymphadenopathy:      Cervical: No cervical adenopathy.   Skin:     General: Skin is warm and dry.      Findings: No rash.   Neurological:      General: No focal deficit present.      Mental Status: She is alert and oriented to person, place, and time.   Psychiatric:         Mood and Affect: Mood normal.         Behavior: Behavior normal.         Thought Content: Thought content normal.         Judgment: Judgment normal.         Assessment:       1. Essential hypertension    2. Fatigue, unspecified type    3. Primary insomnia    4. Hypertension, essential    5. Uncontrolled hypertension    6. Insomnia, unspecified type    7. Encounter for screening mammogram for breast cancer        Plan:       Megha was seen today for results.    Diagnoses and all orders for this visit:    Essential hypertension    Fatigue, unspecified type    Primary insomnia    Hypertension, essential  -     Discontinue: triamterene-hydrochlorothiazide 37.5-25 mg (MAXZIDE-25) 37.5-25 mg per tablet; TAKE 1/2 TABLET BY MOUTH EVERY OTHER DAY  -     triamterene-hydrochlorothiazide 37.5-25 mg (MAXZIDE-25) 37.5-25 mg per tablet; TAKE 1/2 TABLET BY MOUTH EVERY OTHER DAY    Uncontrolled hypertension  -     metoprolol tartrate (LOPRESSOR) 50 MG tablet; TAKE 1 & 1/2 (ONE & ONE-HALF) TABLETS BY MOUTH TWICE DAILY    Insomnia, unspecified type  -     traZODone (DESYREL) 50 MG tablet; Take 1 tablet (50 mg total) by mouth nightly as needed for Insomnia.    Encounter for screening mammogram for breast  cancer  -     Mammo Digital Screening Bilat; Future    discussed otc's  Salt restriction

## 2020-06-23 NOTE — TELEPHONE ENCOUNTER
----- Message from Chantal Salgado sent at 6/23/2020  1:36 PM CDT -----  Contact: Christelle cisneros/[facility]  Christelle with [facility] is calling to follow up on a request for tType:  Pharmacy Calling to Clarify an RX    Name of Caller:  Christelle  Pharm[facility] Name:  [facility]  Prescription Name:  Clonidine  What do they need to clarify?:  instructions   [facility] Call Back Number:  [phone]  Additional Information:  Thank you!

## 2020-08-12 NOTE — TELEPHONE ENCOUNTER
----- Message from Liz Potts sent at 11/24/2017 10:49 AM CST -----  Contact:                                                attn:  Enriqueta   - Gregoria Ldad [phone] is calling to check the status of having the [place] paperwork filled out/please advise   spouse

## 2020-08-27 ENCOUNTER — PES CALL (OUTPATIENT)
Dept: ADMINISTRATIVE | Facility: CLINIC | Age: 75
End: 2020-08-27

## 2020-09-11 DIAGNOSIS — G45.3 AMAUROSIS FUGAX: Primary | ICD-10-CM

## 2020-09-14 ENCOUNTER — TELEPHONE (OUTPATIENT)
Dept: FAMILY MEDICINE | Facility: CLINIC | Age: 75
End: 2020-09-14

## 2020-09-14 DIAGNOSIS — Z12.31 VISIT FOR SCREENING MAMMOGRAM: Primary | ICD-10-CM

## 2020-09-14 NOTE — TELEPHONE ENCOUNTER
----- Message from Vanna Abraham sent at 9/14/2020 10:43 AM CDT -----  Regarding: mammo order  Contact: Gregoria Ladd (Spouse)  Type:  Mammogram    Caller is requesting to schedule their annual mammogram appointment.  Order is not listed in EPIC.  Please enter order and contact patient to schedule.    Name of Caller:  Gregoria Ladd (Spouse)  Where would they like the mammogram performed?  [facility] Call Back Number:  [phone]

## 2020-09-17 ENCOUNTER — HOSPITAL ENCOUNTER (OUTPATIENT)
Dept: RADIOLOGY | Facility: HOSPITAL | Age: 75
Discharge: HOME | End: 2020-09-17
Attending: OPTOMETRIST
Payer: MEDICARE

## 2020-09-17 DIAGNOSIS — G45.3 AMAUROSIS FUGAX: ICD-10-CM

## 2020-09-17 PROCEDURE — 93880 EXTRACRANIAL BILAT STUDY: CPT | Mod: TC,PO

## 2020-09-23 ENCOUNTER — LAB VISIT (OUTPATIENT)
Dept: LAB | Facility: HOSPITAL | Age: 75
End: 2020-09-23
Attending: FAMILY MEDICINE
Payer: MEDICARE

## 2020-09-23 DIAGNOSIS — G45.3 AMAUROSIS FUGAX: Primary | ICD-10-CM

## 2020-09-23 LAB
ANION GAP SERPL CALC-SCNC: 9 MMOL/L (ref 8–16)
APTT BLDCRRT: 65 SEC (ref 21–32)
BASOPHILS # BLD AUTO: 0.06 K/UL (ref 0–0.2)
BASOPHILS NFR BLD: 0.7 % (ref 0–1.9)
BUN SERPL-MCNC: 14 MG/DL (ref 8–23)
CALCIUM SERPL-MCNC: 9.3 MG/DL (ref 8.7–10.5)
CHLORIDE SERPL-SCNC: 94 MMOL/L (ref 95–110)
CHOLEST SERPL-MCNC: 226 MG/DL (ref 120–199)
CHOLEST/HDLC SERPL: 4 {RATIO} (ref 2–5)
CO2 SERPL-SCNC: 30 MMOL/L (ref 23–29)
CREAT SERPL-MCNC: 0.8 MG/DL (ref 0.5–1.4)
DIFFERENTIAL METHOD: NORMAL
EOSINOPHIL # BLD AUTO: 0.3 K/UL (ref 0–0.5)
EOSINOPHIL NFR BLD: 4 % (ref 0–8)
ERYTHROCYTE [DISTWIDTH] IN BLOOD BY AUTOMATED COUNT: 13.4 % (ref 11.5–14.5)
ERYTHROCYTE [SEDIMENTATION RATE] IN BLOOD BY WESTERGREN METHOD: 6 MM/HR (ref 0–20)
EST. GFR  (AFRICAN AMERICAN): >60 ML/MIN/1.73 M^2
EST. GFR  (NON AFRICAN AMERICAN): >60 ML/MIN/1.73 M^2
GLUCOSE SERPL-MCNC: 86 MG/DL (ref 70–110)
HCT VFR BLD AUTO: 39.3 % (ref 37–48.5)
HDLC SERPL-MCNC: 56 MG/DL (ref 40–75)
HDLC SERPL: 24.8 % (ref 20–50)
HGB BLD-MCNC: 12.6 G/DL (ref 12–16)
IMM GRANULOCYTES # BLD AUTO: 0.02 K/UL (ref 0–0.04)
IMM GRANULOCYTES NFR BLD AUTO: 0.2 % (ref 0–0.5)
INR PPP: 1.9 (ref 0.8–1.2)
LDLC SERPL CALC-MCNC: 147.4 MG/DL (ref 63–159)
LYMPHOCYTES # BLD AUTO: 2.4 K/UL (ref 1–4.8)
LYMPHOCYTES NFR BLD: 28.9 % (ref 18–48)
MCH RBC QN AUTO: 31 PG (ref 27–31)
MCHC RBC AUTO-ENTMCNC: 32.1 G/DL (ref 32–36)
MCV RBC AUTO: 97 FL (ref 82–98)
MONOCYTES # BLD AUTO: 0.8 K/UL (ref 0.3–1)
MONOCYTES NFR BLD: 9.1 % (ref 4–15)
NEUTROPHILS # BLD AUTO: 4.7 K/UL (ref 1.8–7.7)
NEUTROPHILS NFR BLD: 57.1 % (ref 38–73)
NONHDLC SERPL-MCNC: 170 MG/DL
NRBC BLD-RTO: 0 /100 WBC
PLATELET # BLD AUTO: 314 K/UL (ref 150–350)
PMV BLD AUTO: 10.5 FL (ref 9.2–12.9)
POTASSIUM SERPL-SCNC: 3.7 MMOL/L (ref 3.5–5.1)
PROTHROMBIN TIME: 19.9 SEC (ref 9–12.5)
RBC # BLD AUTO: 4.07 M/UL (ref 4–5.4)
RHEUMATOID FACT SERPL-ACNC: <10 IU/ML (ref 0–15)
SODIUM SERPL-SCNC: 133 MMOL/L (ref 136–145)
TRIGL SERPL-MCNC: 113 MG/DL (ref 30–150)
WBC # BLD AUTO: 8.26 K/UL (ref 3.9–12.7)

## 2020-09-23 PROCEDURE — 80061 LIPID PANEL: CPT | Mod: HCNC

## 2020-09-23 PROCEDURE — 86038 ANTINUCLEAR ANTIBODIES: CPT | Mod: HCNC

## 2020-09-23 PROCEDURE — 85305 CLOT INHIBIT PROT S TOTAL: CPT | Mod: HCNC

## 2020-09-23 PROCEDURE — 83036 HEMOGLOBIN GLYCOSYLATED A1C: CPT | Mod: HCNC

## 2020-09-23 PROCEDURE — 36415 COLL VENOUS BLD VENIPUNCTURE: CPT | Mod: HCNC,PO

## 2020-09-23 PROCEDURE — 86592 SYPHILIS TEST NON-TREP QUAL: CPT | Mod: HCNC

## 2020-09-23 PROCEDURE — 86431 RHEUMATOID FACTOR QUANT: CPT | Mod: HCNC

## 2020-09-23 PROCEDURE — 85303 CLOT INHIBIT PROT C ACTIVITY: CPT | Mod: HCNC

## 2020-09-23 PROCEDURE — 85025 COMPLETE CBC W/AUTO DIFF WBC: CPT | Mod: HCNC

## 2020-09-23 PROCEDURE — 80048 BASIC METABOLIC PNL TOTAL CA: CPT | Mod: HCNC

## 2020-09-23 PROCEDURE — 85610 PROTHROMBIN TIME: CPT | Mod: HCNC

## 2020-09-23 PROCEDURE — 85651 RBC SED RATE NONAUTOMATED: CPT | Mod: HCNC,PO

## 2020-09-23 PROCEDURE — 85730 THROMBOPLASTIN TIME PARTIAL: CPT | Mod: HCNC

## 2020-09-23 PROCEDURE — 86780 TREPONEMA PALLIDUM: CPT | Mod: HCNC

## 2020-09-24 LAB
ANA SER QL IF: NORMAL
ESTIMATED AVG GLUCOSE: 100 MG/DL (ref 68–131)
HBA1C MFR BLD HPLC: 5.1 % (ref 4–5.6)
RPR SER QL: NORMAL
T PALLIDUM AB SER QL IF: NORMAL

## 2020-09-25 LAB
APTT PROTEIN C ACTIVATOR+FV DP/APTT PPP: 131 % (ref 70–140)
PROT S ACT/NOR PPP: 85 % (ref 70–140)

## 2020-09-29 ENCOUNTER — PATIENT OUTREACH (OUTPATIENT)
Dept: ADMINISTRATIVE | Facility: HOSPITAL | Age: 75
End: 2020-09-29

## 2020-09-29 ENCOUNTER — LAB VISIT (OUTPATIENT)
Dept: LAB | Facility: HOSPITAL | Age: 75
End: 2020-09-29
Attending: FAMILY MEDICINE
Payer: MEDICARE

## 2020-09-29 DIAGNOSIS — M54.50 LUMBAGO: ICD-10-CM

## 2020-09-29 DIAGNOSIS — G89.4 CHRONIC PAIN SYNDROME: Primary | ICD-10-CM

## 2020-09-29 DIAGNOSIS — E87.1 HYPOSMOLALITY SYNDROME: ICD-10-CM

## 2020-09-29 LAB
ALBUMIN SERPL BCP-MCNC: 4.1 G/DL (ref 3.5–5.2)
ALP SERPL-CCNC: 79 U/L (ref 55–135)
ALT SERPL W/O P-5'-P-CCNC: 7 U/L (ref 10–44)
ANION GAP SERPL CALC-SCNC: 10 MMOL/L (ref 8–16)
AST SERPL-CCNC: 16 U/L (ref 10–40)
BASOPHILS # BLD AUTO: 0.09 K/UL (ref 0–0.2)
BASOPHILS NFR BLD: 1 % (ref 0–1.9)
BILIRUB SERPL-MCNC: 0.5 MG/DL (ref 0.1–1)
BUN SERPL-MCNC: 10 MG/DL (ref 8–23)
CALCIUM SERPL-MCNC: 9.1 MG/DL (ref 8.7–10.5)
CHLORIDE SERPL-SCNC: 94 MMOL/L (ref 95–110)
CO2 SERPL-SCNC: 28 MMOL/L (ref 23–29)
CREAT SERPL-MCNC: 0.8 MG/DL (ref 0.5–1.4)
DIFFERENTIAL METHOD: ABNORMAL
EOSINOPHIL # BLD AUTO: 0.3 K/UL (ref 0–0.5)
EOSINOPHIL NFR BLD: 3.7 % (ref 0–8)
ERYTHROCYTE [DISTWIDTH] IN BLOOD BY AUTOMATED COUNT: 13 % (ref 11.5–14.5)
EST. GFR  (AFRICAN AMERICAN): >60 ML/MIN/1.73 M^2
EST. GFR  (NON AFRICAN AMERICAN): >60 ML/MIN/1.73 M^2
GLUCOSE SERPL-MCNC: 82 MG/DL (ref 70–110)
HCT VFR BLD AUTO: 37 % (ref 37–48.5)
HGB BLD-MCNC: 12.5 G/DL (ref 12–16)
IMM GRANULOCYTES # BLD AUTO: 0.03 K/UL (ref 0–0.04)
IMM GRANULOCYTES NFR BLD AUTO: 0.3 % (ref 0–0.5)
LYMPHOCYTES # BLD AUTO: 2.9 K/UL (ref 1–4.8)
LYMPHOCYTES NFR BLD: 32.1 % (ref 18–48)
MCH RBC QN AUTO: 31.3 PG (ref 27–31)
MCHC RBC AUTO-ENTMCNC: 33.8 G/DL (ref 32–36)
MCV RBC AUTO: 93 FL (ref 82–98)
MONOCYTES # BLD AUTO: 0.8 K/UL (ref 0.3–1)
MONOCYTES NFR BLD: 8.4 % (ref 4–15)
NEUTROPHILS # BLD AUTO: 4.9 K/UL (ref 1.8–7.7)
NEUTROPHILS NFR BLD: 54.5 % (ref 38–73)
NRBC BLD-RTO: 0 /100 WBC
PLATELET # BLD AUTO: 319 K/UL (ref 150–350)
PMV BLD AUTO: 9.9 FL (ref 9.2–12.9)
POTASSIUM SERPL-SCNC: 3.8 MMOL/L (ref 3.5–5.1)
PROT SERPL-MCNC: 7.1 G/DL (ref 6–8.4)
RBC # BLD AUTO: 4 M/UL (ref 4–5.4)
SODIUM SERPL-SCNC: 132 MMOL/L (ref 136–145)
TSH SERPL DL<=0.005 MIU/L-ACNC: 0.8 UIU/ML (ref 0.4–4)
WBC # BLD AUTO: 9 K/UL (ref 3.9–12.7)

## 2020-09-29 PROCEDURE — 85025 COMPLETE CBC W/AUTO DIFF WBC: CPT | Mod: HCNC

## 2020-09-29 PROCEDURE — 84443 ASSAY THYROID STIM HORMONE: CPT | Mod: HCNC

## 2020-09-29 PROCEDURE — 36415 COLL VENOUS BLD VENIPUNCTURE: CPT | Mod: HCNC

## 2020-09-29 PROCEDURE — 80053 COMPREHEN METABOLIC PANEL: CPT | Mod: HCNC

## 2020-11-10 ENCOUNTER — HOSPITAL ENCOUNTER (OUTPATIENT)
Dept: RADIOLOGY | Facility: HOSPITAL | Age: 75
Discharge: HOME | End: 2020-11-10
Attending: FAMILY MEDICINE
Payer: MEDICARE

## 2020-11-10 DIAGNOSIS — Z12.31 VISIT FOR SCREENING MAMMOGRAM: ICD-10-CM

## 2020-11-10 PROCEDURE — 77067 SCR MAMMO BI INCL CAD: CPT | Mod: TC,PO

## 2020-11-12 DIAGNOSIS — E03.9 ACQUIRED HYPOTHYROIDISM: ICD-10-CM

## 2020-11-12 RX ORDER — SULFAMETHOXAZOLE AND TRIMETHOPRIM 800; 160 MG/1; MG/1
TABLET ORAL
Qty: 90 TABLET | Refills: 0 | Status: SHIPPED | OUTPATIENT
Start: 2020-11-12 | End: 2021-03-09

## 2020-11-12 NOTE — PROGRESS NOTES
Refill Authorization Note   Megha Ladd is requesting a refill authorization.  Brief assessment and rationale for refill: Approve    -Medication-related problems identified: Requires appointment  Medication Therapy Plan: CDMR; Appt (ANNUAL)    Medication reconciliation completed: No   Comments:   Orders Placed This Encounter    ARMOUR THYROID 60 mg Tab      Requested Prescriptions   Signed Prescriptions Disp Refills    ARMOUR THYROID 60 mg Tab 90 tablet 0     Sig: TAKE 1 TABLET (60 MG TOTAL) BY MOUTH BEFORE BREAKFAST. (NEW DOSING)       Endocrinology:  Hypothyroid Agents Failed - 11/12/2020  9:09 AM        Failed - Manual Review: FT4 is not required if last TSH is WNL.        Passed - Patient is at least 18 years old        Passed - Office visit in past 12 months or future 90 days     Recent Outpatient Visits            4 months ago Essential hypertension    [facility] Tae Espinoza PA-C    5 months ago Fatigue, unspecified type    [facility] Tae Espinoza PA-C    11 months ago Essential hypertension    [facility] Alfred Mejia MD    1 year ago Uncontrolled hypertension    [facility] Alfred Mejia MD    1 year ago Good hypertension control    [facility] Alfred Mejia MD                    Passed - TSH in normal range and within 360 days     TSH   Date Value Ref Range Status   09/29/2020 0.795 0.400 - 4.000 uIU/mL Final   06/01/2020 0.156 (L) 0.400 - 4.000 uIU/mL Final   11/26/2019 1.289 0.400 - 4.000 uIU/mL Final              Passed - T4 free within 1080 days     Free T4   Date Value Ref Range Status   06/01/2020 0.84 0.71 - 1.51 ng/dL Final   08/10/2018 0.82 0.71 - 1.51 ng/dL Final   06/26/2018 0.85 0.71 - 1.51 ng/dL Final                  Appointments  past 12m or future 3m with PCP    Date Provider   Last Visit   11/26/2019 Alfred Mejia MD   Next Visit   Visit date not found Alfred Mejia MD   ED visits in  past 90 days: 0     Note composed:9:29 AM 11/12/2020

## 2020-11-12 NOTE — TELEPHONE ENCOUNTER
Care Due:                  Date            Visit Type   Department     Provider  --------------------------------------------------------------------------------                                ESTABLISHED                              PATIENT      [facility]  Last Visit: 11-      [facility]       Alfred Mejia  Next Visit: None Scheduled  None         None Found                                                            Last  Test          Frequency    Reason                     Performed    Due Date  --------------------------------------------------------------------------------    Office Visit  12 months..  montelukast, thyroid,....  11- 11-    Powered by [employer]. Reference number: [id number]. 11/12/2020 2:21:27 AM   CST

## 2021-02-04 ENCOUNTER — TELEPHONE (OUTPATIENT)
Dept: FAMILY MEDICINE | Facility: CLINIC | Age: 76
End: 2021-02-04

## 2021-03-08 DIAGNOSIS — E03.9 ACQUIRED HYPOTHYROIDISM: ICD-10-CM

## 2021-03-09 RX ORDER — SULFAMETHOXAZOLE AND TRIMETHOPRIM 800; 160 MG/1; MG/1
TABLET ORAL
Qty: 90 TABLET | Refills: 0 | Status: SHIPPED | OUTPATIENT
Start: 2021-03-09 | End: 2022-09-07

## 2021-03-11 ENCOUNTER — LAB VISIT (OUTPATIENT)
Dept: LAB | Facility: HOSPITAL | Age: 76
End: 2021-03-11
Attending: FAMILY MEDICINE
Payer: MEDICARE

## 2021-03-11 DIAGNOSIS — G89.4 CHRONIC PAIN SYNDROME: ICD-10-CM

## 2021-03-11 DIAGNOSIS — E87.1 HYPOSMOLALITY SYNDROME: Primary | ICD-10-CM

## 2021-03-11 DIAGNOSIS — M54.10 RADICULAR SYNDROME OF LOWER LIMBS: ICD-10-CM

## 2021-03-11 LAB
ALBUMIN SERPL BCP-MCNC: 4 G/DL (ref 3.5–5.2)
ALP SERPL-CCNC: 79 U/L (ref 55–135)
ALT SERPL W/O P-5'-P-CCNC: 8 U/L (ref 10–44)
ANION GAP SERPL CALC-SCNC: 5 MMOL/L (ref 8–16)
AST SERPL-CCNC: 15 U/L (ref 10–40)
BASOPHILS # BLD AUTO: 0.09 K/UL (ref 0–0.2)
BASOPHILS NFR BLD: 1 % (ref 0–1.9)
BILIRUB SERPL-MCNC: 0.6 MG/DL (ref 0.1–1)
BUN SERPL-MCNC: 15 MG/DL (ref 8–23)
CALCIUM SERPL-MCNC: 9.2 MG/DL (ref 8.7–10.5)
CHLORIDE SERPL-SCNC: 97 MMOL/L (ref 95–110)
CO2 SERPL-SCNC: 35 MMOL/L (ref 23–29)
CREAT SERPL-MCNC: 0.8 MG/DL (ref 0.5–1.4)
DIFFERENTIAL METHOD: ABNORMAL
EOSINOPHIL # BLD AUTO: 0.3 K/UL (ref 0–0.5)
EOSINOPHIL NFR BLD: 3.6 % (ref 0–8)
ERYTHROCYTE [DISTWIDTH] IN BLOOD BY AUTOMATED COUNT: 12.6 % (ref 11.5–14.5)
EST. GFR  (AFRICAN AMERICAN): >60 ML/MIN/1.73 M^2
EST. GFR  (NON AFRICAN AMERICAN): >60 ML/MIN/1.73 M^2
GLUCOSE SERPL-MCNC: 93 MG/DL (ref 70–110)
HCT VFR BLD AUTO: 38.6 % (ref 37–48.5)
HGB BLD-MCNC: 12.7 G/DL (ref 12–16)
IMM GRANULOCYTES # BLD AUTO: 0.02 K/UL (ref 0–0.04)
IMM GRANULOCYTES NFR BLD AUTO: 0.2 % (ref 0–0.5)
LYMPHOCYTES # BLD AUTO: 3.3 K/UL (ref 1–4.8)
LYMPHOCYTES NFR BLD: 36.2 % (ref 18–48)
MCH RBC QN AUTO: 31.2 PG (ref 27–31)
MCHC RBC AUTO-ENTMCNC: 32.9 G/DL (ref 32–36)
MCV RBC AUTO: 95 FL (ref 82–98)
MONOCYTES # BLD AUTO: 0.9 K/UL (ref 0.3–1)
MONOCYTES NFR BLD: 9.7 % (ref 4–15)
NEUTROPHILS # BLD AUTO: 4.5 K/UL (ref 1.8–7.7)
NEUTROPHILS NFR BLD: 49.3 % (ref 38–73)
NRBC BLD-RTO: 0 /100 WBC
PLATELET # BLD AUTO: 302 K/UL (ref 150–350)
PMV BLD AUTO: 10.2 FL (ref 9.2–12.9)
POTASSIUM SERPL-SCNC: 4 MMOL/L (ref 3.5–5.1)
PROT SERPL-MCNC: 7 G/DL (ref 6–8.4)
RBC # BLD AUTO: 4.07 M/UL (ref 4–5.4)
SODIUM SERPL-SCNC: 137 MMOL/L (ref 136–145)
TSH SERPL DL<=0.005 MIU/L-ACNC: 0.42 UIU/ML (ref 0.4–4)
WBC # BLD AUTO: 9.15 K/UL (ref 3.9–12.7)

## 2021-03-11 PROCEDURE — 82533 TOTAL CORTISOL: CPT | Performed by: FAMILY MEDICINE

## 2021-03-11 PROCEDURE — 36415 COLL VENOUS BLD VENIPUNCTURE: CPT | Performed by: FAMILY MEDICINE

## 2021-03-11 PROCEDURE — 84588 ASSAY OF VASOPRESSIN: CPT | Performed by: FAMILY MEDICINE

## 2021-03-11 PROCEDURE — 84443 ASSAY THYROID STIM HORMONE: CPT | Performed by: FAMILY MEDICINE

## 2021-03-11 PROCEDURE — 83930 ASSAY OF BLOOD OSMOLALITY: CPT | Performed by: FAMILY MEDICINE

## 2021-03-11 PROCEDURE — 85025 COMPLETE CBC W/AUTO DIFF WBC: CPT | Performed by: FAMILY MEDICINE

## 2021-03-11 PROCEDURE — 80053 COMPREHEN METABOLIC PANEL: CPT | Performed by: FAMILY MEDICINE

## 2021-03-12 LAB
CORTIS SERPL-MCNC: 13.8 UG/DL (ref 4.3–22.4)
OSMOLALITY SERPL: 288 MOSM/KG (ref 275–295)

## 2021-03-24 LAB — VASOPRESSIN SERPL-MCNC: <0.5 PG/ML (ref 0–6.9)

## 2021-04-27 DIAGNOSIS — K21.9 GASTROESOPHAGEAL REFLUX DISEASE: ICD-10-CM

## 2021-04-30 ENCOUNTER — TELEPHONE (OUTPATIENT)
Dept: FAMILY MEDICINE | Facility: CLINIC | Age: 76
End: 2021-04-30

## 2021-04-30 RX ORDER — PANTOPRAZOLE SODIUM 40 MG/1
TABLET, DELAYED RELEASE ORAL
Qty: 90 TABLET | Refills: 1 | Status: SHIPPED | OUTPATIENT
Start: 2021-04-30 | End: 2021-05-28

## 2021-05-28 ENCOUNTER — OFFICE VISIT (OUTPATIENT)
Dept: FAMILY MEDICINE | Facility: CLINIC | Age: 76
End: 2021-05-28
Attending: FAMILY MEDICINE
Payer: MEDICARE

## 2021-05-28 VITALS
WEIGHT: 154.75 LBS | TEMPERATURE: 99 F | OXYGEN SATURATION: 97 % | DIASTOLIC BLOOD PRESSURE: 74 MMHG | HEIGHT: 66 IN | BODY MASS INDEX: 24.87 KG/M2 | HEART RATE: 64 BPM | SYSTOLIC BLOOD PRESSURE: 130 MMHG

## 2021-05-28 DIAGNOSIS — E03.9 HYPOTHYROIDISM, UNSPECIFIED TYPE: ICD-10-CM

## 2021-05-28 DIAGNOSIS — I10 ESSENTIAL HYPERTENSION: ICD-10-CM

## 2021-05-28 DIAGNOSIS — Z00.00 ENCOUNTER FOR PREVENTIVE HEALTH EXAMINATION: Primary | ICD-10-CM

## 2021-05-28 DIAGNOSIS — K52.9 CHRONIC DIARRHEA: ICD-10-CM

## 2021-05-28 DIAGNOSIS — E78.5 HYPERLIPIDEMIA, UNSPECIFIED HYPERLIPIDEMIA TYPE: ICD-10-CM

## 2021-05-28 DIAGNOSIS — J45.30 MILD PERSISTENT ASTHMA, UNSPECIFIED WHETHER COMPLICATED: ICD-10-CM

## 2021-05-28 DIAGNOSIS — K21.9 GASTROESOPHAGEAL REFLUX DISEASE, UNSPECIFIED WHETHER ESOPHAGITIS PRESENT: ICD-10-CM

## 2021-05-28 DIAGNOSIS — E87.1 HYPONATREMIA: ICD-10-CM

## 2021-05-28 PROCEDURE — 1101F PR PT FALLS ASSESS DOC 0-1 FALLS W/OUT INJ PAST YR: ICD-10-PCS | Mod: HCNC,CPTII,S$GLB, | Performed by: FAMILY MEDICINE

## 2021-05-28 PROCEDURE — 99499 RISK ADDL DX/OHS AUDIT: ICD-10-PCS | Mod: S$GLB,,, | Performed by: FAMILY MEDICINE

## 2021-05-28 PROCEDURE — 1125F PR PAIN SEVERITY QUANTIFIED, PAIN PRESENT: ICD-10-PCS | Mod: HCNC,S$GLB,, | Performed by: FAMILY MEDICINE

## 2021-05-28 PROCEDURE — 99397 PR PREVENTIVE VISIT,EST,65 & OVER: ICD-10-PCS | Mod: HCNC,S$GLB,, | Performed by: FAMILY MEDICINE

## 2021-05-28 PROCEDURE — 3288F FALL RISK ASSESSMENT DOCD: CPT | Mod: HCNC,CPTII,S$GLB, | Performed by: FAMILY MEDICINE

## 2021-05-28 PROCEDURE — 1101F PT FALLS ASSESS-DOCD LE1/YR: CPT | Mod: HCNC,CPTII,S$GLB, | Performed by: FAMILY MEDICINE

## 2021-05-28 PROCEDURE — 99999 PR PBB SHADOW E&M-EST. PATIENT-LVL V: CPT | Mod: PBBFAC,HCNC,, | Performed by: FAMILY MEDICINE

## 2021-05-28 PROCEDURE — 1125F AMNT PAIN NOTED PAIN PRSNT: CPT | Mod: HCNC,S$GLB,, | Performed by: FAMILY MEDICINE

## 2021-05-28 PROCEDURE — 99499 UNLISTED E&M SERVICE: CPT | Mod: S$GLB,,, | Performed by: FAMILY MEDICINE

## 2021-05-28 PROCEDURE — 99999 PR PBB SHADOW E&M-EST. PATIENT-LVL V: ICD-10-PCS | Mod: PBBFAC,HCNC,, | Performed by: FAMILY MEDICINE

## 2021-05-28 PROCEDURE — 99397 PER PM REEVAL EST PAT 65+ YR: CPT | Mod: HCNC,S$GLB,, | Performed by: FAMILY MEDICINE

## 2021-05-28 PROCEDURE — 3288F PR FALLS RISK ASSESSMENT DOCUMENTED: ICD-10-PCS | Mod: HCNC,CPTII,S$GLB, | Performed by: FAMILY MEDICINE

## 2021-05-28 RX ORDER — METOPROLOL TARTRATE 50 MG/1
TABLET ORAL
Qty: 270 TABLET | Refills: 3 | Status: SHIPPED | OUTPATIENT
Start: 2021-05-28 | End: 2022-07-07

## 2021-05-28 RX ORDER — PANTOPRAZOLE SODIUM 20 MG/1
20 TABLET, DELAYED RELEASE ORAL DAILY
Qty: 90 TABLET | Refills: 3 | Status: SHIPPED | OUTPATIENT
Start: 2021-05-28 | End: 2022-03-21

## 2021-06-08 ENCOUNTER — LAB VISIT (OUTPATIENT)
Dept: LAB | Facility: HOSPITAL | Age: 76
End: 2021-06-08
Attending: FAMILY MEDICINE
Payer: MEDICARE

## 2021-06-08 DIAGNOSIS — K52.9 CHRONIC DIARRHEA: ICD-10-CM

## 2021-06-08 PROCEDURE — 87425 ROTAVIRUS AG IA: CPT | Mod: HCNC | Performed by: FAMILY MEDICINE

## 2021-06-08 PROCEDURE — 87427 SHIGA-LIKE TOXIN AG IA: CPT | Mod: 59,HCNC | Performed by: FAMILY MEDICINE

## 2021-06-08 PROCEDURE — 89055 LEUKOCYTE ASSESSMENT FECAL: CPT | Mod: HCNC | Performed by: FAMILY MEDICINE

## 2021-06-08 PROCEDURE — 87209 SMEAR COMPLEX STAIN: CPT | Mod: HCNC | Performed by: FAMILY MEDICINE

## 2021-06-08 PROCEDURE — 87338 HPYLORI STOOL AG IA: CPT | Mod: HCNC | Performed by: FAMILY MEDICINE

## 2021-06-08 PROCEDURE — 87329 GIARDIA AG IA: CPT | Mod: HCNC | Performed by: FAMILY MEDICINE

## 2021-06-08 PROCEDURE — 87045 FECES CULTURE AEROBIC BACT: CPT | Mod: HCNC | Performed by: FAMILY MEDICINE

## 2021-06-08 PROCEDURE — 87324 CLOSTRIDIUM AG IA: CPT | Mod: HCNC | Performed by: FAMILY MEDICINE

## 2021-06-08 PROCEDURE — 87449 NOS EACH ORGANISM AG IA: CPT | Mod: HCNC | Performed by: FAMILY MEDICINE

## 2021-06-08 PROCEDURE — 87046 STOOL CULTR AEROBIC BACT EA: CPT | Mod: 59,HCNC | Performed by: FAMILY MEDICINE

## 2021-06-09 LAB
C DIFF GDH STL QL: NEGATIVE
C DIFF TOX A+B STL QL IA: NEGATIVE
CRYPTOSP AG STL QL IA: NEGATIVE
G LAMBLIA AG STL QL IA: NEGATIVE
RV AG STL QL IA.RAPID: NEGATIVE
WBC #/AREA STL HPF: NORMAL /[HPF]

## 2021-06-10 LAB
E COLI SXT1 STL QL IA: NEGATIVE
E COLI SXT2 STL QL IA: NEGATIVE
O+P STL MICRO: NORMAL

## 2021-06-11 ENCOUNTER — LAB VISIT (OUTPATIENT)
Dept: LAB | Facility: HOSPITAL | Age: 76
End: 2021-06-11
Attending: FAMILY MEDICINE
Payer: MEDICARE

## 2021-06-11 DIAGNOSIS — K52.9 CHRONIC DIARRHEA: ICD-10-CM

## 2021-06-11 PROCEDURE — 86003 ALLG SPEC IGE CRUDE XTRC EA: CPT | Mod: HCNC | Performed by: FAMILY MEDICINE

## 2021-06-11 PROCEDURE — 36415 COLL VENOUS BLD VENIPUNCTURE: CPT | Mod: HCNC,PO | Performed by: FAMILY MEDICINE

## 2021-06-12 LAB — BACTERIA STL CULT: NORMAL

## 2021-06-14 LAB
A-LACTALB IGE QN: <0.1 KU/L
B-LACTALB IGE QN: <0.1 KU/L
CASEIN IGE QN: <0.1 KU/L
COW MILK IGE QN: <0.1 KU/L
DEPRECATED A-LACTALB IGE RAST QL: NORMAL
DEPRECATED B-LACTALB IGE RAST QL: NORMAL
DEPRECATED CASEIN IGE RAST QL: NORMAL
DEPRECATED COW MILK IGE RAST QL: NORMAL
H PYLORI AG STL QL IA: NOT DETECTED

## 2021-07-21 RX ORDER — FLUTICASONE PROPIONATE AND SALMETEROL XINAFOATE 115; 21 UG/1; UG/1
AEROSOL, METERED RESPIRATORY (INHALATION)
Qty: 36 INHALER | Refills: 3 | Status: SHIPPED | OUTPATIENT
Start: 2021-07-21 | End: 2022-08-17

## 2021-09-22 RX ORDER — MONTELUKAST SODIUM 10 MG/1
TABLET ORAL
Qty: 90 TABLET | Refills: 2 | Status: SHIPPED | OUTPATIENT
Start: 2021-09-22 | End: 2022-07-05

## 2021-11-23 DIAGNOSIS — I10 ESSENTIAL HYPERTENSION: ICD-10-CM

## 2021-11-23 RX ORDER — ENALAPRIL MALEATE 20 MG/1
TABLET ORAL
Qty: 180 TABLET | Refills: 1 | Status: SHIPPED | OUTPATIENT
Start: 2021-11-23 | End: 2022-05-17

## 2022-03-15 DIAGNOSIS — K21.9 GASTROESOPHAGEAL REFLUX DISEASE, UNSPECIFIED WHETHER ESOPHAGITIS PRESENT: ICD-10-CM

## 2022-03-15 NOTE — TELEPHONE ENCOUNTER
Care Due:                  Date            Visit Type   Department     Provider  --------------------------------------------------------------------------------                                EP -                              PRIMARY      [facility]  Last Visit: 05-      CARE ([facility])   MEDICINE       Alfred Mejia  Next Visit: None Scheduled  None         None Found                                                            Last  Test          Frequency    Reason                     Performed    Due Date  --------------------------------------------------------------------------------    Office Visit  12 months..  ARMOUR, enalapril,         05- 05-                             fluticasone, montelukast,                             pantoprazole.............    CMP.........  12 months..  enalapril................  03- 03-    TSH.........  12 months..  ARMOUR...................  03- 03-    Powered by [employer]. Reference number: [id number].   3/15/2022 3:51:25 PM CDT

## 2022-03-22 RX ORDER — PANTOPRAZOLE SODIUM 20 MG/1
TABLET, DELAYED RELEASE ORAL
Qty: 90 TABLET | Refills: 3 | OUTPATIENT
Start: 2022-03-22

## 2022-03-22 NOTE — TELEPHONE ENCOUNTER
Quick DC. Request already responded to by other means (e.g. phone or fax)   Refill Authorization Note   Megha Ladd  is requesting a refill authorization.  Brief Assessment and Rationale for Refill:  Quick Discontinue  Medication Therapy Plan:              Comments:   Pended Medication(s)       Requested Prescriptions     Pending Prescriptions Disp Refills    pantoprazole (PROTONIX) 20 MG tablet [Pharmacy Med Name: PANTOPRAZOLE SODIUM 20 MG Tablet Delayed Release] 90 tablet 3     Sig: TAKE 1 TABLET EVERY DAY

## 2022-05-17 DIAGNOSIS — I10 ESSENTIAL HYPERTENSION: ICD-10-CM

## 2022-05-17 RX ORDER — ENALAPRIL MALEATE 20 MG/1
TABLET ORAL
Qty: 180 TABLET | Refills: 0 | Status: SHIPPED | OUTPATIENT
Start: 2022-05-17 | End: 2022-08-31

## 2022-05-17 NOTE — TELEPHONE ENCOUNTER
It is almost a year since she was last seen and a good bit over a year since required lab to monitor enalpril has been done.  She needs to schedule an appointment.

## 2022-05-17 NOTE — TELEPHONE ENCOUNTER
Care Due:                  Date            Visit Type   Department     Provider  --------------------------------------------------------------------------------                                EP -                              PRIMARY      [facility]  Last Visit: 05-      CARE ([facility])   MEDICINE       Alfred Mejia  Next Visit: None Scheduled  None         None Found                                                            Last  Test          Frequency    Reason                     Performed    Due Date  --------------------------------------------------------------------------------    Office Visit  12 months..  ARMOUR, enalapril,         05- 05-                             fluticasone, montelukast,                             pantoprazole.............    CMP.........  12 months..  enalapril................  03- 03-    TSH.........  12 months..  ARMOUR...................  03- 03-    [facility] Embedded Care Gaps. Reference number: [id number]. 5/17/2022   11:47:12 AM CDT

## 2022-05-17 NOTE — TELEPHONE ENCOUNTER
Refill Routing Note   Medication(s) are not appropriate for processing by [facility] for the following reason(s):      - Required laboratory values are outdated    [facility] action(s):  Defer Medication-related problems identified:   Requires appointment  Requires labs        Medication reconciliation completed: No     Appointments  past 12m or future 3m with PCP    Date Provider   Last Visit   5/28/2021 Alfred Mejia MD   Next Visit   Visit date not found Alfred Mejia MD   ED visits in past 90 days: 0        Note composed:4:38 PM 05/17/2022

## 2022-05-18 ENCOUNTER — TELEPHONE (OUTPATIENT)
Dept: FAMILY MEDICINE | Facility: CLINIC | Age: 77
End: 2022-05-18
Payer: MEDICARE

## 2022-05-18 NOTE — TELEPHONE ENCOUNTER
----- Message from Mirian Bailey sent at 5/18/2022 10:10 AM CDT -----  Contact:   Type:  Patient Returning Call    Who Called: Patient     Who Left Message for Patient: Anamaria     Does the patient know what this is regarding?: Medication     Would the patient rather a call back or a response via [facility]?  Call    Best Call Back Number: [phone] (home)     Additional Information:

## 2022-06-02 ENCOUNTER — TELEPHONE (OUTPATIENT)
Dept: FAMILY MEDICINE | Facility: CLINIC | Age: 77
End: 2022-06-02
Payer: MEDICARE

## 2022-06-02 NOTE — TELEPHONE ENCOUNTER
I called the patient in regards to the appointment that she has currently scheduled with Dr. Mejia on 07/15/2022 at 3:40pm. Dr. Mejia will not be in the office on this day and we need to get her rescheduled.  No answer left voicemail to return our call. I will send the patient a portal message as well.

## 2022-06-23 DIAGNOSIS — M47.816 LUMBAR SPONDYLOSIS: ICD-10-CM

## 2022-06-23 DIAGNOSIS — M54.50 LUMBAGO: Primary | ICD-10-CM

## 2022-06-23 DIAGNOSIS — G89.4 CHRONIC PAIN SYNDROME: ICD-10-CM

## 2022-07-05 RX ORDER — MONTELUKAST SODIUM 10 MG/1
TABLET ORAL
Qty: 90 TABLET | Refills: 0 | Status: SHIPPED | OUTPATIENT
Start: 2022-07-05 | End: 2023-01-05

## 2022-07-05 NOTE — TELEPHONE ENCOUNTER
No new care gaps identified.  [facility] Embedded Care Gaps. Reference number: [id number]. 7/05/2022   9:11:43 AM CHRISTAT

## 2022-07-06 ENCOUNTER — HOSPITAL ENCOUNTER (OUTPATIENT)
Dept: RADIOLOGY | Facility: HOSPITAL | Age: 77
Discharge: HOME | End: 2022-07-06
Attending: ANESTHESIOLOGY
Payer: MEDICARE

## 2022-07-06 DIAGNOSIS — M47.816 LUMBAR SPONDYLOSIS: ICD-10-CM

## 2022-07-06 DIAGNOSIS — G89.4 CHRONIC PAIN SYNDROME: ICD-10-CM

## 2022-07-06 DIAGNOSIS — M54.50 LUMBAGO: ICD-10-CM

## 2022-07-06 PROCEDURE — 72110 X-RAY EXAM L-2 SPINE 4/>VWS: CPT | Mod: TC,PO

## 2022-08-17 DIAGNOSIS — I10 HTN (HYPERTENSION): ICD-10-CM

## 2022-08-17 RX ORDER — FLUTICASONE PROPIONATE AND SALMETEROL XINAFOATE 115; 21 UG/1; UG/1
AEROSOL, METERED RESPIRATORY (INHALATION)
Qty: 12 G | Refills: 0 | Status: SHIPPED | OUTPATIENT
Start: 2022-08-17 | End: 2022-12-29

## 2022-08-17 NOTE — TELEPHONE ENCOUNTER
Care Due:                  Date            Visit Type   Department     Provider  --------------------------------------------------------------------------------                                EP -                              PRIMARY      [facility]  Last Visit: 05-      CARE ([facility])   MEDICINE       Alfred Mejia                              EP -                              PRIMARY      [facility]  Next Visit: 08-      CARE ([facility])   PRACTICE       Alfred Mejia                                                            Last  Test          Frequency    Reason                     Performed    Due Date  --------------------------------------------------------------------------------    CMP.........  12 months..  enalapril................  03- 03-    TSH.........  12 months..  ARMOUR...................  03- 03-    [facility] Embedded Care Gaps. Reference number: [id number]. 8/17/2022   1:19:53 AM CDT

## 2022-08-17 NOTE — TELEPHONE ENCOUNTER
Refill Routing Note   Medication(s) are not appropriate for processing by [facility] for the following reason(s):      - Required vitals are outdated    [facility] action(s):  Defer Medication-related problems identified: Requires labs     Medication Therapy Plan: FOV  Medication reconciliation completed: No     Appointments  past 12m or future 3m with PCP    Date Provider   Last Visit   5/28/2021 Alfred Mejia MD   Next Visit   8/31/2022 Alfred Mejia MD   ED visits in past 90 days: 0        Note composed:6:31 AM 08/17/2022

## 2022-08-26 ENCOUNTER — TELEPHONE (OUTPATIENT)
Dept: FAMILY MEDICINE | Facility: CLINIC | Age: 77
End: 2022-08-26
Payer: MEDICARE

## 2022-08-26 NOTE — TELEPHONE ENCOUNTER
Patient's  (Gregoria) states patient has annual exam scheduled for 8-31-22; requesting order for CBC, Thyroid studies, and any other lab orders Dr. Mejia think is appropriate. Dr. Mejia is out of clinic today. Mr. Kinney was notified. Also advised Dr. Mejia prefers to place lab orders at time of annual visit due to evaluation often times reveal other labs that are necessary. This avoids patient being stuck at lab twice. Mr. Kinney verbalized understanding.

## 2022-08-26 NOTE — TELEPHONE ENCOUNTER
----- Message from Sonya Kenyon sent at 8/26/2022  9:22 AM CDT -----  .Type:  Patient Call Back    Who Called: PT        Does the patient know what this is regarding?: TEST HE SAYS PT NEEDS     Would the patient rather a call back YES     [facility] Call Back Number: [phone]    Additional Information: Thank You

## 2022-08-31 ENCOUNTER — TELEPHONE (OUTPATIENT)
Dept: FAMILY MEDICINE | Facility: CLINIC | Age: 77
End: 2022-08-31
Payer: MEDICARE

## 2022-08-31 ENCOUNTER — OFFICE VISIT (OUTPATIENT)
Dept: FAMILY MEDICINE | Facility: CLINIC | Age: 77
End: 2022-08-31
Attending: FAMILY MEDICINE
Payer: MEDICARE

## 2022-08-31 VITALS
SYSTOLIC BLOOD PRESSURE: 118 MMHG | RESPIRATION RATE: 17 BRPM | TEMPERATURE: 98 F | HEART RATE: 65 BPM | BODY MASS INDEX: 25.56 KG/M2 | WEIGHT: 159.06 LBS | OXYGEN SATURATION: 97 % | HEIGHT: 66 IN | DIASTOLIC BLOOD PRESSURE: 94 MMHG

## 2022-08-31 DIAGNOSIS — M19.049 ARTHRITIS OF HAND: ICD-10-CM

## 2022-08-31 DIAGNOSIS — Z00.00 ENCOUNTER FOR PREVENTIVE HEALTH EXAMINATION: Primary | ICD-10-CM

## 2022-08-31 DIAGNOSIS — I10 HYPERTENSION, ESSENTIAL: ICD-10-CM

## 2022-08-31 DIAGNOSIS — Z78.0 MENOPAUSE: ICD-10-CM

## 2022-08-31 DIAGNOSIS — J45.30 MILD PERSISTENT ASTHMA, UNSPECIFIED WHETHER COMPLICATED: ICD-10-CM

## 2022-08-31 DIAGNOSIS — M70.41 PREPATELLAR BURSITIS OF RIGHT KNEE: ICD-10-CM

## 2022-08-31 DIAGNOSIS — E87.1 HYPONATREMIA: ICD-10-CM

## 2022-08-31 DIAGNOSIS — I10 PRIMARY HYPERTENSION: ICD-10-CM

## 2022-08-31 DIAGNOSIS — E03.9 HYPOTHYROIDISM, UNSPECIFIED TYPE: ICD-10-CM

## 2022-08-31 DIAGNOSIS — E78.5 HYPERLIPIDEMIA, UNSPECIFIED HYPERLIPIDEMIA TYPE: ICD-10-CM

## 2022-08-31 DIAGNOSIS — K21.9 GASTROESOPHAGEAL REFLUX DISEASE, UNSPECIFIED WHETHER ESOPHAGITIS PRESENT: ICD-10-CM

## 2022-08-31 PROCEDURE — 1160F PR REVIEW ALL MEDS BY PRESCRIBER/CLIN PHARMACIST DOCUMENTED: ICD-10-PCS | Mod: CPTII,S$GLB,, | Performed by: FAMILY MEDICINE

## 2022-08-31 PROCEDURE — 1125F PR PAIN SEVERITY QUANTIFIED, PAIN PRESENT: ICD-10-PCS | Mod: CPTII,S$GLB,, | Performed by: FAMILY MEDICINE

## 2022-08-31 PROCEDURE — 99999 PR PBB SHADOW E&M-EST. PATIENT-LVL V: CPT | Mod: PBBFAC,,, | Performed by: FAMILY MEDICINE

## 2022-08-31 PROCEDURE — 1159F PR MEDICATION LIST DOCUMENTED IN MEDICAL RECORD: ICD-10-PCS | Mod: CPTII,S$GLB,, | Performed by: FAMILY MEDICINE

## 2022-08-31 PROCEDURE — 1125F AMNT PAIN NOTED PAIN PRSNT: CPT | Mod: CPTII,S$GLB,, | Performed by: FAMILY MEDICINE

## 2022-08-31 PROCEDURE — 3080F DIAST BP >= 90 MM HG: CPT | Mod: CPTII,S$GLB,, | Performed by: FAMILY MEDICINE

## 2022-08-31 PROCEDURE — 3074F PR MOST RECENT SYSTOLIC BLOOD PRESSURE < 130 MM HG: ICD-10-PCS | Mod: CPTII,S$GLB,, | Performed by: FAMILY MEDICINE

## 2022-08-31 PROCEDURE — 3080F PR MOST RECENT DIASTOLIC BLOOD PRESSURE >= 90 MM HG: ICD-10-PCS | Mod: CPTII,S$GLB,, | Performed by: FAMILY MEDICINE

## 2022-08-31 PROCEDURE — 1101F PT FALLS ASSESS-DOCD LE1/YR: CPT | Mod: CPTII,S$GLB,, | Performed by: FAMILY MEDICINE

## 2022-08-31 PROCEDURE — 1159F MED LIST DOCD IN RCRD: CPT | Mod: CPTII,S$GLB,, | Performed by: FAMILY MEDICINE

## 2022-08-31 PROCEDURE — 99397 PER PM REEVAL EST PAT 65+ YR: CPT | Mod: S$GLB,,, | Performed by: FAMILY MEDICINE

## 2022-08-31 PROCEDURE — 99397 PR PREVENTIVE VISIT,EST,65 & OVER: ICD-10-PCS | Mod: S$GLB,,, | Performed by: FAMILY MEDICINE

## 2022-08-31 PROCEDURE — 3288F FALL RISK ASSESSMENT DOCD: CPT | Mod: CPTII,S$GLB,, | Performed by: FAMILY MEDICINE

## 2022-08-31 PROCEDURE — 99999 PR PBB SHADOW E&M-EST. PATIENT-LVL V: ICD-10-PCS | Mod: PBBFAC,,, | Performed by: FAMILY MEDICINE

## 2022-08-31 PROCEDURE — 3074F SYST BP LT 130 MM HG: CPT | Mod: CPTII,S$GLB,, | Performed by: FAMILY MEDICINE

## 2022-08-31 PROCEDURE — 3288F PR FALLS RISK ASSESSMENT DOCUMENTED: ICD-10-PCS | Mod: CPTII,S$GLB,, | Performed by: FAMILY MEDICINE

## 2022-08-31 PROCEDURE — 1101F PR PT FALLS ASSESS DOC 0-1 FALLS W/OUT INJ PAST YR: ICD-10-PCS | Mod: CPTII,S$GLB,, | Performed by: FAMILY MEDICINE

## 2022-08-31 PROCEDURE — 1160F RVW MEDS BY RX/DR IN RCRD: CPT | Mod: CPTII,S$GLB,, | Performed by: FAMILY MEDICINE

## 2022-08-31 RX ORDER — TRIAMTERENE/HYDROCHLOROTHIAZID 37.5-25 MG
0.5 TABLET ORAL DAILY
Qty: 45 TABLET | Refills: 12 | Status: SHIPPED | OUTPATIENT
Start: 2022-08-31 | End: 2023-09-01 | Stop reason: SDUPTHER

## 2022-08-31 NOTE — PROGRESS NOTES
Subjective:       Patient ID: Megha Ladd is a 76 y.o. female.    Chief Complaint: Annual Exam (Pt states that she is here for her annual exam )    76-year-old female coming in for annual exam.  Several weeks ago she fell on a carpeted floor landing on her right knee and sustaining a bruise just medial and below the patella.  She was able to walk on it for two weeks and the swelling that initially occurred was resolving when it started the swell up again and became a little more painful.  She is still able to bear weight and is applying some ice and it is improving again.  She has been having some recurrence of plantar fasciitis in the right foot for which she was given some inserts years ago.  It is possible the altered gait may be flaring up the knee.  She has a history of asthma, hypertension, hyperlipidemia, hyponatremia, hypothyroidism, reflux, functional constipation, alopecia and insomnia.  She has not been taking her thyroid supplement using armor thyroid since February.  She started losing her hair and was feeling flushed and discontinued it thinking she was overdosing but she did not get a level checked and has not yet.  She is still not taking it.  She does not want to use any other thyroid but the armor thyroid if she needs to go back on it.    Past Medical History:  No date: Asthma  No date: GERD (gastroesophageal reflux disease)  No date: Hyperlipidemia  No date: Hypertension  No date: Hypothyroid  No date: Lumbago  No date: Neuromuscular disorder  No date: Sinusitis  No date: Ulcer    Past Surgical History:  No date: APPENDECTOMY  No date:  SECTION  No date: LUMBAR EPIDURAL INJECTION  No date: PARTIAL HYSTERECTOMY  '  Review of patient's family history indicates:    Social History    Tobacco Use      Smoking status: Never      Smokeless tobacco: Never    Alcohol use: No    Drug use: No    Current Outpatient Medications on File Prior to Visit:  acetaminophen (TYLENOL) 500 MG tablet, Take  1,000 mg by mouth 3 (three) times daily., Disp: , Rfl:   ADVAIR -21 mcg/actuation HFAA inhaler, TAKE 2 PUFFS BY MOUTH TWICE A DAY, Disp: 12 g, Rfl: 0  cartilage-collagen-bor-hyalur 40-5-3.3 mg Tab, Take 1 tablet by mouth once daily., Disp: , Rfl:   celecoxib (CELEBREX) 200 MG capsule, Take 200 mg by mouth once daily. Every day, Disp: , Rfl:   cetirizine (ZYRTEC) 10 MG tablet, Take 10 mg by mouth daily as needed for Allergies., Disp: , Rfl:   cloNIDine (CATAPRES) 0.1 MG tablet, Take 1 tablet if systolic above 180 or diastolic above 105. Can repeat after 2 hours if bp remains above 180 or 105., Disp: 60 tablet, Rfl: 11  enalapril (VASOTEC) 20 MG tablet, TAKE 1 TABLET BY MOUTH TWICE DAILY, Disp: 180 tablet, Rfl: 0  fluticasone (FLONASE) 50 mcg/actuation nasal spray, SPRAY 1 SPRAY IN EACH NOSTRIL 2 TIMES A DAY, Disp: 16 g, Rfl: 11  loperamide (IMODIUM) 2 mg capsule, Take 2 mg by mouth 4 (four) times daily as needed for Diarrhea., Disp: , Rfl:   meclizine (ANTIVERT) 25 mg tablet, Take 1 tablet (25 mg total) by mouth 3 (three) times daily as needed., Disp: 60 tablet, Rfl: 2  metaxalone (SKELAXIN) 800 MG tablet, Take 800 mg by mouth 3 (three) times daily. Every 6 hours PRN, Disp: , Rfl:   metoprolol tartrate (LOPRESSOR) 50 MG tablet, TAKE 1 & 1/2 (ONE & ONE-HALF) TABLETS BY MOUTH TWICE DAILY, Disp: 270 tablet, Rfl: 0  montelukast (SINGULAIR) 10 mg tablet, TAKE 1 TABLET EVERY EVENING, Disp: 90 tablet, Rfl: 0  pantoprazole (PROTONIX) 20 MG tablet, Take 1 tablet (20 mg total) by mouth once daily., Disp: 90 tablet, Rfl: 0  peg 400-propylene glycol 0.4-0.3 % Drop, Place 2 drops into both eyes 2 (two) times daily. , Disp: , Rfl:   pregabalin (LYRICA) 75 MG capsule, Take 75 mg by mouth once daily. , Disp: , Rfl:   SODIUM CHLORIDE (SIMPLY SALINE NASL), by Nasal route 2 (two) times daily as needed. , Disp: , Rfl:   tramadol (ULTRAM) 50 mg tablet, Take 50 mg by mouth every 8 (eight) hours as needed. Every 6 hours, Disp: ,  Rfl:   UNABLE TO FIND, Take 1 tablet by mouth once daily. Ultra triple action [facility], Disp: , Rfl:   [DISCONTINUED] triamterene-hydrochlorothiazide 37.5-25 mg (MAXZIDE-25) 37.5-25 mg per tablet, TAKE 1/2 TABLET BY MOUTH EVERY OTHER DAY, Disp: 25 tablet, Rfl: 12  ARMOUR THYROID 60 mg Tab, TAKE 1 TABLET (60 MG) BY MOUTH BEFORE BREAKFAST (Patient not taking: Reported on 8/31/2022), Disp: 90 tablet, Rfl: 0  [DISCONTINUED] brimonidine (MIRVASO) 0.33 % Gel, Apply topically as needed. , Disp: , Rfl:   [DISCONTINUED] dextromethorphan-guaiFENesin  mg (MUCINEX DM)  mg per 12 hr tablet, Take 1 tablet by mouth 2 (two) times daily as needed. , Disp: , Rfl:   [DISCONTINUED] guaiFENesin (MUCINEX) 600 mg 12 hr tablet, Take 1 tablet by mouth 2 (two) times daily as needed., Disp: , Rfl:   [DISCONTINUED] ivermectin (SOOLANTRA) 1 % Crea, Apply topically as needed. , Disp: , Rfl:   [DISCONTINUED] LACTOBACILLUS RHAMNOSUS GG (CULTURELLE ORAL), Take 1 capsule by mouth daily as needed. [address] [employer]illion, Disp: , Rfl:   [DISCONTINUED] ondansetron (ZOFRAN) 4 MG tablet, Take 4 mg by mouth every 8 (eight) hours as needed for Nausea. Dr [facility], Disp: , Rfl:   [DISCONTINUED] pimecrolimus (ELIDEL) 1 % cream, Apply topically daily as needed. , Disp: , Rfl:   [DISCONTINUED] traZODone (DESYREL) 50 MG tablet, Take 1 tablet (50 mg total) by mouth nightly as needed for Insomnia., Disp: 30 tablet, Rfl: 5    No current facility-administered medications on file prior to visit.        Review of Systems   Constitutional:  Negative for chills, diaphoresis, fatigue, fever and unexpected weight change.   HENT:  Negative for congestion, ear pain, hearing loss, postnasal drip and sinus pressure.    Eyes:  Negative for itching and visual disturbance.   Respiratory:  Negative for cough, chest tightness, shortness of breath and wheezing.    Cardiovascular:  Negative for chest pain, palpitations and leg swelling.   Gastrointestinal:   Negative for abdominal pain, blood in stool, constipation, diarrhea, nausea and vomiting.   Genitourinary:  Negative for dysuria, frequency and hematuria.   Musculoskeletal:  Positive for arthralgias and gait problem. Negative for back pain, joint swelling and myalgias.   Neurological:  Negative for dizziness and headaches.   Hematological:  Negative for adenopathy.   Psychiatric/Behavioral:  Negative for sleep disturbance. The patient is not nervous/anxious.      Objective:      Physical Exam  Vitals and nursing note reviewed.   Constitutional:       General: She is not in acute distress.     Appearance: Normal appearance. She is well-developed and normal weight. She is not ill-appearing, toxic-appearing or diaphoretic.      Comments: Mildly elevated diastolic blood pressure, patient is taking her Dyazide 1/2 every other day  Normal weight with a BMI of 25.7 she is up 4.3 lb from her May 28, 2021 visit   HENT:      Head: Normocephalic and atraumatic.      Right Ear: Tympanic membrane, ear canal and external ear normal. There is no impacted cerumen.      Left Ear: Tympanic membrane, ear canal and external ear normal. There is no impacted cerumen.      Nose: Nose normal. No congestion or rhinorrhea.      Mouth/Throat:      Mouth: Mucous membranes are moist.      Pharynx: Oropharynx is clear. No oropharyngeal exudate or posterior oropharyngeal erythema.   Eyes:      General: No scleral icterus.        Right eye: No discharge.         Left eye: No discharge.      Extraocular Movements: Extraocular movements intact.      Conjunctiva/sclera: Conjunctivae normal.      Pupils: Pupils are equal, round, and reactive to light.   Neck:      Thyroid: No thyromegaly.      Vascular: No carotid bruit or JVD.   Cardiovascular:      Rate and Rhythm: Normal rate and regular rhythm.      Pulses: Normal pulses.      Heart sounds: Normal heart sounds. No murmur heard.    No friction rub. No gallop.   Pulmonary:      Effort: Pulmonary  effort is normal. No respiratory distress.      Breath sounds: Normal breath sounds. No stridor. No wheezing, rhonchi or rales.   Chest:      Chest wall: No tenderness.   Abdominal:      General: Bowel sounds are normal. There is no distension.      Palpations: Abdomen is soft. There is no mass.      Tenderness: There is no abdominal tenderness. There is no guarding or rebound.      Hernia: No hernia is present.   Musculoskeletal:         General: No swelling, tenderness, deformity or signs of injury. Normal range of motion.      Cervical back: Normal range of motion and neck supple. No rigidity or tenderness.      Right lower leg: No edema.      Left lower leg: No edema.        Legs:    Lymphadenopathy:      Cervical: No cervical adenopathy.   Skin:     General: Skin is warm and dry.      Coloration: Skin is not jaundiced or pale.      Findings: No bruising, erythema, lesion or rash.   Neurological:      General: No focal deficit present.      Mental Status: She is alert and oriented to person, place, and time. Mental status is at baseline.      Cranial Nerves: No cranial nerve deficit.      Sensory: No sensory deficit.      Motor: No weakness.      Coordination: Coordination normal.      Gait: Gait normal.      Deep Tendon Reflexes: Reflexes are normal and symmetric. Reflexes normal.   Psychiatric:         Mood and Affect: Mood normal.         Behavior: Behavior normal.         Thought Content: Thought content normal.         Judgment: Judgment normal.       Assessment:       1. Encounter for preventive health examination    2. Primary hypertension    3. Hyperlipidemia, unspecified hyperlipidemia type    4. Mild persistent asthma, unspecified whether complicated    5. Hyponatremia    6. Hypothyroidism, unspecified type    7. Gastroesophageal reflux disease, unspecified whether esophagitis present    8. Arthritis of hand    9. Hypertension, essential    10. Prepatellar bursitis of right knee    11. BMI  25.0-25.9,adult          Plan:       1. Encounter for preventive health examination  Schedule fasting lab  - Comprehensive Metabolic Panel; Future  - Lipid Panel; Future  - CBC Auto Differential; Future    2. Primary hypertension  Increase backside to a half tablet every day  - Comprehensive Metabolic Panel; Future  - Lipid Panel; Future  - CBC Auto Differential; Future  - triamterene-hydrochlorothiazide 37.5-25 mg (MAXZIDE-25) 37.5-25 mg per tablet; Take 0.5 tablets by mouth once daily.  Dispense: 45 tablet; Refill: 12    3. Hyperlipidemia, unspecified hyperlipidemia type  Await lab  - Comprehensive Metabolic Panel; Future  - Lipid Panel; Future    4. Mild persistent asthma, unspecified whether complicated  Asymptomatic    5. Hyponatremia  Await chemistry panel results  - Comprehensive Metabolic Panel; Future    6. Hypothyroidism, unspecified type  Patient has been off of her thyroid for six months plus.  She will be starting out as a re-evaluation  - TSH; Future    7. Gastroesophageal reflux disease, unspecified whether esophagitis present  Asymptomatic    8. Arthritis of hand  Suspect osteoarthritis chiefly affecting the base of the thumb  - Cyclic Citrullinated Peptide Antibody, IgG; Future  - Rheumatoid Factor; Future  - C-Reactive Protein; Future  - Sedimentation rate; Future    9. Hypertension, essential  See above    10. Prepatellar bursitis of right knee  Continue cold compresses possibly alternate cold and heat.  Discussed doing x-ray but after two weeks of walking on it I seriously doubt she has a fracture    11. BMI 25.0-25.9,adult

## 2022-08-31 NOTE — TELEPHONE ENCOUNTER
I called the patient in regards to confirming her appointment that she has scheduled with Dr. Mejia on 08/31/2022 at 2:20pm no answer left voicemail to return our call. I will send the patient a portal message as well

## 2022-08-31 NOTE — TELEPHONE ENCOUNTER
----- Message from Kavya Munson sent at 8/31/2022 12:03 PM CDT -----  Contact: , Gregoria  Type: Confirming Appt    Who Called:      Best Call Back Number: [phone]    Additional Information: Returning Мария's call to confirm appt today at 2:20, they will be there.

## 2022-08-31 NOTE — TELEPHONE ENCOUNTER
----- Message from Kavya Munson sent at 8/31/2022 12:03 PM CDT -----  Contact: , Gregoria  Type: Confirming Appt    Who Called:      Best Call Back Number: [phone]    Additional Information: Returning Мария's call to confirm appt today at 2:20, they will be there.

## 2022-09-07 ENCOUNTER — TELEPHONE (OUTPATIENT)
Dept: FAMILY MEDICINE | Facility: CLINIC | Age: 77
End: 2022-09-07
Payer: MEDICARE

## 2022-09-07 DIAGNOSIS — M25.561 ACUTE PAIN OF RIGHT KNEE: Primary | ICD-10-CM
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
## 2022-09-07 NOTE — TELEPHONE ENCOUNTER
"Patient has not been taking the [place] Thyroid "for a very long time" says  told her she could stop it due to her getting sweaty and hot in the evenings. I could not find that in any office notes. The office note from 5/28/21 mentions problem with diarrhea and was advised she could reduce dose of [place] Thyroid. Patient notified there is a thyroid level attached to lab.       Patient seen 8/31 - fall injury to knee is not improved- continues with swelling and pain-is able to ambulate.   "

## 2022-09-08 ENCOUNTER — HOSPITAL ENCOUNTER (OUTPATIENT)
Dept: RADIOLOGY | Facility: HOSPITAL | Age: 77
Discharge: HOME | End: 2022-09-08
Attending: FAMILY MEDICINE
Payer: MEDICARE

## 2022-09-08 DIAGNOSIS — M25.561 ACUTE PAIN OF RIGHT KNEE: ICD-10-CM
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
## 2022-09-08 PROCEDURE — 73562 X-RAY EXAM OF KNEE 3: CPT | Mod: TC,PO,RT

## 2022-10-06 DIAGNOSIS — I10 ESSENTIAL HYPERTENSION: ICD-10-CM

## 2022-10-06 RX ORDER — METOPROLOL TARTRATE 50 MG/1
TABLET ORAL
Qty: 270 TABLET | Refills: 0 | Status: SHIPPED | OUTPATIENT
Start: 2022-10-06 | End: 2022-12-29

## 2022-10-06 NOTE — TELEPHONE ENCOUNTER
No new care gaps identified.  [facility] Embedded Care Gaps. Reference number: [id number]. 10/06/2022   1:18:49 PM CDT

## 2022-10-06 NOTE — TELEPHONE ENCOUNTER
Refill Routing Note   Medication(s) are not appropriate for processing by [facility] for the following reason(s):      - Required vitals are abnormal    [facility] action(s):  Defer          Medication reconciliation completed: No     Appointments  past 12m or future 3m with PCP    Date Provider   Last Visit   8/31/2022 Alfred Mejia MD   Next Visit   Visit date not found Alfred Mejia MD   ED visits in past 90 days: 0        Note composed:1:31 PM 10/06/2022

## 2022-10-07 ENCOUNTER — TELEPHONE (OUTPATIENT)
Dept: FAMILY MEDICINE | Facility: CLINIC | Age: 77
End: 2022-10-07
Payer: MEDICARE

## 2022-10-07 NOTE — TELEPHONE ENCOUNTER
----- Message from Zena Ellis sent at 10/7/2022 10:19 AM CDT -----  Type: Patient Call Back         Who called:          What is the request in detail: returning missed call; please advise           Best call back number: [phone]         Additional Information:            Thank You

## 2022-10-07 NOTE — TELEPHONE ENCOUNTER
Last seen in August.  Blood pressure was elevated.  Maxide was increased from every other day to every day.  Needs nurses  Blood pressure check

## 2022-10-12 DIAGNOSIS — K21.9 GASTROESOPHAGEAL REFLUX DISEASE, UNSPECIFIED WHETHER ESOPHAGITIS PRESENT: ICD-10-CM

## 2022-10-12 RX ORDER — PANTOPRAZOLE SODIUM 20 MG/1
20 TABLET, DELAYED RELEASE ORAL DAILY
Qty: 90 TABLET | Refills: 0 | Status: SHIPPED | OUTPATIENT
Start: 2022-10-12 | End: 2022-11-03

## 2022-10-12 NOTE — TELEPHONE ENCOUNTER
Patient didn't make her bp check today- she was up a lot last night with heartburn and reflux. She ran out of refills on the Pantoprazole.  made appt 11/3/22 for her. She will have bp check with me 10/14/22. [address]

## 2022-10-12 NOTE — TELEPHONE ENCOUNTER
----- Message from Blane Mahajan sent at 10/12/2022 11:41 AM CDT -----  Contact: pt'chester Kinney at  [phone]  Type: Needs Medical Advice  Who Called:  ptabby Kinney  Best Call Back Number: [phone]  Additional Information: ptabby Kinney is calling the office requesting a callback from nurse Hale. Please call back and advise.

## 2022-10-12 NOTE — TELEPHONE ENCOUNTER
----- Message from Neville Olivas sent at 10/12/2022  1:43 PM CDT -----  Type: Needs Medical Advice  Who Called:  / Gregoria Ladd     Best Call Back Number: [phone]  Additional Information: Caller states that he would like a callback regarding rescheduling the patient's Nurse Visit from today.

## 2022-10-12 NOTE — TELEPHONE ENCOUNTER
No new care gaps identified.  [facility] Embedded Care Gaps. Reference number: [id number]. 10/12/2022   4:13:45 PM CDT

## 2022-10-21 ENCOUNTER — OFFICE VISIT (OUTPATIENT)
Dept: DERMATOLOGY | Facility: CLINIC | Age: 77
End: 2022-10-21
Payer: MEDICARE

## 2022-10-21 DIAGNOSIS — L71.9 ROSACEA: ICD-10-CM

## 2022-10-21 DIAGNOSIS — L20.84 INTRINSIC ATOPIC DERMATITIS: ICD-10-CM

## 2022-10-21 DIAGNOSIS — D48.5 NEOPLASM OF UNCERTAIN BEHAVIOR OF SKIN: Primary | ICD-10-CM

## 2022-10-21 PROCEDURE — 11102 PR TANGENTIAL BIOPSY, SKIN, SINGLE LESION: ICD-10-PCS | Mod: S$GLB,,, | Performed by: DERMATOLOGY

## 2022-10-21 PROCEDURE — 99204 OFFICE O/P NEW MOD 45 MIN: CPT | Mod: 25,S$GLB,, | Performed by: DERMATOLOGY

## 2022-10-21 PROCEDURE — 1159F PR MEDICATION LIST DOCUMENTED IN MEDICAL RECORD: ICD-10-PCS | Mod: CPTII,S$GLB,, | Performed by: DERMATOLOGY

## 2022-10-21 PROCEDURE — 1160F PR REVIEW ALL MEDS BY PRESCRIBER/CLIN PHARMACIST DOCUMENTED: ICD-10-PCS | Mod: CPTII,S$GLB,, | Performed by: DERMATOLOGY

## 2022-10-21 PROCEDURE — 99999 PR PBB SHADOW E&M-EST. PATIENT-LVL III: ICD-10-PCS | Mod: PBBFAC,,, | Performed by: DERMATOLOGY

## 2022-10-21 PROCEDURE — 1159F MED LIST DOCD IN RCRD: CPT | Mod: CPTII,S$GLB,, | Performed by: DERMATOLOGY

## 2022-10-21 PROCEDURE — 99999 PR PBB SHADOW E&M-EST. PATIENT-LVL III: CPT | Mod: PBBFAC,,, | Performed by: DERMATOLOGY

## 2022-10-21 PROCEDURE — 3288F PR FALLS RISK ASSESSMENT DOCUMENTED: ICD-10-PCS | Mod: CPTII,S$GLB,, | Performed by: DERMATOLOGY

## 2022-10-21 PROCEDURE — 88305 TISSUE EXAM BY PATHOLOGIST: CPT | Performed by: PATHOLOGY

## 2022-10-21 PROCEDURE — 1160F RVW MEDS BY RX/DR IN RCRD: CPT | Mod: CPTII,S$GLB,, | Performed by: DERMATOLOGY

## 2022-10-21 PROCEDURE — 88305 TISSUE EXAM BY PATHOLOGIST: ICD-10-PCS | Mod: 26,,, | Performed by: PATHOLOGY

## 2022-10-21 PROCEDURE — 99204 PR OFFICE/OUTPT VISIT, NEW, LEVL IV, 45-59 MIN: ICD-10-PCS | Mod: 25,S$GLB,, | Performed by: DERMATOLOGY

## 2022-10-21 PROCEDURE — 88305 TISSUE EXAM BY PATHOLOGIST: CPT | Mod: 26,,, | Performed by: PATHOLOGY

## 2022-10-21 PROCEDURE — 3288F FALL RISK ASSESSMENT DOCD: CPT | Mod: CPTII,S$GLB,, | Performed by: DERMATOLOGY

## 2022-10-21 PROCEDURE — 1126F AMNT PAIN NOTED NONE PRSNT: CPT | Mod: CPTII,S$GLB,, | Performed by: DERMATOLOGY

## 2022-10-21 PROCEDURE — 1101F PT FALLS ASSESS-DOCD LE1/YR: CPT | Mod: CPTII,S$GLB,, | Performed by: DERMATOLOGY

## 2022-10-21 PROCEDURE — 1126F PR PAIN SEVERITY QUANTIFIED, NO PAIN PRESENT: ICD-10-PCS | Mod: CPTII,S$GLB,, | Performed by: DERMATOLOGY

## 2022-10-21 PROCEDURE — 1101F PR PT FALLS ASSESS DOC 0-1 FALLS W/OUT INJ PAST YR: ICD-10-PCS | Mod: CPTII,S$GLB,, | Performed by: DERMATOLOGY

## 2022-10-21 PROCEDURE — 11102 TANGNTL BX SKIN SINGLE LES: CPT | Mod: S$GLB,,, | Performed by: DERMATOLOGY

## 2022-10-21 RX ORDER — DOXYCYCLINE 100 MG/1
TABLET ORAL
Qty: 30 TABLET | Refills: 2 | Status: SHIPPED | OUTPATIENT
Start: 2022-10-21 | End: 2023-02-03

## 2022-10-21 RX ORDER — TACROLIMUS 1 MG/G
OINTMENT TOPICAL
Qty: 30 G | Refills: 1 | Status: SHIPPED | OUTPATIENT
Start: 2022-10-21

## 2022-10-21 NOTE — PATIENT INSTRUCTIONS
Shave Biopsy Wound Care    Your doctor has performed a shave biopsy today.  A band aid and vaseline ointment has been placed over the site.  This should remain in place for 24 hours.  It is recommended that you keep the area dry for the first 24 hours.  After 24 hours, you may remove the band aid and wash the area with warm soap and water and apply Vaseline jelly.  Many patients prefer to use Neosporin or Bacitracin ointment.  This is acceptable; however, know that you can develop an allergy to this medication even if you have used it safely for years.  It is important to keep the area moist.  Letting it dry out and get air slows healing time, and will worsen the scar.  Band aid is optional after first 24 hours.      If you notice increasing redness, tenderness, pain, or yellow drainage at the biopsy site, please notify your doctor.  These are signs of an infection.    If your biopsy site is bleeding, apply firm pressure for 15 minutes straight.  Repeat for another 15 minutes, if it is still bleeding.   If the surgical site continues to bleed, then please contact your doctor.       [facility]  [facility] - DERMATOLOGY  [address] [place] [address], [address] [address]  Dept: [phone]

## 2022-10-21 NOTE — PROGRESS NOTES
"  Subjective:       Patient ID:  Megha Ladd is a 77 y.o. female who presents for   Chief Complaint   Patient presents with    Rash     face     New patient    Patient here today for rash on face - Hx of rosacea  Flared up for 1 year.  Currently using Cerave PM and Elta MD AM Therapy. Uses an Aloe gel every day on face.  Always had very sensitive skin, has gotten worse. States the mask bothers her skin.    Has tried many prescription creams in the past (Soolantra, Metrogel) Does not recall all the medication names.  Took ivermectin tablets, difficulty tolerating  Soolantra cream PRN, triggered "big huge pustules"    Washes face with cetaphil  Has cerave PM and ELTA AM, no sunblock  No recent doxycycline, tolerates tablets        Review of patient's allergies indicates:   -- Zoster vaccine live -- Other (See Comments)    --  Headache, neck shoulder, body flu sx, sore throat,             nausea, dry cough, fatigue   -- Amlodipine -- Diarrhea, Nausea Only and Other                            (See Comments)    --  Joint pain   -- Azithromycin     --  Other reaction(s): Unknown   -- Budesonide     --  Other reaction(s): heartburn             Other reaction(s): Rash   -- Cephalexin     --  Other reaction(s): Unknown   -- Ciprofloxacin     --  Other reaction(s): Unknown   -- Erythromycin     --  Other reaction(s): Unknown   -- Esomeprazole magnesium     --  Other reaction(s): Headache   -- Felodipine     --  Nausea, raw throat, ulcer tongue, myalgia   -- Levofloxacin     --  Other reaction(s): tendonitis   -- Talwin compound     --  Other reaction(s): Vomiting             Other reaction(s): Hallucinations   -- Hydrocodone-acetaminophen -- Rash    --  Other reaction(s): Nausea      Review of Systems   Constitutional:  Negative for fever, chills and fatigue.   Respiratory:  Negative for cough and shortness of breath.    Gastrointestinal:  Negative for nausea and vomiting.   Skin:  Positive for rash and activity-related " sunscreen use. Negative for itching and daily sunscreen use.      Objective:    Physical Exam   Constitutional: She appears well-developed and well-nourished.   Neurological: She is alert and oriented to person, place, and time.   Psychiatric: She has a normal mood and affect.   Skin:   Areas Examined (abnormalities noted in diagram):   Head / Face Inspection Performed            Diagram Legend     Erythematous scaling macule/papule c/w actinic keratosis       Vascular papule c/w angioma      Pigmented verrucoid papule/plaque c/w seborrheic keratosis      Yellow umbilicated papule c/w sebaceous hyperplasia      Irregularly shaped tan macule c/w lentigo     1-2 mm smooth white papules consistent with Milia      Movable subcutaneous cyst with punctum c/w epidermal inclusion cyst      Subcutaneous movable cyst c/w pilar cyst      Firm pink to brown papule c/w dermatofibroma      Pedunculated fleshy papule(s) c/w skin tag(s)      Evenly pigmented macule c/w junctional nevus     Mildly variegated pigmented, slightly irregular-bordered macule c/w mildly atypical nevus      Flesh colored to evenly pigmented papule c/w intradermal nevus       Pink pearly papule/plaque c/w basal cell carcinoma      Erythematous hyperkeratotic cursted plaque c/w SCC      Surgical scar with no sign of skin cancer recurrence      Open and closed comedones      Inflammatory papules and pustules      Verrucoid papule consistent consistent with wart     Erythematous eczematous patches and plaques     Dystrophic onycholytic nail with subungual debris c/w onychomycosis     Umbilicated papule    Erythematous-base heme-crusted tan verrucoid plaque consistent with inflamed seborrheic keratosis     Erythematous Silvery Scaling Plaque c/w Psoriasis     See annotation                  Assessment / Plan:      Pathology Orders:       Normal Orders This Visit    Specimen to Pathology, Dermatology     Questions:    Procedure Type: Dermatology and skin  neoplasms    Number of Specimens: 1    ------------------------: -------------------------    Spec 1 Procedure: Biopsy    Spec 1 Clinical Impression: bcc vs other    Spec 1 Source: nasal dorsum    Release to patient:           Neoplasm of uncertain behavior of skin  -     Specimen to Pathology, Dermatology  Shave biopsy procedure note:    Shave biopsy performed after verbal consent including risk of infection, scar, recurrence, need for additional treatment of site. Area prepped with alcohol, anesthetized with approximately 1.0cc of 1% lidocaine with epinephrine. Lesional tissue shaved with razor blade. Hemostasis achieved with application of aluminum chloride followed by hyfrecation. No complications. Dressing applied. Wound care explained.    Rosacea  Element of demodex, oil prep ++  Took ivermectin but as a 3mg a day regimen, not as a 0.2mg/kg one time dose  Soolantra vehicle with propylene glycol, may have triggered issue    Rosacea triple cream ([facility]: ivermectin 1%, metronidazole 1% azelaic acid 20%). Prescription sent electronically.    Doxycycline 100mg PO daily, add AM probiotics  Discussed benefits and risks of doxycyline therapy including but not limited to GI discomfort, esophageal irritation/ulceration, and increased sun sensitivity. Patient was counseled to take medicine with meals and at least 1 hour before lying down.      Intrinsic atopic dermatitis  -     tacrolimus (PROTOPIC) 0.1 % ointment; AAA face daily to BID PRN rash  Dispense: 30 g; Refill: 1  Face  +/- contact  Needs non steroidal anti inflammatory for face           Follow up in about 3 months (around 1/21/2023).

## 2022-10-26 LAB
FINAL PATHOLOGIC DIAGNOSIS: NORMAL
GROSS: NORMAL
Lab: NORMAL
MICROSCOPIC EXAM: NORMAL

## 2022-10-27 ENCOUNTER — TELEPHONE (OUTPATIENT)
Dept: DERMATOLOGY | Facility: CLINIC | Age: 77
End: 2022-10-27
Payer: MEDICARE

## 2022-10-27 NOTE — TELEPHONE ENCOUNTER
----- Message from Jasmyn Newell MD sent at 10/27/2022  7:52 AM CDT -----  I recommend treatment by a Mohs surgeon who has the ability to ensure negative surgical margins before repairing the defect. We work with Dr Lund who sees [facility] patients in [place] or with Dr Thornton at the skin surgery centre in [place]. (Accepts [employer] managed medicare)  Please notify patient and place referral request once patient has been given diagnosis and opportunity to discuss treatment plan    Skin, nasal dorsum, shave biopsy:   -BASAL CELL CARCINOMA, NODULAR TYPE, EXTENDING TO THE PERIPHERAL AND DEEP   BIOPSY EDGES

## 2022-10-28 ENCOUNTER — TELEPHONE (OUTPATIENT)
Dept: DERMATOLOGY | Facility: CLINIC | Age: 77
End: 2022-10-28
Payer: MEDICARE

## 2022-10-28 NOTE — TELEPHONE ENCOUNTER
----- Message from Jasmyn Newell MD sent at 10/27/2022  7:52 AM CDT -----  I recommend treatment by a Mohs surgeon who has the ability to ensure negative surgical margins before repairing the defect. We work with Dr Lund who sees [facility] patients in [place] or with Dr Thornton at the skin surgery centre in [place]. (Accepts [employer] managed medicare)  Please notify patient and place referral request once patient has been given diagnosis and opportunity to discuss treatment plan    Skin, nasal dorsum, shave biopsy:   -BASAL CELL CARCINOMA, NODULAR TYPE, EXTENDING TO THE PERIPHERAL AND DEEP   BIOPSY EDGES

## 2022-11-01 ENCOUNTER — TELEPHONE (OUTPATIENT)
Dept: DERMATOLOGY | Facility: CLINIC | Age: 77
End: 2022-11-01
Payer: MEDICARE

## 2022-11-03 ENCOUNTER — OFFICE VISIT (OUTPATIENT)
Dept: FAMILY MEDICINE | Facility: CLINIC | Age: 77
End: 2022-11-03
Attending: FAMILY MEDICINE
Payer: MEDICARE

## 2022-11-03 VITALS
HEART RATE: 60 BPM | HEIGHT: 66 IN | TEMPERATURE: 98 F | OXYGEN SATURATION: 97 % | DIASTOLIC BLOOD PRESSURE: 74 MMHG | RESPIRATION RATE: 17 BRPM | WEIGHT: 156.75 LBS | BODY MASS INDEX: 25.19 KG/M2 | SYSTOLIC BLOOD PRESSURE: 116 MMHG

## 2022-11-03 DIAGNOSIS — K21.9 GASTROESOPHAGEAL REFLUX DISEASE, UNSPECIFIED WHETHER ESOPHAGITIS PRESENT: Primary | ICD-10-CM

## 2022-11-03 DIAGNOSIS — R19.7 DIARRHEA, UNSPECIFIED TYPE: ICD-10-CM

## 2022-11-03 PROCEDURE — 99213 PR OFFICE/OUTPT VISIT, EST, LEVL III, 20-29 MIN: ICD-10-PCS | Mod: S$GLB,,, | Performed by: FAMILY MEDICINE

## 2022-11-03 PROCEDURE — 90694 FLU VACCINE - QUADRIVALENT - ADJUVANTED: ICD-10-PCS | Mod: S$GLB,,, | Performed by: FAMILY MEDICINE

## 2022-11-03 PROCEDURE — 1101F PT FALLS ASSESS-DOCD LE1/YR: CPT | Mod: CPTII,S$GLB,, | Performed by: FAMILY MEDICINE

## 2022-11-03 PROCEDURE — 90694 VACC AIIV4 NO PRSRV 0.5ML IM: CPT | Mod: S$GLB,,, | Performed by: FAMILY MEDICINE

## 2022-11-03 PROCEDURE — 99999 PR PBB SHADOW E&M-EST. PATIENT-LVL V: CPT | Mod: PBBFAC,,, | Performed by: FAMILY MEDICINE

## 2022-11-03 PROCEDURE — 99999 PR PBB SHADOW E&M-EST. PATIENT-LVL V: ICD-10-PCS | Mod: PBBFAC,,, | Performed by: FAMILY MEDICINE

## 2022-11-03 PROCEDURE — 3078F PR MOST RECENT DIASTOLIC BLOOD PRESSURE < 80 MM HG: ICD-10-PCS | Mod: CPTII,S$GLB,, | Performed by: FAMILY MEDICINE

## 2022-11-03 PROCEDURE — 3288F FALL RISK ASSESSMENT DOCD: CPT | Mod: CPTII,S$GLB,, | Performed by: FAMILY MEDICINE

## 2022-11-03 PROCEDURE — 99213 OFFICE O/P EST LOW 20 MIN: CPT | Mod: S$GLB,,, | Performed by: FAMILY MEDICINE

## 2022-11-03 PROCEDURE — 3074F SYST BP LT 130 MM HG: CPT | Mod: CPTII,S$GLB,, | Performed by: FAMILY MEDICINE

## 2022-11-03 PROCEDURE — 1160F RVW MEDS BY RX/DR IN RCRD: CPT | Mod: CPTII,S$GLB,, | Performed by: FAMILY MEDICINE

## 2022-11-03 PROCEDURE — 1101F PR PT FALLS ASSESS DOC 0-1 FALLS W/OUT INJ PAST YR: ICD-10-PCS | Mod: CPTII,S$GLB,, | Performed by: FAMILY MEDICINE

## 2022-11-03 PROCEDURE — 3078F DIAST BP <80 MM HG: CPT | Mod: CPTII,S$GLB,, | Performed by: FAMILY MEDICINE

## 2022-11-03 PROCEDURE — 1125F AMNT PAIN NOTED PAIN PRSNT: CPT | Mod: CPTII,S$GLB,, | Performed by: FAMILY MEDICINE

## 2022-11-03 PROCEDURE — 3288F PR FALLS RISK ASSESSMENT DOCUMENTED: ICD-10-PCS | Mod: CPTII,S$GLB,, | Performed by: FAMILY MEDICINE

## 2022-11-03 PROCEDURE — 1125F PR PAIN SEVERITY QUANTIFIED, PAIN PRESENT: ICD-10-PCS | Mod: CPTII,S$GLB,, | Performed by: FAMILY MEDICINE

## 2022-11-03 PROCEDURE — 1159F MED LIST DOCD IN RCRD: CPT | Mod: CPTII,S$GLB,, | Performed by: FAMILY MEDICINE

## 2022-11-03 PROCEDURE — 1159F PR MEDICATION LIST DOCUMENTED IN MEDICAL RECORD: ICD-10-PCS | Mod: CPTII,S$GLB,, | Performed by: FAMILY MEDICINE

## 2022-11-03 PROCEDURE — 3074F PR MOST RECENT SYSTOLIC BLOOD PRESSURE < 130 MM HG: ICD-10-PCS | Mod: CPTII,S$GLB,, | Performed by: FAMILY MEDICINE

## 2022-11-03 PROCEDURE — G0008 FLU VACCINE - QUADRIVALENT - ADJUVANTED: ICD-10-PCS | Mod: S$GLB,,, | Performed by: FAMILY MEDICINE

## 2022-11-03 PROCEDURE — G0008 ADMIN INFLUENZA VIRUS VAC: HCPCS | Mod: S$GLB,,, | Performed by: FAMILY MEDICINE

## 2022-11-03 PROCEDURE — 1160F PR REVIEW ALL MEDS BY PRESCRIBER/CLIN PHARMACIST DOCUMENTED: ICD-10-PCS | Mod: CPTII,S$GLB,, | Performed by: FAMILY MEDICINE

## 2022-11-03 RX ORDER — PANTOPRAZOLE SODIUM 40 MG/1
40 TABLET, DELAYED RELEASE ORAL DAILY
Qty: 90 TABLET | Refills: 1 | Status: SHIPPED | OUTPATIENT
Start: 2022-11-03 | End: 2023-09-01

## 2022-11-03 NOTE — PROGRESS NOTES
Subjective:       Patient ID: Megha Ladd is a 77 y.o. female.    Chief Complaint: Gastroesophageal Reflux (Pt states that she is here for a follow up and to get a refill on her medication ), Hypertension (Pt states that she is here for her fu ), Extremity Weakness, Diarrhea (Pt states that the dermatologist has put her on doxcycline and she believes that is what is causing the diarrhea), and Fatigue    77-year-old female with chronic reflux and esophagitis had reduced her Protonix in May of 2021 in an effort to limit potential malabsorption side effects such as B12 deficiency and osteoporosis.  She did well for a time until she ran out of the 20 mg and developed severe esophagitis.  She got it back under control when she resume the 20 mg Protonix but recently was put on doxycycline 100 mg daily by Dermatology for a skin rash and she has been having more trouble.  She has been taking the doxycycline appropriately and she has not been taking any calcium antacids with it but she has an ongoing epigastric discs comfort associated with some loose bowel movements.  She has begun taking some Culturelle probiotic.    Past Medical History:  No date: Asthma  No date: GERD (gastroesophageal reflux disease)  No date: Hyperlipidemia  No date: Hypertension  No date: Hypothyroid  No date: Lumbago  No date: Neuromuscular disorder  No date: Sinusitis  No date: Ulcer    Past Surgical History:  No date: APPENDECTOMY  No date:  SECTION  No date: LUMBAR EPIDURAL INJECTION  No date: PARTIAL HYSTERECTOMY    Current Outpatient Medications on File Prior to Visit:  acetaminophen (TYLENOL) 500 MG tablet, Take 1,000 mg by mouth 3 (three) times daily., Disp: , Rfl:   ADVAIR -21 mcg/actuation HFAA inhaler, TAKE 2 PUFFS BY MOUTH TWICE A DAY, Disp: 12 g, Rfl: 0  cartilage-collagen-bor-hyalur 40-5-3.3 mg Tab, Take 1 tablet by mouth once daily., Disp: , Rfl:   cetirizine (ZYRTEC) 10 MG tablet, Take 10 mg by mouth daily as needed  for Allergies., Disp: , Rfl:   cloNIDine (CATAPRES) 0.1 MG tablet, Take 1 tablet if systolic above 180 or diastolic above 105. Can repeat after 2 hours if bp remains above 180 or 105., Disp: 60 tablet, Rfl: 11  doxycycline monohydrate 100 mg Tab, Take 1 po qday, Disp: 30 tablet, Rfl: 2  enalapril (VASOTEC) 20 MG tablet, TAKE 1 TABLET BY MOUTH TWICE DAILY, Disp: 180 tablet, Rfl: 1  fluticasone (FLONASE) 50 mcg/actuation nasal spray, SPRAY 1 SPRAY IN EACH NOSTRIL 2 TIMES A DAY, Disp: 16 g, Rfl: 11  loperamide (IMODIUM) 2 mg capsule, Take 2 mg by mouth 4 (four) times daily as needed for Diarrhea., Disp: , Rfl:   meclizine (ANTIVERT) 25 mg tablet, Take 1 tablet (25 mg total) by mouth 3 (three) times daily as needed., Disp: 60 tablet, Rfl: 2  metaxalone (SKELAXIN) 800 MG tablet, Take 800 mg by mouth 3 (three) times daily. Every 6 hours PRN, Disp: , Rfl:   metoprolol tartrate (LOPRESSOR) 50 MG tablet, TAKE 1 & 1/2 (ONE & ONE-HALF) TABLETS BY MOUTH TWICE DAILY, Disp: 270 tablet, Rfl: 0  montelukast (SINGULAIR) 10 mg tablet, TAKE 1 TABLET EVERY EVENING, Disp: 90 tablet, Rfl: 0  peg 400-propylene glycol 0.4-0.3 % Drop, Place 2 drops into both eyes 2 (two) times daily. , Disp: , Rfl:   pregabalin (LYRICA) 75 MG capsule, Take 75 mg by mouth once daily. , Disp: , Rfl:   SODIUM CHLORIDE (SIMPLY SALINE NASL), by Nasal route 2 (two) times daily as needed. , Disp: , Rfl:   tacrolimus (PROTOPIC) 0.1 % ointment, AAA face daily to BID PRN rash, Disp: 30 g, Rfl: 1  tramadol (ULTRAM) 50 mg tablet, Take 50 mg by mouth every 8 (eight) hours as needed. Every 6 hours, Disp: , Rfl:   triamterene-hydrochlorothiazide 37.5-25 mg (MAXZIDE-25) 37.5-25 mg per tablet, Take 0.5 tablets by mouth once daily., Disp: 45 tablet, Rfl: 12  UNABLE TO FIND, Take 1 tablet by mouth once daily. Ultra triple action joint health, Disp: , Rfl:   [DISCONTINUED] pantoprazole (PROTONIX) 20 MG tablet, Take 1 tablet (20 mg total) by mouth once daily., Disp: 90 tablet,  Rfl: 0  celecoxib (CELEBREX) 200 MG capsule, Take 200 mg by mouth once daily. Every day, Disp: , Rfl:     No current facility-administered medications on file prior to visit.        Review of Systems   Constitutional:  Negative for chills, diaphoresis and fever.   Gastrointestinal:  Positive for abdominal pain, diarrhea and nausea. Negative for blood in stool, constipation and vomiting.   Genitourinary:  Negative for dysuria and hematuria.     Objective:      Physical Exam  Vitals and nursing note reviewed.   Constitutional:       General: She is not in acute distress.     Appearance: Normal appearance. She is normal weight. She is not ill-appearing, toxic-appearing or diaphoretic.      Comments: Initial blood pressure elevated but after a few minutes to sit and relax she came down to a normal level.      Normal weight with a BMI of 25.3 she is down 2.3 lb from her August 31, 2022 visit   Abdominal:      General: Abdomen is flat. Bowel sounds are normal. There is no distension.      Palpations: Abdomen is soft. There is no shifting dullness, hepatomegaly, splenomegaly or mass.      Tenderness: There is no abdominal tenderness. There is no guarding or rebound. Negative signs include Box's sign and McBurney's sign.   Neurological:      Mental Status: She is alert.       Assessment:       1. Gastroesophageal reflux disease, unspecified whether esophagitis present    2. Diarrhea, unspecified type          Plan:       1. Gastroesophageal reflux disease, unspecified whether esophagitis present  Increase Protonix back to 40 mg while she is taking the doxycycline.  Do not take calcium antacids or milk products within 1 hour of the doxycycline.  This does not apply to the Protonix or the culture L.  - pantoprazole (PROTONIX) 40 MG tablet; Take 1 tablet (40 mg total) by mouth once daily.  Dispense: 90 tablet; Refill: 1    2. Diarrhea, unspecified type  Most likely secondary to the upper GI irritation, patient will watch  for further changes and she may continue the Culturelle

## 2022-11-17 ENCOUNTER — OFFICE VISIT (OUTPATIENT)
Dept: DERMATOLOGY | Facility: CLINIC | Age: 77
End: 2022-11-17
Payer: MEDICARE

## 2022-11-17 VITALS
DIASTOLIC BLOOD PRESSURE: 76 MMHG | SYSTOLIC BLOOD PRESSURE: 161 MMHG | HEIGHT: 66 IN | HEART RATE: 68 BPM | WEIGHT: 153 LBS | BODY MASS INDEX: 24.59 KG/M2

## 2022-11-17 DIAGNOSIS — C44.311 BASAL CELL CARCINOMA OF NOSE: Primary | ICD-10-CM

## 2022-11-17 PROCEDURE — 99499 UNLISTED E&M SERVICE: CPT | Mod: HCNC,S$GLB,, | Performed by: DERMATOLOGY

## 2022-11-17 PROCEDURE — 99999 PR PBB SHADOW E&M-EST. PATIENT-LVL V: ICD-10-PCS | Mod: PBBFAC,,, | Performed by: DERMATOLOGY

## 2022-11-17 PROCEDURE — 3288F FALL RISK ASSESSMENT DOCD: CPT | Mod: CPTII,S$GLB,, | Performed by: DERMATOLOGY

## 2022-11-17 PROCEDURE — 3078F PR MOST RECENT DIASTOLIC BLOOD PRESSURE < 80 MM HG: ICD-10-PCS | Mod: CPTII,S$GLB,, | Performed by: DERMATOLOGY

## 2022-11-17 PROCEDURE — 3288F PR FALLS RISK ASSESSMENT DOCUMENTED: ICD-10-PCS | Mod: CPTII,S$GLB,, | Performed by: DERMATOLOGY

## 2022-11-17 PROCEDURE — 1160F PR REVIEW ALL MEDS BY PRESCRIBER/CLIN PHARMACIST DOCUMENTED: ICD-10-PCS | Mod: CPTII,S$GLB,, | Performed by: DERMATOLOGY

## 2022-11-17 PROCEDURE — 99999 PR PBB SHADOW E&M-EST. PATIENT-LVL V: CPT | Mod: PBBFAC,,, | Performed by: DERMATOLOGY

## 2022-11-17 PROCEDURE — 1101F PT FALLS ASSESS-DOCD LE1/YR: CPT | Mod: CPTII,S$GLB,, | Performed by: DERMATOLOGY

## 2022-11-17 PROCEDURE — 1159F PR MEDICATION LIST DOCUMENTED IN MEDICAL RECORD: ICD-10-PCS | Mod: CPTII,S$GLB,, | Performed by: DERMATOLOGY

## 2022-11-17 PROCEDURE — 3077F PR MOST RECENT SYSTOLIC BLOOD PRESSURE >= 140 MM HG: ICD-10-PCS | Mod: CPTII,S$GLB,, | Performed by: DERMATOLOGY

## 2022-11-17 PROCEDURE — 99499 RISK ADDL DX/OHS AUDIT: ICD-10-PCS | Mod: HCNC,S$GLB,, | Performed by: DERMATOLOGY

## 2022-11-17 PROCEDURE — 1125F AMNT PAIN NOTED PAIN PRSNT: CPT | Mod: CPTII,S$GLB,, | Performed by: DERMATOLOGY

## 2022-11-17 PROCEDURE — 3078F DIAST BP <80 MM HG: CPT | Mod: CPTII,S$GLB,, | Performed by: DERMATOLOGY

## 2022-11-17 PROCEDURE — 1160F RVW MEDS BY RX/DR IN RCRD: CPT | Mod: CPTII,S$GLB,, | Performed by: DERMATOLOGY

## 2022-11-17 PROCEDURE — 3077F SYST BP >= 140 MM HG: CPT | Mod: CPTII,S$GLB,, | Performed by: DERMATOLOGY

## 2022-11-17 PROCEDURE — 1125F PR PAIN SEVERITY QUANTIFIED, PAIN PRESENT: ICD-10-PCS | Mod: CPTII,S$GLB,, | Performed by: DERMATOLOGY

## 2022-11-17 PROCEDURE — 1101F PR PT FALLS ASSESS DOC 0-1 FALLS W/OUT INJ PAST YR: ICD-10-PCS | Mod: CPTII,S$GLB,, | Performed by: DERMATOLOGY

## 2022-11-17 PROCEDURE — 99213 OFFICE O/P EST LOW 20 MIN: CPT | Mod: S$GLB,,, | Performed by: DERMATOLOGY

## 2022-11-17 PROCEDURE — 1159F MED LIST DOCD IN RCRD: CPT | Mod: CPTII,S$GLB,, | Performed by: DERMATOLOGY

## 2022-11-17 PROCEDURE — 99213 PR OFFICE/OUTPT VISIT, EST, LEVL III, 20-29 MIN: ICD-10-PCS | Mod: S$GLB,,, | Performed by: DERMATOLOGY

## 2022-11-17 NOTE — PROGRESS NOTES
ALLERGIES:  Zoster vaccine live, Amlodipine, Azithromycin, Budesonide, Cephalexin, Ciprofloxacin, Erythromycin, Esomeprazole magnesium, Felodipine, Levofloxacin, Talwin compound, and Hydrocodone-acetaminophen    CHIEF COMPLAINT:  This 77 y.o. female comes for evaluation for Mohs' Micrographic Surgery, Fresh Tissue Technique, for treatment of a biopsy-proven basal cell carcinoma on the nasal dosum. Consultation requested by Jasmyn Newell M.D..    The patient is accompanied to this visit by her .    HISTORY OF PRESENT ILLNESS:   Location: nasal dorsum   Duration: 12 months or more  Quality: persistent  Context: status post biopsy by NALINI Daniels; path = as below; pathology accession # [id number]-, [facility]    Prior Treatment: none    Defibrillator: No  Pacemaker: No  Artificial heart valves: No  Artificial joints: No       Final Pathologic Diagnosis   Date Value Ref Range Status   10/21/2022   Final    Skin, nasal dorsum, shave biopsy:  -BASAL CELL CARCINOMA, NODULAR TYPE, EXTENDING TO THE PERIPHERAL AND DEEP  BIOPSY EDGES  This lesion is skin cancer. You will be contacted regarding treatment.       Comment:     Interp By Drake Salgado M.D., Signed on 10/26/2022 at 12:37         REVIEW OF SYSTEMS:   General: general health good  Skin: previous skin cancer(s) Right lower eyelid. Unsure of type.   If yes, details:   Relevant other:  Rocasea  Cardiovascular:   High Blood Pressure: Yes   Chest Pain:  No   Defibrillator: as above  Pacemaker: as above  Artificial heart valves: as above  Prior Endocarditis: No   Prior Heart Attack/MI: No     If yes, when:   Prior Cardiac Bypass or Stents:  No   If yes, when:   Mitral Valve Prolapse: No   Relevant other:  No   Respiratory:   Shortness of breath:  No   Relevant other:  No   Endocrine:   Diabetes:  No   Relevant other:  Hypo  Hem/Lymph:   Taking Prescribed Blood Thinners:  No   Easy Bleeding:  No   Relevant other:  No   Allergy/Immuno: as noted  above  Relevant other:  No   GI:   Prior Hepatitis:  No     If yes, details:   Relevant other:  Gerd  Musculoskeletal:   Artificial joints: as above  Relevant other:  No   Neurologic:   Prior Stroke:  No     If yes, details:   Relevant other:  No   Relevant other info:  No         PAST MEDICAL HISTORY:  Past Medical History:   Diagnosis Date    Asthma     GERD (gastroesophageal reflux disease)     Hyperlipidemia     Hypertension     Hypothyroid     Lumbago     Neuromuscular disorder     Sinusitis     Ulcer        PAST SURGICAL HISTORY:    Past Surgical History:   Procedure Laterality Date    APPENDECTOMY       SECTION      LUMBAR EPIDURAL INJECTION      PARTIAL HYSTERECTOMY          SOCIAL HISTORY:  Dependencies: smoking status as noted below  Social History     Tobacco Use    Smoking status: Never    Smokeless tobacco: Never   Substance Use Topics    Alcohol use: No    Drug use: No       PERTINENT MEDICATIONS:  See medications list.    Current Outpatient Medications:     acetaminophen (TYLENOL) 500 MG tablet, Take 1,000 mg by mouth 3 (three) times daily., Disp: , Rfl:     ADVAIR -21 mcg/actuation HFAA inhaler, TAKE 2 PUFFS BY MOUTH TWICE A DAY, Disp: 12 g, Rfl: 0    cartilage-collagen-bor-hyalur 40-5-3.3 mg Tab, Take 1 tablet by mouth once daily., Disp: , Rfl:     cetirizine (ZYRTEC) 10 MG tablet, Take 10 mg by mouth daily as needed for Allergies., Disp: , Rfl:     cloNIDine (CATAPRES) 0.1 MG tablet, Take 1 tablet if systolic above 180 or diastolic above 105. Can repeat after 2 hours if bp remains above 180 or 105., Disp: 60 tablet, Rfl: 11    doxycycline monohydrate 100 mg Tab, Take 1 po qday, Disp: 30 tablet, Rfl: 2    enalapril (VASOTEC) 20 MG tablet, TAKE 1 TABLET BY MOUTH TWICE DAILY, Disp: 180 tablet, Rfl: 1    fluticasone (FLONASE) 50 mcg/actuation nasal spray, SPRAY 1 SPRAY IN EACH NOSTRIL 2 TIMES A DAY, Disp: 16 g, Rfl: 11    loperamide (IMODIUM) 2 mg capsule, Take 2 mg by mouth 4 (four)  times daily as needed for Diarrhea., Disp: , Rfl:     meclizine (ANTIVERT) 25 mg tablet, Take 1 tablet (25 mg total) by mouth 3 (three) times daily as needed., Disp: 60 tablet, Rfl: 2    metaxalone (SKELAXIN) 800 MG tablet, Take 800 mg by mouth 3 (three) times daily. Every 6 hours PRN, Disp: , Rfl:     metoprolol tartrate (LOPRESSOR) 50 MG tablet, TAKE 1 & 1/2 (ONE & ONE-HALF) TABLETS BY MOUTH TWICE DAILY, Disp: 270 tablet, Rfl: 0    montelukast (SINGULAIR) 10 mg tablet, TAKE 1 TABLET EVERY EVENING, Disp: 90 tablet, Rfl: 0    pantoprazole (PROTONIX) 40 MG tablet, Take 1 tablet (40 mg total) by mouth once daily., Disp: 90 tablet, Rfl: 1    peg 400-propylene glycol 0.4-0.3 % Drop, Place 2 drops into both eyes 2 (two) times daily. , Disp: , Rfl:     pregabalin (LYRICA) 75 MG capsule, Take 75 mg by mouth once daily. , Disp: , Rfl:     SODIUM CHLORIDE (SIMPLY SALINE NASL), by Nasal route 2 (two) times daily as needed. , Disp: , Rfl:     tacrolimus (PROTOPIC) 0.1 % ointment, AAA face daily to BID PRN rash, Disp: 30 g, Rfl: 1    tramadol (ULTRAM) 50 mg tablet, Take 50 mg by mouth every 8 (eight) hours as needed. Every 6 hours, Disp: , Rfl:     triamterene-hydrochlorothiazide 37.5-25 mg (MAXZIDE-25) 37.5-25 mg per tablet, Take 0.5 tablets by mouth once daily., Disp: 45 tablet, Rfl: 12    UNABLE TO FIND, Take 1 tablet by mouth once daily. Ultra triple action joint health, Disp: , Rfl:     celecoxib (CELEBREX) 200 MG capsule, Take 200 mg by mouth once daily. Every day, Disp: , Rfl:     ALLERGIES:  Zoster vaccine live, Amlodipine, Azithromycin, Budesonide, Cephalexin, Ciprofloxacin, Erythromycin, Esomeprazole magnesium, Felodipine, Levofloxacin, Talwin compound, and Hydrocodone-acetaminophen    EXAM:  Constitutional  General appearance: well-developed, well-nourished, well-kempt older white female    Neurologic/Psychiatric  Alert,  normal orientation to time, place, person  Normal mood and affect with no evidence of  depression, anxiety, agitation  Skin: see photo(s)  Head: background moderate solar damage to exposed areas of skin  inspection/palpation reveals an approximately 6-7 mm pink scar on the mid dorsal nose   she confirmed this as the site of the prior biopsy and site(s) confirmed by reference to the photograph(s) attached below taken at the time of the biopsy/biopsies by the referring physician    Photo(s) this visit:       Photo(s) from biopsy visit:      ASSESSMENT: biopsy-proven basal cell carcinoma of the dorsal nose  chronic solar damage to areas as noted above    PLAN:  The diagnosis and management options, and risks and benefits of the alternatives, including observation/non-treatment, radiation treatment, excision with vertical frozen section or paraffin-embedded section margin evaluation, and Mohs' Micrographic Surgery, Fresh Tissue Technique, were discussed at length with the patient. In particular, the discussion included, but was not limited to, the following:    One alternative at this point would be to defer further treatment and observe the lesion. With small skin cancers of this kind, it is possible that a biopsy can be sufficient to definitively treat a small skin cancer of this kind. Alternatively, some skin cancers are slow growing and do not require immediate treatment. The potential advantage of this choice would be to avoid the need for possibly unnecessary additional surgery. Among the potential disadvantages of this would be the possibility of enlargement of the lesion, more extensive spread of the lesion or recurrence at a later date, which might necessitate a larger and more complex surgery.    Radiation treatment can be an effective treatment for this type of skin cancer. The usual course of treatment is every weekday for several weeks. Local irritation will result from treatment, although no systemic side effects are expected. The potential advantage of radiation treatment is that it avoids  the need for surgery. Among the disadvantages of radiation treatment are the length of treatment, the local inflammatory response, the absence of pathologic confirmation of the removal of the skin cancer, a possible increased risk of additional skin cancer in the treated area in later years, and a somewhat increased risk of recurrence at a later date.     Excisional surgery can be an effective treatment for this type of skin cancer. This would involve excision of the lesion with margin evaluation by submitting the specimen to a pathologist for either immediate marginal assessment via frozen section processing, or delayed marginal assessment by fixed-tissue processing. The potential advantage of this technique is that it offers a way of treating the lesion with some degree of histologic confirmation of tumor removal. Among the disadvantages of this treatment are the possible need for re-excision if marginal involvement is identified, a somewhat greater likelihood of recurrence as compared to Mohs' surgery because of the less comprehensive margin evaluation inherent in the technique, and the general potential risks of surgery, including allergic reactions to the anesthetic and other materials used, infection, injury to nerves in the area with consequent loss of sensation or muscle function, and scarring or distortion of surrounding structures.    Mohs' surgery is a very effective treatment for this type of skin cancer. The potential advantage of Mohs' surgery is that this technique offers the greatest possible certainty of knowing that the skin cancer has been completely removed, with the removal of the least amount of normal tissue. The potential disadvantages of Mohs' surgery include the duration of the surgery, the possible need for a separate surgery for reconstruction following tumor removal, and scarring as a result. In addition, general potential risks of surgery as noted above also apply to treatment via Mohs'  surgery.    In light of the nature of this tumor and the location on the nose in an area of increased risk of recurrence,  Mohs' micrographic surgery was thought to be the most appropriate management choice, and this diagnosis is appropriate for treatment by Mohs' micrographic surgery.     We also discussed options for management of the wound following completion of tumor removal via the Mohs' technique. This discussion include a review of the nature of, and risks and benefits of the alternatives, including healing via secondary intention, primary linear closure, closure via adjacent tissue transfer/skin flap(s), and closure by use of a skin graft.     Sufficient time was available for questions, and all questions were answered to her satisfaction. She fully understands the aims, risks, alternatives, and possible complications, and has elected to proceed with the surgery, and verbally consented to do so. The procedure will be scheduled in the near future.    Routine pre-op instructions were given to her.      --------------------------------------  Note: Some or all of this note may have been generated using voice recognition software. There may be voice recognition errors including grammatical and/or spelling errors found in the text. Attempts were made to correct these errors prior to signature.

## 2022-12-06 ENCOUNTER — OFFICE VISIT (OUTPATIENT)
Dept: DERMATOLOGY | Facility: CLINIC | Age: 77
End: 2022-12-06
Payer: MEDICARE

## 2022-12-06 VITALS — BODY MASS INDEX: 24.59 KG/M2 | HEIGHT: 66 IN | WEIGHT: 153 LBS

## 2022-12-06 DIAGNOSIS — L21.9 SEBORRHEIC DERMATITIS: Primary | ICD-10-CM

## 2022-12-06 DIAGNOSIS — C44.311 BASAL CELL CARCINOMA OF NOSE: ICD-10-CM

## 2022-12-06 DIAGNOSIS — L71.9 ROSACEA: ICD-10-CM

## 2022-12-06 PROCEDURE — 3288F PR FALLS RISK ASSESSMENT DOCUMENTED: ICD-10-PCS | Mod: CPTII,S$GLB,, | Performed by: DERMATOLOGY

## 2022-12-06 PROCEDURE — 99213 PR OFFICE/OUTPT VISIT, EST, LEVL III, 20-29 MIN: ICD-10-PCS | Mod: S$GLB,,, | Performed by: DERMATOLOGY

## 2022-12-06 PROCEDURE — 1126F AMNT PAIN NOTED NONE PRSNT: CPT | Mod: CPTII,S$GLB,, | Performed by: DERMATOLOGY

## 2022-12-06 PROCEDURE — 1126F PR PAIN SEVERITY QUANTIFIED, NO PAIN PRESENT: ICD-10-PCS | Mod: CPTII,S$GLB,, | Performed by: DERMATOLOGY

## 2022-12-06 PROCEDURE — 1159F PR MEDICATION LIST DOCUMENTED IN MEDICAL RECORD: ICD-10-PCS | Mod: CPTII,S$GLB,, | Performed by: DERMATOLOGY

## 2022-12-06 PROCEDURE — 99999 PR PBB SHADOW E&M-EST. PATIENT-LVL III: CPT | Mod: PBBFAC,,, | Performed by: DERMATOLOGY

## 2022-12-06 PROCEDURE — 1101F PR PT FALLS ASSESS DOC 0-1 FALLS W/OUT INJ PAST YR: ICD-10-PCS | Mod: CPTII,S$GLB,, | Performed by: DERMATOLOGY

## 2022-12-06 PROCEDURE — 1160F PR REVIEW ALL MEDS BY PRESCRIBER/CLIN PHARMACIST DOCUMENTED: ICD-10-PCS | Mod: CPTII,S$GLB,, | Performed by: DERMATOLOGY

## 2022-12-06 PROCEDURE — 1159F MED LIST DOCD IN RCRD: CPT | Mod: CPTII,S$GLB,, | Performed by: DERMATOLOGY

## 2022-12-06 PROCEDURE — 1160F RVW MEDS BY RX/DR IN RCRD: CPT | Mod: CPTII,S$GLB,, | Performed by: DERMATOLOGY

## 2022-12-06 PROCEDURE — 99999 PR PBB SHADOW E&M-EST. PATIENT-LVL III: ICD-10-PCS | Mod: PBBFAC,,, | Performed by: DERMATOLOGY

## 2022-12-06 PROCEDURE — 3288F FALL RISK ASSESSMENT DOCD: CPT | Mod: CPTII,S$GLB,, | Performed by: DERMATOLOGY

## 2022-12-06 PROCEDURE — 1101F PT FALLS ASSESS-DOCD LE1/YR: CPT | Mod: CPTII,S$GLB,, | Performed by: DERMATOLOGY

## 2022-12-06 PROCEDURE — 99213 OFFICE O/P EST LOW 20 MIN: CPT | Mod: S$GLB,,, | Performed by: DERMATOLOGY

## 2022-12-06 RX ORDER — KETOCONAZOLE 20 MG/ML
SHAMPOO, SUSPENSION TOPICAL
Qty: 120 ML | Refills: 5 | Status: ON HOLD | OUTPATIENT
Start: 2022-12-06 | End: 2023-10-26 | Stop reason: SDUPTHER

## 2022-12-06 NOTE — PROGRESS NOTES
Subjective:       Patient ID:  Megha Ladd is a 77 y.o. female who presents for   Chief Complaint   Patient presents with    Rosacea     Follow up     LOV- 10/21/22- rosacea  Scheduled Mohs 2/6/2023   Skin, nasal dorsum, shave biopsy:   -BASAL CELL CARCINOMA, NODULAR TYPE, EXTENDING TO THE PERIPHERAL AND DEEP   BIOPSY EDGES    Here today for a follow up on rosacea-   Rash on face is much improved    Has hx of NMSC-BCC nasal dorsum scheduled for Mohs on 2/6/23- Dr. Meehan  Has no fhx of MM    Current Outpatient Medications:   ·  acetaminophen (TYLENOL) 500 MG tablet, Take 1,000 mg by mouth 3 (three) times daily., Disp: , Rfl:   ·  ADVAIR -21 mcg/actuation HFAA inhaler, TAKE 2 PUFFS BY MOUTH TWICE A DAY, Disp: 12 g, Rfl: 0  ·  cartilage-collagen-bor-hyalur 40-5-3.3 mg Tab, Take 1 tablet by mouth once daily., Disp: , Rfl:   ·  celecoxib (CELEBREX) 200 MG capsule, Take 200 mg by mouth once daily. Every day, Disp: , Rfl:   ·  cetirizine (ZYRTEC) 10 MG tablet, Take 10 mg by mouth daily as needed for Allergies., Disp: , Rfl:   ·  cloNIDine (CATAPRES) 0.1 MG tablet, Take 1 tablet if systolic above 180 or diastolic above 105. Can repeat after 2 hours if bp remains above 180 or 105., Disp: 60 tablet, Rfl: 11  ·  doxycycline monohydrate 100 mg Tab, Take 1 po qday, Disp: 30 tablet, Rfl: 2  ·  enalapril (VASOTEC) 20 MG tablet, TAKE 1 TABLET BY MOUTH TWICE DAILY, Disp: 180 tablet, Rfl: 1  ·  fluticasone (FLONASE) 50 mcg/actuation nasal spray, SPRAY 1 SPRAY IN EACH NOSTRIL 2 TIMES A DAY, Disp: 16 g, Rfl: 11  ·  loperamide (IMODIUM) 2 mg capsule, Take 2 mg by mouth 4 (four) times daily as needed for Diarrhea., Disp: , Rfl:   ·  meclizine (ANTIVERT) 25 mg tablet, Take 1 tablet (25 mg total) by mouth 3 (three) times daily as needed., Disp: 60 tablet, Rfl: 2  ·  metaxalone (SKELAXIN) 800 MG tablet, Take 800 mg by mouth 3 (three) times daily. Every 6 hours PRN, Disp: , Rfl:   ·  metoprolol tartrate (LOPRESSOR) 50 MG  tablet, TAKE 1 & 1/2 (ONE & ONE-HALF) TABLETS BY MOUTH TWICE DAILY, Disp: 270 tablet, Rfl: 0  ·  montelukast (SINGULAIR) 10 mg tablet, TAKE 1 TABLET EVERY EVENING, Disp: 90 tablet, Rfl: 0  ·  pantoprazole (PROTONIX) 40 MG tablet, Take 1 tablet (40 mg total) by mouth once daily., Disp: 90 tablet, Rfl: 1  ·  peg 400-propylene glycol 0.4-0.3 % Drop, Place 2 drops into both eyes 2 (two) times daily. , Disp: , Rfl:   ·  pregabalin (LYRICA) 75 MG capsule, Take 75 mg by mouth once daily. , Disp: , Rfl:   ·  SODIUM CHLORIDE (SIMPLY SALINE NASL), by Nasal route 2 (two) times daily as needed. , Disp: , Rfl:   ·  tacrolimus (PROTOPIC) 0.1 % ointment, AAA face daily to BID PRN rash, Disp: 30 g, Rfl: 1  ·  tramadol (ULTRAM) 50 mg tablet, Take 50 mg by mouth every 8 (eight) hours as needed. Every 6 hours, Disp: , Rfl:   ·  triamterene-hydrochlorothiazide 37.5-25 mg (MAXZIDE-25) 37.5-25 mg per tablet, Take 0.5 tablets by mouth once daily., Disp: 45 tablet, Rfl: 12  ·  UNABLE TO FIND, Take 1 tablet by mouth once daily. Ultra triple action joint health, Disp: , Rfl:         Review of Systems   Constitutional:  Negative for fever, chills and fatigue.   Respiratory:  Negative for cough and shortness of breath.    Gastrointestinal:  Negative for nausea and vomiting.   Skin:  Positive for activity-related sunscreen use and wears hat. Negative for itching, rash and daily sunscreen use.      Objective:    Physical Exam   Constitutional: She appears well-developed and well-nourished.   Neurological: She is alert and oriented to person, place, and time.   Psychiatric: She has a normal mood and affect.   Skin:   Areas Examined (abnormalities noted in diagram):   Head / Face Inspection Performed            Diagram Legend     Erythematous scaling macule/papule c/w actinic keratosis       Vascular papule c/w angioma      Pigmented verrucoid papule/plaque c/w seborrheic keratosis      Yellow umbilicated papule c/w sebaceous hyperplasia       Irregularly shaped tan macule c/w lentigo     1-2 mm smooth white papules consistent with Milia      Movable subcutaneous cyst with punctum c/w epidermal inclusion cyst      Subcutaneous movable cyst c/w pilar cyst      Firm pink to brown papule c/w dermatofibroma      Pedunculated fleshy papule(s) c/w skin tag(s)      Evenly pigmented macule c/w junctional nevus     Mildly variegated pigmented, slightly irregular-bordered macule c/w mildly atypical nevus      Flesh colored to evenly pigmented papule c/w intradermal nevus       Pink pearly papule/plaque c/w basal cell carcinoma      Erythematous hyperkeratotic cursted plaque c/w SCC      Surgical scar with no sign of skin cancer recurrence      Open and closed comedones      Inflammatory papules and pustules      Verrucoid papule consistent consistent with wart     Erythematous eczematous patches and plaques     Dystrophic onycholytic nail with subungual debris c/w onychomycosis     Umbilicated papule    Erythematous-base heme-crusted tan verrucoid plaque consistent with inflamed seborrheic keratosis     Erythematous Silvery Scaling Plaque c/w Psoriasis     See annotation      Assessment / Plan:        Seborrheic dermatitis  -     ketoconazole (NIZORAL) 2 % shampoo; Wash hair with medicated shampoo at least 2x/week - let sit on scalp at least 5 minutes prior to rinsing  Dispense: 120 mL; Refill: 5  Alternate with zinc shampoo    Rosacea  Cleared nicely with triple cream, protopic and doxy  Stop doxycycline  Continue triple cream once to twice daily  Use protopic as needed for itch/scale    Basal cell carcinoma of nose  Planned Mohs 2/6/2023           No follow-ups on file.

## 2023-01-05 RX ORDER — MONTELUKAST SODIUM 10 MG/1
TABLET ORAL
Qty: 90 TABLET | Refills: 3 | Status: SHIPPED | OUTPATIENT
Start: 2023-01-05 | End: 2023-01-12 | Stop reason: SDUPTHER

## 2023-01-05 NOTE — TELEPHONE ENCOUNTER
Refill Decision Note   Megha Ladd  is requesting a refill authorization.  Brief Assessment and Rationale for Refill:  Approve     Medication Therapy Plan:       Medication Reconciliation Completed: No   Comments:     No Care Gaps recommended.     Note composed:1:47 PM 01/05/2023

## 2023-01-05 NOTE — TELEPHONE ENCOUNTER
No new care gaps identified.  [facility] Embedded Care Gaps. Reference number: [id number]. 1/05/2023   1:10:45 PM CST

## 2023-01-06 ENCOUNTER — TELEPHONE (OUTPATIENT)
Dept: DERMATOLOGY | Facility: CLINIC | Age: 78
End: 2023-01-06
Payer: MEDICARE

## 2023-01-12 RX ORDER — MONTELUKAST SODIUM 10 MG/1
10 TABLET ORAL NIGHTLY
Qty: 5 TABLET | Refills: 0 | Status: SHIPPED | OUTPATIENT
Start: 2023-01-12 | End: 2023-04-20 | Stop reason: SDUPTHER

## 2023-01-12 NOTE — TELEPHONE ENCOUNTER
Tati called- patient needs short fill on Singulair for 5 days until mail order supply received. [facility] [address]

## 2023-01-12 NOTE — TELEPHONE ENCOUNTER
No new care gaps identified.  [facility] Embedded Care Gaps. Reference number: [id number]. 1/12/2023   4:18:50 PM CST

## 2023-01-31 ENCOUNTER — TELEPHONE (OUTPATIENT)
Dept: DERMATOLOGY | Facility: CLINIC | Age: 78
End: 2023-01-31
Payer: MEDICARE

## 2023-01-31 NOTE — TELEPHONE ENCOUNTER
Patient called and said that she has been dizzy since Friday and getting worse everyday. Wanted to know if she should cancel her surgery. I told her that I would speak with Dr. Lund, after speaking with him he said he could not do surgery on her with out knowing what is going on. He said she needed to see her primary doctor or is is that bad she needs to go to the er. I told her to call us after she finds out is going on.

## 2023-02-02 ENCOUNTER — TELEPHONE (OUTPATIENT)
Dept: FAMILY MEDICINE | Facility: CLINIC | Age: 78
End: 2023-02-02
Payer: MEDICARE

## 2023-02-02 NOTE — TELEPHONE ENCOUNTER
----- Message from Lorraine Baer sent at 2/2/2023 11:22 AM CST -----  Contact: Pt   Type:  Needs Medical Advice    Who Called: Pt  Gregoria  Symptoms (please be specific): Pt need surgery clearance      Would the patient rather a call back or a response via [facility]? Call  Best Call Back Number: [phone]    Additional Information: Please call pt to consult... Pt has an appt for 2/7 but would like a nurse to give them a call...    Thank you...

## 2023-02-02 NOTE — TELEPHONE ENCOUNTER
Patient's  (Gregoria) advised patient will be having a cancerous mole removed from her nose, and needs surgical clearance. Appointment scheduled for the date of 2-3-23. Mr. Kinney agreed to appointment date, time, and location. Location confirmed at the time of call.

## 2023-02-03 ENCOUNTER — OFFICE VISIT (OUTPATIENT)
Dept: FAMILY MEDICINE | Facility: CLINIC | Age: 78
End: 2023-02-03
Payer: MEDICARE

## 2023-02-03 VITALS
HEIGHT: 66 IN | WEIGHT: 165.56 LBS | OXYGEN SATURATION: 98 % | DIASTOLIC BLOOD PRESSURE: 82 MMHG | BODY MASS INDEX: 26.61 KG/M2 | RESPIRATION RATE: 16 BRPM | HEART RATE: 75 BPM | TEMPERATURE: 98 F | SYSTOLIC BLOOD PRESSURE: 124 MMHG

## 2023-02-03 DIAGNOSIS — I10 ESSENTIAL HYPERTENSION: ICD-10-CM

## 2023-02-03 DIAGNOSIS — R09.81 SINUS CONGESTION: ICD-10-CM

## 2023-02-03 DIAGNOSIS — R42 DIZZINESS: ICD-10-CM

## 2023-02-03 DIAGNOSIS — R09.82 PND (POST-NASAL DRIP): Primary | ICD-10-CM

## 2023-02-03 PROCEDURE — 3074F PR MOST RECENT SYSTOLIC BLOOD PRESSURE < 130 MM HG: ICD-10-PCS | Mod: HCNC,CPTII,S$GLB,

## 2023-02-03 PROCEDURE — 1100F PR PT FALLS ASSESS DOC 2+ FALLS/FALL W/INJURY/YR: ICD-10-PCS | Mod: HCNC,CPTII,S$GLB,

## 2023-02-03 PROCEDURE — 1125F PR PAIN SEVERITY QUANTIFIED, PAIN PRESENT: ICD-10-PCS | Mod: HCNC,CPTII,S$GLB,

## 2023-02-03 PROCEDURE — 3079F DIAST BP 80-89 MM HG: CPT | Mod: HCNC,CPTII,S$GLB,

## 2023-02-03 PROCEDURE — 99214 PR OFFICE/OUTPT VISIT, EST, LEVL IV, 30-39 MIN: ICD-10-PCS | Mod: HCNC,S$GLB,,

## 2023-02-03 PROCEDURE — 1160F RVW MEDS BY RX/DR IN RCRD: CPT | Mod: HCNC,CPTII,S$GLB,

## 2023-02-03 PROCEDURE — 1125F AMNT PAIN NOTED PAIN PRSNT: CPT | Mod: HCNC,CPTII,S$GLB,

## 2023-02-03 PROCEDURE — 99999 PR PBB SHADOW E&M-EST. PATIENT-LVL V: ICD-10-PCS | Mod: PBBFAC,HCNC,,

## 2023-02-03 PROCEDURE — 3288F FALL RISK ASSESSMENT DOCD: CPT | Mod: HCNC,CPTII,S$GLB,

## 2023-02-03 PROCEDURE — 1160F PR REVIEW ALL MEDS BY PRESCRIBER/CLIN PHARMACIST DOCUMENTED: ICD-10-PCS | Mod: HCNC,CPTII,S$GLB,

## 2023-02-03 PROCEDURE — 3074F SYST BP LT 130 MM HG: CPT | Mod: HCNC,CPTII,S$GLB,

## 2023-02-03 PROCEDURE — 3288F PR FALLS RISK ASSESSMENT DOCUMENTED: ICD-10-PCS | Mod: HCNC,CPTII,S$GLB,

## 2023-02-03 PROCEDURE — 99214 OFFICE O/P EST MOD 30 MIN: CPT | Mod: HCNC,S$GLB,,

## 2023-02-03 PROCEDURE — 1159F PR MEDICATION LIST DOCUMENTED IN MEDICAL RECORD: ICD-10-PCS | Mod: HCNC,CPTII,S$GLB,

## 2023-02-03 PROCEDURE — 1159F MED LIST DOCD IN RCRD: CPT | Mod: HCNC,CPTII,S$GLB,

## 2023-02-03 PROCEDURE — 3079F PR MOST RECENT DIASTOLIC BLOOD PRESSURE 80-89 MM HG: ICD-10-PCS | Mod: HCNC,CPTII,S$GLB,

## 2023-02-03 PROCEDURE — 99999 PR PBB SHADOW E&M-EST. PATIENT-LVL V: CPT | Mod: PBBFAC,HCNC,,

## 2023-02-03 PROCEDURE — 1100F PTFALLS ASSESS-DOCD GE2>/YR: CPT | Mod: HCNC,CPTII,S$GLB,

## 2023-02-03 RX ORDER — DOXYCYCLINE HYCLATE 100 MG
100 TABLET ORAL 2 TIMES DAILY
Qty: 14 TABLET | Refills: 0 | Status: SHIPPED | OUTPATIENT
Start: 2023-02-03 | End: 2023-02-10

## 2023-02-03 RX ORDER — MECLIZINE HYDROCHLORIDE 25 MG/1
25 TABLET ORAL 3 TIMES DAILY PRN
Qty: 60 TABLET | Refills: 2 | Status: SHIPPED | OUTPATIENT
Start: 2023-02-03 | End: 2023-09-01 | Stop reason: SDUPTHER

## 2023-02-03 NOTE — PROGRESS NOTES
Subjective:       Patient ID: Megha Ladd is a 77 y.o. female.    Chief Complaint: Dizziness    Presents to the clinic w/ complaints of acute onset dizziness. Recent sinus congestion PND. No ear pain. Feels off balance. Requests refill of meclizine as she has had issues with this previously. Her symptoms have been gradually improving with use of OTC medications and nasal sprays. Had a cough but this resolved. Purulent drainage from her nose. Derm procedure has been canceled given her recent symptoms.       Past Medical History:   Diagnosis Date    Asthma     GERD (gastroesophageal reflux disease)     Hyperlipidemia     Hypertension     Hypothyroid     Lumbago     Neuromuscular disorder     Sinusitis     Ulcer        Review of patient's allergies indicates:   Allergen Reactions    Zoster vaccine live Other (See Comments)     Headache, neck shoulder, body flu sx, sore throat, nausea, dry cough, fatigue     Amlodipine Diarrhea, Nausea Only and Other (See Comments)     Joint pain    Azithromycin      Other reaction(s): Unknown    Budesonide      Other reaction(s): heartburn  Other reaction(s): Rash    Cephalexin      Other reaction(s): Unknown    Ciprofloxacin      Other reaction(s): Unknown    Erythromycin      Other reaction(s): Unknown    Esomeprazole magnesium      Other reaction(s): Headache    Felodipine      Nausea, raw throat, ulcer tongue, myalgia    Levofloxacin      Other reaction(s): tendonitis    Talwin compound      Other reaction(s): Vomiting  Other reaction(s): Hallucinations    Hydrocodone-acetaminophen Rash     Other reaction(s): Nausea         Current Outpatient Medications:     acetaminophen (TYLENOL) 500 MG tablet, Take 1,000 mg by mouth 3 (three) times daily., Disp: , Rfl:     ADVAIR -21 mcg/actuation HFAA inhaler, TAKE 2 PUFFS BY MOUTH TWICE A DAY, Disp: 36 g, Rfl: 1    cartilage-collagen-bor-hyalur 40-5-3.3 mg Tab, Take 1 tablet by mouth once daily., Disp: , Rfl:     cetirizine  (ZYRTEC) 10 MG tablet, Take 10 mg by mouth daily as needed for Allergies., Disp: , Rfl:     cloNIDine (CATAPRES) 0.1 MG tablet, Take 1 tablet if systolic above 180 or diastolic above 105. Can repeat after 2 hours if bp remains above 180 or 105., Disp: 60 tablet, Rfl: 11    enalapril (VASOTEC) 20 MG tablet, TAKE 1 TABLET BY MOUTH TWICE DAILY, Disp: 180 tablet, Rfl: 1    fluticasone (FLONASE) 50 mcg/actuation nasal spray, SPRAY 1 SPRAY IN EACH NOSTRIL 2 TIMES A DAY, Disp: 16 g, Rfl: 11    ketoconazole (NIZORAL) 2 % shampoo, Wash hair with medicated shampoo at least 2x/week - let sit on scalp at least 5 minutes prior to rinsing, Disp: 120 mL, Rfl: 5    loperamide (IMODIUM) 2 mg capsule, Take 2 mg by mouth 4 (four) times daily as needed for Diarrhea., Disp: , Rfl:     metaxalone (SKELAXIN) 800 MG tablet, Take 800 mg by mouth 3 (three) times daily. Every 6 hours PRN, Disp: , Rfl:     metoprolol tartrate (LOPRESSOR) 50 MG tablet, TAKE 1 & 1/2 (ONE & ONE-HALF) TABLETS BY MOUTH TWICE DAILY, Disp: 270 tablet, Rfl: 1    montelukast (SINGULAIR) 10 mg tablet, Take 1 tablet (10 mg total) by mouth every evening., Disp: 5 tablet, Rfl: 0    pantoprazole (PROTONIX) 40 MG tablet, Take 1 tablet (40 mg total) by mouth once daily., Disp: 90 tablet, Rfl: 1    peg 400-propylene glycol 0.4-0.3 % Drop, Place 2 drops into both eyes 2 (two) times daily. , Disp: , Rfl:     pregabalin (LYRICA) 75 MG capsule, Take 75 mg by mouth once daily. , Disp: , Rfl:     SODIUM CHLORIDE (SIMPLY SALINE NASL), by Nasal route 2 (two) times daily as needed. , Disp: , Rfl:     tacrolimus (PROTOPIC) 0.1 % ointment, AAA face daily to BID PRN rash, Disp: 30 g, Rfl: 1    tramadol (ULTRAM) 50 mg tablet, Take 50 mg by mouth every 8 (eight) hours as needed. Every 6 hours, Disp: , Rfl:     triamterene-hydrochlorothiazide 37.5-25 mg (MAXZIDE-25) 37.5-25 mg per tablet, Take 0.5 tablets by mouth once daily., Disp: 45 tablet, Rfl: 12    UNABLE TO FIND, Take 1 tablet by  "mouth once daily. Ultra triple action [facility], Disp: , Rfl:     celecoxib (CELEBREX) 200 MG capsule, Take 200 mg by mouth once daily. Every day, Disp: , Rfl:     doxycycline (VIBRA-TABS) 100 MG tablet, Take 1 tablet (100 mg total) by mouth 2 (two) times daily. for 7 days, Disp: 14 tablet, Rfl: 0    meclizine (ANTIVERT) 25 mg tablet, Take 1 tablet (25 mg total) by mouth 3 (three) times daily as needed for Dizziness., Disp: 60 tablet, Rfl: 2    Review of Systems   HENT:  Positive for congestion, hearing loss, postnasal drip and sinus pain.    Neurological:  Positive for dizziness.     Objective:      /82 (BP Location: Right arm, Patient Position: Sitting, BP Method: Medium (Manual))   Pulse 75   Temp 98.4 °F (36.9 °C) (Oral)   Resp 16   Ht 5' 6" (1.676 m)   Wt 75.1 kg (165 lb 9.1 oz)   SpO2 98%   BMI 26.72 kg/m²   Physical Exam  Vitals reviewed.   Constitutional:       General: She is not in acute distress.     Appearance: Normal appearance. She is normal weight. She is not ill-appearing, toxic-appearing or diaphoretic.   HENT:      Head: Normocephalic.      Right Ear: External ear normal. Decreased hearing noted. No tenderness. There is no impacted cerumen. Tympanic membrane is injected, erythematous and bulging. Tympanic membrane is not scarred or perforated.      Left Ear: External ear normal. Decreased hearing noted. No tenderness. There is no impacted cerumen. Tympanic membrane is injected, erythematous and bulging. Tympanic membrane is not scarred or perforated.      Ears:      Comments: Wears hearing aids     Nose: No congestion or rhinorrhea.      Left Sinus: Frontal sinus tenderness present.      Mouth/Throat:      Mouth: Mucous membranes are moist.      Pharynx: Oropharynx is clear.   Eyes:      General: No scleral icterus.        Right eye: No discharge.         Left eye: No discharge.      Extraocular Movements: Extraocular movements intact.      Conjunctiva/sclera: Conjunctivae normal. "   Cardiovascular:      Rate and Rhythm: Normal rate and regular rhythm.      Pulses: Normal pulses.      Heart sounds: Normal heart sounds. No murmur heard.    No friction rub. No gallop.   Pulmonary:      Effort: Pulmonary effort is normal. No respiratory distress.      Breath sounds: Normal breath sounds. No wheezing, rhonchi or rales.   Chest:      Chest wall: No tenderness.   Musculoskeletal:         General: No swelling, tenderness or deformity. Normal range of motion.      Cervical back: Normal range of motion.      Right lower leg: No edema.      Left lower leg: No edema.   Skin:     General: Skin is warm and dry.      Capillary Refill: Capillary refill takes less than 2 seconds.      Coloration: Skin is not jaundiced.      Findings: No bruising, erythema, lesion or rash.   Neurological:      Mental Status: She is alert and oriented to person, place, and time.      Gait: Gait abnormal.   Psychiatric:         Mood and Affect: Mood normal.         Behavior: Behavior normal.         Thought Content: Thought content normal.         Judgment: Judgment normal.       Assessment:       1. PND (post-nasal drip)    2. Dizziness    3. Sinus congestion    4. Essential hypertension          Plan:       PND (post-nasal drip)  -     doxycycline (VIBRA-TABS) 100 MG tablet; Take 1 tablet (100 mg total) by mouth 2 (two) times daily. for 7 days  Dispense: 14 tablet; Refill: 0    Dizziness  -     meclizine (ANTIVERT) 25 mg tablet; Take 1 tablet (25 mg total) by mouth 3 (three) times daily as needed for Dizziness.  Dispense: 60 tablet; Refill: 2    Sinus congestion  -     doxycycline (VIBRA-TABS) 100 MG tablet; Take 1 tablet (100 mg total) by mouth 2 (two) times daily. for 7 days  Dispense: 14 tablet; Refill: 0    Essential hypertension        -    Stable. Continue meds. Will continue to monitor.        Has follow up with DR. Mejia scheduled.        Galindo Howard PA-C  Family Medicine Physician Assistant       I spent a total  of 30 minutes on the day of the visit.This includes face to face time and non-face to face time preparing to see the patient (eg, review of tests), obtaining and/or reviewing separately obtained history, documenting clinical information in the electronic or other health record, independently interpreting results and communicating results to the patient/family/caregiver, or care coordinator.      We have addressed [3] Low: 2 or more self-limited or minor problems / 1 stable chronic illness / 1 acute, uncomplicated illness or injury  The complexity of the data reviewed and analyzed for this visit was [2] Minimal or None  The risk of complications and/or morbidity or mortality are [4] Moderate risk (I.e. prescription drug management / decision regarding minor surgery with identified pt or procedure risk factors / decision regarding elective major surgery without identified pt or procedure risk factors / diagnosis or treatment significantly limited by social determinants of health)   The level of Medical Decision Making for this visit is [3] Low

## 2023-02-03 NOTE — PATIENT INSTRUCTIONS
Sixto Cleveland,     If you are due for any health screening(s) below please notify me so we can arrange them to be ordered and scheduled to maintain your health. Most healthy patients complete it. Don't lose out on improving your health.     All of your core healthy metrics are met.

## 2023-02-06 ENCOUNTER — TELEPHONE (OUTPATIENT)
Dept: DERMATOLOGY | Facility: CLINIC | Age: 78
End: 2023-02-06
Payer: MEDICARE

## 2023-02-06 NOTE — TELEPHONE ENCOUNTER
Called patient, she went to the  And it ws  sinus infection , he is on antibiotics and also was put on meclizine.She started it 2-4-23.I told him I would call them during the to get her rescheduled.

## 2023-02-07 DIAGNOSIS — Z00.00 ENCOUNTER FOR MEDICARE ANNUAL WELLNESS EXAM: ICD-10-CM

## 2023-02-09 DIAGNOSIS — Z00.00 ENCOUNTER FOR MEDICARE ANNUAL WELLNESS EXAM: ICD-10-CM

## 2023-02-13 ENCOUNTER — TELEPHONE (OUTPATIENT)
Dept: DERMATOLOGY | Facility: CLINIC | Age: 78
End: 2023-02-13
Payer: MEDICARE

## 2023-02-13 NOTE — TELEPHONE ENCOUNTER
Patient is feeling better and ready to reschedule [facility] appt. Patient declined 3/6 at 7:45 stating she needs later in the day. Informed her we will have to ask Dr. Lund and call her back later.                       ----- Message from Sheila Cruz sent at 2/13/2023 10:32 AM CST -----  Regarding: pt call back  Name of Who is Calling: ROWDY MORNEO [[id number]]      What is the request in detail: Pt  called requesting to speak to  or Bernarda in regards to a procedure the pt was supposed to have. Please advise.       Can the clinic reply by [facility]: no       What Number to Call Back if not in [facility]: [phone] (home)

## 2023-02-14 ENCOUNTER — TELEPHONE (OUTPATIENT)
Dept: DERMATOLOGY | Facility: CLINIC | Age: 78
End: 2023-02-14
Payer: MEDICARE

## 2023-02-22 ENCOUNTER — TELEPHONE (OUTPATIENT)
Dept: FAMILY MEDICINE | Facility: CLINIC | Age: 78
End: 2023-02-22
Payer: MEDICARE

## 2023-02-22 NOTE — TELEPHONE ENCOUNTER
----- Message from Gregoria Lee sent at 2/22/2023  9:59 AM CST -----  Contact: Gregoria - Spouse [phone]  Type: Needs Medical Advice    Who Called:  Gregoria - Spouse   Symptoms (please be specific):  Sinus Infection     How long has patient had these symptoms:  02-03    Pharmacy name and phone #:      [facility] [id number]  [place], [place] - [address]  [address]  [place] [address]  Phone: [phone] Fax: [phone]        Best Call Back Number: [phone] (home)     Additional Information: Spouse is calling the office to speak with nurse/MA regarding prescription Prednisone or recommend  Would like to see if physician can call Rx to pharmacy. Other prescription was given at time of visit but infection is still not cleared up.   Please call back and advise. Thanks     Left voicemail for patient to call back.

## 2023-02-23 ENCOUNTER — OFFICE VISIT (OUTPATIENT)
Dept: FAMILY MEDICINE | Facility: CLINIC | Age: 78
End: 2023-02-23
Payer: MEDICARE

## 2023-02-23 VITALS
BODY MASS INDEX: 26.61 KG/M2 | OXYGEN SATURATION: 98 % | TEMPERATURE: 98 F | DIASTOLIC BLOOD PRESSURE: 72 MMHG | HEIGHT: 66 IN | WEIGHT: 165.56 LBS | SYSTOLIC BLOOD PRESSURE: 136 MMHG | HEART RATE: 73 BPM

## 2023-02-23 DIAGNOSIS — J01.00 ACUTE MAXILLARY SINUSITIS, RECURRENCE NOT SPECIFIED: Primary | ICD-10-CM

## 2023-02-23 PROCEDURE — 99213 OFFICE O/P EST LOW 20 MIN: CPT | Mod: HCNC,S$GLB,, | Performed by: FAMILY MEDICINE

## 2023-02-23 PROCEDURE — 1159F MED LIST DOCD IN RCRD: CPT | Mod: HCNC,CPTII,S$GLB, | Performed by: FAMILY MEDICINE

## 2023-02-23 PROCEDURE — 3288F FALL RISK ASSESSMENT DOCD: CPT | Mod: HCNC,CPTII,S$GLB, | Performed by: FAMILY MEDICINE

## 2023-02-23 PROCEDURE — 1160F RVW MEDS BY RX/DR IN RCRD: CPT | Mod: HCNC,CPTII,S$GLB, | Performed by: FAMILY MEDICINE

## 2023-02-23 PROCEDURE — 1159F PR MEDICATION LIST DOCUMENTED IN MEDICAL RECORD: ICD-10-PCS | Mod: HCNC,CPTII,S$GLB, | Performed by: FAMILY MEDICINE

## 2023-02-23 PROCEDURE — 99999 PR PBB SHADOW E&M-EST. PATIENT-LVL III: CPT | Mod: PBBFAC,HCNC,, | Performed by: FAMILY MEDICINE

## 2023-02-23 PROCEDURE — 1100F PTFALLS ASSESS-DOCD GE2>/YR: CPT | Mod: HCNC,CPTII,S$GLB, | Performed by: FAMILY MEDICINE

## 2023-02-23 PROCEDURE — 99213 PR OFFICE/OUTPT VISIT, EST, LEVL III, 20-29 MIN: ICD-10-PCS | Mod: HCNC,S$GLB,, | Performed by: FAMILY MEDICINE

## 2023-02-23 PROCEDURE — 1160F PR REVIEW ALL MEDS BY PRESCRIBER/CLIN PHARMACIST DOCUMENTED: ICD-10-PCS | Mod: HCNC,CPTII,S$GLB, | Performed by: FAMILY MEDICINE

## 2023-02-23 PROCEDURE — 3288F PR FALLS RISK ASSESSMENT DOCUMENTED: ICD-10-PCS | Mod: HCNC,CPTII,S$GLB, | Performed by: FAMILY MEDICINE

## 2023-02-23 PROCEDURE — 3078F PR MOST RECENT DIASTOLIC BLOOD PRESSURE < 80 MM HG: ICD-10-PCS | Mod: HCNC,CPTII,S$GLB, | Performed by: FAMILY MEDICINE

## 2023-02-23 PROCEDURE — 3075F PR MOST RECENT SYSTOLIC BLOOD PRESS GE 130-139MM HG: ICD-10-PCS | Mod: HCNC,CPTII,S$GLB, | Performed by: FAMILY MEDICINE

## 2023-02-23 PROCEDURE — 99999 PR PBB SHADOW E&M-EST. PATIENT-LVL III: ICD-10-PCS | Mod: PBBFAC,HCNC,, | Performed by: FAMILY MEDICINE

## 2023-02-23 PROCEDURE — 1100F PR PT FALLS ASSESS DOC 2+ FALLS/FALL W/INJURY/YR: ICD-10-PCS | Mod: HCNC,CPTII,S$GLB, | Performed by: FAMILY MEDICINE

## 2023-02-23 PROCEDURE — 3078F DIAST BP <80 MM HG: CPT | Mod: HCNC,CPTII,S$GLB, | Performed by: FAMILY MEDICINE

## 2023-02-23 PROCEDURE — 3075F SYST BP GE 130 - 139MM HG: CPT | Mod: HCNC,CPTII,S$GLB, | Performed by: FAMILY MEDICINE

## 2023-02-23 PROCEDURE — 1125F PR PAIN SEVERITY QUANTIFIED, PAIN PRESENT: ICD-10-PCS | Mod: HCNC,CPTII,S$GLB, | Performed by: FAMILY MEDICINE

## 2023-02-23 PROCEDURE — 1125F AMNT PAIN NOTED PAIN PRSNT: CPT | Mod: HCNC,CPTII,S$GLB, | Performed by: FAMILY MEDICINE

## 2023-02-23 RX ORDER — DOXYCYCLINE 100 MG/1
100 CAPSULE ORAL 2 TIMES DAILY
Qty: 20 CAPSULE | Refills: 0 | Status: SHIPPED | OUTPATIENT
Start: 2023-02-23 | End: 2023-02-23

## 2023-02-23 RX ORDER — DOXYCYCLINE HYCLATE 100 MG
100 TABLET ORAL 2 TIMES DAILY
Qty: 20 TABLET | Refills: 0 | Status: SHIPPED | OUTPATIENT
Start: 2023-02-23 | End: 2023-03-28 | Stop reason: SDUPTHER

## 2023-02-23 NOTE — PROGRESS NOTES
Subjective:       Patient ID: Megha Ladd is a 77 y.o. female.    Chief Complaint: Sinus Problem    New to me patient here for UC visit.  Rx 2/3 with Doxy x 7 days and improved mostly and since finished has now recurred with some PND again and cough and ears feeling clogged.  No fever or SOB or pl pain.      Sinus Problem  Pertinent negatives include no shortness of breath.   Review of Systems   Constitutional:  Negative for fever.   Respiratory:  Negative for shortness of breath.    Cardiovascular:  Negative for chest pain.   Gastrointestinal:  Negative for abdominal pain and nausea.   Skin:  Negative for rash.   All other systems reviewed and are negative.    Objective:      Physical Exam  Constitutional:       General: She is not in acute distress.     Appearance: She is well-developed.   HENT:      Right Ear: Tympanic membrane normal. Tympanic membrane is not erythematous.      Left Ear: Tympanic membrane normal. Tympanic membrane is not erythematous.      Nose: Mucosal edema present.      Right Sinus: No maxillary sinus tenderness.      Left Sinus: Maxillary sinus tenderness present.      Mouth/Throat:      Pharynx: Posterior oropharyngeal erythema present.   Cardiovascular:      Rate and Rhythm: Normal rate and regular rhythm.      Heart sounds: No murmur heard.  Pulmonary:      Effort: Pulmonary effort is normal.      Breath sounds: Normal breath sounds.   Musculoskeletal:      Cervical back: Neck supple.   Lymphadenopathy:      Cervical: No cervical adenopathy.   Skin:     General: Skin is warm and dry.       Assessment:       1. Acute maxillary sinusitis, recurrence not specified          Plan:       Acute maxillary sinusitis, recurrence not specified  -     Discontinue: doxycycline (MONODOX) 100 MG capsule; Take 1 capsule (100 mg total) by mouth 2 (two) times daily.  Dispense: 20 capsule; Refill: 0    Other orders  -     doxycycline (VIBRA-TABS) 100 MG tablet; Take 1 tablet (100 mg total) by mouth 2  (two) times daily.  Dispense: 20 tablet; Refill: 0      Patient Instructions   Push fluids intake.  Drink plenty of water.

## 2023-03-14 ENCOUNTER — PES CALL (OUTPATIENT)
Dept: ADMINISTRATIVE | Facility: CLINIC | Age: 78
End: 2023-03-14
Payer: MEDICARE

## 2023-03-16 DIAGNOSIS — K21.9 GASTROESOPHAGEAL REFLUX DISEASE, UNSPECIFIED WHETHER ESOPHAGITIS PRESENT: ICD-10-CM

## 2023-03-16 RX ORDER — PANTOPRAZOLE SODIUM 20 MG/1
TABLET, DELAYED RELEASE ORAL
Qty: 90 TABLET | Refills: 0 | OUTPATIENT
Start: 2023-03-16

## 2023-03-16 NOTE — TELEPHONE ENCOUNTER
No new care gaps identified.  [facility] Embedded Care Gaps. Reference number: [id number]. 3/16/2023   12:11:10 PM CDT

## 2023-03-16 NOTE — TELEPHONE ENCOUNTER
Quick DC. Inappropriate Request    Refill Authorization Note   Megha Ladd  is requesting a refill authorization.  Brief Assessment and Rationale for Refill:  Quick Discontinue  Medication Therapy Plan:  Dose adjustment 11/3/22    Medication Reconciliation Completed:  No      Comments:     Note composed:6:41 PM 03/16/2023

## 2023-03-20 NOTE — PROGRESS NOTES
Prior photo(s) of site(s) to confirm location(s):      Defibrillator: No  Pacemaker: No  Artificial heart valves: No  Artificial joints: No    ALLERGIES:   Zoster vaccine live, Amlodipine, Azithromycin, Budesonide, Cephalexin, Ciprofloxacin, Erythromycin, Esomeprazole magnesium, Felodipine, Levofloxacin, Talwin compound, and Hydrocodone-acetaminophen      Current Outpatient Medications:     acetaminophen (TYLENOL) 500 MG tablet, Take 1,000 mg by mouth 3 (three) times daily., Disp: , Rfl:     ADVAIR -21 mcg/actuation HFAA inhaler, TAKE 2 PUFFS BY MOUTH TWICE A DAY, Disp: 36 g, Rfl: 1    cartilage-collagen-bor-hyalur 40-5-3.3 mg Tab, Take 1 tablet by mouth once daily., Disp: , Rfl:     celecoxib (CELEBREX) 200 MG capsule, Take 200 mg by mouth once daily. Every day, Disp: , Rfl:     cetirizine (ZYRTEC) 10 MG tablet, Take 10 mg by mouth daily as needed for Allergies., Disp: , Rfl:     cloNIDine (CATAPRES) 0.1 MG tablet, Take 1 tablet if systolic above 180 or diastolic above 105. Can repeat after 2 hours if bp remains above 180 or 105., Disp: 60 tablet, Rfl: 11    doxycycline (VIBRA-TABS) 100 MG tablet, Take 1 tablet (100 mg total) by mouth 2 (two) times daily., Disp: 20 tablet, Rfl: 0    enalapril (VASOTEC) 20 MG tablet, TAKE 1 TABLET BY MOUTH TWICE DAILY, Disp: 180 tablet, Rfl: 1    fluticasone (FLONASE) 50 mcg/actuation nasal spray, SPRAY 1 SPRAY IN EACH NOSTRIL 2 TIMES A DAY, Disp: 16 g, Rfl: 11    ketoconazole (NIZORAL) 2 % shampoo, Wash hair with medicated shampoo at least 2x/week - let sit on scalp at least 5 minutes prior to rinsing, Disp: 120 mL, Rfl: 5    loperamide (IMODIUM) 2 mg capsule, Take 2 mg by mouth 4 (four) times daily as needed for Diarrhea., Disp: , Rfl:     meclizine (ANTIVERT) 25 mg tablet, Take 1 tablet (25 mg total) by mouth 3 (three) times daily as needed for Dizziness., Disp: 60 tablet, Rfl: 2    metaxalone (SKELAXIN) 800 MG tablet, Take 800 mg by mouth 3 (three) times daily. Every 6  hours PRN, Disp: , Rfl:     metoprolol tartrate (LOPRESSOR) 50 MG tablet, TAKE 1 & 1/2 (ONE & ONE-HALF) TABLETS BY MOUTH TWICE DAILY, Disp: 270 tablet, Rfl: 1    montelukast (SINGULAIR) 10 mg tablet, Take 1 tablet (10 mg total) by mouth every evening., Disp: 5 tablet, Rfl: 0    pantoprazole (PROTONIX) 40 MG tablet, Take 1 tablet (40 mg total) by mouth once daily., Disp: 90 tablet, Rfl: 1    peg 400-propylene glycol 0.4-0.3 % Drop, Place 2 drops into both eyes 2 (two) times daily. , Disp: , Rfl:     pregabalin (LYRICA) 75 MG capsule, Take 75 mg by mouth once daily. , Disp: , Rfl:     SODIUM CHLORIDE (SIMPLY SALINE NASL), by Nasal route 2 (two) times daily as needed. , Disp: , Rfl:     tacrolimus (PROTOPIC) 0.1 % ointment, AAA face daily to BID PRN rash, Disp: 30 g, Rfl: 1    tramadol (ULTRAM) 50 mg tablet, Take 50 mg by mouth every 8 (eight) hours as needed. Every 6 hours, Disp: , Rfl:     triamterene-hydrochlorothiazide 37.5-25 mg (MAXZIDE-25) 37.5-25 mg per tablet, Take 0.5 tablets by mouth once daily., Disp: 45 tablet, Rfl: 12    UNABLE TO FIND, Take 1 tablet by mouth once daily. Ultra triple action [facility], Disp: , Rfl:   -------------------------------------------------------------  PROCEDURE: Mohs' Micrographic Surgery    SITE: nasal dorsum    INDICATION: basal cell carcinoma in an area at increased risk of recurrence    CASE NUMBER: [id number]      ANESTHETIC: 3 mL 0.25% bupivicaine with epinephrine 1:200,000     SURGICAL PREP: Ethanol and ophthalmic Betadine     SURGEON: Leif Lund MD    ASSISTANTS: Isabel Allen CST     PREOPERATIVE DIAGNOSIS: basal cell carcinoma    POSTOPERATIVE DIAGNOSIS: basal cell carcinoma    PATHOLOGIC DIAGNOSIS: basal cell carcinoma    STAGES OF MOHS' SURGERY PERFORMED: one    TUMOR-FREE PLANE ACHIEVED: yes    HEMOSTASIS: Hyfrecation     SPECIMENS: one (one in stage A)    INITIAL LESION SIZE: 1.0 x 1.0 cm    FINAL DEFECT SIZE: 1.0 x 1.1 cm    WOUND REPAIR/DISPOSITION:  see below    NARRATIVE:    The patient is a 77 y.o.female referred by Jasmyn Newell MD with a history of cancer on the nose which was biopsied - pathology accession # [id number]-, [facility]. Findings revealed basal cell carcinoma. Examination revealed a hypopigmented scar on the dorsal nose at the site of prior biopsy, which was confirmed by reference to the photograph taken at the previous patient visit, as attached above. In light of the nature of this tumor and the location on the nose, Mohs' micrographic surgery was thought to be the most appropriate management choice, and this diagnosis is appropriate for treatment by Mohs' micrographic surgery.  I discussed it with the patient and she fully understands the aims, risks, alternatives, and possible complications, and elects to proceed.  There are no medical or surgical contraindications to the procedure.     A signed informed consent was obtained.    PROCEDURE:  The patient was placed in the semi-recumbent position on the operating table in the Mohs' Surgery Suite. The area in question was thoroughly prepped with ethanol and ophthalmic Betadine. A sterile surgical marker was used to outline the clinically apparent margins of the involved area, and a narrow margin of normal-appearing skin. Reference marks were made at the periphery of the outlined area with the surgical marker. The proposed area of excision was measured and photographed. Local anesthesia as noted above was administered.  The total volume of anesthetic used throughout this portion of the procedure was as documented above. The area was prepared and draped in the standard manner. All of the grossly identifiable area of clinically abnormal tissue and an underlying/peripheral layer was taken and processed by the Mohs' technique.  Hemostasis was obtained with the hyfrecator. Tissue was taken from any areas of residual marginal involvement (if present) and processed by the Mohs' technique in  "as many stages as needed until a tumor-free plane was achieved.    Colors of inks used in the reference nicks at epidermal margins (if present) and/or inking of non-epithelial edges, if applicable, is represented on the Mohs map as follows: solid lines represent red ink, dots represent blue ink, jagged lines represent black ink, curlicues represent green ink, "xxx" represents yellow ink.    The first Mohs' layer consisted of one section(s) with 4 slide(s) evaluated. No residual tumor was noted at the margins of the first Mohs' layer. Histology of the specimen(s) showed changes consistent with chronic solar damage.    A total of one section(s) and 4 slide(s) were examined under the microscope via the Mohs technique.  A cancer free plane was reached after layer number one. Defect final size was as noted above.      The wound was covered with a nonadherent dressing between stages, and the patient allowed to wait in the waiting area during these periods. The final defect was photographed at the completion of the Mohs' procedure.    See the separate procedure note which follows regarding repair of the defect following Mohs' surgery.       -----------------------------------------------    REPAIR FOLLOWING MOHS' MICROGRAPHIC SURGERY    PREOPERATIVE DIAGNOSIS: defect following Mohs' surgery for a basal cell carcinoma    POSTOPERATIVE DIAGNOSIS: same    PROCEDURE PERFORMED: complex linear closure     ANESTHETIC: 4.5 mL 2% Lidocaine with Epinephrine 1:100,000    SURGICAL PREP: ophthalmic Betadine    SURGEON: Leif Lund MD     ASSISTANTS: as above    LOCATION: nasal dorsum      INDICATIONS:  Earlier in the day, the patient underwent Mohs' micrographic surgical excision of a basal cell carcinoma on the nose. Tumor free margins were achieved after layer number one.  Later in the day, the management of the resulting wound was addressed with the patient. I discussed the various wound management options with the patient " and she fully understands the aims, risks, alternatives, and possible complications of the alternatives, and she elects to proceed with closure of the defect in the manner noted below.  There are no medical or surgical contraindications to the procedure.    A signed informed consent was previously obtained.    PROCEDURE:  Repair via complex closure:  The patient was returned to the procedure room following completion of the Mohs' procedure and final slide review. Because of the size, shape and location of the defect, simple closure could not be achieved without possible distortion of surrounding structures, excessive tension on the wound margins and an unacceptable risk of wound dehiscence, and the creation of standing cone deformities. Consideration was given to the site of the wound, the surrounding structures, and the orientation of closure necessary to provide the optimal functional and cosmetic outcome. After devoting time to these considerations, and to the orientation of the vectors of maximal skin tension surrounding the defect, the area was prepped again and a fusiform closure was outlined on the skin surrounding the defect with a sterile surgical marker, to minimize tension across the wound. Additional anesthetic was infiltrated into the tissues surrounding the defect and the anticipated area of repair, to maintain anesthesia during the procedure. Preparation of the site for closure was then carried out by extending the defect through excision of triangles of superfluous tissue on either side of the wound to square the shoulders of the defect and to allow closure without distortion by standing cone deformities, creating a fusiform defect measuring 1.0 x 4.0 cm in size.  Wound margins were extensively undermined deep to the nasalis muscle, to a width of 1.5 cm, exceeding the maximal transverse width of the wound, to allow advancement of the wound margins into the defect and to permit closure with minimal  tension. After hemostasis was achieved with the hyfrecator, closure was accomplished with:      multiple #5-0 buried interrupted Vicryl suture(s) and    several #5-0 vertical mattress and simple interrupted Prolene suture(s) and    one #5-0 running locked Prolene suture(s) for final approximation of the wound margins.    Total length of the final closure was 4.0 cm.      The site was photographed following completion of the repair. Final dressing consisted of petrolatum, Telfa and tape.    Estimated blood loss for the total procedure was less than 5 mL.    Total operative time including tissue processing in the Mohs' laboratory and microscopic Mohs' frozen section slide review was 2 hour(s). Verbal and written wound care instructions were given to the patient, and she expressed understanding of these instructions. The patient tolerated the procedure well and left the operating room in good condition; she is to return in 6  days for suture removal.     Dr. Lund's cell phone number was given to the patient with instructions to call prn with any problems.

## 2023-03-23 ENCOUNTER — PROCEDURE VISIT (OUTPATIENT)
Dept: DERMATOLOGY | Facility: CLINIC | Age: 78
End: 2023-03-23
Payer: MEDICARE

## 2023-03-23 VITALS
BODY MASS INDEX: 26.36 KG/M2 | SYSTOLIC BLOOD PRESSURE: 177 MMHG | HEART RATE: 83 BPM | HEIGHT: 66 IN | WEIGHT: 164 LBS | DIASTOLIC BLOOD PRESSURE: 83 MMHG

## 2023-03-23 DIAGNOSIS — C44.311 BASAL CELL CARCINOMA (BCC) OF DORSUM OF NOSE: Primary | ICD-10-CM

## 2023-03-23 PROCEDURE — 13152 CMPLX RPR E/N/E/L 2.6-7.5 CM: CPT | Mod: HCNC,51,S$GLB, | Performed by: DERMATOLOGY

## 2023-03-23 PROCEDURE — 99499 UNLISTED E&M SERVICE: CPT | Mod: HCNC,S$GLB,, | Performed by: DERMATOLOGY

## 2023-03-23 PROCEDURE — 17311 MOHS 1 STAGE H/N/HF/G: CPT | Mod: HCNC,S$GLB,, | Performed by: DERMATOLOGY

## 2023-03-23 PROCEDURE — 17311: ICD-10-PCS | Mod: HCNC,S$GLB,, | Performed by: DERMATOLOGY

## 2023-03-23 PROCEDURE — 99499 NO LOS: ICD-10-PCS | Mod: HCNC,S$GLB,, | Performed by: DERMATOLOGY

## 2023-03-23 PROCEDURE — 13152 PR RECMPL WND LID,NOS,EAR 2.6-7.5 CM: ICD-10-PCS | Mod: HCNC,51,S$GLB, | Performed by: DERMATOLOGY

## 2023-03-28 ENCOUNTER — DOCUMENTATION ONLY (OUTPATIENT)
Dept: DERMATOLOGY | Facility: CLINIC | Age: 78
End: 2023-03-28
Payer: MEDICARE

## 2023-03-28 RX ORDER — DOXYCYCLINE 100 MG/1
100 CAPSULE ORAL EVERY 12 HOURS
Qty: 20 CAPSULE | Refills: 0 | Status: SHIPPED | OUTPATIENT
Start: 2023-03-28 | End: 2023-09-01

## 2023-03-28 RX ORDER — DOXYCYCLINE 100 MG/1
100 CAPSULE ORAL EVERY 12 HOURS
Qty: 20 CAPSULE | Refills: 0 | Status: CANCELLED | OUTPATIENT
Start: 2023-03-28

## 2023-03-28 NOTE — PROGRESS NOTES
Review of patient's allergies indicates:   Allergen Reactions    Zoster vaccine live Other (See Comments)     Headache, neck shoulder, body flu sx, sore throat, nausea, dry cough, fatigue     Amlodipine Diarrhea, Nausea Only and Other (See Comments)     Joint pain    Azithromycin      Other reaction(s): Unknown    Budesonide      Other reaction(s): heartburn  Other reaction(s): Rash    Cephalexin      Other reaction(s): Unknown    Ciprofloxacin      Other reaction(s): Unknown    Erythromycin      Other reaction(s): Unknown    Esomeprazole magnesium      Other reaction(s): Headache    Felodipine      Nausea, raw throat, ulcer tongue, myalgia    Levofloxacin      Other reaction(s): tendonitis    Talwin compound      Other reaction(s): Vomiting  Other reaction(s): Hallucinations    Hydrocodone-acetaminophen Rash     Other reaction(s): Nausea       Current Outpatient Medications:     acetaminophen (TYLENOL) 500 MG tablet, Take 1,000 mg by mouth 3 (three) times daily., Disp: , Rfl:     ADVAIR -21 mcg/actuation HFAA inhaler, TAKE 2 PUFFS BY MOUTH TWICE A DAY, Disp: 36 g, Rfl: 1    cartilage-collagen-bor-hyalur 40-5-3.3 mg Tab, Take 1 tablet by mouth once daily., Disp: , Rfl:     celecoxib (CELEBREX) 200 MG capsule, Take 200 mg by mouth once daily. Every day, Disp: , Rfl:     cetirizine (ZYRTEC) 10 MG tablet, Take 10 mg by mouth daily as needed for Allergies., Disp: , Rfl:     cloNIDine (CATAPRES) 0.1 MG tablet, Take 1 tablet if systolic above 180 or diastolic above 105. Can repeat after 2 hours if bp remains above 180 or 105., Disp: 60 tablet, Rfl: 11    doxycycline (VIBRA-TABS) 100 MG tablet, Take 1 tablet (100 mg total) by mouth 2 (two) times daily. (Patient not taking: Reported on 3/23/2023), Disp: 20 tablet, Rfl: 0    enalapril (VASOTEC) 20 MG tablet, TAKE 1 TABLET BY MOUTH TWICE DAILY, Disp: 180 tablet, Rfl: 1    fluticasone (FLONASE) 50 mcg/actuation nasal spray, SPRAY 1 SPRAY IN EACH NOSTRIL 2 TIMES A DAY,  "Disp: 16 g, Rfl: 11    ketoconazole (NIZORAL) 2 % shampoo, Wash hair with medicated shampoo at least 2x/week - let sit on scalp at least 5 minutes prior to rinsing (Patient not taking: Reported on 3/23/2023), Disp: 120 mL, Rfl: 5    loperamide (IMODIUM) 2 mg capsule, Take 2 mg by mouth 4 (four) times daily as needed for Diarrhea., Disp: , Rfl:     meclizine (ANTIVERT) 25 mg tablet, Take 1 tablet (25 mg total) by mouth 3 (three) times daily as needed for Dizziness., Disp: 60 tablet, Rfl: 2    metaxalone (SKELAXIN) 800 MG tablet, Take 800 mg by mouth 3 (three) times daily. Every 6 hours PRN, Disp: , Rfl:     metoprolol tartrate (LOPRESSOR) 50 MG tablet, TAKE 1 & 1/2 (ONE & ONE-HALF) TABLETS BY MOUTH TWICE DAILY, Disp: 270 tablet, Rfl: 1    montelukast (SINGULAIR) 10 mg tablet, Take 1 tablet (10 mg total) by mouth every evening., Disp: 5 tablet, Rfl: 0    pantoprazole (PROTONIX) 40 MG tablet, Take 1 tablet (40 mg total) by mouth once daily., Disp: 90 tablet, Rfl: 1    peg 400-propylene glycol 0.4-0.3 % Drop, Place 2 drops into both eyes 2 (two) times daily. , Disp: , Rfl:     pregabalin (LYRICA) 75 MG capsule, Take 75 mg by mouth once daily. , Disp: , Rfl:     SODIUM CHLORIDE (SIMPLY SALINE NASL), by Nasal route 2 (two) times daily as needed. , Disp: , Rfl:     tacrolimus (PROTOPIC) 0.1 % ointment, AAA face daily to BID PRN rash, Disp: 30 g, Rfl: 1    tramadol (ULTRAM) 50 mg tablet, Take 50 mg by mouth every 8 (eight) hours as needed. Every 6 hours, Disp: , Rfl:     triamterene-hydrochlorothiazide 37.5-25 mg (MAXZIDE-25) 37.5-25 mg per tablet, Take 0.5 tablets by mouth once daily., Disp: 45 tablet, Rfl: 12    UNABLE TO FIND, Take 1 tablet by mouth once daily. Ultra triple action joint [facility], Disp: , Rfl:       Patient's  called to report sudden onset of "pulsing" to site of surgery last week.   No real pain.   Has appt tomorrow for suture removal.    Received photo from patient via text message.  See photo(s) " below.  Some luca-incisional erythema    Photo:       IMPRESSION: status post Mohs' micrographic surgery  Possible wound infection; possible community-acquired MRSA    PLAN:  Will Rx Doxycycline 100 mg PO BID x 10 days empirically  Followup tomorrow as scheduled

## 2023-03-29 ENCOUNTER — OFFICE VISIT (OUTPATIENT)
Dept: DERMATOLOGY | Facility: CLINIC | Age: 78
End: 2023-03-29
Payer: MEDICARE

## 2023-03-29 DIAGNOSIS — Z48.02 VISIT FOR SUTURE REMOVAL: Primary | ICD-10-CM

## 2023-03-29 PROCEDURE — 3288F FALL RISK ASSESSMENT DOCD: CPT | Mod: HCNC,CPTII,S$GLB, | Performed by: DERMATOLOGY

## 2023-03-29 PROCEDURE — 1159F PR MEDICATION LIST DOCUMENTED IN MEDICAL RECORD: ICD-10-PCS | Mod: HCNC,CPTII,S$GLB, | Performed by: DERMATOLOGY

## 2023-03-29 PROCEDURE — 1160F RVW MEDS BY RX/DR IN RCRD: CPT | Mod: HCNC,CPTII,S$GLB, | Performed by: DERMATOLOGY

## 2023-03-29 PROCEDURE — 1101F PR PT FALLS ASSESS DOC 0-1 FALLS W/OUT INJ PAST YR: ICD-10-PCS | Mod: HCNC,CPTII,S$GLB, | Performed by: DERMATOLOGY

## 2023-03-29 PROCEDURE — 1159F MED LIST DOCD IN RCRD: CPT | Mod: HCNC,CPTII,S$GLB, | Performed by: DERMATOLOGY

## 2023-03-29 PROCEDURE — 1160F PR REVIEW ALL MEDS BY PRESCRIBER/CLIN PHARMACIST DOCUMENTED: ICD-10-PCS | Mod: HCNC,CPTII,S$GLB, | Performed by: DERMATOLOGY

## 2023-03-29 PROCEDURE — 99999 PR PBB SHADOW E&M-EST. PATIENT-LVL III: CPT | Mod: PBBFAC,HCNC,, | Performed by: DERMATOLOGY

## 2023-03-29 PROCEDURE — 1126F AMNT PAIN NOTED NONE PRSNT: CPT | Mod: HCNC,CPTII,S$GLB, | Performed by: DERMATOLOGY

## 2023-03-29 PROCEDURE — 99999 PR PBB SHADOW E&M-EST. PATIENT-LVL III: ICD-10-PCS | Mod: PBBFAC,HCNC,, | Performed by: DERMATOLOGY

## 2023-03-29 PROCEDURE — 1101F PT FALLS ASSESS-DOCD LE1/YR: CPT | Mod: HCNC,CPTII,S$GLB, | Performed by: DERMATOLOGY

## 2023-03-29 PROCEDURE — 3288F PR FALLS RISK ASSESSMENT DOCUMENTED: ICD-10-PCS | Mod: HCNC,CPTII,S$GLB, | Performed by: DERMATOLOGY

## 2023-03-29 PROCEDURE — 99024 PR POST-OP FOLLOW-UP VISIT: ICD-10-PCS | Mod: HCNC,S$GLB,, | Performed by: DERMATOLOGY

## 2023-03-29 PROCEDURE — 99024 POSTOP FOLLOW-UP VISIT: CPT | Mod: HCNC,S$GLB,, | Performed by: DERMATOLOGY

## 2023-03-29 PROCEDURE — 1126F PR PAIN SEVERITY QUANTIFIED, NO PAIN PRESENT: ICD-10-PCS | Mod: HCNC,CPTII,S$GLB, | Performed by: DERMATOLOGY

## 2023-03-29 NOTE — PROGRESS NOTES
CC: 77 y.o.female patient is here for suture removal.     HPI: Patient is one*** week(s) s/p Mohs' micrographic surgery, fresh tissue technique of a basal cell carcinoma on the nasal dorsum, with subsequent repair ***  Patient reports no problems***.    EXAM:  Sutures intact.  Wound healing well.  Good approximation of skin edges.  No undue erythema to surrounding skin or signs or symptoms of infection.***    IMPRESSION:  ***Healing well post Mohs' micrographic surgery and repair    PLAN:  Site cleaned with peroxide, sutures removed  Dressed with petrolatum ***  Reviewed further care and expected course  ***Followup 6*** weeks; call prn sooner  ***Followup to *** in *** months***; PRN to me

## 2023-03-29 NOTE — PROGRESS NOTES
CC: 77 y.o.female patient is here for suture removal.     HPI: Patient is one week(s) s/p Mohs' micrographic surgery, fresh tissue technique of a basal cell carcinoma on the nose, with subsequent repair  See note from yesterday below  Still has some occasional throbbing  Started Doxycyline.    EXAM:  Sutures intact.  Wound healing well.  Good approximation of skin edges.   Some luca-incisional erythema but no signs infection or hematoma    IMPRESSION:  Healing well post Mohs' micrographic surgery and repair    PLAN:  Site cleaned with peroxide, sutures removed  Dressed with petrolatum, Telfa and tape  Reviewed further care and expected course  Followup to Dr. Newell in 3-4 months; PRN to me     ============================================================  PREVIOUS NOTE(S):  Note of 3/28      Review of patient's allergies indicates:   Allergen Reactions    Zoster vaccine live Other (See Comments)     Headache, neck shoulder, body flu sx, sore throat, nausea, dry cough, fatigue     Amlodipine Diarrhea, Nausea Only and Other (See Comments)     Joint pain    Azithromycin      Other reaction(s): Unknown    Budesonide      Other reaction(s): heartburn  Other reaction(s): Rash    Cephalexin      Other reaction(s): Unknown    Ciprofloxacin      Other reaction(s): Unknown    Erythromycin      Other reaction(s): Unknown    Esomeprazole magnesium      Other reaction(s): Headache    Felodipine      Nausea, raw throat, ulcer tongue, myalgia    Levofloxacin      Other reaction(s): tendonitis    Talwin compound      Other reaction(s): Vomiting  Other reaction(s): Hallucinations    Hydrocodone-acetaminophen Rash     Other reaction(s): Nausea       Current Outpatient Medications:     acetaminophen (TYLENOL) 500 MG tablet, Take 1,000 mg by mouth 3 (three) times daily., Disp: , Rfl:     ADVAIR -21 mcg/actuation HFAA inhaler, TAKE 2 PUFFS BY MOUTH TWICE A DAY, Disp: 36 g, Rfl: 1    cartilage-collagen-bor-hyalur 40-5-3.3 mg  Tab, Take 1 tablet by mouth once daily., Disp: , Rfl:     celecoxib (CELEBREX) 200 MG capsule, Take 200 mg by mouth once daily. Every day, Disp: , Rfl:     cetirizine (ZYRTEC) 10 MG tablet, Take 10 mg by mouth daily as needed for Allergies., Disp: , Rfl:     cloNIDine (CATAPRES) 0.1 MG tablet, Take 1 tablet if systolic above 180 or diastolic above 105. Can repeat after 2 hours if bp remains above 180 or 105., Disp: 60 tablet, Rfl: 11    doxycycline (VIBRA-TABS) 100 MG tablet, Take 1 tablet (100 mg total) by mouth 2 (two) times daily. (Patient not taking: Reported on 3/23/2023), Disp: 20 tablet, Rfl: 0    enalapril (VASOTEC) 20 MG tablet, TAKE 1 TABLET BY MOUTH TWICE DAILY, Disp: 180 tablet, Rfl: 1    fluticasone (FLONASE) 50 mcg/actuation nasal spray, SPRAY 1 SPRAY IN EACH NOSTRIL 2 TIMES A DAY, Disp: 16 g, Rfl: 11    ketoconazole (NIZORAL) 2 % shampoo, Wash hair with medicated shampoo at least 2x/week - let sit on scalp at least 5 minutes prior to rinsing (Patient not taking: Reported on 3/23/2023), Disp: 120 mL, Rfl: 5    loperamide (IMODIUM) 2 mg capsule, Take 2 mg by mouth 4 (four) times daily as needed for Diarrhea., Disp: , Rfl:     meclizine (ANTIVERT) 25 mg tablet, Take 1 tablet (25 mg total) by mouth 3 (three) times daily as needed for Dizziness., Disp: 60 tablet, Rfl: 2    metaxalone (SKELAXIN) 800 MG tablet, Take 800 mg by mouth 3 (three) times daily. Every 6 hours PRN, Disp: , Rfl:     metoprolol tartrate (LOPRESSOR) 50 MG tablet, TAKE 1 & 1/2 (ONE & ONE-HALF) TABLETS BY MOUTH TWICE DAILY, Disp: 270 tablet, Rfl: 1    montelukast (SINGULAIR) 10 mg tablet, Take 1 tablet (10 mg total) by mouth every evening., Disp: 5 tablet, Rfl: 0    pantoprazole (PROTONIX) 40 MG tablet, Take 1 tablet (40 mg total) by mouth once daily., Disp: 90 tablet, Rfl: 1    peg 400-propylene glycol 0.4-0.3 % Drop, Place 2 drops into both eyes 2 (two) times daily. , Disp: , Rfl:     pregabalin (LYRICA) 75 MG capsule, Take 75 mg by  "mouth once daily. , Disp: , Rfl:     SODIUM CHLORIDE (SIMPLY SALINE NASL), by Nasal route 2 (two) times daily as needed. , Disp: , Rfl:     tacrolimus (PROTOPIC) 0.1 % ointment, AAA face daily to BID PRN rash, Disp: 30 g, Rfl: 1    tramadol (ULTRAM) 50 mg tablet, Take 50 mg by mouth every 8 (eight) hours as needed. Every 6 hours, Disp: , Rfl:     triamterene-hydrochlorothiazide 37.5-25 mg (MAXZIDE-25) 37.5-25 mg per tablet, Take 0.5 tablets by mouth once daily., Disp: 45 tablet, Rfl: 12    UNABLE TO FIND, Take 1 tablet by mouth once daily. Ultra triple action joint [facility], Disp: , Rfl:       Patient's  called to report sudden onset of "pulsing" to site of surgery last week.   No real pain.   Has appt tomorrow for suture removal.    Received photo from patient via text message.  See photo(s) below.  Some luca-incisional erythema    Photo:       IMPRESSION: status post Mohs' micrographic surgery  Possible wound infection; possible community-acquired MRSA    PLAN:  Will Rx Doxycycline 100 mg PO BID x 10 days empirically  Followup tomorrow as scheduled      "

## 2023-04-20 RX ORDER — MONTELUKAST SODIUM 10 MG/1
10 TABLET ORAL NIGHTLY
Qty: 90 TABLET | Refills: 3 | Status: SHIPPED | OUTPATIENT
Start: 2023-04-20 | End: 2023-09-01 | Stop reason: SDUPTHER

## 2023-04-20 NOTE — TELEPHONE ENCOUNTER
No new care gaps identified.  [facility] Embedded Care Gaps. Reference number: [id number]. 4/20/2023   2:35:50 PM CDT

## 2023-04-20 NOTE — TELEPHONE ENCOUNTER
----- Message from Selina Cross, Patient Care Assistant sent at 4/20/2023  2:13 PM CDT -----  Contact: Gregoria Paulson  Pt is calling to speak a nurse in regards to a medication montelukast (SINGULAIR) 10 mg tablet. Please call back to advise [phone]  thanks

## 2023-05-15 ENCOUNTER — TELEPHONE (OUTPATIENT)
Dept: ADMINISTRATIVE | Facility: CLINIC | Age: 78
End: 2023-05-15
Payer: MEDICARE

## 2023-05-16 ENCOUNTER — TELEPHONE (OUTPATIENT)
Dept: FAMILY MEDICINE | Facility: CLINIC | Age: 78
End: 2023-05-16
Payer: MEDICARE

## 2023-05-16 PROBLEM — I77.1 TORTUOUS AORTA: Status: ACTIVE | Noted: 2023-05-16

## 2023-05-16 NOTE — TELEPHONE ENCOUNTER
Patient has existing appointment for AWV with NICKIE Rai today at 11 am.  Patient's  (Gregoria) contacted office for notification patient is not feeling well s/p Covid booster vaccination and would like to cancel appointment. Mr. Kinney states his wife will call back to reschedule once she is feeling better. Appointment canceled as per Mr. Kinney's request.

## 2023-05-30 ENCOUNTER — TELEPHONE (OUTPATIENT)
Dept: DERMATOLOGY | Facility: CLINIC | Age: 78
End: 2023-05-30
Payer: MEDICARE

## 2023-05-30 NOTE — TELEPHONE ENCOUNTER
Attempted to contact patient, no answer, lvm for a return call,.     ----- Message from Lien Resendiz sent at 5/29/2023  8:43 AM CDT -----  Contact: Pt's   Type:  Sooner Appointment Request    Caller is requesting a sooner appointment.  Caller declined first available appointment listed below.  Caller will not accept being placed on the waitlist and is requesting a message be sent to doctor.    Name of Caller:  Patient  When is the first available appointment?  na  Symptoms:  Would like to reschedule her appt to the next soonest available date.  Best Call Back Number:  [phone]  Additional Information:  Please call the patient back to schedule/advise. Thank you!

## 2023-06-26 DIAGNOSIS — I10 ESSENTIAL HYPERTENSION: ICD-10-CM

## 2023-06-26 NOTE — TELEPHONE ENCOUNTER
Care Due:                  Date            Visit Type   Department     Provider  --------------------------------------------------------------------------------                                SAME DAY -                              ESTABLISHED   [facility]  Last Visit: 02-      PATIENT      MEDICINE       Alfred Lang                              EP -                              PRIMARY      [facility]  Next Visit: 09-      CARE ([facility])   PRACTICE       Alfred Mejia                                                            Last  Test          Frequency    Reason                     Performed    Due Date  --------------------------------------------------------------------------------    CMP.........  12 months..  enalapril,                 09- 09-                             triamterene-hydrochloroth                             iazide...................    [employer] Embedded Care Due Messages. Reference number: [id number].   6/26/2023 5:34:11 PM CDT

## 2023-06-27 RX ORDER — METOPROLOL TARTRATE 50 MG/1
TABLET ORAL
Qty: 270 TABLET | Refills: 0 | Status: SHIPPED | OUTPATIENT
Start: 2023-06-27 | End: 2023-09-01 | Stop reason: SDUPTHER

## 2023-06-27 NOTE — TELEPHONE ENCOUNTER
Refill Routing Note   Medication(s) are not appropriate for processing by [facility] for the following reason(s):      Required vitals abnormal    [facility] action(s):  Defer Labs due            Appointments  past 12m or future 3m with PCP    Date Provider   Last Visit   11/3/2022 Alfred Mejia MD   Next Visit   9/1/2023 Alfred Mejia MD   ED visits in past 90 days: 0        Note composed:4:59 AM 06/27/2023

## 2023-07-03 ENCOUNTER — OFFICE VISIT (OUTPATIENT)
Dept: DERMATOLOGY | Facility: CLINIC | Age: 78
End: 2023-07-03
Payer: MEDICARE

## 2023-07-03 VITALS — HEIGHT: 66 IN | BODY MASS INDEX: 26.36 KG/M2 | WEIGHT: 164 LBS

## 2023-07-03 DIAGNOSIS — Z08 ENCOUNTER FOR FOLLOW-UP SURVEILLANCE OF SKIN CANCER: ICD-10-CM

## 2023-07-03 DIAGNOSIS — D48.5 NEOPLASM OF UNCERTAIN BEHAVIOR OF SKIN: Primary | ICD-10-CM

## 2023-07-03 DIAGNOSIS — L82.1 SEBORRHEIC KERATOSES: ICD-10-CM

## 2023-07-03 DIAGNOSIS — Z85.828 ENCOUNTER FOR FOLLOW-UP SURVEILLANCE OF SKIN CANCER: ICD-10-CM

## 2023-07-03 DIAGNOSIS — D22.9 MULTIPLE BENIGN NEVI: ICD-10-CM

## 2023-07-03 DIAGNOSIS — L91.8 SKIN TAGS, MULTIPLE ACQUIRED: ICD-10-CM

## 2023-07-03 DIAGNOSIS — L21.9 SEBORRHEIC DERMATITIS: ICD-10-CM

## 2023-07-03 DIAGNOSIS — L71.9 ROSACEA: ICD-10-CM

## 2023-07-03 PROCEDURE — 11102 TANGNTL BX SKIN SINGLE LES: CPT | Mod: S$GLB,,, | Performed by: DERMATOLOGY

## 2023-07-03 PROCEDURE — 1160F PR REVIEW ALL MEDS BY PRESCRIBER/CLIN PHARMACIST DOCUMENTED: ICD-10-PCS | Mod: CPTII,S$GLB,, | Performed by: DERMATOLOGY

## 2023-07-03 PROCEDURE — 1101F PR PT FALLS ASSESS DOC 0-1 FALLS W/OUT INJ PAST YR: ICD-10-PCS | Mod: CPTII,S$GLB,, | Performed by: DERMATOLOGY

## 2023-07-03 PROCEDURE — 88305 TISSUE EXAM BY PATHOLOGIST: CPT | Mod: 26,,, | Performed by: PATHOLOGY

## 2023-07-03 PROCEDURE — 1101F PT FALLS ASSESS-DOCD LE1/YR: CPT | Mod: CPTII,S$GLB,, | Performed by: DERMATOLOGY

## 2023-07-03 PROCEDURE — 11103 PR TANGENTIAL BIOPSY, SKIN, EA ADDTL LESION: ICD-10-PCS | Mod: S$GLB,,, | Performed by: DERMATOLOGY

## 2023-07-03 PROCEDURE — 1160F RVW MEDS BY RX/DR IN RCRD: CPT | Mod: CPTII,S$GLB,, | Performed by: DERMATOLOGY

## 2023-07-03 PROCEDURE — 88305 TISSUE EXAM BY PATHOLOGIST: CPT | Mod: 59 | Performed by: PATHOLOGY

## 2023-07-03 PROCEDURE — 99214 PR OFFICE/OUTPT VISIT, EST, LEVL IV, 30-39 MIN: ICD-10-PCS | Mod: 25,S$GLB,, | Performed by: DERMATOLOGY

## 2023-07-03 PROCEDURE — 1159F MED LIST DOCD IN RCRD: CPT | Mod: CPTII,S$GLB,, | Performed by: DERMATOLOGY

## 2023-07-03 PROCEDURE — 99214 OFFICE O/P EST MOD 30 MIN: CPT | Mod: 25,S$GLB,, | Performed by: DERMATOLOGY

## 2023-07-03 PROCEDURE — 3288F PR FALLS RISK ASSESSMENT DOCUMENTED: ICD-10-PCS | Mod: CPTII,S$GLB,, | Performed by: DERMATOLOGY

## 2023-07-03 PROCEDURE — 11103 TANGNTL BX SKIN EA SEP/ADDL: CPT | Mod: S$GLB,,, | Performed by: DERMATOLOGY

## 2023-07-03 PROCEDURE — 3288F FALL RISK ASSESSMENT DOCD: CPT | Mod: CPTII,S$GLB,, | Performed by: DERMATOLOGY

## 2023-07-03 PROCEDURE — 11102 PR TANGENTIAL BIOPSY, SKIN, SINGLE LESION: ICD-10-PCS | Mod: S$GLB,,, | Performed by: DERMATOLOGY

## 2023-07-03 PROCEDURE — 1159F PR MEDICATION LIST DOCUMENTED IN MEDICAL RECORD: ICD-10-PCS | Mod: CPTII,S$GLB,, | Performed by: DERMATOLOGY

## 2023-07-03 PROCEDURE — 88305 TISSUE EXAM BY PATHOLOGIST: ICD-10-PCS | Mod: 26,,, | Performed by: PATHOLOGY

## 2023-07-03 RX ORDER — KETOCONAZOLE 20 MG/ML
SHAMPOO, SUSPENSION TOPICAL
Qty: 120 ML | Refills: 5 | Status: SHIPPED | OUTPATIENT
Start: 2023-07-03 | End: 2024-01-18 | Stop reason: SDUPTHER

## 2023-07-03 RX ORDER — FLUCONAZOLE 200 MG/1
TABLET ORAL
Qty: 4 TABLET | Refills: 0 | Status: SHIPPED | OUTPATIENT
Start: 2023-07-03 | End: 2023-09-01

## 2023-07-03 NOTE — PROGRESS NOTES
Subjective:      Patient ID:  Megha Ladd is a 77 y.o. female who presents for   Chief Complaint   Patient presents with    Skin Check     UBSC     LOV 3/23/23 (Dr. Lund) BCC of Dorsum of Nose    Patient here for UBSC  Follow up after MOHS surgery 3/23  Patient has questions regarding healing post MOHS  C/O dry skin around eyebrows, scaly spot left temple    Derm Hx:  [facility] nasal [facility]20[facility] Dr[facility]   Has no fhx of MM    Current Outpatient Medications:   ·  acetaminophen (TYLENOL) 500 MG tablet, Take 1,000 mg by mouth 3 (three) times daily., Disp: , Rfl:   ·  ADVAIR -21 mcg/actuation HFAA inhaler, TAKE 2 PUFFS BY MOUTH TWICE A DAY, Disp: 36 g, Rfl: 1  ·  cartilage-collagen-bor-hyalur 40-5-3.3 mg Tab, Take 1 tablet by mouth once daily., Disp: , Rfl:   ·  celecoxib (CELEBREX) 200 MG capsule, Take 200 mg by mouth once daily. Every day, Disp: , Rfl:   ·  cetirizine (ZYRTEC) 10 MG tablet, Take 10 mg by mouth daily as needed for Allergies., Disp: , Rfl:   ·  cloNIDine (CATAPRES) 0.1 MG tablet, Take 1 tablet if systolic above 180 or diastolic above 105. Can repeat after 2 hours if bp remains above 180 or 105., Disp: 60 tablet, Rfl: 11  ·  doxycycline (VIBRAMYCIN) 100 MG Cap, Take 1 capsule (100 mg total) by mouth every 12 (twelve) hours., Disp: 20 capsule, Rfl: 0  ·  enalapril (VASOTEC) 20 MG tablet, TAKE 1 TABLET BY MOUTH TWICE DAILY, Disp: 180 tablet, Rfl: 1  ·  fluticasone (FLONASE) 50 mcg/actuation nasal spray, SPRAY 1 SPRAY IN EACH NOSTRIL 2 TIMES A DAY, Disp: 16 g, Rfl: 11  ·  ketoconazole (NIZORAL) 2 % shampoo, Wash hair with medicated shampoo at least 2x/week - let sit on scalp at least 5 minutes prior to rinsing, Disp: 120 mL, Rfl: 5  ·  loperamide (IMODIUM) 2 mg capsule, Take 2 mg by mouth 4 (four) times daily as needed for Diarrhea., Disp: , Rfl:   ·  meclizine (ANTIVERT) 25 mg tablet, Take 1 tablet (25 mg total) by mouth 3 (three) times daily as  needed for Dizziness., Disp: 60 tablet, Rfl: 2  ·  metaxalone (SKELAXIN) 800 MG tablet, Take 800 mg by mouth 3 (three) times daily. Every 6 hours PRN, Disp: , Rfl:   ·  metoprolol tartrate (LOPRESSOR) 50 MG tablet, TAKE 1 & 1/2 (ONE & ONE-HALF) TABLETS BY MOUTH TWICE DAILY, Disp: 270 tablet, Rfl: 0  ·  montelukast (SINGULAIR) 10 mg tablet, Take 1 tablet (10 mg total) by mouth every evening., Disp: 90 tablet, Rfl: 3  ·  pantoprazole (PROTONIX) 40 MG tablet, Take 1 tablet (40 mg total) by mouth once daily., Disp: 90 tablet, Rfl: 1  ·  peg 400-propylene glycol 0.4-0.3 % Drop, Place 2 drops into both eyes 2 (two) times daily. , Disp: , Rfl:   ·  pregabalin (LYRICA) 75 MG capsule, Take 75 mg by mouth once daily. , Disp: , Rfl:   ·  SODIUM CHLORIDE (SIMPLY SALINE NASL), by Nasal route 2 (two) times daily as needed. , Disp: , Rfl:   ·  tacrolimus (PROTOPIC) 0.1 % ointment, AAA face daily to BID PRN rash, Disp: 30 g, Rfl: 1  ·  tramadol (ULTRAM) 50 mg tablet, Take 50 mg by mouth every 8 (eight) hours as needed. Every 6 hours, Disp: , Rfl:   ·  triamterene-hydrochlorothiazide 37.5-25 mg (MAXZIDE-25) 37.5-25 mg per tablet, Take 0.5 tablets by mouth once daily., Disp: 45 tablet, Rfl: 12  ·  UNABLE TO FIND, Take 1 tablet by mouth once daily. Ultra triple action joint health, Disp: , Rfl:        Review of Systems   Constitutional:  Negative for fever, chills and fatigue.   Respiratory:  Negative for cough and shortness of breath.    Gastrointestinal:  Negative for nausea and vomiting.   Skin:  Positive for activity-related sunscreen use and wears hat. Negative for itching, rash and daily sunscreen use.     Objective:   Physical Exam   Constitutional: She appears well-developed and well-nourished. No distress.   Neurological: She is alert and oriented to person, place, and time. She is not disoriented.   Psychiatric: She has a normal mood and affect.   Skin:   Areas Examined (abnormalities noted in diagram):   Scalp / Hair  Palpated and Inspected  Head / Face Inspection Performed  Neck Inspection Performed  Chest / Axilla Inspection Performed  Abdomen Inspection Performed  Back Inspection Performed  RUE Inspected  LUE Inspection Performed  Nails and Digits Inspection Performed               Diagram Legend     Erythematous scaling macule/papule c/w actinic keratosis       Vascular papule c/w angioma      Pigmented verrucoid papule/plaque c/w seborrheic keratosis      Yellow umbilicated papule c/w sebaceous hyperplasia      Irregularly shaped tan macule c/w lentigo     1-2 mm smooth white papules consistent with Milia      Movable subcutaneous cyst with punctum c/w epidermal inclusion cyst      Subcutaneous movable cyst c/w pilar cyst      Firm pink to brown papule c/w dermatofibroma      Pedunculated fleshy papule(s) c/w skin tag(s)      Evenly pigmented macule c/w junctional nevus     Mildly variegated pigmented, slightly irregular-bordered macule c/w mildly atypical nevus      Flesh colored to evenly pigmented papule c/w intradermal nevus       Pink pearly papule/plaque c/w basal cell carcinoma      Erythematous hyperkeratotic cursted plaque c/w SCC      Surgical scar with no sign of skin cancer recurrence      Open and closed comedones      Inflammatory papules and pustules      Verrucoid papule consistent consistent with wart     Erythematous eczematous patches and plaques     Dystrophic onycholytic nail with subungual debris c/w onychomycosis     Umbilicated papule    Erythematous-base heme-crusted tan verrucoid plaque consistent with inflamed seborrheic keratosis     Erythematous Silvery Scaling Plaque c/w Psoriasis     See annotation        Assessment / Plan:      Pathology Orders:       Normal Orders This Visit    Specimen to Pathology, Dermatology     Questions:    Procedure Type: Dermatology and skin neoplasms    Number of Specimens: 2    ------------------------: -------------------------    Spec 1 Procedure: Biopsy    Spec 1  Clinical Impression: sup BCC vs other    Spec 1 Source: left lateral forehead    ------------------------: -------------------------    Spec 2 Procedure: Biopsy    Spec 2 Clinical Impression: sup BCC vs other    Spec 2 Source: left lateral cheek    Release to patient:           Neoplasm of uncertain behavior of skin  -     Specimen to Pathology, Dermatology  Shave biopsy procedure note:x2    Shave biopsy performed after verbal consent including risk of infection, scar, recurrence, need for additional treatment of site. Area prepped with alcohol, anesthetized with approximately 1.0cc of 1% lidocaine with epinephrine. Lesional tissue shaved with razor blade. Hemostasis achieved with application of aluminum chloride followed by hyfrecation. No complications. Dressing applied. Wound care explained.    Seborrheic dermatitis  -     ketoconazole (NIZORAL) 2 % shampoo; Wash hair with medicated shampoo at least 2x/week - let sit on scalp at least 5 minutes prior to rinsing  Dispense: 120 mL; Refill: 5  -     fluconazole (DIFLUCAN) 200 MG Tab; Take 1 pill PO 2 times per week x 2 weeks.  Dispense: 4 tablet; Refill: 0  Severe, scalp and glabella  Overlap with rosacea    Rosacea  Improved with triple cream, continue once daily, refill  Vanicream zinc shampoo as face wash daily    Multiple benign nevi  Careful dermoscopy evaluation of nevi performed with none identified as needing biopsy today  Monitor for new mole or moles that are becoming bigger, darker, irritated, or developing irregular borders.     Encounter for follow-up surveillance of skin cancer  Area of previous NMSC (nose x2) examined. Sitea well healed with no signs of recurrence.  Upper body skin examination performed today including at least 9 points as noted in physical examination. 2 lesions suspicious for malignancy noted.    Seborrheic keratoses  These are benign inherited growths without a malignant potential. Reassurance given to patient. No treatment is  necessary.     Skin tags, multiple acquired  Reassurance given to patient. No treatment is necessary.   Treatment of benign, asymptomatic lesions may be considered cosmetic.             Follow up in about 6 months (around 1/3/2024), or if symptoms worsen or fail to improve.

## 2023-07-03 NOTE — PATIENT INSTRUCTIONS
Shave Biopsy Wound Care    Your doctor has performed a shave biopsy today.  A band aid and vaseline ointment has been placed over the site.  This should remain in place for 24 hours.  It is recommended that you keep the area dry for the first 24 hours.  After 24 hours, you may remove the band aid and wash the area with warm soap and water and apply Vaseline jelly.  Many patients prefer to use Neosporin or Bacitracin ointment.  This is acceptable; however, know that you can develop an allergy to this medication even if you have used it safely for years.  It is important to keep the area moist.  Letting it dry out and get air slows healing time, and will worsen the scar.  Band aid is optional after first 24 hours.      If you notice increasing redness, tenderness, pain, or yellow drainage at the biopsy site, please notify your doctor.  These are signs of an infection.    If your biopsy site is bleeding, apply firm pressure for 15 minutes straight.  Repeat for another 15 minutes, if it is still bleeding.   If the surgical site continues to bleed, then please contact your doctor.       [facility] - DERMATOLOGY  [address], [address]  [place] [address]  Dept: [phone]  Dept Fax: [phone]

## 2023-07-11 LAB
FINAL PATHOLOGIC DIAGNOSIS: NORMAL
Lab: NORMAL

## 2023-07-13 ENCOUNTER — TELEPHONE (OUTPATIENT)
Dept: DERMATOLOGY | Facility: CLINIC | Age: 78
End: 2023-07-13
Payer: MEDICARE

## 2023-07-13 DIAGNOSIS — C44.91 BASAL CELL CARCINOMA (BCC), UNSPECIFIED SITE: Primary | ICD-10-CM

## 2023-07-13 NOTE — TELEPHONE ENCOUNTER
----- Message from Shashank Bain sent at 7/13/2023  2:32 PM CDT -----  Regarding: rt call  Type:  Patient Returning Call    Who Called:  pt  Who Left Message for Patient:  Ashlie  Does the patient know what this is regarding?:  yes  Best Call Back Number:  [phone]    Additional Information:

## 2023-07-17 DIAGNOSIS — D48.5 NEOPLASM OF UNCERTAIN BEHAVIOR OF SKIN: Primary | ICD-10-CM

## 2023-07-24 ENCOUNTER — TELEPHONE (OUTPATIENT)
Dept: DERMATOLOGY | Facility: CLINIC | Age: 78
End: 2023-07-24
Payer: MEDICARE

## 2023-08-08 ENCOUNTER — PROCEDURE VISIT (OUTPATIENT)
Dept: DERMATOLOGY | Facility: CLINIC | Age: 78
End: 2023-08-08
Payer: MEDICARE

## 2023-08-08 VITALS
WEIGHT: 164 LBS | HEIGHT: 66 IN | SYSTOLIC BLOOD PRESSURE: 160 MMHG | BODY MASS INDEX: 26.36 KG/M2 | HEART RATE: 79 BPM | DIASTOLIC BLOOD PRESSURE: 79 MMHG

## 2023-08-08 DIAGNOSIS — D48.5 NEOPLASM OF UNCERTAIN BEHAVIOR OF SKIN: ICD-10-CM

## 2023-08-08 DIAGNOSIS — C44.310 BASAL CELL CARCINOMA, FACE: Primary | ICD-10-CM

## 2023-08-08 PROCEDURE — 13133 PR REPR,FACE,GENITAL,HAND,FT+5 CMCM/<: ICD-10-PCS | Mod: HCNC,S$GLB,, | Performed by: DERMATOLOGY

## 2023-08-08 PROCEDURE — 17312 MOHS ADDL STAGE: CPT | Mod: HCNC,S$GLB,, | Performed by: DERMATOLOGY

## 2023-08-08 PROCEDURE — 13132 PR RECMPL WND HEAD,FAC,HAND 2.6-7.5 CM: ICD-10-PCS | Mod: HCNC,51,S$GLB, | Performed by: DERMATOLOGY

## 2023-08-08 PROCEDURE — 17312: ICD-10-PCS | Mod: HCNC,S$GLB,, | Performed by: DERMATOLOGY

## 2023-08-08 PROCEDURE — 13133 CMPLX RPR F/C/C/M/N/AX/G/H/F: CPT | Mod: HCNC,S$GLB,, | Performed by: DERMATOLOGY

## 2023-08-08 PROCEDURE — 17311: ICD-10-PCS | Mod: 76,HCNC,S$GLB, | Performed by: DERMATOLOGY

## 2023-08-08 PROCEDURE — 17311 MOHS 1 STAGE H/N/HF/G: CPT | Mod: 76,HCNC,S$GLB, | Performed by: DERMATOLOGY

## 2023-08-08 PROCEDURE — 13132 CMPLX RPR F/C/C/M/N/AX/G/H/F: CPT | Mod: HCNC,51,S$GLB, | Performed by: DERMATOLOGY

## 2023-08-08 NOTE — PROGRESS NOTES
MOHS MICROGRAPHIC SURGERY OPERATIVE NOTE  Name:  Megha Ladd  Date: 2023  Patient : 1945  Attending Surgeon: Sofie Myers MD  Assistants: Michael Kelly - Surgical Technician and Maritza Boothe - Histology Technician  Anesthetic Agent: 1% lidocaine with 1:100,000 epinephrine  Clinical Diagnosis: basal cell carcinoma nodular and superficial  Operation: Mohs Micrographic Surgery  Location: left lateral forehead  Indications: Location in non-mask areas of face where maximum conservation of tumor-free tissue is needed.  Surgical Preparation: povidone-iodine     Description of Operation:  The nature and purpose of the procedure, associated risks and alternative treatments were explained to the patient in detail. All patient questions were answered completely. An informed operative consent and photography permit were obtained. The tumor location was then identified and marked with agreement by the patient of the correct location. The patient was positioned, prepped, and draped in the usual sterile manner. Local anesthesia was obtained with 2.5 cc(s) of 1% lidocaine with 1:100,000 epinephrine. The lesion pre-operatively measures 1.2 by 0.9 cm.     1ST STAGE:  A 2+ mm rim of normal appearing skin was marked circumferentially around the lesion after scraping with a curette to define the margin. The area thus outlined was excised at a 45 degree angle. Hemostasis was obtained with electrodesiccation. The specimen was oriented, mapped, and subdivided into at least two sections. Sections were then chromacoded and submitted for horizontal frozen sections. The patient tolerated the procedure well and there were no complications. Upon microscopic examination of the processed horizontal frozen sections of this stage:  No residual tumor was present.  Tumor type noted on stage 1: No tumor seen.          SUMMARY:  The tumor was extirpated in 1 Mohs Stages resulting in a final defect measuring 1.4 by 1.2 cm².   The  final defect extended deep to subcutaneous fat  The patient tolerated the procedure well and no complications were noted.     PHOTOS:      Sofie Myers MD    Complex Linear Closure Operative Note  Name: Megha Ladd  YOB: 1945  Date: 8/8/2023  Attending Surgeon: Sofie Myers MD FAAD  Assistants:  Michael Kelly - Surgical Technician  Clinical Diagnosis: A 1.4 by 1.2 cm defect secondary to Mohs Micrographic Surgery  Location: left lateral forehead  Operation: Complex linear closure  Surgical Preparation: broad scrub with povidone-iodine    Description of Operation:  The nature and purpose of the procedure, associated risks and alternative treatments were explained to the patient in detail. All patient questions were answered completely. In order to minimize tension on the closure and avoid compromising the anatomic contour of the cosmetic region and maximize functional capacity, a complex linear closure was performed. An informed operative consent and photography permit were obtained. The patient was positioned, prepped, and draped in the usual sterile manner. Local anesthesia was obtained with 4 cc(s) of 1% lidocaine with 1:100,000 epinephrine.    The defect edges were debeveled with a #15 scalpel blade. The circular defect was widely undermined in the deep subcutaneous plane at least 100% of the defect diameter to allow for maximum tissue movement. Hemostasis was achieved with spot electrodessication. The defect was closed first utilizing multiple 4-0 Monocryl interrupted buried subcutaneous sutures. This resulted in excellent apposition of the dermis and epidermis, however two redundant areas of tissue were created on opposite sides of the defect. Utilizing a #15 scalpel blade, two Burows triangles were excised to ensure a flat scar. Hemostasis was obtained with spot electrodessication. The skin edges throughout further fastened superficially with 5-0 Prolene. The final length of the  repair was 4.2 cm.  The patient tolerated the procedure well and there were no complications.  Polysporin, Telfa and a pressure dressing were applied to sutures after gentle cleansing with saline.    Patient instructed in and provided with instructions for post-op care, will RTC in 7 days for suture removal    Post op meds: None    Photos:      Sofie Myers MD

## 2023-08-08 NOTE — PROGRESS NOTES
MOHS MICROGRAPHIC SURGERY OPERATIVE NOTE  Name:  Megha Ladd  Date: 2023  Patient : 1945  Attending Surgeon: Sofie Myers MD  Assistants: Michael Kelly - Surgical Technician and Maritza Boothe - Histology Technician  Anesthetic Agent: 1% lidocaine with 1:100,000 epinephrine  Clinical Diagnosis: basal cell carcinoma - superficial and nodular  Operation: Mohs Micrographic Surgery  Location: left lateral cheek  Indications: Location in non-mask areas of face where maximum conservation of tumor-free tissue is needed.  Surgical Preparation: povidone-iodine     Description of Operation:  The nature and purpose of the procedure, associated risks and alternative treatments were explained to the patient in detail. All patient questions were answered completely. An informed operative consent and photography permit were obtained. The tumor location was then identified and marked with agreement by the patient of the correct location. The patient was positioned, prepped, and draped in the usual sterile manner. Local anesthesia was obtained with 3.5 cc(s) of 1% lidocaine with 1:100,000 epinephrine. The lesion pre-operatively measures 1.2 by 0.8 cm.     1ST STAGE:  A 2+ mm rim of normal appearing skin was marked circumferentially around the lesion after scraping with a curette to define the margin. The area thus outlined was excised at a 45 degree angle. Hemostasis was obtained with electrodesiccation. The specimen was oriented, mapped, and subdivided into at least two sections. Sections were then chromacoded and submitted for horizontal frozen sections. The patient tolerated the procedure well and there were no complications. Upon microscopic examination of the processed horizontal frozen sections of this stage:  Dense obscuring inflammation and/or scar tissue prevents full assessment; an additional stage is indicated to rule out residual tumor  Tumor type noted on stage 1: No tumor seen.     SUBSEQUENT  STAGES:  The patient returned to the operating room for additional stages of tumor removal, and prepped in the manner described above. Surgery was directed to the areas having residual tumor, with thin layers of tissue being excised from these regions. A new reference map was prepared during the surgery to maintain precise orientation as described above. Hemostasis was achieved and the excised tissue was processed for microscopic analysis.  These sections were then examined by the Mohs surgeon, and areas of persistent tumor were indicated on the reference map. This process was repeated until the frozen horizontal sections of STAGE 2 revealed no further tumor cells and tumor eradication was considered to be  complete.  Tumor type noted on subsequent stages: No tumor seen.     SUMMARY:  The tumor was extirpated in 2 Mohs Stages resulting in a final defect measuring 2.1 by 1.3 cm².   The final defect extended deep to subcutaneous fat  The patient tolerated the procedure well and no complications were noted.     PHOTOS:  Defect photo not taken by assistant    Sofie Myers MD    Complex Linear Closure Operative Note  Name: Megha Ladd  YOB: 1945  Date: 8/8/2023  Attending Surgeon: Sofie Myers MD FAAD  Assistants:  Michael Kelly - Surgical Technician  Clinical Diagnosis: A 2.1 by 1.3 cm defect secondary to Mohs Micrographic Surgery  Location: left lateral cheek  Operation: Complex linear closure  Surgical Preparation: broad scrub with povidone-iodine    Description of Operation:  The nature and purpose of the procedure, associated risks and alternative treatments were explained to the patient in detail. All patient questions were answered completely. In order to minimize tension on the closure and avoid compromising the anatomic contour of the cosmetic region and maximize functional capacity, a complex linear closure was performed. An informed operative consent and photography permit were  obtained. The patient was positioned, prepped, and draped in the usual sterile manner. Local anesthesia was obtained with 7 cc(s) of 1% lidocaine with 1:100,000 epinephrine.    The defect edges were debeveled with a #15 scalpel blade. The circular defect was widely undermined in the deep subcutaneous plane at least 100% of the defect diameter to allow for maximum tissue movement. Hemostasis was achieved with spot electrodessication. The defect was closed first utilizing multiple 4-0 Monocryl interrupted buried subcutaneous sutures. This resulted in excellent apposition of the dermis and epidermis, however two redundant areas of tissue were created on opposite sides of the defect. Utilizing a #15 scalpel blade, two Burows triangles were excised to ensure a flat scar. Hemostasis was obtained with spot electrodessication. The skin edges throughout further fastened superficially with 5-0 Prolene. The final length of the repair was 4.0 cm.  The patient tolerated the procedure well and there were no complications.  Polysporin, Telfa and a pressure dressing were applied to sutures after gentle cleansing with saline.    Patient instructed in and provided with instructions for post-op care, will RTC in 7 days for suture removal    Post op meds: None    Photos:        Sofie Myers MD

## 2023-08-15 ENCOUNTER — OFFICE VISIT (OUTPATIENT)
Dept: DERMATOLOGY | Facility: CLINIC | Age: 78
End: 2023-08-15
Payer: MEDICARE

## 2023-08-15 DIAGNOSIS — Z48.02 VISIT FOR SUTURE REMOVAL: Primary | ICD-10-CM

## 2023-08-15 PROCEDURE — 1101F PT FALLS ASSESS-DOCD LE1/YR: CPT | Mod: HCNC,CPTII,S$GLB, | Performed by: DERMATOLOGY

## 2023-08-15 PROCEDURE — 3288F FALL RISK ASSESSMENT DOCD: CPT | Mod: HCNC,CPTII,S$GLB, | Performed by: DERMATOLOGY

## 2023-08-15 PROCEDURE — 1126F PR PAIN SEVERITY QUANTIFIED, NO PAIN PRESENT: ICD-10-PCS | Mod: HCNC,CPTII,S$GLB, | Performed by: DERMATOLOGY

## 2023-08-15 PROCEDURE — 1159F MED LIST DOCD IN RCRD: CPT | Mod: HCNC,CPTII,S$GLB, | Performed by: DERMATOLOGY

## 2023-08-15 PROCEDURE — 1126F AMNT PAIN NOTED NONE PRSNT: CPT | Mod: HCNC,CPTII,S$GLB, | Performed by: DERMATOLOGY

## 2023-08-15 PROCEDURE — 99024 POSTOP FOLLOW-UP VISIT: CPT | Mod: HCNC,S$GLB,, | Performed by: DERMATOLOGY

## 2023-08-15 PROCEDURE — 3288F PR FALLS RISK ASSESSMENT DOCUMENTED: ICD-10-PCS | Mod: HCNC,CPTII,S$GLB, | Performed by: DERMATOLOGY

## 2023-08-15 PROCEDURE — 1159F PR MEDICATION LIST DOCUMENTED IN MEDICAL RECORD: ICD-10-PCS | Mod: HCNC,CPTII,S$GLB, | Performed by: DERMATOLOGY

## 2023-08-15 PROCEDURE — 99024 PR POST-OP FOLLOW-UP VISIT: ICD-10-PCS | Mod: HCNC,S$GLB,, | Performed by: DERMATOLOGY

## 2023-08-15 PROCEDURE — 99999 PR PBB SHADOW E&M-EST. PATIENT-LVL III: ICD-10-PCS | Mod: PBBFAC,HCNC,, | Performed by: DERMATOLOGY

## 2023-08-15 PROCEDURE — 99999 PR PBB SHADOW E&M-EST. PATIENT-LVL III: CPT | Mod: PBBFAC,HCNC,, | Performed by: DERMATOLOGY

## 2023-08-15 PROCEDURE — 1101F PR PT FALLS ASSESS DOC 0-1 FALLS W/OUT INJ PAST YR: ICD-10-PCS | Mod: HCNC,CPTII,S$GLB, | Performed by: DERMATOLOGY

## 2023-08-16 NOTE — PROGRESS NOTES
Mohs Surgery Suture Removal Note   Patient Name: Megha Ladd  Patient : 1945  Date of Service: 2023    CC: Pt. Presents for removal of sutures X2 on the left side of the face today  Subjective: patient reports wounds healing as expected     Objective: Wound well healed, sutures clean/dry/intact. No evidence of any complications noted.     Assessment and Plan:  Diagnoses and all orders for this visit:    Visit for suture removal      - Suture removal today  - Steri strips/mastisol were not applied  - Patient counseled on silicone gel/silicone sheeting and their use in the management of post-operative scars  - Handout on silicone gel/silicone gel sheeting was provided  - Patient to follow up with their referring provider in 3 months for further skin evaluation    Sofie Myers

## 2023-08-23 ENCOUNTER — TELEPHONE (OUTPATIENT)
Dept: DERMATOLOGY | Facility: CLINIC | Age: 78
End: 2023-08-23
Payer: MEDICARE

## 2023-08-24 ENCOUNTER — OFFICE VISIT (OUTPATIENT)
Dept: DERMATOLOGY | Facility: CLINIC | Age: 78
End: 2023-08-24
Payer: MEDICARE

## 2023-08-24 DIAGNOSIS — L90.5 SCAR: Primary | ICD-10-CM

## 2023-08-24 PROCEDURE — 3288F FALL RISK ASSESSMENT DOCD: CPT | Mod: HCNC,CPTII,S$GLB, | Performed by: DERMATOLOGY

## 2023-08-24 PROCEDURE — 1101F PT FALLS ASSESS-DOCD LE1/YR: CPT | Mod: HCNC,CPTII,S$GLB, | Performed by: DERMATOLOGY

## 2023-08-24 PROCEDURE — 99024 PR POST-OP FOLLOW-UP VISIT: ICD-10-PCS | Mod: HCNC,S$GLB,, | Performed by: DERMATOLOGY

## 2023-08-24 PROCEDURE — 1159F PR MEDICATION LIST DOCUMENTED IN MEDICAL RECORD: ICD-10-PCS | Mod: HCNC,CPTII,S$GLB, | Performed by: DERMATOLOGY

## 2023-08-24 PROCEDURE — 99999 PR PBB SHADOW E&M-EST. PATIENT-LVL III: ICD-10-PCS | Mod: PBBFAC,HCNC,, | Performed by: DERMATOLOGY

## 2023-08-24 PROCEDURE — 1126F PR PAIN SEVERITY QUANTIFIED, NO PAIN PRESENT: ICD-10-PCS | Mod: HCNC,CPTII,S$GLB, | Performed by: DERMATOLOGY

## 2023-08-24 PROCEDURE — 1101F PR PT FALLS ASSESS DOC 0-1 FALLS W/OUT INJ PAST YR: ICD-10-PCS | Mod: HCNC,CPTII,S$GLB, | Performed by: DERMATOLOGY

## 2023-08-24 PROCEDURE — 1126F AMNT PAIN NOTED NONE PRSNT: CPT | Mod: HCNC,CPTII,S$GLB, | Performed by: DERMATOLOGY

## 2023-08-24 PROCEDURE — 99024 POSTOP FOLLOW-UP VISIT: CPT | Mod: HCNC,S$GLB,, | Performed by: DERMATOLOGY

## 2023-08-24 PROCEDURE — 99999 PR PBB SHADOW E&M-EST. PATIENT-LVL III: CPT | Mod: PBBFAC,HCNC,, | Performed by: DERMATOLOGY

## 2023-08-24 PROCEDURE — 1159F MED LIST DOCD IN RCRD: CPT | Mod: HCNC,CPTII,S$GLB, | Performed by: DERMATOLOGY

## 2023-08-24 PROCEDURE — 3288F PR FALLS RISK ASSESSMENT DOCUMENTED: ICD-10-PCS | Mod: HCNC,CPTII,S$GLB, | Performed by: DERMATOLOGY

## 2023-08-28 NOTE — PROGRESS NOTES
"Dermatologic Surgery Wound Check Note  Name: Megha Ladd   Date: 2023  Patient : 1945    Attending Surgeon: Sofie Myers MD  Assistants: Maritza Boothe - Histology Technician  Location: left temple/cheek    Subjective: Patient presents today for wound check on the above areas. Patient reports retained prolene sutures (blue) in the wound. Also concerned about everted wound edge of jawline/cheek.      Objective:  Physical Exam   Head              Assessment and plan:    Diagnoses and all orders for this visit:    Scar  - both wounds healing well, prolene removed, monocryl tails trimmed, counseled these monocryl sutures may spit as they were cut with tails remaining  - patient to RTC for spitting sutures PRN   - "Bump" patient reported is mild wound eversion which was done intentionally at time of closure, pt counseled this will resolve without intervention over 6 weeks.         "

## 2023-09-01 ENCOUNTER — OFFICE VISIT (OUTPATIENT)
Dept: FAMILY MEDICINE | Facility: CLINIC | Age: 78
End: 2023-09-01
Attending: FAMILY MEDICINE
Payer: MEDICARE

## 2023-09-01 VITALS
OXYGEN SATURATION: 97 % | TEMPERATURE: 98 F | SYSTOLIC BLOOD PRESSURE: 120 MMHG | RESPIRATION RATE: 17 BRPM | BODY MASS INDEX: 26.42 KG/M2 | DIASTOLIC BLOOD PRESSURE: 78 MMHG | WEIGHT: 164.38 LBS | HEIGHT: 66 IN | HEART RATE: 59 BPM

## 2023-09-01 DIAGNOSIS — J45.30 MILD PERSISTENT ASTHMA, UNSPECIFIED WHETHER COMPLICATED: ICD-10-CM

## 2023-09-01 DIAGNOSIS — I10 UNCONTROLLED HYPERTENSION: ICD-10-CM

## 2023-09-01 DIAGNOSIS — I77.1 TORTUOUS AORTA: ICD-10-CM

## 2023-09-01 DIAGNOSIS — J30.9 CHRONIC ALLERGIC RHINITIS: ICD-10-CM

## 2023-09-01 DIAGNOSIS — I10 ESSENTIAL HYPERTENSION: ICD-10-CM

## 2023-09-01 DIAGNOSIS — E03.9 HYPOTHYROIDISM, UNSPECIFIED TYPE: ICD-10-CM

## 2023-09-01 DIAGNOSIS — Z00.00 ENCOUNTER FOR PREVENTIVE HEALTH EXAMINATION: Primary | ICD-10-CM

## 2023-09-01 DIAGNOSIS — E78.5 HYPERLIPIDEMIA, UNSPECIFIED HYPERLIPIDEMIA TYPE: ICD-10-CM

## 2023-09-01 DIAGNOSIS — I10 PRIMARY HYPERTENSION: ICD-10-CM

## 2023-09-01 DIAGNOSIS — G89.29 OTHER CHRONIC PAIN: ICD-10-CM

## 2023-09-01 DIAGNOSIS — R42 DIZZINESS: ICD-10-CM

## 2023-09-01 DIAGNOSIS — K21.9 GASTROESOPHAGEAL REFLUX DISEASE, UNSPECIFIED WHETHER ESOPHAGITIS PRESENT: ICD-10-CM

## 2023-09-01 PROCEDURE — 99397 PER PM REEVAL EST PAT 65+ YR: CPT | Mod: HCNC,S$GLB,, | Performed by: FAMILY MEDICINE

## 2023-09-01 PROCEDURE — 3288F FALL RISK ASSESSMENT DOCD: CPT | Mod: HCNC,CPTII,S$GLB, | Performed by: FAMILY MEDICINE

## 2023-09-01 PROCEDURE — 3078F DIAST BP <80 MM HG: CPT | Mod: HCNC,CPTII,S$GLB, | Performed by: FAMILY MEDICINE

## 2023-09-01 PROCEDURE — 99397 PR PREVENTIVE VISIT,EST,65 & OVER: ICD-10-PCS | Mod: HCNC,S$GLB,, | Performed by: FAMILY MEDICINE

## 2023-09-01 PROCEDURE — 3074F PR MOST RECENT SYSTOLIC BLOOD PRESSURE < 130 MM HG: ICD-10-PCS | Mod: HCNC,CPTII,S$GLB, | Performed by: FAMILY MEDICINE

## 2023-09-01 PROCEDURE — 1125F PR PAIN SEVERITY QUANTIFIED, PAIN PRESENT: ICD-10-PCS | Mod: HCNC,CPTII,S$GLB, | Performed by: FAMILY MEDICINE

## 2023-09-01 PROCEDURE — 1159F PR MEDICATION LIST DOCUMENTED IN MEDICAL RECORD: ICD-10-PCS | Mod: HCNC,CPTII,S$GLB, | Performed by: FAMILY MEDICINE

## 2023-09-01 PROCEDURE — 1101F PR PT FALLS ASSESS DOC 0-1 FALLS W/OUT INJ PAST YR: ICD-10-PCS | Mod: HCNC,CPTII,S$GLB, | Performed by: FAMILY MEDICINE

## 2023-09-01 PROCEDURE — 3078F PR MOST RECENT DIASTOLIC BLOOD PRESSURE < 80 MM HG: ICD-10-PCS | Mod: HCNC,CPTII,S$GLB, | Performed by: FAMILY MEDICINE

## 2023-09-01 PROCEDURE — 3074F SYST BP LT 130 MM HG: CPT | Mod: HCNC,CPTII,S$GLB, | Performed by: FAMILY MEDICINE

## 2023-09-01 PROCEDURE — 3288F PR FALLS RISK ASSESSMENT DOCUMENTED: ICD-10-PCS | Mod: HCNC,CPTII,S$GLB, | Performed by: FAMILY MEDICINE

## 2023-09-01 PROCEDURE — 1125F AMNT PAIN NOTED PAIN PRSNT: CPT | Mod: HCNC,CPTII,S$GLB, | Performed by: FAMILY MEDICINE

## 2023-09-01 PROCEDURE — 99999 PR PBB SHADOW E&M-EST. PATIENT-LVL V: ICD-10-PCS | Mod: PBBFAC,HCNC,, | Performed by: FAMILY MEDICINE

## 2023-09-01 PROCEDURE — 99999 PR PBB SHADOW E&M-EST. PATIENT-LVL V: CPT | Mod: PBBFAC,HCNC,, | Performed by: FAMILY MEDICINE

## 2023-09-01 PROCEDURE — 1159F MED LIST DOCD IN RCRD: CPT | Mod: HCNC,CPTII,S$GLB, | Performed by: FAMILY MEDICINE

## 2023-09-01 PROCEDURE — 1160F PR REVIEW ALL MEDS BY PRESCRIBER/CLIN PHARMACIST DOCUMENTED: ICD-10-PCS | Mod: HCNC,CPTII,S$GLB, | Performed by: FAMILY MEDICINE

## 2023-09-01 PROCEDURE — 1101F PT FALLS ASSESS-DOCD LE1/YR: CPT | Mod: HCNC,CPTII,S$GLB, | Performed by: FAMILY MEDICINE

## 2023-09-01 PROCEDURE — 1160F RVW MEDS BY RX/DR IN RCRD: CPT | Mod: HCNC,CPTII,S$GLB, | Performed by: FAMILY MEDICINE

## 2023-09-01 RX ORDER — ENALAPRIL MALEATE 20 MG/1
20 TABLET ORAL 2 TIMES DAILY
Qty: 180 TABLET | Refills: 3 | Status: SHIPPED | OUTPATIENT
Start: 2023-09-01

## 2023-09-01 RX ORDER — AZELASTINE 1 MG/ML
SPRAY, METERED NASAL
Qty: 30 ML | Refills: 10 | Status: SHIPPED | OUTPATIENT
Start: 2023-09-01

## 2023-09-01 RX ORDER — METOPROLOL TARTRATE 50 MG/1
TABLET ORAL
Qty: 270 TABLET | Refills: 3 | Status: SHIPPED | OUTPATIENT
Start: 2023-09-01

## 2023-09-01 RX ORDER — PANTOPRAZOLE SODIUM 20 MG/1
20 TABLET, DELAYED RELEASE ORAL DAILY
Qty: 90 TABLET | Refills: 3 | Status: SHIPPED | OUTPATIENT
Start: 2023-09-01 | End: 2024-08-31

## 2023-09-01 RX ORDER — MECLIZINE HCL 12.5 MG 12.5 MG/1
12.5 TABLET ORAL 3 TIMES DAILY PRN
Qty: 60 TABLET | Refills: 2 | Status: SHIPPED | OUTPATIENT
Start: 2023-09-01

## 2023-09-01 RX ORDER — FLUTICASONE PROPIONATE AND SALMETEROL XINAFOATE 115; 21 UG/1; UG/1
2 AEROSOL, METERED RESPIRATORY (INHALATION) 2 TIMES DAILY
Qty: 36 G | Refills: 3 | Status: SHIPPED | OUTPATIENT
Start: 2023-09-01

## 2023-09-01 RX ORDER — MONTELUKAST SODIUM 10 MG/1
10 TABLET ORAL NIGHTLY
Qty: 90 TABLET | Refills: 3 | Status: SHIPPED | OUTPATIENT
Start: 2023-09-01

## 2023-09-01 RX ORDER — CLONIDINE HYDROCHLORIDE 0.1 MG/1
TABLET ORAL
Qty: 60 TABLET | Refills: 2 | Status: SHIPPED | OUTPATIENT
Start: 2023-09-01

## 2023-09-01 RX ORDER — TRIAMTERENE/HYDROCHLOROTHIAZID 37.5-25 MG
0.5 TABLET ORAL DAILY
Qty: 45 TABLET | Refills: 12 | Status: SHIPPED | OUTPATIENT
Start: 2023-09-01

## 2023-09-01 NOTE — PROGRESS NOTES
Subjective:       Patient ID: Megha Ladd is a 77 y.o. female.    Chief Complaint: Annual Exam (Pt states that she is here for her annual exam )    77-year-old female coming in for general checkup and follow-up on multiple medical problems.  She has a history of chronic low back pain with degenerative joint and disc disease of the lumbar spine and scoliosis.  She was under the care of Dr. Taylor and was well managed but [place] and she is now seeing Dr. Rios.  Dr. Rios has a very different style of practice, he will not allow her  to come into the visits and she feels he is pressing her to take stronger pain medications than she is comfortable with.  She is asking for a referral for a 2nd opinion in management to another pain provider.  Currently she feels she is well controlled on tramadol and pregabalin.   was checked with no inappropriate activity.  She has a history of asthma which has been well controlled and she has not used a rescue inhaler in years.  She has hypertension on enalapril, metoprolol, and max side with a clonidine 0.1 mg p.r.n. blood pressure elevations.  She has hyperlipidemia which has been diet controlled, an incidental finding of a tortuous aorta with no symptoms, a history of hyponatremia which has been controlled with a lowered dose of max side taking 1/2 tablet and hypothyroidism not currently on thyroid medication.  She has reflux and has been off of her Protonix in an effort to avoid B12 deficiency and osteoporosis but she is having moderately severe reflux symptoms and would like to go back to the low-dose Protonix if possible.  She has alopecia, fatigue, insomnia and otherwise is without complaints.  She does have some intermittent dizziness and needs a refill of meclizine that she uses p.r.n..  She has the 25 mg tablets and she always cuts them in half an uses a 12.5.  She would like to reduce it to the 12.5 mg tablets to avoid having to cut  them.      Past Medical History:  No date: Asthma  No date: Basal cell carcinoma  No date: GERD (gastroesophageal reflux disease)  No date: Hyperlipidemia  No date: Hypertension  No date: Hypothyroid  No date: Lumbago  No date: Neuromuscular disorder  No date: Sinusitis  No date: Ulcer    Past Surgical History:  No date: APPENDECTOMY  No date:  SECTION  No date: LUMBAR EPIDURAL INJECTION  No date: PARTIAL HYSTERECTOMY    Review of patient's family history indicates:  Problem: Heart disease      Relation: Father          Age of Onset: (Not Specified)  Problem: Early death      Relation: Father          Age of Onset: 51  Problem: Heart disease      Relation: Sister          Age of Onset: (Not Specified)  Problem: Hypertension      Relation: Sister          Age of Onset: (Not Specified)  Problem: Emphysema      Relation: Sister          Age of Onset: (Not Specified)  Problem: Hypertension      Relation: Brother          Age of Onset: (Not Specified)  Problem: Ulcers      Relation: Brother          Age of Onset: (Not Specified)  Problem: Miscarriages / Stillbirths      Relation: Daughter          Age of Onset: (Not Specified)          Comment: still birth  Problem: Allergy (severe)      Relation: Daughter          Age of Onset: (Not Specified)  Problem: Allergic rhinitis      Relation: Neg Hx          Age of Onset: (Not Specified)  Problem: Allergies      Relation: Neg Hx          Age of Onset: (Not Specified)  Problem: Angioedema      Relation: Neg Hx          Age of Onset: (Not Specified)  Problem: Asthma      Relation: Neg Hx          Age of Onset: (Not Specified)  Problem: Atopy      Relation: Neg Hx          Age of Onset: (Not Specified)  Problem: Eczema      Relation: Neg Hx          Age of Onset: (Not Specified)  Problem: Immunodeficiency      Relation: Neg Hx          Age of Onset: (Not Specified)  Problem: Rhinitis      Relation: Neg Hx          Age of Onset: (Not Specified)  Problem: Urticaria       Relation: Neg Hx          Age of Onset: (Not Specified)    Social History    Tobacco Use      Smoking status: Never      Smokeless tobacco: Never    Alcohol use: No    Drug use: No    Current Outpatient Medications on File Prior to Visit:  acetaminophen (TYLENOL) 500 MG tablet, Take 1,000 mg by mouth 3 (three) times daily., Disp: , Rfl:   cetirizine (ZYRTEC) 10 MG tablet, Take 10 mg by mouth daily as needed for Allergies., Disp: , Rfl:   fluticasone (FLONASE) 50 mcg/actuation nasal spray, SPRAY 1 SPRAY IN EACH NOSTRIL 2 TIMES A DAY, Disp: 16 g, Rfl: 11  loperamide (IMODIUM) 2 mg capsule, Take 2 mg by mouth 4 (four) times daily as needed for Diarrhea., Disp: , Rfl:   metaxalone (SKELAXIN) 800 MG tablet, Take 800 mg by mouth 3 (three) times daily. Every 6 hours PRN, Disp: , Rfl:   peg 400-propylene glycol 0.4-0.3 % Drop, Place 2 drops into both eyes 2 (two) times daily. , Disp: , Rfl:   pregabalin (LYRICA) 75 MG capsule, Take 75 mg by mouth once daily. , Disp: , Rfl:   tacrolimus (PROTOPIC) 0.1 % ointment, AAA face daily to BID PRN rash, Disp: 30 g, Rfl: 1  tramadol (ULTRAM) 50 mg tablet, Take 50 mg by mouth every 8 (eight) hours as needed. Every 6 hours, Disp: , Rfl:   UNABLE TO FIND, Take 1 tablet by mouth once daily. Ultra triple action joint [facility], Disp: , Rfl:   [DISCONTINUED] ADVAIR -21 mcg/actuation HFAA inhaler, TAKE 2 PUFFS BY MOUTH TWICE A DAY, Disp: 36 g, Rfl: 1  [DISCONTINUED] cloNIDine (CATAPRES) 0.1 MG tablet, Take 1 tablet if systolic above 180 or diastolic above 105. Can repeat after 2 hours if bp remains above 180 or 105., Disp: 60 tablet, Rfl: 11  [DISCONTINUED] enalapril (VASOTEC) 20 MG tablet, TAKE 1 TABLET BY MOUTH TWICE DAILY, Disp: 180 tablet, Rfl: 1  [DISCONTINUED] meclizine (ANTIVERT) 25 mg tablet, Take 1 tablet (25 mg total) by mouth 3 (three) times daily as needed for Dizziness., Disp: 60 tablet, Rfl: 2  [DISCONTINUED] metoprolol tartrate (LOPRESSOR) 50 MG tablet, TAKE 1 & 1/2 (ONE &  ONE-HALF) TABLETS BY MOUTH TWICE DAILY, Disp: 270 tablet, Rfl: 0  [DISCONTINUED] montelukast (SINGULAIR) 10 mg tablet, Take 1 tablet (10 mg total) by mouth every evening., Disp: 90 tablet, Rfl: 3  [DISCONTINUED] triamterene-hydrochlorothiazide 37.5-25 mg (MAXZIDE-25) 37.5-25 mg per tablet, Take 0.5 tablets by mouth once daily., Disp: 45 tablet, Rfl: 12  cartilage-collagen-bor-hyalur 40-5-3.3 mg Tab, Take 1 tablet by mouth once daily., Disp: , Rfl:   ketoconazole (NIZORAL) 2 % shampoo, Wash hair with medicated shampoo at least 2x/week - let sit on scalp at least 5 minutes prior to rinsing, Disp: 120 mL, Rfl: 5  ketoconazole (NIZORAL) 2 % shampoo, Wash hair with medicated shampoo at least 2x/week - let sit on scalp at least 5 minutes prior to rinsing, Disp: 120 mL, Rfl: 5  SODIUM CHLORIDE (SIMPLY SALINE NASL), by Nasal route 2 (two) times daily as needed. , Disp: , Rfl:   [DISCONTINUED] celecoxib (CELEBREX) 200 MG capsule, Take 200 mg by mouth once daily. Every day, Disp: , Rfl:   [DISCONTINUED] doxycycline (VIBRAMYCIN) 100 MG Cap, Take 1 capsule (100 mg total) by mouth every 12 (twelve) hours. (Patient not taking: Reported on 9/1/2023), Disp: 20 capsule, Rfl: 0  [DISCONTINUED] fluconazole (DIFLUCAN) 200 MG Tab, Take 1 pill PO 2 times per week x 2 weeks. (Patient not taking: Reported on 9/1/2023), Disp: 4 tablet, Rfl: 0  [DISCONTINUED] pantoprazole (PROTONIX) 40 MG tablet, Take 1 tablet (40 mg total) by mouth once daily. (Patient not taking: Reported on 9/1/2023), Disp: 90 tablet, Rfl: 1    No current facility-administered medications on file prior to visit.          Review of Systems   Constitutional:  Negative for chills, diaphoresis, fatigue, fever and unexpected weight change.   HENT:  Negative for congestion, ear pain, hearing loss, postnasal drip and sinus pressure.    Eyes:  Negative for itching and visual disturbance.   Respiratory:  Negative for cough, chest tightness, shortness of breath and wheezing.     Cardiovascular:  Negative for chest pain, palpitations and leg swelling.   Gastrointestinal:  Negative for abdominal pain, blood in stool, constipation, diarrhea, nausea and vomiting.   Genitourinary:  Negative for dysuria, frequency and hematuria.   Musculoskeletal:  Negative for arthralgias, back pain, joint swelling and myalgias.   Neurological:  Negative for dizziness and headaches.   Hematological:  Negative for adenopathy.   Psychiatric/Behavioral:  Negative for sleep disturbance. The patient is not nervous/anxious.        Objective:      Physical Exam  Vitals and nursing note reviewed.   Constitutional:       General: She is not in acute distress.     Appearance: Normal appearance. She is well-developed and normal weight. She is not ill-appearing, toxic-appearing or diaphoretic.      Comments: Good blood pressure control  Normal weight for for age with a BMI of 26.5 she is up 5.2 lb from her August 31, 2022 visit   HENT:      Head: Normocephalic and atraumatic.      Right Ear: Tympanic membrane, ear canal and external ear normal. There is no impacted cerumen.      Left Ear: Tympanic membrane, ear canal and external ear normal. There is no impacted cerumen.      Ears:      Comments: Bilateral hearing aids     Nose: Nose normal. No congestion or rhinorrhea.      Mouth/Throat:      Mouth: Mucous membranes are moist.      Pharynx: Oropharynx is clear. No oropharyngeal exudate or posterior oropharyngeal erythema.   Eyes:      General: No scleral icterus.        Right eye: No discharge.         Left eye: No discharge.      Extraocular Movements: Extraocular movements intact.      Conjunctiva/sclera: Conjunctivae normal.      Pupils: Pupils are equal, round, and reactive to light.   Neck:      Thyroid: No thyromegaly.      Vascular: No carotid bruit or JVD.   Cardiovascular:      Rate and Rhythm: Normal rate and regular rhythm.      Pulses: Normal pulses.      Heart sounds: Normal heart sounds. No murmur heard.      No friction rub. No gallop.   Pulmonary:      Effort: Pulmonary effort is normal. No respiratory distress.      Breath sounds: Normal breath sounds. No stridor. No wheezing, rhonchi or rales.   Chest:      Chest wall: No tenderness.   Abdominal:      General: Abdomen is flat. Bowel sounds are normal. There is no distension.      Palpations: Abdomen is soft. There is no mass.      Tenderness: There is no abdominal tenderness. There is no guarding or rebound.      Hernia: No hernia is present.   Musculoskeletal:         General: No swelling, tenderness, deformity or signs of injury. Normal range of motion.      Cervical back: Normal range of motion and neck supple. No rigidity or tenderness.      Right lower leg: No edema.      Left lower leg: No edema.   Lymphadenopathy:      Cervical: No cervical adenopathy.   Skin:     General: Skin is warm and dry.      Coloration: Skin is not jaundiced or pale.      Findings: No bruising, erythema or rash.   Neurological:      General: No focal deficit present.      Mental Status: She is alert and oriented to person, place, and time. Mental status is at baseline.      Cranial Nerves: No cranial nerve deficit.      Sensory: No sensory deficit.      Motor: No weakness.      Coordination: Coordination normal.      Gait: Gait normal.      Deep Tendon Reflexes: Reflexes are normal and symmetric. Reflexes normal.   Psychiatric:         Mood and Affect: Mood normal.         Behavior: Behavior normal.         Thought Content: Thought content normal.         Judgment: Judgment normal.         Assessment:       1. Encounter for preventive health examination    2. Other chronic pain    3. Primary hypertension    4. Hyperlipidemia, unspecified hyperlipidemia type    5. 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 Mild persistent asthma, unspecified whether complicated    7. Hypothyroidism, unspecified type    8. Gastroesophageal reflux disease, unspecified whether esophagitis present    9. Essential  hypertension    10. Uncontrolled hypertension    11. Dizziness    12. Chronic allergic rhinitis    13. BMI 26.0-26.9,adult        Plan:       1. Other chronic pain  Referral in for Dr. Sawyer  - Ambulatory referral/consult to Pain Clinic; Future    2. Encounter for preventive health examination  Labs to be scheduled fasting  - CBC Auto Differential; Future  - TSH; Future  - Comprehensive Metabolic Panel; Future  - Lipid Panel; Future    3. Primary hypertension  Good control, no changes needed  - CBC Auto Differential; Future  - Comprehensive Metabolic Panel; Future  - Lipid Panel; Future  - triamterene-hydrochlorothiazide 37.5-25 mg (MAXZIDE-25) 37.5-25 mg per tablet; Take 0.5 tablets by mouth once daily.  Dispense: 45 tablet; Refill: 12    4. Hyperlipidemia, unspecified hyperlipidemia type  Await lipid panel results  - Comprehensive Metabolic Panel; Future  - Lipid Panel; Future    5. 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 Mild persistent asthma, unspecified whether complicated  Asymptomatic  - fluticasone propion-salmeterol 115-21 mcg/dose (ADVAIR HFA) 115-21 mcg/actuation HFAA inhaler; Inhale 2 puffs into the lungs 2 (two) times daily. Controller  Dispense: 36 g; Refill: 3  - montelukast (SINGULAIR) 10 mg tablet; Take 1 tablet (10 mg total) by mouth every evening.  Dispense: 90 tablet; Refill: 3    7. Hypothyroidism, unspecified type  Not currently on thyroid supplementation.  Recheck TSH  - TSH; Future    8. Gastroesophageal reflux disease, unspecified whether esophagitis present  Having problems off Protonix.  Will resume the 20 mg dose.  She should start taking a B12 supplement and maintain good calcium intake  - pantoprazole (PROTONIX) 20 MG tablet; Take 1 tablet (20 mg total) by mouth once daily.  Dispense: 90 tablet; Refill: 3    9. Essential hypertension  See above  - cloNIDine (CATAPRES) 0.1 MG tablet; Take 1 tablet if systolic above 180 or diastolic above 105. Can repeat after 2 hours if bp  remains above 180 or 105.  Dispense: 60 tablet; Refill: 2  - enalapril (VASOTEC) 20 MG tablet; Take 1 tablet (20 mg total) by mouth 2 (two) times daily.  Dispense: 180 tablet; Refill: 3  - metoprolol tartrate (LOPRESSOR) 50 MG tablet; TAKE 1 & 1/2 (ONE & ONE-HALF) TABLETS BY MOUTH TWICE DAILY  Dispense: 270 tablet; Refill: 3    10. Uncontrolled hypertension  -see above  - cloNIDine (CATAPRES) 0.1 MG tablet; Take 1 tablet if systolic above 180 or diastolic above 105. Can repeat after 2 hours if bp remains above 180 or 105.  Dispense: 60 tablet; Refill: 2    11. Dizziness  See above  - meclizine (ANTIVERT) 12.5 mg tablet; Take 1 tablet (12.5 mg total) by mouth 3 (three) times daily as needed for Dizziness.  Dispense: 60 tablet; Refill: 2    12. Chronic allergic rhinitis  Start Astelin as a p.r.n., continue Singulair and Flonase  - azelastine (ASTELIN) 137 mcg (0.1 %) nasal spray; One to two sprays each nostril twice a day as needed for congestion  Dispense: 30 mL; Refill: 10  - montelukast (SINGULAIR) 10 mg tablet; Take 1 tablet (10 mg total) by mouth every evening.  Dispense: 90 tablet; Refill: 3    13. BMI 26.0-26.9,adult  For age no changes needed

## 2023-09-05 ENCOUNTER — LAB VISIT (OUTPATIENT)
Dept: LAB | Facility: HOSPITAL | Age: 78
End: 2023-09-05
Attending: FAMILY MEDICINE
Payer: MEDICARE

## 2023-09-05 DIAGNOSIS — Z00.00 ENCOUNTER FOR PREVENTIVE HEALTH EXAMINATION: ICD-10-CM

## 2023-09-05 DIAGNOSIS — E78.5 HYPERLIPIDEMIA, UNSPECIFIED HYPERLIPIDEMIA TYPE: ICD-10-CM

## 2023-09-05 DIAGNOSIS — E03.9 HYPOTHYROIDISM, UNSPECIFIED TYPE: ICD-10-CM

## 2023-09-05 DIAGNOSIS — I10 PRIMARY HYPERTENSION: ICD-10-CM

## 2023-09-05 LAB
ALBUMIN SERPL BCP-MCNC: 3.7 G/DL (ref 3.5–5.2)
ALP SERPL-CCNC: 72 U/L (ref 55–135)
ALT SERPL W/O P-5'-P-CCNC: 5 U/L (ref 10–44)
ANION GAP SERPL CALC-SCNC: 11 MMOL/L (ref 8–16)
AST SERPL-CCNC: 16 U/L (ref 10–40)
BASOPHILS # BLD AUTO: 0.1 K/UL (ref 0–0.2)
BASOPHILS NFR BLD: 0.9 % (ref 0–1.9)
BILIRUB SERPL-MCNC: 0.5 MG/DL (ref 0.1–1)
BUN SERPL-MCNC: 16 MG/DL (ref 8–23)
CALCIUM SERPL-MCNC: 9.2 MG/DL (ref 8.7–10.5)
CHLORIDE SERPL-SCNC: 94 MMOL/L (ref 95–110)
CHOLEST SERPL-MCNC: 201 MG/DL (ref 120–199)
CHOLEST/HDLC SERPL: 4.2 {RATIO} (ref 2–5)
CO2 SERPL-SCNC: 29 MMOL/L (ref 23–29)
CREAT SERPL-MCNC: 1 MG/DL (ref 0.5–1.4)
DIFFERENTIAL METHOD: ABNORMAL
EOSINOPHIL # BLD AUTO: 0.4 K/UL (ref 0–0.5)
EOSINOPHIL NFR BLD: 3.6 % (ref 0–8)
ERYTHROCYTE [DISTWIDTH] IN BLOOD BY AUTOMATED COUNT: 13.4 % (ref 11.5–14.5)
EST. GFR  (NO RACE VARIABLE): 58 ML/MIN/1.73 M^2
GLUCOSE SERPL-MCNC: 83 MG/DL (ref 70–110)
HCT VFR BLD AUTO: 37.4 % (ref 37–48.5)
HDLC SERPL-MCNC: 48 MG/DL (ref 40–75)
HDLC SERPL: 23.9 % (ref 20–50)
HGB BLD-MCNC: 11.9 G/DL (ref 12–16)
IMM GRANULOCYTES # BLD AUTO: 0.04 K/UL (ref 0–0.04)
IMM GRANULOCYTES NFR BLD AUTO: 0.4 % (ref 0–0.5)
LDLC SERPL CALC-MCNC: 135 MG/DL (ref 63–159)
LYMPHOCYTES # BLD AUTO: 4.2 K/UL (ref 1–4.8)
LYMPHOCYTES NFR BLD: 38.4 % (ref 18–48)
MCH RBC QN AUTO: 30.3 PG (ref 27–31)
MCHC RBC AUTO-ENTMCNC: 31.8 G/DL (ref 32–36)
MCV RBC AUTO: 95 FL (ref 82–98)
MONOCYTES # BLD AUTO: 0.9 K/UL (ref 0.3–1)
MONOCYTES NFR BLD: 7.8 % (ref 4–15)
NEUTROPHILS # BLD AUTO: 5.3 K/UL (ref 1.8–7.7)
NEUTROPHILS NFR BLD: 48.9 % (ref 38–73)
NONHDLC SERPL-MCNC: 153 MG/DL
NRBC BLD-RTO: 0 /100 WBC
PLATELET # BLD AUTO: 312 K/UL (ref 150–450)
PMV BLD AUTO: 10.5 FL (ref 9.2–12.9)
POTASSIUM SERPL-SCNC: 4.3 MMOL/L (ref 3.5–5.1)
PROT SERPL-MCNC: 6.9 G/DL (ref 6–8.4)
RBC # BLD AUTO: 3.93 M/UL (ref 4–5.4)
SODIUM SERPL-SCNC: 134 MMOL/L (ref 136–145)
TRIGL SERPL-MCNC: 90 MG/DL (ref 30–150)
TSH SERPL DL<=0.005 MIU/L-ACNC: 3.92 UIU/ML (ref 0.4–4)
WBC # BLD AUTO: 10.87 K/UL (ref 3.9–12.7)

## 2023-09-05 PROCEDURE — 80061 LIPID PANEL: CPT | Mod: HCNC | Performed by: FAMILY MEDICINE

## 2023-09-05 PROCEDURE — 80053 COMPREHEN METABOLIC PANEL: CPT | Mod: HCNC | Performed by: FAMILY MEDICINE

## 2023-09-05 PROCEDURE — 85025 COMPLETE CBC W/AUTO DIFF WBC: CPT | Mod: HCNC | Performed by: FAMILY MEDICINE

## 2023-09-05 PROCEDURE — 36415 COLL VENOUS BLD VENIPUNCTURE: CPT | Mod: HCNC,PO | Performed by: FAMILY MEDICINE

## 2023-09-05 PROCEDURE — 84443 ASSAY THYROID STIM HORMONE: CPT | Mod: HCNC | Performed by: FAMILY MEDICINE

## 2023-09-06 ENCOUNTER — TELEPHONE (OUTPATIENT)
Dept: FAMILY MEDICINE | Facility: CLINIC | Age: 78
End: 2023-09-06
Payer: MEDICARE

## 2023-09-06 DIAGNOSIS — D64.9 ANEMIA, UNSPECIFIED TYPE: Primary | ICD-10-CM

## 2023-09-06 NOTE — TELEPHONE ENCOUNTER
----- Message from Jeanne Boeckelman, MA sent at 9/6/2023  3:12 PM CDT -----  I called the patient in regards to her lab results, spoke with her  and he states that she had been having a lot of diarrhea but the protonix has helped slow that down some. Patient does not eat a lot of red meat.  Pt would like to know if there is any other way to bring the cholesterol down other then exercise and a statin.  Patient also is willing to do the labs that you are offering in 6-12 weeks. Once orders are in she will be ready to schedule.

## 2023-09-06 NOTE — TELEPHONE ENCOUNTER
I was not encouraging a statin, read my message again.    Avoid red meat, avoid fried foods, avoid cheap cooking oils loaded with trans fats.  When it gets a little cooler take some walks.  There is a health food supplement called red yeast rice available over-the-counter that is in essence a mild natural statin.  It may help a little bit and I have never heard of anyone having side effects with it, possibly because they did not get enough of it to help.

## 2023-09-07 NOTE — TELEPHONE ENCOUNTER
Left message for patient to call office.     1- have her answer if she is having any black stool or blood it it or in urine. If no repeat cbc in 6-12 wks. If blood in stool or black need hemoccut kit.     2- Let her know I am mailing her a low fat diet.        Myalgia Treatment: I explained this is common when taking isotretinoin. If this worsens they will contact us. They may try OTC ibuprofen.

## 2023-09-15 ENCOUNTER — TELEPHONE (OUTPATIENT)
Dept: DERMATOLOGY | Facility: CLINIC | Age: 78
End: 2023-09-15
Payer: MEDICARE

## 2023-09-15 NOTE — TELEPHONE ENCOUNTER
----- Message from Sofiya Phipps sent at 9/15/2023  1:14 PM CDT -----  Type:  Needs Medical Advice    Who Called:  Pt's  Gregoria    Would the patient rather a call back or a response via [facility]?  Call back    Best Call Back Number:  [phone]    Additional Information:  Pt is worried about a stitch is coming out and is asking to speak with Maritza Boothe.   Please call back to advise. Thanks!

## 2023-09-15 NOTE — TELEPHONE ENCOUNTER
Returned patient call and told him to just clip the end do not pull, it is red or bothering her to call us back.

## 2023-09-20 DIAGNOSIS — Z78.0 MENOPAUSE: ICD-10-CM

## 2023-09-26 DIAGNOSIS — M47.816 LUMBAR SPONDYLOSIS: ICD-10-CM

## 2023-09-26 DIAGNOSIS — M54.50 LUMBAGO: Primary | ICD-10-CM

## 2023-09-26 DIAGNOSIS — G89.4 CHRONIC PAIN SYNDROME: ICD-10-CM

## 2023-09-26 DIAGNOSIS — R07.81 PLEURITIC CHEST PAIN: ICD-10-CM

## 2023-09-27 ENCOUNTER — HOSPITAL ENCOUNTER (OUTPATIENT)
Dept: RADIOLOGY | Facility: HOSPITAL | Age: 78
Discharge: HOME | End: 2023-09-27
Attending: ANESTHESIOLOGY
Payer: MEDICARE

## 2023-09-27 DIAGNOSIS — M47.816 LUMBAR SPONDYLOSIS: ICD-10-CM

## 2023-09-27 DIAGNOSIS — G89.4 CHRONIC PAIN SYNDROME: ICD-10-CM

## 2023-09-27 DIAGNOSIS — R07.81 RIB PAIN ON RIGHT SIDE: ICD-10-CM

## 2023-09-27 DIAGNOSIS — R07.81 PLEURITIC CHEST PAIN: ICD-10-CM

## 2023-09-27 DIAGNOSIS — M54.50 LUMBAGO: ICD-10-CM

## 2023-09-27 DIAGNOSIS — M54.50 LOW BACK PAIN: Primary | ICD-10-CM

## 2023-09-27 PROCEDURE — 71100 X-RAY EXAM RIBS UNI 2 VIEWS: CPT | Mod: TC,PO,RT

## 2023-10-03 ENCOUNTER — HOSPITAL ENCOUNTER (OUTPATIENT)
Dept: RADIOLOGY | Facility: HOSPITAL | Age: 78
Discharge: HOME | End: 2023-10-03
Attending: FAMILY MEDICINE
Payer: MEDICARE

## 2023-10-03 DIAGNOSIS — Z78.0 MENOPAUSE: ICD-10-CM

## 2023-10-03 PROCEDURE — 77080 DXA BONE DENSITY AXIAL: CPT | Mod: TC,PO

## 2023-10-20 ENCOUNTER — TELEPHONE (OUTPATIENT)
Dept: FAMILY MEDICINE | Facility: CLINIC | Age: 78
End: 2023-10-20
Payer: MEDICARE

## 2023-10-20 NOTE — TELEPHONE ENCOUNTER
Spoke with    Instruction given for th Flu Fair tomorrow         ----- Message from Laurent Hernandez sent at 10/20/2023  9:09 AM CDT -----  Name of Who is Calling:ROWDY MORENO [[id number]]        What is the request in detail: Pt is requesting a call back from the office in regards to getting an appt for High dose Flu Shot. Please advise thank you       Can the clinic reply by [facility]:NO        What Number to Call Back if not in [facility]:.Telephone Information:  Mobile          [phone]

## 2023-10-23 ENCOUNTER — IMMUNIZATION (OUTPATIENT)
Dept: URGENT CARE | Facility: CLINIC | Age: 78
End: 2023-10-23
Payer: MEDICARE

## 2023-10-23 DIAGNOSIS — Z23 NEED FOR IMMUNIZATION AGAINST INFLUENZA: Primary | ICD-10-CM

## 2023-10-23 PROCEDURE — 90694 VACC AIIV4 NO PRSRV 0.5ML IM: CPT | Mod: S$GLB,,, | Performed by: EMERGENCY MEDICINE

## 2023-10-23 PROCEDURE — G0008 PR ADMIN INFLUENZA VIRUS VAC: ICD-10-PCS | Mod: S$GLB,,, | Performed by: EMERGENCY MEDICINE

## 2023-10-23 PROCEDURE — 90694 PR FLU VACCINE, QAIIV, INACTIV, NO PRSV, 0.5 ML, IM: ICD-10-PCS | Mod: S$GLB,,, | Performed by: EMERGENCY MEDICINE

## 2023-10-23 PROCEDURE — G0008 ADMIN INFLUENZA VIRUS VAC: HCPCS | Mod: S$GLB,,, | Performed by: EMERGENCY MEDICINE

## 2023-10-25 ENCOUNTER — HOSPITAL ENCOUNTER (OUTPATIENT)
Facility: HOSPITAL | Age: 78
Discharge: HOME | End: 2023-10-26
Attending: EMERGENCY MEDICINE | Admitting: INTERNAL MEDICINE
Payer: MEDICARE

## 2023-10-25 DIAGNOSIS — R07.9 CHEST PAIN: ICD-10-CM

## 2023-10-25 PROBLEM — R00.2 HEART PALPITATIONS: Status: ACTIVE | Noted: 2023-10-25

## 2023-10-25 LAB
ALBUMIN SERPL BCP-MCNC: 3.9 G/DL (ref 3.5–5.2)
ALP SERPL-CCNC: 71 U/L (ref 55–135)
ALT SERPL W/O P-5'-P-CCNC: 7 U/L (ref 10–44)
ANION GAP SERPL CALC-SCNC: 7 MMOL/L (ref 8–16)
AST SERPL-CCNC: 14 U/L (ref 10–40)
BASOPHILS # BLD AUTO: 0.09 K/UL (ref 0–0.2)
BASOPHILS NFR BLD: 0.8 % (ref 0–1.9)
BILIRUB SERPL-MCNC: 0.5 MG/DL (ref 0.1–1)
BNP SERPL-MCNC: 286 PG/ML (ref 0–99)
BUN SERPL-MCNC: 14 MG/DL (ref 8–23)
CALCIUM SERPL-MCNC: 9.4 MG/DL (ref 8.7–10.5)
CHLORIDE SERPL-SCNC: 98 MMOL/L (ref 95–110)
CO2 SERPL-SCNC: 30 MMOL/L (ref 23–29)
CREAT SERPL-MCNC: 0.9 MG/DL (ref 0.5–1.4)
DIFFERENTIAL METHOD: ABNORMAL
EOSINOPHIL # BLD AUTO: 0.3 K/UL (ref 0–0.5)
EOSINOPHIL NFR BLD: 2.6 % (ref 0–8)
ERYTHROCYTE [DISTWIDTH] IN BLOOD BY AUTOMATED COUNT: 13.2 % (ref 11.5–14.5)
EST. GFR  (NO RACE VARIABLE): >60 ML/MIN/1.73 M^2
GLUCOSE SERPL-MCNC: 99 MG/DL (ref 70–110)
HCT VFR BLD AUTO: 37.7 % (ref 37–48.5)
HGB BLD-MCNC: 12.6 G/DL (ref 12–16)
IMM GRANULOCYTES # BLD AUTO: 0.04 K/UL (ref 0–0.04)
IMM GRANULOCYTES NFR BLD AUTO: 0.4 % (ref 0–0.5)
INR PPP: 0.9 (ref 0.8–1.2)
LYMPHOCYTES # BLD AUTO: 2.8 K/UL (ref 1–4.8)
LYMPHOCYTES NFR BLD: 24.8 % (ref 18–48)
MAGNESIUM SERPL-MCNC: 1.8 MG/DL (ref 1.6–2.6)
MCH RBC QN AUTO: 31.6 PG (ref 27–31)
MCHC RBC AUTO-ENTMCNC: 33.4 G/DL (ref 32–36)
MCV RBC AUTO: 95 FL (ref 82–98)
MONOCYTES # BLD AUTO: 0.9 K/UL (ref 0.3–1)
MONOCYTES NFR BLD: 7.9 % (ref 4–15)
NEUTROPHILS # BLD AUTO: 7.2 K/UL (ref 1.8–7.7)
NEUTROPHILS NFR BLD: 63.5 % (ref 38–73)
NRBC BLD-RTO: 0 /100 WBC
PLATELET # BLD AUTO: 280 K/UL (ref 150–450)
PLATELET BLD QL SMEAR: ABNORMAL
PMV BLD AUTO: 10.3 FL (ref 9.2–12.9)
POTASSIUM SERPL-SCNC: 3.8 MMOL/L (ref 3.5–5.1)
PROT SERPL-MCNC: 6.9 G/DL (ref 6–8.4)
PROTHROMBIN TIME: 10.3 SEC (ref 9–12.5)
RBC # BLD AUTO: 3.99 M/UL (ref 4–5.4)
SODIUM SERPL-SCNC: 135 MMOL/L (ref 136–145)
TROPONIN I SERPL HS-MCNC: 205.3 PG/ML (ref 0–14.9)
TROPONIN I SERPL HS-MCNC: 335.2 PG/ML (ref 0–14.9)
WBC # BLD AUTO: 11.27 K/UL (ref 3.9–12.7)

## 2023-10-25 PROCEDURE — 93010 ELECTROCARDIOGRAM REPORT: CPT | Mod: ,,, | Performed by: INTERNAL MEDICINE

## 2023-10-25 PROCEDURE — 93010 EKG 12-LEAD: ICD-10-PCS | Mod: ,,, | Performed by: INTERNAL MEDICINE

## 2023-10-25 PROCEDURE — 93005 ELECTROCARDIOGRAM TRACING: CPT | Performed by: INTERNAL MEDICINE

## 2023-10-25 PROCEDURE — G0378 HOSPITAL OBSERVATION PER HR: HCPCS

## 2023-10-25 PROCEDURE — 99285 EMERGENCY DEPT VISIT HI MDM: CPT | Mod: 25

## 2023-10-25 PROCEDURE — 80053 COMPREHEN METABOLIC PANEL: CPT

## 2023-10-25 PROCEDURE — 85610 PROTHROMBIN TIME: CPT

## 2023-10-25 PROCEDURE — 83880 ASSAY OF NATRIURETIC PEPTIDE: CPT

## 2023-10-25 PROCEDURE — 85025 COMPLETE CBC W/AUTO DIFF WBC: CPT

## 2023-10-25 PROCEDURE — 25000003 PHARM REV CODE 250: Performed by: EMERGENCY MEDICINE

## 2023-10-25 PROCEDURE — 84484 ASSAY OF TROPONIN QUANT: CPT | Mod: 91

## 2023-10-25 PROCEDURE — 83735 ASSAY OF MAGNESIUM: CPT

## 2023-10-25 RX ORDER — CLONIDINE HYDROCHLORIDE 0.1 MG/1
0.1 TABLET ORAL
Status: DISCONTINUED | OUTPATIENT
Start: 2023-10-25 | End: 2023-10-26 | Stop reason: HOSPADM

## 2023-10-25 RX ORDER — LANOLIN ALCOHOL/MO/W.PET/CERES
800 CREAM (GRAM) TOPICAL
Status: DISCONTINUED | OUTPATIENT
Start: 2023-10-25 | End: 2023-10-26 | Stop reason: HOSPADM

## 2023-10-25 RX ORDER — GLUCAGON 1 MG
1 KIT INJECTION
Status: DISCONTINUED | OUTPATIENT
Start: 2023-10-25 | End: 2023-10-26 | Stop reason: HOSPADM

## 2023-10-25 RX ORDER — MONTELUKAST SODIUM 10 MG/1
10 TABLET ORAL NIGHTLY
Status: DISCONTINUED | OUTPATIENT
Start: 2023-10-25 | End: 2023-10-26 | Stop reason: HOSPADM

## 2023-10-25 RX ORDER — TRIAMTERENE/HYDROCHLOROTHIAZID 37.5-25 MG
1 TABLET ORAL DAILY
Status: DISCONTINUED | OUTPATIENT
Start: 2023-10-26 | End: 2023-10-26 | Stop reason: HOSPADM

## 2023-10-25 RX ORDER — IBUPROFEN 200 MG
16 TABLET ORAL
Status: DISCONTINUED | OUTPATIENT
Start: 2023-10-25 | End: 2023-10-26 | Stop reason: HOSPADM

## 2023-10-25 RX ORDER — SODIUM,POTASSIUM PHOSPHATES 280-250MG
2 POWDER IN PACKET (EA) ORAL
Status: DISCONTINUED | OUTPATIENT
Start: 2023-10-25 | End: 2023-10-26 | Stop reason: HOSPADM

## 2023-10-25 RX ORDER — SODIUM CHLORIDE 0.9 % (FLUSH) 0.9 %
10 SYRINGE (ML) INJECTION EVERY 12 HOURS PRN
Status: DISCONTINUED | OUTPATIENT
Start: 2023-10-25 | End: 2023-10-26 | Stop reason: HOSPADM

## 2023-10-25 RX ORDER — PANTOPRAZOLE SODIUM 20 MG/1
20 TABLET, DELAYED RELEASE ORAL DAILY
Status: DISCONTINUED | OUTPATIENT
Start: 2023-10-26 | End: 2023-10-26 | Stop reason: HOSPADM

## 2023-10-25 RX ORDER — POLYETHYLENE GLYCOL 3350 17 G/17G
17 POWDER, FOR SOLUTION ORAL DAILY
Status: DISCONTINUED | OUTPATIENT
Start: 2023-10-26 | End: 2023-10-26 | Stop reason: HOSPADM

## 2023-10-25 RX ORDER — ONDANSETRON 2 MG/ML
4 INJECTION INTRAMUSCULAR; INTRAVENOUS EVERY 8 HOURS PRN
Status: DISCONTINUED | OUTPATIENT
Start: 2023-10-25 | End: 2023-10-26 | Stop reason: HOSPADM

## 2023-10-25 RX ORDER — ACETAMINOPHEN 325 MG/1
650 TABLET ORAL EVERY 4 HOURS PRN
Status: DISCONTINUED | OUTPATIENT
Start: 2023-10-25 | End: 2023-10-26 | Stop reason: HOSPADM

## 2023-10-25 RX ORDER — TALC
6 POWDER (GRAM) TOPICAL NIGHTLY PRN
Status: DISCONTINUED | OUTPATIENT
Start: 2023-10-25 | End: 2023-10-26 | Stop reason: HOSPADM

## 2023-10-25 RX ORDER — LISINOPRIL 20 MG/1
40 TABLET ORAL DAILY
Status: DISCONTINUED | OUTPATIENT
Start: 2023-10-26 | End: 2023-10-26 | Stop reason: HOSPADM

## 2023-10-25 RX ORDER — HEPARIN SODIUM 5000 [USP'U]/ML
5000 INJECTION, SOLUTION INTRAVENOUS; SUBCUTANEOUS EVERY 8 HOURS
Status: DISCONTINUED | OUTPATIENT
Start: 2023-10-25 | End: 2023-10-26 | Stop reason: HOSPADM

## 2023-10-25 RX ORDER — TRAMADOL HYDROCHLORIDE 50 MG/1
50 TABLET ORAL EVERY 8 HOURS PRN
Status: DISCONTINUED | OUTPATIENT
Start: 2023-10-25 | End: 2023-10-26 | Stop reason: HOSPADM

## 2023-10-25 RX ORDER — IBUPROFEN 200 MG
24 TABLET ORAL
Status: DISCONTINUED | OUTPATIENT
Start: 2023-10-25 | End: 2023-10-26 | Stop reason: HOSPADM

## 2023-10-25 RX ORDER — MECLIZINE HCL 12.5 MG 12.5 MG/1
12.5 TABLET ORAL 3 TIMES DAILY PRN
Status: DISCONTINUED | OUTPATIENT
Start: 2023-10-25 | End: 2023-10-26 | Stop reason: HOSPADM

## 2023-10-25 RX ORDER — ASPIRIN 325 MG
325 TABLET ORAL
Status: COMPLETED | OUTPATIENT
Start: 2023-10-25 | End: 2023-10-25

## 2023-10-25 RX ORDER — NALOXONE HCL 0.4 MG/ML
0.02 VIAL (ML) INJECTION
Status: DISCONTINUED | OUTPATIENT
Start: 2023-10-25 | End: 2023-10-26 | Stop reason: HOSPADM

## 2023-10-25 RX ORDER — METOPROLOL TARTRATE 50 MG/1
50 TABLET ORAL 2 TIMES DAILY
Status: DISCONTINUED | OUTPATIENT
Start: 2023-10-25 | End: 2023-10-26 | Stop reason: HOSPADM

## 2023-10-25 RX ORDER — CETIRIZINE HYDROCHLORIDE 10 MG/1
10 TABLET ORAL
Status: DISCONTINUED | OUTPATIENT
Start: 2023-10-25 | End: 2023-10-26

## 2023-10-25 RX ORDER — FLUTICASONE PROPIONATE 50 MCG
1 SPRAY, SUSPENSION (ML) NASAL DAILY
Status: DISCONTINUED | OUTPATIENT
Start: 2023-10-26 | End: 2023-10-26 | Stop reason: HOSPADM

## 2023-10-25 RX ORDER — FLUTICASONE FUROATE AND VILANTEROL 100; 25 UG/1; UG/1
1 POWDER RESPIRATORY (INHALATION) DAILY
Status: DISCONTINUED | OUTPATIENT
Start: 2023-10-26 | End: 2023-10-25

## 2023-10-25 RX ORDER — LOPERAMIDE HYDROCHLORIDE 2 MG/1
2 CAPSULE ORAL 4 TIMES DAILY PRN
Status: DISCONTINUED | OUTPATIENT
Start: 2023-10-25 | End: 2023-10-26 | Stop reason: HOSPADM

## 2023-10-25 RX ADMIN — ASPIRIN 325 MG ORAL TABLET 325 MG: 325 PILL ORAL at 08:10

## 2023-10-26 ENCOUNTER — CLINICAL SUPPORT (OUTPATIENT)
Dept: CARDIOLOGY | Facility: HOSPITAL | Age: 78
End: 2023-10-26
Attending: EMERGENCY MEDICINE
Payer: MEDICARE

## 2023-10-26 ENCOUNTER — HOSPITAL ENCOUNTER (OUTPATIENT)
Dept: RADIOLOGY | Facility: HOSPITAL | Age: 78
Discharge: HOME | End: 2023-10-26
Attending: EMERGENCY MEDICINE | Admitting: INTERNAL MEDICINE
Payer: MEDICARE

## 2023-10-26 VITALS
HEART RATE: 64 BPM | OXYGEN SATURATION: 97 % | RESPIRATION RATE: 18 BRPM | TEMPERATURE: 98 F | HEIGHT: 67 IN | SYSTOLIC BLOOD PRESSURE: 176 MMHG | DIASTOLIC BLOOD PRESSURE: 88 MMHG | WEIGHT: 167.56 LBS | BODY MASS INDEX: 26.3 KG/M2

## 2023-10-26 VITALS — BODY MASS INDEX: 26.21 KG/M2 | HEIGHT: 67 IN | WEIGHT: 167 LBS

## 2023-10-26 LAB
ALBUMIN SERPL BCP-MCNC: 3.6 G/DL (ref 3.5–5.2)
ALP SERPL-CCNC: 66 U/L (ref 55–135)
ALT SERPL W/O P-5'-P-CCNC: 7 U/L (ref 10–44)
ANION GAP SERPL CALC-SCNC: 11 MMOL/L (ref 8–16)
AORTIC ROOT ANNULUS: 3.3 CM
AORTIC VALVE CUSP SEPERATION: 1.7 CM
AST SERPL-CCNC: 14 U/L (ref 10–40)
AV INDEX (PROSTH): 0.9
AV MEAN GRADIENT: 3 MMHG
AV PEAK GRADIENT: 5 MMHG
AV REGURGITATION PRESSURE HALF TIME: 405 MS
AV VALVE AREA BY VELOCITY RATIO: 2.77 CM²
AV VALVE AREA: 2.82 CM²
AV VELOCITY RATIO: 0.88
BASOPHILS # BLD AUTO: 0.05 K/UL (ref 0–0.2)
BASOPHILS NFR BLD: 0.4 % (ref 0–1.9)
BILIRUB SERPL-MCNC: 0.6 MG/DL (ref 0.1–1)
BSA FOR ECHO PROCEDURE: 1.89 M2
BUN SERPL-MCNC: 16 MG/DL (ref 8–23)
CALCIUM SERPL-MCNC: 9 MG/DL (ref 8.7–10.5)
CHLORIDE SERPL-SCNC: 99 MMOL/L (ref 95–110)
CHOLEST SERPL-MCNC: 207 MG/DL (ref 120–199)
CHOLEST/HDLC SERPL: 4.5 {RATIO} (ref 2–5)
CO2 SERPL-SCNC: 27 MMOL/L (ref 23–29)
CREAT SERPL-MCNC: 0.8 MG/DL (ref 0.5–1.4)
CV ECHO LV RWT: 0.45 CM
CV PHARM DOSE: 0.4 MG
CV STRESS BASE HR: 65 BPM
D DIMER PPP IA.FEU-MCNC: 1.16 MG/L FEU (ref 0–0.49)
DIASTOLIC BLOOD PRESSURE: 98 MMHG
DIFFERENTIAL METHOD: ABNORMAL
DOP CALC AO PEAK VEL: 1.11 M/S
DOP CALC AO VTI: 27.2 CM
DOP CALC LVOT AREA: 3.1 CM2
DOP CALC LVOT DIAMETER: 2 CM
DOP CALC LVOT PEAK VEL: 0.98 M/S
DOP CALC LVOT STROKE VOLUME: 76.62 CM3
DOP CALC MV VTI: 30.7 CM
DOP CALCLVOT PEAK VEL VTI: 24.4 CM
E WAVE DECELERATION TIME: 239 MSEC
E/A RATIO: 0.81
E/E' RATIO: 7.78 M/S
ECHO LV POSTERIOR WALL: 0.85 CM (ref 0.6–1.1)
EOSINOPHIL # BLD AUTO: 0.2 K/UL (ref 0–0.5)
EOSINOPHIL NFR BLD: 1.7 % (ref 0–8)
ERYTHROCYTE [DISTWIDTH] IN BLOOD BY AUTOMATED COUNT: 13.2 % (ref 11.5–14.5)
EST. GFR  (NO RACE VARIABLE): >60 ML/MIN/1.73 M^2
FRACTIONAL SHORTENING: 33 % (ref 28–44)
GLUCOSE SERPL-MCNC: 87 MG/DL (ref 70–110)
HCT VFR BLD AUTO: 35.7 % (ref 37–48.5)
HDLC SERPL-MCNC: 46 MG/DL (ref 40–75)
HDLC SERPL: 22.2 % (ref 20–50)
HGB BLD-MCNC: 11.6 G/DL (ref 12–16)
IMM GRANULOCYTES # BLD AUTO: 0.05 K/UL (ref 0–0.04)
IMM GRANULOCYTES NFR BLD AUTO: 0.4 % (ref 0–0.5)
INTERVENTRICULAR SEPTUM: 1.24 CM (ref 0.6–1.1)
IVC DIAMETER: 1.62 CM
LDLC SERPL CALC-MCNC: 141 MG/DL (ref 63–159)
LEFT INTERNAL DIMENSION IN SYSTOLE: 2.57 CM (ref 2.1–4)
LEFT VENTRICLE DIASTOLIC VOLUME INDEX: 33.32 ML/M2
LEFT VENTRICLE DIASTOLIC VOLUME: 62.3 ML
LEFT VENTRICLE MASS INDEX: 67 G/M2
LEFT VENTRICLE SYSTOLIC VOLUME INDEX: 12.8 ML/M2
LEFT VENTRICLE SYSTOLIC VOLUME: 23.9 ML
LEFT VENTRICULAR INTERNAL DIMENSION IN DIASTOLE: 3.81 CM (ref 3.5–6)
LEFT VENTRICULAR MASS: 125.46 G
LV LATERAL E/E' RATIO: 7 M/S
LV SEPTAL E/E' RATIO: 8.75 M/S
LVOT MG: 2 MMHG
LVOT MV: 0.68 CM/S
LYMPHOCYTES # BLD AUTO: 2.9 K/UL (ref 1–4.8)
LYMPHOCYTES NFR BLD: 24.5 % (ref 18–48)
MAGNESIUM SERPL-MCNC: 1.7 MG/DL (ref 1.6–2.6)
MCH RBC QN AUTO: 30.7 PG (ref 27–31)
MCHC RBC AUTO-ENTMCNC: 32.5 G/DL (ref 32–36)
MCV RBC AUTO: 94 FL (ref 82–98)
MONOCYTES # BLD AUTO: 0.9 K/UL (ref 0.3–1)
MONOCYTES NFR BLD: 7.5 % (ref 4–15)
MV MEAN GRADIENT: 2 MMHG
MV PEAK A VEL: 0.86 M/S
MV PEAK E VEL: 0.7 M/S
MV PEAK GRADIENT: 4 MMHG
MV VALVE AREA BY CONTINUITY EQUATION: 2.5 CM2
NEUTROPHILS # BLD AUTO: 7.8 K/UL (ref 1.8–7.7)
NEUTROPHILS NFR BLD: 65.5 % (ref 38–73)
NONHDLC SERPL-MCNC: 161 MG/DL
NRBC BLD-RTO: 0 /100 WBC
OHS CV CPX 1 MINUTE RECOVERY HEART RATE: 82 BPM
OHS CV CPX 85 PERCENT MAX PREDICTED HEART RATE MALE: 121
OHS CV CPX MAX PREDICTED HEART RATE: 142
OHS CV CPX PATIENT IS FEMALE: 1
OHS CV CPX PATIENT IS MALE: 0
OHS CV CPX PEAK DIASTOLIC BLOOD PRESSURE: 90 MMHG
OHS CV CPX PEAK HEAR RATE: 92 BPM
OHS CV CPX PEAK RATE PRESSURE PRODUCT: NORMAL
OHS CV CPX PEAK SYSTOLIC BLOOD PRESSURE: 206 MMHG
OHS CV CPX PERCENT MAX PREDICTED HEART RATE ACHIEVED: 67
OHS CV CPX RATE PRESSURE PRODUCT PRESENTING: NORMAL
PHOSPHATE SERPL-MCNC: 3.9 MG/DL (ref 2.7–4.5)
PISA AR MAX VEL: 3.79 M/S
PISA MRMAX VEL: 5.02 M/S
PISA TR MAX VEL: 2.39 M/S
PLATELET # BLD AUTO: 271 K/UL (ref 150–450)
PMV BLD AUTO: 10.4 FL (ref 9.2–12.9)
POTASSIUM SERPL-SCNC: 3.7 MMOL/L (ref 3.5–5.1)
PROT SERPL-MCNC: 6.2 G/DL (ref 6–8.4)
PV MV: 0.56 M/S
PV PEAK GRADIENT: 3 MMHG
PV PEAK VELOCITY: 0.86 M/S
RA PRESSURE ESTIMATED: 3 MMHG
RBC # BLD AUTO: 3.78 M/UL (ref 4–5.4)
RV TB RVSP: 5 MMHG
SODIUM SERPL-SCNC: 137 MMOL/L (ref 136–145)
SYSTOLIC BLOOD PRESSURE: 202 MMHG
T4 FREE SERPL-MCNC: 0.65 NG/DL (ref 0.71–1.51)
TDI LATERAL: 0.1 M/S
TDI SEPTAL: 0.08 M/S
TDI: 0.09 M/S
TR MAX PG: 23 MMHG
TRICUSPID ANNULAR PLANE SYSTOLIC EXCURSION: 2.24 CM
TRIGL SERPL-MCNC: 100 MG/DL (ref 30–150)
TROPONIN I SERPL HS-MCNC: 303.9 PG/ML (ref 0–14.9)
TROPONIN I SERPL HS-MCNC: 320.7 PG/ML (ref 0–14.9)
TROPONIN I SERPL HS-MCNC: 345.6 PG/ML (ref 0–14.9)
TSH SERPL DL<=0.005 MIU/L-ACNC: 3.88 UIU/ML (ref 0.34–5.6)
TV REST PULMONARY ARTERY PRESSURE: 26 MMHG
WBC # BLD AUTO: 11.87 K/UL (ref 3.9–12.7)
Z-SCORE OF LEFT VENTRICULAR DIMENSION IN END DIASTOLE: -3.13
Z-SCORE OF LEFT VENTRICULAR DIMENSION IN END SYSTOLE: -1.77

## 2023-10-26 PROCEDURE — 93306 ECHO (CUPID ONLY): ICD-10-PCS | Mod: 26,,, | Performed by: GENERAL PRACTICE

## 2023-10-26 PROCEDURE — 63600175 PHARM REV CODE 636 W HCPCS: Performed by: PHYSICAL THERAPY ASSISTANT

## 2023-10-26 PROCEDURE — G0378 HOSPITAL OBSERVATION PER HR: HCPCS

## 2023-10-26 PROCEDURE — 93016 CV STRESS TEST SUPVJ ONLY: CPT | Mod: ,,, | Performed by: GENERAL PRACTICE

## 2023-10-26 PROCEDURE — 36415 COLL VENOUS BLD VENIPUNCTURE: CPT | Performed by: PHYSICAL THERAPY ASSISTANT

## 2023-10-26 PROCEDURE — 93306 TTE W/DOPPLER COMPLETE: CPT | Mod: 26,,, | Performed by: GENERAL PRACTICE

## 2023-10-26 PROCEDURE — 85379 FIBRIN DEGRADATION QUANT: CPT | Performed by: INTERNAL MEDICINE

## 2023-10-26 PROCEDURE — 99900035 HC TECH TIME PER 15 MIN (STAT)

## 2023-10-26 PROCEDURE — 83735 ASSAY OF MAGNESIUM: CPT | Performed by: PHYSICAL THERAPY ASSISTANT

## 2023-10-26 PROCEDURE — 25000242 PHARM REV CODE 250 ALT 637 W/ HCPCS: Performed by: INTERNAL MEDICINE

## 2023-10-26 PROCEDURE — 63600175 PHARM REV CODE 636 W HCPCS: Performed by: INTERNAL MEDICINE

## 2023-10-26 PROCEDURE — 84484 ASSAY OF TROPONIN QUANT: CPT | Mod: 91 | Performed by: PHYSICAL THERAPY ASSISTANT

## 2023-10-26 PROCEDURE — 84439 ASSAY OF FREE THYROXINE: CPT | Performed by: PHYSICAL THERAPY ASSISTANT

## 2023-10-26 PROCEDURE — 80061 LIPID PANEL: CPT | Performed by: PHYSICAL THERAPY ASSISTANT

## 2023-10-26 PROCEDURE — 80053 COMPREHEN METABOLIC PANEL: CPT | Performed by: PHYSICAL THERAPY ASSISTANT

## 2023-10-26 PROCEDURE — 93018 CV STRESS TEST I&R ONLY: CPT | Mod: ,,, | Performed by: GENERAL PRACTICE

## 2023-10-26 PROCEDURE — 93017 CV STRESS TEST TRACING ONLY: CPT

## 2023-10-26 PROCEDURE — 84484 ASSAY OF TROPONIN QUANT: CPT | Performed by: INTERNAL MEDICINE

## 2023-10-26 PROCEDURE — 94640 AIRWAY INHALATION TREATMENT: CPT

## 2023-10-26 PROCEDURE — 25000003 PHARM REV CODE 250: Performed by: PHYSICAL THERAPY ASSISTANT

## 2023-10-26 PROCEDURE — 96372 THER/PROPH/DIAG INJ SC/IM: CPT | Performed by: PHYSICAL THERAPY ASSISTANT

## 2023-10-26 PROCEDURE — 93306 TTE W/DOPPLER COMPLETE: CPT

## 2023-10-26 PROCEDURE — 93016 NUCLEAR STRESS TEST (CUPID ONLY): ICD-10-PCS | Mod: ,,, | Performed by: GENERAL PRACTICE

## 2023-10-26 PROCEDURE — 84100 ASSAY OF PHOSPHORUS: CPT | Performed by: PHYSICAL THERAPY ASSISTANT

## 2023-10-26 PROCEDURE — 85025 COMPLETE CBC W/AUTO DIFF WBC: CPT | Performed by: PHYSICAL THERAPY ASSISTANT

## 2023-10-26 PROCEDURE — 93018 NUCLEAR STRESS TEST (CUPID ONLY): ICD-10-PCS | Mod: ,,, | Performed by: GENERAL PRACTICE

## 2023-10-26 PROCEDURE — 36415 COLL VENOUS BLD VENIPUNCTURE: CPT | Performed by: INTERNAL MEDICINE

## 2023-10-26 PROCEDURE — 78452 HT MUSCLE IMAGE SPECT MULT: CPT | Mod: TC

## 2023-10-26 PROCEDURE — 99900031 HC PATIENT EDUCATION (STAT)

## 2023-10-26 PROCEDURE — 94761 N-INVAS EAR/PLS OXIMETRY MLT: CPT

## 2023-10-26 PROCEDURE — 84443 ASSAY THYROID STIM HORMONE: CPT | Performed by: PHYSICAL THERAPY ASSISTANT

## 2023-10-26 PROCEDURE — 96374 THER/PROPH/DIAG INJ IV PUSH: CPT | Mod: 59

## 2023-10-26 RX ORDER — ARFORMOTEROL TARTRATE 15 UG/2ML
15 SOLUTION RESPIRATORY (INHALATION) 2 TIMES DAILY
Status: DISCONTINUED | OUTPATIENT
Start: 2023-10-26 | End: 2023-10-26 | Stop reason: HOSPADM

## 2023-10-26 RX ORDER — BUDESONIDE 0.5 MG/2ML
0.5 INHALANT ORAL EVERY 12 HOURS
Status: DISCONTINUED | OUTPATIENT
Start: 2023-10-26 | End: 2023-10-26 | Stop reason: HOSPADM

## 2023-10-26 RX ORDER — CETIRIZINE HYDROCHLORIDE 10 MG/1
10 TABLET ORAL DAILY PRN
Status: DISCONTINUED | OUTPATIENT
Start: 2023-10-26 | End: 2023-10-26 | Stop reason: HOSPADM

## 2023-10-26 RX ORDER — REGADENOSON 0.08 MG/ML
0.4 INJECTION, SOLUTION INTRAVENOUS
Status: COMPLETED | OUTPATIENT
Start: 2023-10-26 | End: 2023-10-26

## 2023-10-26 RX ADMIN — LISINOPRIL 40 MG: 20 TABLET ORAL at 11:10

## 2023-10-26 RX ADMIN — MONTELUKAST 10 MG: 10 TABLET, FILM COATED ORAL at 12:10

## 2023-10-26 RX ADMIN — REGADENOSON 0.4 MG: 0.08 INJECTION, SOLUTION INTRAVENOUS at 10:10

## 2023-10-26 RX ADMIN — TRAMADOL HYDROCHLORIDE 50 MG: 50 TABLET, COATED ORAL at 11:10

## 2023-10-26 RX ADMIN — Medication 800 MG: at 11:10

## 2023-10-26 RX ADMIN — METOPROLOL TARTRATE 50 MG: 50 TABLET, FILM COATED ORAL at 11:10

## 2023-10-26 RX ADMIN — ARFORMOTEROL TARTRATE 15 MCG: 15 SOLUTION RESPIRATORY (INHALATION) at 07:10

## 2023-10-26 RX ADMIN — TRIAMTERENE AND HYDROCHLOROTHIAZIDE 1 TABLET: 37.5; 25 TABLET ORAL at 11:10

## 2023-10-26 RX ADMIN — ONDANSETRON 4 MG: 2 INJECTION INTRAMUSCULAR; INTRAVENOUS at 11:10

## 2023-10-26 RX ADMIN — HEPARIN SODIUM 5000 UNITS: 5000 INJECTION, SOLUTION INTRAVENOUS; SUBCUTANEOUS at 06:10

## 2023-10-26 RX ADMIN — POLYETHYLENE GLYCOL 3350 17 G: 17 POWDER, FOR SOLUTION ORAL at 11:10

## 2023-10-26 RX ADMIN — BUDESONIDE INHALATION 0.5 MG: 0.5 SUSPENSION RESPIRATORY (INHALATION) at 07:10

## 2023-10-26 RX ADMIN — POTASSIUM BICARBONATE 50 MEQ: 977.5 TABLET, EFFERVESCENT ORAL at 11:10

## 2023-10-26 RX ADMIN — METOPROLOL TARTRATE 50 MG: 50 TABLET, FILM COATED ORAL at 12:10

## 2023-10-26 RX ADMIN — PANTOPRAZOLE SODIUM 20 MG: 20 TABLET, DELAYED RELEASE ORAL at 06:10

## 2023-10-26 NOTE — PLAN OF CARE
[facility]  Initial Discharge Assessment       Primary Care Provider: Alfred Mejia MD    Admission Diagnosis: Chest pain [R07.9]    Admission Date: 10/25/2023  Expected Discharge Date: 10/26/2023    Transition of Care Barriers: None     completed discharge assessment with Pt's  (Pt in stress test). Unable to assess Pt AAOx4s. Pt lives at address in chart. Demographics, PCP, and insurance verified. No home health. No dialysis. DME listed below. Pt completes ADLs without assistance. Pt to discharge home via family transport. Pt has no other needs to be addressed at this time. CM will continue to follow.     Payor: [employer] MEDICARE / Plan: [employer] MEDICARE HMO / Product Type: Capitation /     Extended Emergency Contact Information  Primary Emergency Contact: Gregoria Ladd  Address: [address] [place] [address] [place] [address] [place]  Mobile Phone: [phone]  Relation: Spouse  Preferred language: English   needed? No    Discharge Plan A: Home with family  Discharge Plan B: Home with family      [facility] - [place], [place] - [address]  [address] [facility] [address] [facility] [address] [address]  Phone: [phone] Fax: [phone]    [facility] - [place]  [address]  [address]  [place] [address]  Phone: [phone] Fax: [phone]    [id number] - [place] - [address]  [address]  [place] [address]  Phone: [phone] Fax: [phone]    [id number] - [place] - [address]  [address]  [place] [address]  Phone: [phone] Fax: [phone]    [id number] - [place] - [address]  [address]  [place] [address]  Phone: [phone] Fax: [phone]    [id number] - [place] - [address] [facility]  [address] [facility] [place]  [address]  Phone: [phone] Fax: [phone]      Initial Assessment (most recent)       Adult Discharge Assessment - 10/26/23 1443          Discharge Assessment    Assessment Type Final Discharge Note     Confirmed/corrected address, phone number and insurance Yes     Confirmed Demographics Correct on Facesheet     Source of Information family     If unable to respond/provide information was family/caregiver contacted? Yes     Contact Name/Number Gregoria Ladd (Spouse)   [phone] (Mobile)     Does patient/caregiver understand observation status Yes     Reason For Admission Chest pain     People in Home spouse     Facility Arrived From: home     Do you expect to return to your current living situation? Yes     Do you have help at home or someone to help you manage your care at home? Yes     Who are your caregiver(s) and their phone number(s)? Gregoria Ladd (Spouse)   [phone] (Mobile)     Prior to hospitilization cognitive status: Unable to Assess     Current cognitive status: Unable to Assess     Walking or Climbing Stairs ambulation difficulty, requires equipment     Mobility Management straight cane     Equipment Currently Used at Home cane, straight     Readmission within 30 days? No     Patient currently being followed by outpatient case management? No     Do you currently have service(s) that help you manage your care at home? No     Do you take prescription medications? Yes     Do you have prescription coverage? Yes     Coverage [employer] MANAGED MEDICARE - [employer] MEDICARE HMO     Do you have any problems affording any of your prescribed medications? No     Is the patient taking medications as prescribed? yes     Who is going to help you get home at discharge? Gregoria Ladd (Spouse)   [phone] (Mobile)     How do you get to doctors appointments? car, drives self;family or friend will provide     Are you on dialysis? No     Do you take coumadin? No     DME Needed Upon Discharge  none      Discharge Plan discussed with: Spouse/sig other     Name(s) and Number(s) Gregoria Ladd (Spouse)   [phone] (Mobile)     Transition of Care Barriers None     Discharge Plan A Home with family     Discharge Plan B Home with family

## 2023-10-26 NOTE — PLAN OF CARE
Problem: Adult Inpatient Plan of Care  Goal: Plan of Care Review  10/26/2023 0511 by Rajwinder Holland LPN  Outcome: Ongoing, Progressing  10/26/2023 0511 by Rajwinder Holland LPN  Outcome: Ongoing, Progressing  Goal: Patient-Specific Goal (Individualized)  10/26/2023 0511 by Rajwinder Holland LPN  Outcome: Ongoing, Progressing  10/26/2023 0511 by Rajwinder Holland LPN  Outcome: Ongoing, Progressing  Goal: Absence of Hospital-Acquired Illness or Injury  10/26/2023 0511 by Rajwinder Holland LPN  Outcome: Ongoing, Progressing  10/26/2023 0511 by Rajwinder Holland LPN  Outcome: Ongoing, Progressing  Goal: Optimal Comfort and Wellbeing  10/26/2023 0511 by Rajwinder Holland LPN  Outcome: Ongoing, Progressing  10/26/2023 0511 by Rajwinder Holland LPN  Outcome: Ongoing, Progressing  Goal: Readiness for Transition of Care  10/26/2023 0511 by Rajwinder Holland LPN  Outcome: Ongoing, Progressing  10/26/2023 0511 by Rajwinder Holland LPN  Outcome: Ongoing, Progressing

## 2023-10-26 NOTE — ASSESSMENT & PLAN NOTE
Monitor on telemetry  EKG shows normal sinus rhythm  Patient has hypothyroidism without supplementation, repeat labs pending  Trend CMP and replenish electrolytes PRN

## 2023-10-26 NOTE — PLAN OF CARE
Patient cleared for discharge from case management standpoint.    Follow up appointment recommendation not available.     Chart and discharge orders reviewed.  Patient discharged home with no further case management needs.      10/26/23 1447   Final Note   Assessment Type Final Discharge Note   Anticipated Discharge Disposition Home   Hospital Resources/Appts/Education Provided Appointment suggestion unavailable   Post-Acute Status   Discharge Delays None known at this time

## 2023-10-26 NOTE — HOSPITAL COURSE
Patient admitted with left shoulder pain. Patient states the day before she has received high dose flu vaccine and she felt a not on her left arm. Subsequently she had aching pain behind her left shoulder and then progressed down left arm. Then she started having chest palpitations, no chest pain. No SOB. They had pulse oxymeter and her HR was variable between . This lasted for few hours before she came in to the hospital. Her troponin was elevated but then down trended. No events noted on overnight telemetry. Stress test was negative. Echo read is pending.

## 2023-10-26 NOTE — H&P
[facility] - Emergency Dept  Hospital Medicine  History & Physical    Patient Name: Megha Ladd  MRN: [id number]  Patient Class: OP- Observation  Admission Date: 10/25/2023  Attending Physician: Fritz Couch MD   Primary Care Provider: Alfred Mejia MD         Patient information was obtained from patient, spouse/SO and ER records.     Subjective:     Principal Problem:Chest pain    Chief Complaint:   Chief Complaint   Patient presents with    Palpitations     Intermittent Palpitations ranging from mid 60's to 120;s since 1500 today, accompanied by SOB and pain in left neck, shoulder, arm and back.     Shortness of Breath        HPI: Patient is a 78-year-old female with a past medical history of hypertension, hyperlipidemia, hypothyroidism, asthma, GERD, dizziness. Patient presents to the ED today with complaints of left scapular pain radiating to left arm and palpitations. Patient reports she received flu vaccine yesterday and attributed her muscle soreness to this. Patient reports she came to the ED due to the fear of the palpitations. Patient states at home she experienced shortness of breath worse on exertion walking from house to car this morning.  Patient reports no shortness a breath history prior to today. Patient reports checking heart rate due to palpitations on pulse oximeter at home and pulse is reported to be fluctuating . Upon arrival to ED, patient is asymptomatic with no complaints of pain. EKG shows normal sinus rhythm, , potassium 3.8, magnesium 1.8, troponin 205.3 and 335.2. Patient was given aspirin in the ED. chest x-ray negative for acute cardiopulmonary process. Patient has history of hypothyroid but states she has not been on supplementation due to normal labs a few months ago. Updated TSH and free T4 pending. Patient reports blood pressure is well controlled on home regimen, patient denies following with Cardiology. Patient denies current chest pain,  shortness for breath, abdominal pain, nausea or vomiting. Full code.      Past Medical History:   Diagnosis Date    Asthma     Basal cell carcinoma     GERD (gastroesophageal reflux disease)     Hyperlipidemia     Hypertension     Hypothyroid     Lumbago     Neuromuscular disorder     Sinusitis     Ulcer        Past Surgical History:   Procedure Laterality Date    APPENDECTOMY       SECTION      LUMBAR EPIDURAL INJECTION      PARTIAL HYSTERECTOMY         Review of patient's allergies indicates:   Allergen Reactions    Zoster vaccine live Other (See Comments)     Headache, neck shoulder, body flu sx, sore throat, nausea, dry cough, fatigue     Amlodipine Diarrhea, Nausea Only and Other (See Comments)     Joint pain    Azithromycin      Other reaction(s): Unknown    Budesonide      Other reaction(s): heartburn  Other reaction(s): Rash    Cephalexin      Other reaction(s): Unknown    Ciprofloxacin      Other reaction(s): Unknown    Erythromycin      Other reaction(s): Unknown    Esomeprazole magnesium      Other reaction(s): Headache    Felodipine      Nausea, raw throat, ulcer tongue, myalgia    Levofloxacin      Other reaction(s): tendonitis    Talwin compound      Other reaction(s): Vomiting  Other reaction(s): Hallucinations    Hydrocodone-acetaminophen Rash     Other reaction(s): Nausea       No current facility-administered medications on file prior to encounter.     Current Outpatient Medications on File Prior to Encounter   Medication Sig    acetaminophen (TYLENOL) 500 MG tablet Take 1,000 mg by mouth 3 (three) times daily.    azelastine (ASTELIN) 137 mcg (0.1 %) nasal spray One to two sprays each nostril twice a day as needed for congestion    cartilage-collagen-bor-hyalur 40-5-3.3 mg Tab Take 1 tablet by mouth once daily.    cetirizine (ZYRTEC) 10 MG tablet Take 10 mg by mouth daily as needed for Allergies.    cloNIDine (CATAPRES) 0.1 MG tablet Take 1 tablet if systolic  above 180 or diastolic above 105. Can repeat after 2 hours if bp remains above 180 or 105.    enalapril (VASOTEC) 20 MG tablet Take 1 tablet (20 mg total) by mouth 2 (two) times daily.    fluticasone (FLONASE) 50 mcg/actuation nasal spray SPRAY 1 SPRAY IN EACH NOSTRIL 2 TIMES A DAY    fluticasone propion-salmeterol 115-21 mcg/dose (ADVAIR HFA) 115-21 mcg/actuation HFAA inhaler Inhale 2 puffs into the lungs 2 (two) times daily. Controller    ketoconazole (NIZORAL) 2 % shampoo Wash hair with medicated shampoo at least 2x/week - let sit on scalp at least 5 minutes prior to rinsing    ketoconazole (NIZORAL) 2 % shampoo Wash hair with medicated shampoo at least 2x/week - let sit on scalp at least 5 minutes prior to rinsing    loperamide (IMODIUM) 2 mg capsule Take 2 mg by mouth 4 (four) times daily as needed for Diarrhea.    meclizine (ANTIVERT) 12.5 mg tablet Take 1 tablet (12.5 mg total) by mouth 3 (three) times daily as needed for Dizziness.    metaxalone (SKELAXIN) 800 MG tablet Take 800 mg by mouth 3 (three) times daily. Every 6 hours PRN    metoprolol tartrate (LOPRESSOR) 50 MG tablet TAKE 1 & 1/2 (ONE & ONE-HALF) TABLETS BY MOUTH TWICE DAILY    montelukast (SINGULAIR) 10 mg tablet Take 1 tablet (10 mg total) by mouth every evening.    pantoprazole (PROTONIX) 20 MG tablet Take 1 tablet (20 mg total) by mouth once daily.    peg 400-propylene glycol 0.4-0.3 % Drop Place 2 drops into both eyes 2 (two) times daily.     pregabalin (LYRICA) 75 MG capsule Take 75 mg by mouth once daily.     SODIUM CHLORIDE (SIMPLY SALINE NASL) by Nasal route 2 (two) times daily as needed.     tacrolimus (PROTOPIC) 0.1 % ointment AAA face daily to BID PRN rash    tramadol (ULTRAM) 50 mg tablet Take 50 mg by mouth every 8 (eight) hours as needed. Every 6 hours    triamterene-hydrochlorothiazide 37.5-25 mg (MAXZIDE-25) 37.5-25 mg per tablet Take 0.5 tablets by mouth once daily.    UNABLE TO FIND Take 1 tablet by mouth once  daily. Ultra triple action joint health     Family History       Problem Relation (Age of Onset)    Allergy (severe) Daughter    Early death Father (51)    Emphysema Sister    Heart disease Father, Sister    Hypertension Sister, Brother    Miscarriages / Stillbirths Daughter    Ulcers Brother          Tobacco Use    Smoking status: Never    Smokeless tobacco: Never   Substance and Sexual Activity    Alcohol use: No    Drug use: No    Sexual activity: Yes     Partners: Male     Review of Systems   Constitutional: Negative.  Negative for appetite change, chills, fatigue, fever and unexpected weight change.   HENT: Negative.  Negative for congestion, ear pain, hearing loss, rhinorrhea, sore throat and tinnitus.    Eyes: Negative.  Negative for pain, discharge and redness.   Respiratory:  Positive for shortness of breath. Negative for cough, wheezing and stridor.    Cardiovascular:  Positive for chest pain and palpitations. Negative for leg swelling.   Gastrointestinal: Negative.  Negative for abdominal distention, abdominal pain, constipation, diarrhea, nausea and vomiting.   Endocrine: Negative.  Negative for cold intolerance, heat intolerance, polydipsia, polyphagia and polyuria.   Genitourinary: Negative.  Negative for decreased urine volume, difficulty urinating, flank pain, hematuria and urgency.   Musculoskeletal:  Positive for arthralgias (right shoulder chronic) and back pain (Left scapular region radiating to left arm). Negative for gait problem and myalgias.   Skin: Negative.  Negative for pallor and rash.   Allergic/Immunologic: Negative.  Negative for environmental allergies, food allergies and immunocompromised state.   Neurological: Negative.  Negative for dizziness, syncope, facial asymmetry, weakness and headaches.   Hematological: Negative.    Psychiatric/Behavioral: Negative.  Negative for agitation, confusion, self-injury and suicidal ideas.      Objective:     Vital Signs (Most Recent):  Temp:  98.2 °F (36.8 °C) (10/25/23 1714)  Pulse: 63 (10/25/23 2100)  Resp: 19 (10/25/23 2049)  BP: (!) 147/71 (10/25/23 2100)  SpO2: 100 % (10/25/23 2100) Vital Signs (24h Range):  Temp:  [98.2 °F (36.8 °C)] 98.2 °F (36.8 °C)  Pulse:  [60-65] 63  Resp:  [18-26] 19  SpO2:  [96 %-100 %] 100 %  BP: (133-155)/(68-72) 147/71     Weight: 77.1 kg (170 lb)  Body mass index is 26.63 kg/m².     Physical Exam  Vitals reviewed.   Constitutional:       General: She is not in acute distress.     Appearance: Normal appearance. She is normal weight. She is not toxic-appearing.   HENT:      Head: Normocephalic and atraumatic.      Nose: Nose normal. No congestion or rhinorrhea.      Mouth/Throat:      Mouth: Mucous membranes are moist.      Pharynx: Oropharynx is clear.   Eyes:      General:         Right eye: No discharge.         Left eye: No discharge.      Extraocular Movements: Extraocular movements intact.      Conjunctiva/sclera: Conjunctivae normal.      Pupils: Pupils are equal, round, and reactive to light.   Cardiovascular:      Rate and Rhythm: Normal rate and regular rhythm.      Pulses: Normal pulses.      Heart sounds: No murmur heard.     No friction rub. No gallop.   Pulmonary:      Effort: Pulmonary effort is normal. No respiratory distress.      Breath sounds: No stridor. No wheezing, rhonchi or rales.   Chest:      Chest wall: No tenderness.   Abdominal:      General: Abdomen is flat. Bowel sounds are normal. There is no distension.      Palpations: Abdomen is soft.      Tenderness: There is no abdominal tenderness. There is no right CVA tenderness, left CVA tenderness or guarding.   Musculoskeletal:         General: Normal range of motion.      Cervical back: Normal range of motion and neck supple.   Skin:     General: Skin is warm and dry.      Findings: No rash.   Neurological:      General: No focal deficit present.      Mental Status: She is alert and oriented to person, place, and time. Mental status is at  baseline.      Motor: No weakness.   Psychiatric:         Mood and Affect: Mood normal.              CRANIAL NERVES     CN III, IV, VI   Pupils are equal, round, and reactive to light.       Significant Labs: All pertinent labs within the past 24 hours have been reviewed.  CBC:   Recent Labs   Lab 10/25/23  1753   WBC 11.27   HGB 12.6   HCT 37.7        CMP:   Recent Labs   Lab 10/25/23  1753   *   K 3.8   CL 98   CO2 30*   GLU 99   BUN 14   CREATININE 0.9   CALCIUM 9.4   PROT 6.9   ALBUMIN 3.9   BILITOT 0.5   ALKPHOS 71   AST 14   ALT 7*   ANIONGAP 7*     Magnesium:   Recent Labs   Lab 10/25/23  1753   MG 1.8     Troponin:   Recent Labs   Lab 10/25/23  1753 10/25/23  1925   TROPONINIHS 205.3* 335.2*     TSH:   Recent Labs   Lab 09/05/23  1146   TSH 3.916     Significant Imaging: I have reviewed all pertinent imaging results/findings within the past 24 hours.    Assessment/Plan:     * Chest pain  Patient asymptomatic since arrival to the ED, normal EKG with elevated troponin  Troponin 205.3 and 335.2  Denies current chest pain  trend cardiac enzymes and troponin  Updated echo ordered  NPO after midnight  Stress test in the morning to assess for ACS  Aspirin given  repeat 12 lead EKG with chest pain  Consider cardiology consult in the morning    Heart palpitations  Monitor on telemetry  EKG shows normal sinus rhythm  Patient has hypothyroidism without supplementation, repeat labs pending  Trend CMP and replenish electrolytes PRN      Hypothyroid  Last TSH 2 months ago 3.916  Patient reports not being on supplemention  Updated TSH and free T4 pending      Hyperlipidemia  Diet-controlled, not on statin therapy  Updated lipid panel in the morning      GERD (gastroesophageal reflux disease)  PPI      HTN (hypertension)  Monitor vitals  Continue home blood pressure management      VTE Risk Mitigation (From admission, onward)         Ordered     heparin (porcine) injection 5,000 Units  Every 8 hours          10/25/23 2105     IP VTE HIGH RISK PATIENT  Once         10/25/23 2105     Place sequential compression device  Until discontinued         10/25/23 2105                On 10/25/2023, patient should be placed in hospital observation services under my care in collaboration with Fritz Couch MD. Discussed and reviewed case with supervising physician who agrees to current plan of care.       NICKIE Benitez  Department of Hospital Medicine  [facility] - Emergency Dept

## 2023-10-26 NOTE — CARE UPDATE
10/26/23 0713   Patient Assessment/Suction   Level of Consciousness (AVPU) alert   Respiratory Effort Unlabored   All Lung Fields Breath Sounds clear   PRE-TX-O2   Device (Oxygen Therapy) room air   SpO2 96 %   Pulse Oximetry Type Intermittent   $ Pulse Oximetry - Multiple Charge Pulse Oximetry - Multiple   Pulse 68   Resp 15   Aerosol Therapy   $ Aerosol Therapy Charges Aerosol Treatment  (pulmicort)   Daily Review of Necessity (SVN) completed   Respiratory Treatment Status (SVN) given   Treatment Route (SVN) mask   Patient Position (SVN) semi-Fish's   Post Treatment Assessment (SVN) increased aeration   Signs of Intolerance (SVN) none   Breath Sounds Post-Respiratory Treatment   Post-treatment Heart Rate (beats/min) 67   Post-treatment Resp Rate (breaths/min) 15   Education   $ Education Bronchodilator;15 min   Respiratory Evaluation   $ Care Plan Tech Time 15 min

## 2023-10-26 NOTE — ASSESSMENT & PLAN NOTE
Patient asymptomatic since arrival to the ED, normal EKG with elevated troponin  Troponin 205.3 and 335.2  Denies current chest pain  trend cardiac enzymes and troponin  Updated echo ordered  NPO after midnight  Stress test in the morning to assess for ACS  Aspirin given  repeat 12 lead EKG with chest pain  Consider cardiology consult in the morning

## 2023-10-26 NOTE — HPI
Patient is a 78-year-old female with a past medical history of hypertension, hyperlipidemia, hypothyroidism, asthma, GERD, dizziness. Patient presents to the ED today with complaints of left scapular pain radiating to left arm and palpitations. Patient reports she received flu vaccine yesterday and attributed her muscle soreness to this. Patient reports she came to the ED due to the fear of the palpitations. Patient states at home she experienced shortness of breath worse on exertion walking from house to car this morning.  Patient reports no shortness a breath history prior to today. Patient reports checking heart rate due to palpitations on pulse oximeter at home and pulse is reported to be fluctuating . Upon arrival to ED, patient is asymptomatic with no complaints of pain. EKG shows normal sinus rhythm, , potassium 3.8, magnesium 1.8, troponin 205.3 and 335.2. Patient was given aspirin in the ED. chest x-ray negative for acute cardiopulmonary process. Patient has history of hypothyroid but states she has not been on supplementation due to normal labs a few months ago. Updated TSH and free T4 pending. Patient reports blood pressure is well controlled on home regimen, patient denies following with Cardiology. Patient denies current chest pain, shortness for breath, abdominal pain, nausea or vomiting. Full code.

## 2023-10-26 NOTE — SUBJECTIVE & OBJECTIVE
Past Medical History:   Diagnosis Date    Asthma     Basal cell carcinoma     GERD (gastroesophageal reflux disease)     Hyperlipidemia     Hypertension     Hypothyroid     Lumbago     Neuromuscular disorder     Sinusitis     Ulcer        Past Surgical History:   Procedure Laterality Date    APPENDECTOMY       SECTION      LUMBAR EPIDURAL INJECTION      PARTIAL HYSTERECTOMY         Review of patient's allergies indicates:   Allergen Reactions    Zoster vaccine live Other (See Comments)     Headache, neck shoulder, body flu sx, sore throat, nausea, dry cough, fatigue     Amlodipine Diarrhea, Nausea Only and Other (See Comments)     Joint pain    Azithromycin      Other reaction(s): Unknown    Budesonide      Other reaction(s): heartburn  Other reaction(s): Rash    Cephalexin      Other reaction(s): Unknown    Ciprofloxacin      Other reaction(s): Unknown    Erythromycin      Other reaction(s): Unknown    Esomeprazole magnesium      Other reaction(s): Headache    Felodipine      Nausea, raw throat, ulcer tongue, myalgia    Levofloxacin      Other reaction(s): tendonitis    Talwin compound      Other reaction(s): Vomiting  Other reaction(s): Hallucinations    Hydrocodone-acetaminophen Rash     Other reaction(s): Nausea       No current facility-administered medications on file prior to encounter.     Current Outpatient Medications on File Prior to Encounter   Medication Sig    acetaminophen (TYLENOL) 500 MG tablet Take 1,000 mg by mouth 3 (three) times daily.    azelastine (ASTELIN) 137 mcg (0.1 %) nasal spray One to two sprays each nostril twice a day as needed for congestion    cartilage-collagen-bor-hyalur 40-5-3.3 mg Tab Take 1 tablet by mouth once daily.    cetirizine (ZYRTEC) 10 MG tablet Take 10 mg by mouth daily as needed for Allergies.    cloNIDine (CATAPRES) 0.1 MG tablet Take 1 tablet if systolic above 180 or diastolic above 105. Can repeat after 2 hours if bp remains above 180 or 105.    enalapril  (VASOTEC) 20 MG tablet Take 1 tablet (20 mg total) by mouth 2 (two) times daily.    fluticasone (FLONASE) 50 mcg/actuation nasal spray SPRAY 1 SPRAY IN EACH NOSTRIL 2 TIMES A DAY    fluticasone propion-salmeterol 115-21 mcg/dose (ADVAIR HFA) 115-21 mcg/actuation HFAA inhaler Inhale 2 puffs into the lungs 2 (two) times daily. Controller    ketoconazole (NIZORAL) 2 % shampoo Wash hair with medicated shampoo at least 2x/week - let sit on scalp at least 5 minutes prior to rinsing    ketoconazole (NIZORAL) 2 % shampoo Wash hair with medicated shampoo at least 2x/week - let sit on scalp at least 5 minutes prior to rinsing    loperamide (IMODIUM) 2 mg capsule Take 2 mg by mouth 4 (four) times daily as needed for Diarrhea.    meclizine (ANTIVERT) 12.5 mg tablet Take 1 tablet (12.5 mg total) by mouth 3 (three) times daily as needed for Dizziness.    metaxalone (SKELAXIN) 800 MG tablet Take 800 mg by mouth 3 (three) times daily. Every 6 hours PRN    metoprolol tartrate (LOPRESSOR) 50 MG tablet TAKE 1 & 1/2 (ONE & ONE-HALF) TABLETS BY MOUTH TWICE DAILY    montelukast (SINGULAIR) 10 mg tablet Take 1 tablet (10 mg total) by mouth every evening.    pantoprazole (PROTONIX) 20 MG tablet Take 1 tablet (20 mg total) by mouth once daily.    peg 400-propylene glycol 0.4-0.3 % Drop Place 2 drops into both eyes 2 (two) times daily.     pregabalin (LYRICA) 75 MG capsule Take 75 mg by mouth once daily.     SODIUM CHLORIDE (SIMPLY SALINE NASL) by Nasal route 2 (two) times daily as needed.     tacrolimus (PROTOPIC) 0.1 % ointment AAA face daily to BID PRN rash    tramadol (ULTRAM) 50 mg tablet Take 50 mg by mouth every 8 (eight) hours as needed. Every 6 hours    triamterene-hydrochlorothiazide 37.5-25 mg (MAXZIDE-25) 37.5-25 mg per tablet Take 0.5 tablets by mouth once daily.    UNABLE TO FIND Take 1 tablet by mouth once daily. Ultra triple action joint health     Family History       Problem Relation (Age of Onset)    Allergy (severe)  Daughter    Early death Father (51)    Emphysema Sister    Heart disease Father, Sister    Hypertension Sister, Brother    Miscarriages / Stillbirths Daughter    Ulcers Brother          Tobacco Use    Smoking status: Never    Smokeless tobacco: Never   Substance and Sexual Activity    Alcohol use: No    Drug use: No    Sexual activity: Yes     Partners: Male     Review of Systems   Constitutional: Negative.  Negative for appetite change, chills, fatigue, fever and unexpected weight change.   HENT: Negative.  Negative for congestion, ear pain, hearing loss, rhinorrhea, sore throat and tinnitus.    Eyes: Negative.  Negative for pain, discharge and redness.   Respiratory:  Positive for shortness of breath. Negative for cough, wheezing and stridor.    Cardiovascular:  Positive for chest pain and palpitations. Negative for leg swelling.   Gastrointestinal: Negative.  Negative for abdominal distention, abdominal pain, constipation, diarrhea, nausea and vomiting.   Endocrine: Negative.  Negative for cold intolerance, heat intolerance, polydipsia, polyphagia and polyuria.   Genitourinary: Negative.  Negative for decreased urine volume, difficulty urinating, flank pain, hematuria and urgency.   Musculoskeletal:  Positive for arthralgias (right shoulder chronic) and back pain (Left scapular region radiating to left arm). Negative for gait problem and myalgias.   Skin: Negative.  Negative for pallor and rash.   Allergic/Immunologic: Negative.  Negative for environmental allergies, food allergies and immunocompromised state.   Neurological: Negative.  Negative for dizziness, syncope, facial asymmetry, weakness and headaches.   Hematological: Negative.    Psychiatric/Behavioral: Negative.  Negative for agitation, confusion, self-injury and suicidal ideas.      Objective:     Vital Signs (Most Recent):  Temp: 98.2 °F (36.8 °C) (10/25/23 1714)  Pulse: 63 (10/25/23 2100)  Resp: 19 (10/25/23 2049)  BP: (!) 147/71 (10/25/23  2100)  SpO2: 100 % (10/25/23 2100) Vital Signs (24h Range):  Temp:  [98.2 °F (36.8 °C)] 98.2 °F (36.8 °C)  Pulse:  [60-65] 63  Resp:  [18-26] 19  SpO2:  [96 %-100 %] 100 %  BP: (133-155)/(68-72) 147/71     Weight: 77.1 kg (170 lb)  Body mass index is 26.63 kg/m².     Physical Exam  Vitals reviewed.   Constitutional:       General: She is not in acute distress.     Appearance: Normal appearance. She is normal weight. She is not toxic-appearing.   HENT:      Head: Normocephalic and atraumatic.      Nose: Nose normal. No congestion or rhinorrhea.      Mouth/Throat:      Mouth: Mucous membranes are moist.      Pharynx: Oropharynx is clear.   Eyes:      General:         Right eye: No discharge.         Left eye: No discharge.      Extraocular Movements: Extraocular movements intact.      Conjunctiva/sclera: Conjunctivae normal.      Pupils: Pupils are equal, round, and reactive to light.   Cardiovascular:      Rate and Rhythm: Normal rate and regular rhythm.      Pulses: Normal pulses.      Heart sounds: No murmur heard.     No friction rub. No gallop.   Pulmonary:      Effort: Pulmonary effort is normal. No respiratory distress.      Breath sounds: No stridor. No wheezing, rhonchi or rales.   Chest:      Chest wall: No tenderness.   Abdominal:      General: Abdomen is flat. Bowel sounds are normal. There is no distension.      Palpations: Abdomen is soft.      Tenderness: There is no abdominal tenderness. There is no right CVA tenderness, left CVA tenderness or guarding.   Musculoskeletal:         General: Normal range of motion.      Cervical back: Normal range of motion and neck supple.   Skin:     General: Skin is warm and dry.      Findings: No rash.   Neurological:      General: No focal deficit present.      Mental Status: She is alert and oriented to person, place, and time. Mental status is at baseline.      Motor: No weakness.   Psychiatric:         Mood and Affect: Mood normal.              CRANIAL NERVES      CN III, IV, VI   Pupils are equal, round, and reactive to light.       Significant Labs: All pertinent labs within the past 24 hours have been reviewed.  CBC:   Recent Labs   Lab 10/25/23  1753   WBC 11.27   HGB 12.6   HCT 37.7        CMP:   Recent Labs   Lab 10/25/23  1753   *   K 3.8   CL 98   CO2 30*   GLU 99   BUN 14   CREATININE 0.9   CALCIUM 9.4   PROT 6.9   ALBUMIN 3.9   BILITOT 0.5   ALKPHOS 71   AST 14   ALT 7*   ANIONGAP 7*     Magnesium:   Recent Labs   Lab 10/25/23  1753   MG 1.8     Troponin:   Recent Labs   Lab 10/25/23  1753 10/25/23  1925   TROPONINIHS 205.3* 335.2*     TSH:   Recent Labs   Lab 09/05/23  1146   TSH 3.916     Significant Imaging: I have reviewed all pertinent imaging results/findings within the past 24 hours.

## 2023-10-26 NOTE — ASSESSMENT & PLAN NOTE
Last TSH 2 months ago 3.916  Patient reports not being on supplemention  Updated TSH and free T4 pending

## 2023-10-26 NOTE — NURSING
.Nurses Note -- 4 Eyes      10/25/2023   11:40 PM      Skin assessed during: Admit      [x] No Altered Skin Integrity Present    []Prevention Measures Documented      [] Yes- Altered Skin Integrity Present or Discovered   [] LDA Added if Not in Epic (Describe Wound)   [] New Altered Skin Integrity was Present on Admit and Documented in LDA   [] Wound Image Taken    Wound Care Consulted? No    Attending Nurse:  Rajwinder Sol RN/Staff Member:   Odilon Figueroa RN

## 2023-10-26 NOTE — ED PROVIDER NOTES
Encounter Date: 10/25/2023       History     Chief Complaint   Patient presents with    Palpitations     Intermittent Palpitations ranging from mid 60's to 120;s since 1500 today, accompanied by SOB and pain in left neck, shoulder, arm and back.     Shortness of Breath     78-year-old female presenting with rapid palpitations, left upper back and shoulder discomfort.  Patient says this began several hours ago.  It felt like her heart was racing.  A home pulse ox showed that her heart rate would fluctuate between the 60s up into the 120s.  She also so had dull pain behind her left shoulder blade and left shoulder that radiated down to her left elbow with associated shortness of breath.  She says the symptoms all resolved upon arrival to the ED.  She denies any symptoms currently.  She denies any history of arrhythmia or CAD.    The history is provided by the patient.     Review of patient's allergies indicates:   Allergen Reactions    Zoster vaccine live Other (See Comments)     Headache, neck shoulder, body flu sx, sore throat, nausea, dry cough, fatigue     Amlodipine Diarrhea, Nausea Only and Other (See Comments)     Joint pain    Azithromycin      Other reaction(s): Unknown    Budesonide      Other reaction(s): heartburn  Other reaction(s): Rash    Cephalexin      Other reaction(s): Unknown    Ciprofloxacin      Other reaction(s): Unknown    Erythromycin      Other reaction(s): Unknown    Esomeprazole magnesium      Other reaction(s): Headache    Felodipine      Nausea, raw throat, ulcer tongue, myalgia    Levofloxacin      Other reaction(s): tendonitis    Talwin compound      Other reaction(s): Vomiting  Other reaction(s): Hallucinations    Hydrocodone-acetaminophen Rash     Other reaction(s): Nausea     Past Medical History:   Diagnosis Date    Asthma     Basal cell carcinoma     GERD (gastroesophageal reflux disease)     Hyperlipidemia     Hypertension     Hypothyroid     Lumbago     Neuromuscular disorder      Sinusitis     Ulcer      Past Surgical History:   Procedure Laterality Date    APPENDECTOMY       SECTION      LUMBAR EPIDURAL INJECTION      PARTIAL HYSTERECTOMY       Family History   Problem Relation Age of Onset    Heart disease Father     Early death Father 51    Heart disease Sister     Hypertension Sister     Emphysema Sister     Hypertension Brother     Ulcers Brother     Miscarriages / Stillbirths Daughter         still birth    Allergy (severe) Daughter     Allergic rhinitis Neg Hx     Allergies Neg Hx     Angioedema Neg Hx     Asthma Neg Hx     Atopy Neg Hx     Eczema Neg Hx     Immunodeficiency Neg Hx     Rhinitis Neg Hx     Urticaria Neg Hx      Social History     Tobacco Use    Smoking status: Never    Smokeless tobacco: Never   Substance Use Topics    Alcohol use: No    Drug use: No     Review of Systems   Respiratory:  Positive for shortness of breath.    Cardiovascular:  Positive for chest pain and palpitations.   All other systems reviewed and are negative.      Physical Exam     Initial Vitals [10/25/23 1714]   BP Pulse Resp Temp SpO2   (!) 143/72 65 20 98.2 °F (36.8 °C) 96 %      MAP       --         Physical Exam    Nursing note and vitals reviewed.  Constitutional: She appears well-developed and well-nourished. She is not diaphoretic. No distress.   HENT:   Head: Normocephalic.   Eyes: Conjunctivae are normal.   Neck: Neck supple.   Normal range of motion.  Cardiovascular:  Normal rate.           Pulmonary/Chest: No respiratory distress.   Abdominal: She exhibits no distension.   Musculoskeletal:         General: No edema.      Cervical back: Normal range of motion and neck supple.     Neurological: She is alert. She has normal strength. GCS eye subscore is 4. GCS verbal subscore is 5. GCS motor subscore is 6.   Skin: Skin is warm and dry.   Psychiatric: She has a normal mood and affect.         ED Course   Procedures  Labs Reviewed   CBC W/ AUTO DIFFERENTIAL - Abnormal; Notable for the  following components:       Result Value    RBC 3.99 (*)     MCH 31.6 (*)     Platelet Estimate Clumped (*)     All other components within normal limits   COMPREHENSIVE METABOLIC PANEL - Abnormal; Notable for the following components:    Sodium 135 (*)     CO2 30 (*)     ALT 7 (*)     Anion Gap 7 (*)     All other components within normal limits   B-TYPE NATRIURETIC PEPTIDE - Abnormal; Notable for the following components:     (*)     All other components within normal limits   TROPONIN I HIGH SENSITIVITY - Abnormal; Notable for the following components:    Troponin I High Sensitivity 205.3 (*)     All other components within normal limits   TROPONIN I HIGH SENSITIVITY - Abnormal; Notable for the following components:    Troponin I High Sensitivity 335.2 (*)     All other components within normal limits   PROTIME-INR   MAGNESIUM   TSH   T4, FREE   TROPONIN I HIGH SENSITIVITY     EKG Readings: (Independently Interpreted)   Sinus rhythm.  Sixty-seven beats/minute.  Left axis deviation.  No ST elevation.       Imaging Results              X-Ray Chest AP Portable (Final result)  Result time 10/25/23 18:34:20      Final result by Megha Mayorga DO (10/25/23 18:34:20)                   Narrative:    AP chest radiograph: 10/25/2023 6:32 PM CDT    Indication: 78 years  old Female with palpitations.    Comparison: 9/27/2023    Findings: The cardiomediastinal silhouette is at the upper limits of normal in size.    No pneumothorax is seen.    No acute airspace opacities are seen.    There is blunting of the costophrenic angles suggesting trace bilateral effusions.    Atherosclerotic calcifications are seen at the aortic arch.    Impression: No acute airspace opacities are seen.    Electronically signed by:  Megha Mayorga DO  10/25/2023 06:34 PM CDT Workstation: [id number]                                     Medications   sodium chloride 0.9% flush 10 mL (has no administration in time range)   naloxone 0.4 mg/mL  injection 0.02 mg (has no administration in time range)   glucose chewable tablet 16 g (has no administration in time range)   glucose chewable tablet 24 g (has no administration in time range)   dextrose 50% injection 12.5 g (has no administration in time range)   dextrose 50% injection 25 g (has no administration in time range)   glucagon (human recombinant) injection 1 mg (has no administration in time range)   potassium bicarbonate disintegrating tablet 50 mEq (has no administration in time range)   potassium bicarbonate disintegrating tablet 35 mEq (has no administration in time range)   potassium bicarbonate disintegrating tablet 60 mEq (has no administration in time range)   magnesium oxide tablet 800 mg (has no administration in time range)   magnesium oxide tablet 800 mg (has no administration in time range)   potassium, sodium phosphates 280-160-250 mg packet 2 packet (has no administration in time range)   potassium, sodium phosphates 280-160-250 mg packet 2 packet (has no administration in time range)   potassium, sodium phosphates 280-160-250 mg packet 2 packet (has no administration in time range)   polyethylene glycol packet 17 g (has no administration in time range)   ondansetron injection 4 mg (has no administration in time range)   acetaminophen tablet 650 mg (has no administration in time range)   melatonin tablet 6 mg (has no administration in time range)   heparin (porcine) injection 5,000 Units (has no administration in time range)   aspirin tablet 325 mg (325 mg Oral Given 10/25/23 2054)     Medical Decision Making  EKG shows normal sinus rhythm with rate in the 60s.  CXR is clear.  Initial troponin is elevated at 200.  Patient was given 325 mg aspirin.  She will be admitted for further cardiac evaluation.  She has remained chest pain-free in the ED.    Risk  OTC drugs.                               Clinical Impression:   Final diagnoses:  [R07.9] Chest pain        ED Disposition Condition     Observation Stable                Jc Wellington MD  10/25/23 7127

## 2023-10-26 NOTE — DISCHARGE SUMMARY
[facility] Medicine  Discharge Summary      Patient Name: Megha Ladd  MRN: [id number]  [facility]: [id number]  Patient Class: OP- Observation  Admission Date: 10/25/2023  Hospital Length of Stay: 0 days  Discharge Date and Time:  10/26/2023 2:38 PM  Attending Physician: Jenae Vyas MD   Discharging Provider: Jenae Vyas MD  Primary Care Provider: Alfred Mejia MD    Primary Care Team: Networked reference to record PCT     HPI:   Patient is a 78-year-old female with a past medical history of hypertension, hyperlipidemia, hypothyroidism, asthma, GERD, dizziness. Patient presents to the ED today with complaints of left scapular pain radiating to left arm and palpitations. Patient reports she received flu vaccine yesterday and attributed her muscle soreness to this. Patient reports she came to the ED due to the fear of the palpitations. Patient states at home she experienced shortness of breath worse on exertion walking from house to car this morning.  Patient reports no shortness a breath history prior to today. Patient reports checking heart rate due to palpitations on pulse oximeter at home and pulse is reported to be fluctuating . Upon arrival to ED, patient is asymptomatic with no complaints of pain. EKG shows normal sinus rhythm, , potassium 3.8, magnesium 1.8, troponin 205.3 and 335.2. Patient was given aspirin in the ED. chest x-ray negative for acute cardiopulmonary process. Patient has history of hypothyroid but states she has not been on supplementation due to normal labs a few months ago. Updated TSH and free T4 pending. Patient reports blood pressure is well controlled on home regimen, patient denies following with Cardiology. Patient denies current chest pain, shortness for breath, abdominal pain, nausea or vomiting. Full code.      * No surgery found *      Hospital Course:   Patient admitted with left shoulder pain. Patient states the day before she  has received high dose flu vaccine and she felt a not on her left arm. Subsequently she had aching pain behind her left shoulder and then progressed down left arm. Then she started having chest palpitations, no chest pain. No SOB. They had pulse oxymeter and her HR was variable between . This lasted for few hours before she came in to the hospital. Her troponin was elevated but then down trended. No events noted on overnight telemetry. Stress test was negative. Echo read is pending.        Goals of Care Treatment Preferences:  Code Status: Full Code    Physical Exam  Vitals reviewed.   Constitutional:       General: She is not in acute distress.     Appearance: Normal appearance. She is normal weight. She is not toxic-appearing.   HENT:      Head: Normocephalic and atraumatic.      Nose: Nose normal. No congestion or rhinorrhea.      Mouth/Throat:      Mouth: Mucous membranes are moist.      Pharynx: Oropharynx is clear.   Eyes:      General:         Right eye: No discharge.         Left eye: No discharge.      Extraocular Movements: Extraocular movements intact.      Conjunctiva/sclera: Conjunctivae normal.      Pupils: Pupils are equal, round, and reactive to light.   Cardiovascular:      Rate and Rhythm: Normal rate and regular rhythm.      Pulses: Normal pulses.      Heart sounds: No murmur heard.     No friction rub. No gallop.   Pulmonary:      Effort: Pulmonary effort is normal. No respiratory distress.      Breath sounds: No stridor. No wheezing, rhonchi or rales.   Chest:      Chest wall: No tenderness.   Abdominal:      General: Abdomen is flat. Bowel sounds are normal. There is no distension.      Palpations: Abdomen is soft.      Tenderness: There is no abdominal tenderness. There is no right CVA tenderness, left CVA tenderness or guarding.   Musculoskeletal:         General: Normal range of motion.      Cervical back: Normal range of motion and neck supple.   Skin:     General: Skin is warm and  "dry.      Findings: No rash.   Neurological:      General: No focal deficit present.      Mental Status: She is alert and oriented to person, place, and time. Mental status is at baseline.      Motor: No weakness.   Psychiatric:         Mood and Affect: Mood normal.     Consults:   Consults (From admission, onward)        Status Ordering Provider     IP consult to case management  Once        Provider:  (Not yet assigned)    Acknowledged KASSY CHRISTIANSEN          No new Assessment & Plan notes have been filed under this hospital service since the last note was generated.  Service: Hospital Medicine    Final Active Diagnoses:    Diagnosis Date Noted POA    PRINCIPAL PROBLEM:  Chest pain [R07.9] 10/25/2023 Yes    Heart palpitations [R00.2] 10/25/2023 Yes    Hyperlipidemia [E78.5] 03/13/2014 Yes    GERD (gastroesophageal reflux disease) [K21.9] 05/22/2012 Yes    Hypothyroid [E03.9] 03/29/2012 Yes    HTN (hypertension) [I10] 03/29/2012 Yes      Problems Resolved During this Admission:       Discharged Condition: good    Disposition: Home or Self Care    Follow Up:   Follow-up Information     Alfred Mejia MD Follow up in 1 week(s).    Specialty: Family Medicine  Contact information:  [facility] [address]  [place] [address] [phone]                       Patient Instructions:   No discharge procedures on file.    Significant Diagnostic Studies: Labs:   CMP   Recent Labs   Lab 10/25/23  1753 10/26/23  0501   * 137   K 3.8 3.7   CL 98 99   CO2 30* 27   GLU 99 87   BUN 14 16   CREATININE 0.9 0.8   CALCIUM 9.4 9.0   PROT 6.9 6.2   ALBUMIN 3.9 3.6   BILITOT 0.5 0.6   ALKPHOS 71 66   AST 14 14   ALT 7* 7*   ANIONGAP 7* 11   , CBC   Recent Labs   Lab 10/25/23  1753 10/26/23  0502   WBC 11.27 11.87   HGB 12.6 11.6*   HCT 37.7 35.7*    271    and Troponin No results for input(s): "TROPONINI" in the last 168 hours.    Pending Diagnostic Studies:     Procedure Component Value Units Date/Time    " Echo [[id number]]  (Abnormal) Resulted: 10/26/23 1024    Order Status: Sent Lab Status: In process Updated: 10/26/23 1026     BSA 1.89 m2      LVOT stroke volume 76.62 cm3      LVIDd 3.81 cm      LV Systolic Volume 23.90 mL      LV Systolic Volume Index 12.8 mL/m2      LVIDs 2.57 cm      LV Diastolic Volume 62.30 mL      LV Diastolic Volume Index 33.32 mL/m2      IVS 1.24 cm      LVOT diameter 2.00 cm      LVOT area 3.1 cm2      FS 33 %      Left Ventricle Relative Wall Thickness 0.45 cm      Posterior Wall 0.85 cm      LV mass 125.46 g      LV Mass Index 67 g/m2      MV Peak E Zack 0.70 m/s      TDI LATERAL 0.10 m/s      TDI SEPTAL 0.08 m/s      E/E' ratio 7.78 m/s      MV Peak A Zack 0.86 m/s      TR Max Zack 2.39 m/s      E/A ratio 0.81     E wave deceleration time 239.00 msec      LV SEPTAL E/E' RATIO 8.75 m/s      LV LATERAL E/E' RATIO 7.00 m/s      LVOT peak zack 0.98 m/s      Left Ventricular Outflow Tract Mean Velocity 0.68 cm/s      Left Ventricular Outflow Tract Mean Gradient 2.00 mmHg      TAPSE 2.24 cm      AV regurgitation pressure 1/2 time 405 ms      AR Max Zack 3.79 m/s      AV mean gradient 3 mmHg      AV peak gradient 5 mmHg      Ao peak zack 1.11 m/s      Ao VTI 27.20 cm      LVOT peak VTI 24.40 cm      AV valve area 2.82 cm²      AV Velocity Ratio 0.88     AV index (prosthetic) 0.90     NESSA by Velocity Ratio 2.77 cm²      Mr max zack 5.02 m/s      MV mean gradient 2 mmHg      MV peak gradient 4 mmHg      MV valve area by continuity eq 2.50 cm2      MV VTI 30.7 cm      Triscuspid Valve Regurgitation Peak Gradient 23 mmHg      PV PEAK VELOCITY 0.86 m/s      PV peak gradient 3 mmHg      Pulmonary Valve Mean Velocity 0.56 m/s      Ao root annulus 3.30 cm      IVC diameter 1.62 cm      Mean e' 0.09 m/s      ZLVIDS -1.77     ZLVIDD -3.13     AORTIC VALVE CUSP SEPERATION 1.70 cm          Medications:  Reconciled Home Medications:      Medication List      CHANGE how you take these medications    azelastine 137  mcg (0.1 %) nasal spray  Commonly known as: ASTELIN  One to two sprays each nostril twice a day as needed for congestion  What changed:   · how much to take  · how to take this  · when to take this     cloNIDine 0.1 MG tablet  Commonly known as: CATAPRES  Take 1 tablet if systolic above 180 or diastolic above 105. Can repeat after 2 hours if bp remains above 180 or 105.  What changed:   · how much to take  · how to take this  · when to take this  · reasons to take this     ketoconazole 2 % shampoo  Commonly known as: NIZORAL  Wash hair with medicated shampoo at least 2x/week - let sit on scalp at least 5 minutes prior to rinsing  What changed:   · how much to take  · how to take this  · when to take this     metoprolol tartrate 50 MG tablet  Commonly known as: LOPRESSOR  TAKE 1 & 1/2 (ONE & ONE-HALF) TABLETS BY MOUTH TWICE DAILY  What changed:   · how much to take  · how to take this  · when to take this        CONTINUE taking these medications    acetaminophen 500 MG tablet  Commonly known as: TYLENOL  Take 1,000 mg by mouth 3 (three) times daily.     cartilage-collagen-bor-hyalur 40-5-3.3 mg Tab  Take 1 tablet by mouth once daily.     cetirizine 10 MG tablet  Commonly known as: ZYRTEC  Take 10 mg by mouth daily as needed for Allergies.     enalapril 20 MG tablet  Commonly known as: VASOTEC  Take 1 tablet (20 mg total) by mouth 2 (two) times daily.     fluticasone propion-salmeterol 115-21 mcg/dose 115-21 mcg/actuation Hfaa inhaler  Commonly known as: ADVAIR HFA  Inhale 2 puffs into the lungs 2 (two) times daily. Controller     fluticasone propionate 50 mcg/actuation nasal spray  Commonly known as: FLONASE  SPRAY 1 SPRAY IN EACH NOSTRIL 2 TIMES A DAY     loperamide 2 mg capsule  Commonly known as: IMODIUM  Take 2 mg by mouth 4 (four) times daily as needed for Diarrhea.     meclizine 12.5 mg tablet  Commonly known as: ANTIVERT  Take 1 tablet (12.5 mg total) by mouth 3 (three) times daily as needed for Dizziness.      metaxalone 800 MG tablet  Commonly known as: SKELAXIN  Take 800 mg by mouth 3 (three) times daily. Every 6 hours PRN     montelukast 10 mg tablet  Commonly known as: SINGULAIR  Take 1 tablet (10 mg total) by mouth every evening.     pantoprazole 20 MG tablet  Commonly known as: PROTONIX  Take 1 tablet (20 mg total) by mouth once daily.     peg 400-propylene glycol 0.4-0.3 % Drop  Place 2 drops into both eyes 2 (two) times daily.     pregabalin 75 MG capsule  Commonly known as: LYRICA  Take 75 mg by mouth 2 (two) times daily.     SIMPLY SALINE NASL  by Nasal route 2 (two) times daily as needed.     tacrolimus 0.1 % ointment  Commonly known as: PROTOPIC  AAA face daily to BID PRN rash     traMADoL 50 mg tablet  Commonly known as: ULTRAM  Take 50 mg by mouth every 12 (twelve) hours as needed for Pain. Every 6 hours     triamterene-hydrochlorothiazide 37.5-25 mg 37.5-25 mg per tablet  Commonly known as: MAXZIDE-25  Take 0.5 tablets by mouth once daily.     UNABLE TO FIND  Take 1 tablet by mouth once daily. Ultra triple action joint health            Indwelling Lines/Drains at time of discharge:   Lines/Drains/Airways     None                 Time spent on the discharge of patient: 40 minutes         Jenae Vyas MD  Department of Hospital Medicine  [facility]

## 2023-10-27 ENCOUNTER — PATIENT MESSAGE (OUTPATIENT)
Dept: CASE MANAGEMENT | Facility: HOSPITAL | Age: 78
End: 2023-10-27

## 2023-11-06 ENCOUNTER — TELEPHONE (OUTPATIENT)
Dept: FAMILY MEDICINE | Facility: CLINIC | Age: 78
End: 2023-11-06
Payer: MEDICARE

## 2023-11-06 NOTE — TELEPHONE ENCOUNTER
Handled in another message      ----- Message from Paul Marrero sent at 11/6/2023  1:48 PM CST -----  Contact: /Gregoria  Type: Needs Medical Advice  Who Called:  /Gregoria    Best Call Back Number: [phone]    Additional Information: Needs to discuss hospital visit.

## 2023-11-06 NOTE — TELEPHONE ENCOUNTER
Please see labs done through ED   She had a CBC done   Do you still need her to repeat the labs?    states she is doing fine   They think she had a reaction to the Flu vaccine  Please advise        ----- Message from Paul Marrero sent at 11/6/2023 12:51 PM CST -----  Contact: Gregoria/  Type: Needs Medical Advice  Who Called:  Gregoria/  Symptoms (please be specific):    How long has patient had these symptoms:    Pharmacy name and phone #:    Best Call Back Number: [phone]    Additional Information: needs to discuss test.

## 2023-11-07 NOTE — TELEPHONE ENCOUNTER
Left message on machine   No follow up labs are needed time Keep appointment on 9/3/2024 and as needed

## 2023-11-07 NOTE — TELEPHONE ENCOUNTER
I reviewed the records and it sounds like that is what the hospital team believed also.  I do not believe that she needs to repeat the labs.

## 2023-12-28 ENCOUNTER — OFFICE VISIT (OUTPATIENT)
Dept: FAMILY MEDICINE | Facility: CLINIC | Age: 78
End: 2023-12-28
Payer: MEDICARE

## 2023-12-28 VITALS
HEIGHT: 67 IN | HEART RATE: 74 BPM | SYSTOLIC BLOOD PRESSURE: 132 MMHG | TEMPERATURE: 98 F | OXYGEN SATURATION: 99 % | WEIGHT: 168.19 LBS | BODY MASS INDEX: 26.4 KG/M2 | DIASTOLIC BLOOD PRESSURE: 62 MMHG

## 2023-12-28 DIAGNOSIS — J06.9 UPPER RESPIRATORY TRACT INFECTION, UNSPECIFIED TYPE: Primary | ICD-10-CM

## 2023-12-28 LAB
CTP QC/QA: YES
CTP QC/QA: YES
POC MOLECULAR INFLUENZA A AGN: NEGATIVE
POC MOLECULAR INFLUENZA B AGN: NEGATIVE
SARS-COV-2 RDRP RESP QL NAA+PROBE: NEGATIVE

## 2023-12-28 PROCEDURE — 3078F DIAST BP <80 MM HG: CPT | Mod: HCNC,CPTII,S$GLB, | Performed by: STUDENT IN AN ORGANIZED HEALTH CARE EDUCATION/TRAINING PROGRAM

## 2023-12-28 PROCEDURE — 3075F PR MOST RECENT SYSTOLIC BLOOD PRESS GE 130-139MM HG: ICD-10-PCS | Mod: HCNC,CPTII,S$GLB, | Performed by: STUDENT IN AN ORGANIZED HEALTH CARE EDUCATION/TRAINING PROGRAM

## 2023-12-28 PROCEDURE — 3288F FALL RISK ASSESSMENT DOCD: CPT | Mod: HCNC,CPTII,S$GLB, | Performed by: STUDENT IN AN ORGANIZED HEALTH CARE EDUCATION/TRAINING PROGRAM

## 2023-12-28 PROCEDURE — 87635: ICD-10-PCS | Mod: QW,HCNC,S$GLB, | Performed by: STUDENT IN AN ORGANIZED HEALTH CARE EDUCATION/TRAINING PROGRAM

## 2023-12-28 PROCEDURE — 87502 INFLUENZA DNA AMP PROBE: CPT | Mod: QW,HCNC,S$GLB, | Performed by: STUDENT IN AN ORGANIZED HEALTH CARE EDUCATION/TRAINING PROGRAM

## 2023-12-28 PROCEDURE — 87635 SARS-COV-2 COVID-19 AMP PRB: CPT | Mod: QW,HCNC,S$GLB, | Performed by: STUDENT IN AN ORGANIZED HEALTH CARE EDUCATION/TRAINING PROGRAM

## 2023-12-28 PROCEDURE — 99213 PR OFFICE/OUTPT VISIT, EST, LEVL III, 20-29 MIN: ICD-10-PCS | Mod: HCNC,S$GLB,, | Performed by: STUDENT IN AN ORGANIZED HEALTH CARE EDUCATION/TRAINING PROGRAM

## 2023-12-28 PROCEDURE — 87502 POCT INFLUENZA A/B MOLECULAR: ICD-10-PCS | Mod: QW,HCNC,S$GLB, | Performed by: STUDENT IN AN ORGANIZED HEALTH CARE EDUCATION/TRAINING PROGRAM

## 2023-12-28 PROCEDURE — 3288F PR FALLS RISK ASSESSMENT DOCUMENTED: ICD-10-PCS | Mod: HCNC,CPTII,S$GLB, | Performed by: STUDENT IN AN ORGANIZED HEALTH CARE EDUCATION/TRAINING PROGRAM

## 2023-12-28 PROCEDURE — 1159F PR MEDICATION LIST DOCUMENTED IN MEDICAL RECORD: ICD-10-PCS | Mod: HCNC,CPTII,S$GLB, | Performed by: STUDENT IN AN ORGANIZED HEALTH CARE EDUCATION/TRAINING PROGRAM

## 2023-12-28 PROCEDURE — 3075F SYST BP GE 130 - 139MM HG: CPT | Mod: HCNC,CPTII,S$GLB, | Performed by: STUDENT IN AN ORGANIZED HEALTH CARE EDUCATION/TRAINING PROGRAM

## 2023-12-28 PROCEDURE — 99213 OFFICE O/P EST LOW 20 MIN: CPT | Mod: HCNC,S$GLB,, | Performed by: STUDENT IN AN ORGANIZED HEALTH CARE EDUCATION/TRAINING PROGRAM

## 2023-12-28 PROCEDURE — 99999 PR PBB SHADOW E&M-EST. PATIENT-LVL V: ICD-10-PCS | Mod: PBBFAC,HCNC,, | Performed by: STUDENT IN AN ORGANIZED HEALTH CARE EDUCATION/TRAINING PROGRAM

## 2023-12-28 PROCEDURE — 1125F PR PAIN SEVERITY QUANTIFIED, PAIN PRESENT: ICD-10-PCS | Mod: HCNC,CPTII,S$GLB, | Performed by: STUDENT IN AN ORGANIZED HEALTH CARE EDUCATION/TRAINING PROGRAM

## 2023-12-28 PROCEDURE — 3078F PR MOST RECENT DIASTOLIC BLOOD PRESSURE < 80 MM HG: ICD-10-PCS | Mod: HCNC,CPTII,S$GLB, | Performed by: STUDENT IN AN ORGANIZED HEALTH CARE EDUCATION/TRAINING PROGRAM

## 2023-12-28 PROCEDURE — 1125F AMNT PAIN NOTED PAIN PRSNT: CPT | Mod: HCNC,CPTII,S$GLB, | Performed by: STUDENT IN AN ORGANIZED HEALTH CARE EDUCATION/TRAINING PROGRAM

## 2023-12-28 PROCEDURE — 1101F PR PT FALLS ASSESS DOC 0-1 FALLS W/OUT INJ PAST YR: ICD-10-PCS | Mod: HCNC,CPTII,S$GLB, | Performed by: STUDENT IN AN ORGANIZED HEALTH CARE EDUCATION/TRAINING PROGRAM

## 2023-12-28 PROCEDURE — 1159F MED LIST DOCD IN RCRD: CPT | Mod: HCNC,CPTII,S$GLB, | Performed by: STUDENT IN AN ORGANIZED HEALTH CARE EDUCATION/TRAINING PROGRAM

## 2023-12-28 PROCEDURE — 1101F PT FALLS ASSESS-DOCD LE1/YR: CPT | Mod: HCNC,CPTII,S$GLB, | Performed by: STUDENT IN AN ORGANIZED HEALTH CARE EDUCATION/TRAINING PROGRAM

## 2023-12-28 PROCEDURE — 99999 PR PBB SHADOW E&M-EST. PATIENT-LVL V: CPT | Mod: PBBFAC,HCNC,, | Performed by: STUDENT IN AN ORGANIZED HEALTH CARE EDUCATION/TRAINING PROGRAM

## 2023-12-28 RX ORDER — PROMETHAZINE HYDROCHLORIDE AND DEXTROMETHORPHAN HYDROBROMIDE 6.25; 15 MG/5ML; MG/5ML
5 SYRUP ORAL EVERY 4 HOURS PRN
Qty: 240 ML | Refills: 0 | Status: SHIPPED | OUTPATIENT
Start: 2023-12-28 | End: 2024-01-07

## 2023-12-28 NOTE — PROGRESS NOTES
[facility] Note    Subjective:  Chief Complaint:   Chief Complaint   Patient presents with    Cough       History of Present Illness:  Megha is a pleasant intelligent patient who is here for uri sick visit. This patient is new to me.     URI x 2 weeks   Waxing and waning   + coughing, congestion, body aches, horsness, fatigue  Denies fever - reports she doesn't usually run fevers, wheeze, sick contacts       Allergies:  Review of patient's allergies indicates:   Allergen Reactions    Zoster vaccine live Other (See Comments)     Headache, neck shoulder, body flu sx, sore throat, nausea, dry cough, fatigue     Amlodipine Diarrhea, Nausea Only and Other (See Comments)     Joint pain    Azithromycin      Other reaction(s): Unknown    Budesonide      Other reaction(s): heartburn  Other reaction(s): Rash    Cephalexin      Other reaction(s): Unknown    Ciprofloxacin      Other reaction(s): Unknown    Erythromycin      Other reaction(s): Unknown    Esomeprazole magnesium      Other reaction(s): Headache    Felodipine      Nausea, raw throat, ulcer tongue, myalgia    Levofloxacin      Other reaction(s): tendonitis    Talwin compound      Other reaction(s): Vomiting  Other reaction(s): Hallucinations    Hydrocodone-acetaminophen Rash     Other reaction(s): Nausea       Home Medications:  Current Outpatient Medications on File Prior to Visit   Medication Sig    acetaminophen (TYLENOL) 500 MG tablet Take 1,000 mg by mouth 3 (three) times daily.    azelastine (ASTELIN) 137 mcg (0.1 %) nasal spray One to two sprays each nostril twice a day as needed for congestion (Patient taking differently: 2 sprays by Nasal route 2 (two) times daily. One to two sprays each nostril twice a day as needed for congestion)    cartilage-collagen-bor-hyalur 40-5-3.3 mg Tab Take 1 tablet by mouth once daily.    cetirizine (ZYRTEC) 10 MG tablet Take 10 mg by mouth daily as needed for Allergies.    cloNIDine (CATAPRES) 0.1 MG tablet  Take 1 tablet if systolic above 180 or diastolic above 105. Can repeat after 2 hours if bp remains above 180 or 105. (Patient taking differently: Take 0.1 mg by mouth daily as needed. Take 1 tablet if systolic above 180 or diastolic above 105. Can repeat after 2 hours if bp remains above 180 or 105.)    enalapril (VASOTEC) 20 MG tablet Take 1 tablet (20 mg total) by mouth 2 (two) times daily.    fluticasone (FLONASE) 50 mcg/actuation nasal spray SPRAY 1 SPRAY IN EACH NOSTRIL 2 TIMES A DAY    fluticasone propion-salmeterol 115-21 mcg/dose (ADVAIR HFA) 115-21 mcg/actuation HFAA inhaler Inhale 2 puffs into the lungs 2 (two) times daily. Controller    ketoconazole (NIZORAL) 2 % shampoo Wash hair with medicated shampoo at least 2x/week - let sit on scalp at least 5 minutes prior to rinsing (Patient taking differently: Apply 1 application  topically twice a week. Wash hair with medicated shampoo at least 2x/week - let sit on scalp at least 5 minutes prior to rinsing)    loperamide (IMODIUM) 2 mg capsule Take 2 mg by mouth 4 (four) times daily as needed for Diarrhea.    meclizine (ANTIVERT) 12.5 mg tablet Take 1 tablet (12.5 mg total) by mouth 3 (three) times daily as needed for Dizziness.    metaxalone (SKELAXIN) 800 MG tablet Take 800 mg by mouth 3 (three) times daily. Every 6 hours PRN    metoprolol tartrate (LOPRESSOR) 50 MG tablet TAKE 1 & 1/2 (ONE & ONE-HALF) TABLETS BY MOUTH TWICE DAILY (Patient taking differently: Take 75 mg by mouth 2 (two) times daily. TAKE 1 & 1/2 (ONE & ONE-HALF) TABLETS BY MOUTH TWICE DAILY)    montelukast (SINGULAIR) 10 mg tablet Take 1 tablet (10 mg total) by mouth every evening.    pantoprazole (PROTONIX) 20 MG tablet Take 1 tablet (20 mg total) by mouth once daily.    peg 400-propylene glycol 0.4-0.3 % Drop Place 2 drops into both eyes 2 (two) times daily.     pregabalin (LYRICA) 75 MG capsule Take 75 mg by mouth 2 (two) times daily.    SODIUM CHLORIDE (SIMPLY SALINE NASL) by Nasal route 2  (two) times daily as needed.     tacrolimus (PROTOPIC) 0.1 % ointment AAA face daily to BID PRN rash    tramadol (ULTRAM) 50 mg tablet Take 50 mg by mouth every 12 (twelve) hours as needed for Pain. Every 6 hours    triamterene-hydrochlorothiazide 37.5-25 mg (MAXZIDE-25) 37.5-25 mg per tablet Take 0.5 tablets by mouth once daily.    UNABLE TO FIND Take 1 tablet by mouth once daily. Ultra triple action joint health     No current facility-administered medications on file prior to visit.       Past Medical History:   Diagnosis Date    Asthma     Basal cell carcinoma     GERD (gastroesophageal reflux disease)     Hyperlipidemia     Hypertension     Hypothyroid     Lumbago     Neuromuscular disorder     Sinusitis     Ulcer      Past Surgical History:   Procedure Laterality Date    APPENDECTOMY       SECTION      LUMBAR EPIDURAL INJECTION      PARTIAL HYSTERECTOMY       Family History   Problem Relation Age of Onset    Heart disease Father     Early death Father 51    Heart disease Sister     Hypertension Sister     Emphysema Sister     Hypertension Brother     Ulcers Brother     Miscarriages / Stillbirths Daughter         still birth    Allergy (severe) Daughter     Allergic rhinitis Neg Hx     Allergies Neg Hx     Angioedema Neg Hx     Asthma Neg Hx     Atopy Neg Hx     Eczema Neg Hx     Immunodeficiency Neg Hx     Rhinitis Neg Hx     Urticaria Neg Hx      Social History     Tobacco Use    Smoking status: Never    Smokeless tobacco: Never   Substance Use Topics    Alcohol use: No    Drug use: No            The patient's past medical history, surgical history, social history, family history, allergies and medications have been reviewed.    Review of Systems     10 point review of systems was conducted and only the pertinent positives and pertinent negatives are noted above in the HPI section.    Physical Examination  General appearance: alert, cooperative, no distress  HEENT: normocephalic, atraumatic, PERRLA,  "TMs visualized bilaterally not impacted by cerumen, no nasal septal deviation, oropharynx mucosa mild erythema and moist without tonsillar exudates  Neck: trachea midline, no LAD, no thyromegaly, no neck stiffness  Lungs: clear to auscultation, no wheezes, rales or rhonchi, symmetric air entry  Heart: normal rate, regular rhythm, normal S1, S2, no murmurs, rubs, clicks or gallops  Skin: No rashes or abnormal skin lesions, no apparent jaundice or bruising  Extremities: Full ROM of all extremities  Neurological:alert, oriented, normal speech, no new focal findings or movement disorder noted from baseline      BP Readings from Last 3 Encounters:   12/28/23 132/62   10/26/23 (!) 176/88   09/01/23 120/78     Wt Readings from Last 3 Encounters:   12/28/23 76.3 kg (168 lb 3.4 oz)   10/25/23 76 kg (167 lb 8.8 oz)   10/26/23 75.8 kg (167 lb)     /62 (BP Location: Right arm, Patient Position: Sitting, BP Method: Medium (Manual))   Pulse 74   Temp 98.1 °F (36.7 °C) (Oral)   Ht 5' 7" (1.702 m)   Wt 76.3 kg (168 lb 3.4 oz)   SpO2 99%   BMI 26.35 kg/m²       274}  Laboratory: I have reviewed old labs below:       274}    Lab Results   Component Value Date    WBC 11.87 10/26/2023    HGB 11.6 (L) 10/26/2023    HCT 35.7 (L) 10/26/2023    MCV 94 10/26/2023     10/26/2023     10/26/2023    K 3.7 10/26/2023    CL 99 10/26/2023    CALCIUM 9.0 10/26/2023    PHOS 3.9 10/26/2023    CO2 27 10/26/2023    GLU 87 10/26/2023    BUN 16 10/26/2023    CREATININE 0.8 10/26/2023    EGFRNORACEVR >60.0 10/26/2023    ANIONGAP 11 10/26/2023    PROT 6.2 10/26/2023    ALBUMIN 3.6 10/26/2023    BILITOT 0.6 10/26/2023    ALKPHOS 66 10/26/2023    ALT 7 (L) 10/26/2023    AST 14 10/26/2023    INR 0.9 10/25/2023    CHOL 207 (H) 10/26/2023    TRIG 100 10/26/2023    HDL 46 10/26/2023    LDLCALC 141.0 10/26/2023    TSH 3.885 10/26/2023    HGBA1C 5.1 09/23/2020      Lab reviewed by me: Particular labs of significance that I will monitor, " workup, or treat to improve are mentioned below in diagnostic impression remarks.    Imaging/EKG: I have reviewed the pertinent results and my findings are noted in remarks.     274}    CC:   Chief Complaint   Patient presents with    Cough           274}    Assessment/Plan  Megha Ladd is a 78 y.o. female who presents to clinic with:  1. Upper respiratory tract infection, unspecified type          274}  Diagnostic Impression Remarks       78 y.o. female who presents to clinic today for uri sick visit     This is the extent of this pleasant patient's concerns at this present time. She did not feel chest pain upon exertion, dyspnea, nausea, vomiting, diaphoresis, or syncope. No pleuritic chest pain, unilateral leg swelling, calf tenderness, or calf pain. Negative for unintentional weight loss night sweats, hematuria, and fevers. Megha will return to clinic in a few months for further workup and reassessment or sooner as needed. She was instructed to call the clinic or go to the emergency department or urgent care immediately if her symptoms do not improve, worsens, or if any new symptoms develop. As we discussed that symptoms could worsen over the next 24 hours she was advised that if any increased swelling, pain, or numbness arise to go immediately to the ED. Patient knows to call any time if an emergency arises. Shared decision making occurred and she verbalized understanding in agreement with this plan. I discussed imaging findings, diagnosis, possibilities, treatment options, medications, risks, and benefits. She had many questions regarding the options and long-term effects. All questions were answered. She expressed understanding after counseling regarding the diagnosis and recommendations. She was capable and demonstrated competence with understanding of these options. Shared decision making was performed resulting in her choosing the current treatment plan. Patient handout was given with instructions and  recommendations. Advised the patient that if they become pregnant to alert us immediately to assess for medication changes. Having a healthy weight can decrease the risk of 13 cancers and is an important goal. I also discussed the importance of close follow up to discuss labs, change or modify her medications if needed, monitor side effects, and further evaluation of medical problems.     Additional workup planned: see labs ordered below.    See below for labs and meds ordered with associated diagnosis      1. Upper respiratory tract infection, unspecified type  - POCT Influenza A/B Molecular Negative  - POCT COVID-19 Rapid Screening  Negative   - promethazine-dextromethorphan (PROMETHAZINE-DM) 6.25-15 mg/5 mL Syrp; Take 5 mLs by mouth every 4 (four) hours as needed (cough).  Dispense: 240 mL; Refill: 0  Continue saline and flonase nasal sprays  Start Zyrtec qhs     Return precautions provided   RTC PRN  Please follow with your PCP for routine healthcare management.     Shaylee Botello MD, [facility]   Family Medicine Physician  12/28/2023    How can you care for yourself at home?  To prevent dehydration, drink plenty of fluids. Choose water and other clear liquids until you feel better. If you have kidney, heart, or liver disease and have to limit fluids, talk with your doctor before you increase the amount of fluids you drink.  Take an over-the-counter pain medicine, such as acetaminophen (Tylenol), ibuprofen (Advil, Motrin), or naproxen (Aleve) as needed for sore throat, body aches, or fever. Read and follow all instructions on the label.  Take an over the counter cough suppressant such as Delsym or Robitussin DM as directed on label for cough. Before you use cough and cold medicines, check the label. These medicines may not be safe for children younger than age 6 or for people with certain health problems.  Be careful when taking over-the-counter cold or influenza (flu) medicines and Tylenol at the same time. Many  of these medicines have acetaminophen, which is Tylenol. Read the labels to make sure that you are not taking more than the recommended dose. Too much acetaminophen (Tylenol) can be harmful.  Get plenty of rest.  May apply warm compresses as needed for facial pain and congestion.   Saline nasal spray to loosen nasal congestion.  Humidifier or steamy shower may help with congestion.  Take Guaifenesin to help with congestion.  Flonase to reduce inflammation in the sinus cavities.  You may take an over the counter 24 hour antihistamine such as Zyrtec or Claritin for allergy symptoms such as sneezing, itchy/watery eyes, scratchy throat, or congestion.  Warm salt water gargles, Cepacol throat lozenges, or Chloraseptic spray for sore throat.  Do not smoke or allow others to smoke around you. If you need help quitting, talk to your doctor about stop-smoking programs and medicines. These can increase your chances of quitting for good.    When should you call for help?     Call 911 anytime you think you may need emergency care. For example, call if:  You have severe trouble breathing.  Neck stiffness, severe headache, vision changes, chest pain, facial swelling, or severe facial pain    Call your doctor or nurse advice line now or seek immediate medical care if:  You seem to be getting much sicker.  You have new or worse trouble breathing.  You have a new or higher fever >100.4 F  You have a new rash.    Watch closely for changes in your health, and be sure to contact your doctor or nurse advice line if:  You have a new symptom, such as a sore throat, an earache, or sinus pain.  You cough more deeply or more often, especially if you notice more mucus or a change in the colour of your mucus.  You do not get better as expected.

## 2024-01-18 ENCOUNTER — OFFICE VISIT (OUTPATIENT)
Dept: DERMATOLOGY | Facility: CLINIC | Age: 79
End: 2024-01-18
Payer: MEDICARE

## 2024-01-18 DIAGNOSIS — Z08 ENCOUNTER FOR FOLLOW-UP SURVEILLANCE OF SKIN CANCER: ICD-10-CM

## 2024-01-18 DIAGNOSIS — L21.9 SEBORRHEIC DERMATITIS: ICD-10-CM

## 2024-01-18 DIAGNOSIS — L82.1 SEBORRHEIC KERATOSES: ICD-10-CM

## 2024-01-18 DIAGNOSIS — D22.9 MULTIPLE BENIGN NEVI: ICD-10-CM

## 2024-01-18 DIAGNOSIS — Z85.828 ENCOUNTER FOR FOLLOW-UP SURVEILLANCE OF SKIN CANCER: ICD-10-CM

## 2024-01-18 DIAGNOSIS — L71.9 ROSACEA: Primary | ICD-10-CM

## 2024-01-18 DIAGNOSIS — B07.8 COMMON WART: ICD-10-CM

## 2024-01-18 PROCEDURE — 1160F RVW MEDS BY RX/DR IN RCRD: CPT | Mod: CPTII,S$GLB,, | Performed by: DERMATOLOGY

## 2024-01-18 PROCEDURE — 1159F MED LIST DOCD IN RCRD: CPT | Mod: CPTII,S$GLB,, | Performed by: DERMATOLOGY

## 2024-01-18 PROCEDURE — 1101F PT FALLS ASSESS-DOCD LE1/YR: CPT | Mod: CPTII,S$GLB,, | Performed by: DERMATOLOGY

## 2024-01-18 PROCEDURE — 3288F FALL RISK ASSESSMENT DOCD: CPT | Mod: CPTII,S$GLB,, | Performed by: DERMATOLOGY

## 2024-01-18 PROCEDURE — 17110 DESTRUCTION B9 LES UP TO 14: CPT | Mod: S$GLB,,, | Performed by: DERMATOLOGY

## 2024-01-18 PROCEDURE — 99214 OFFICE O/P EST MOD 30 MIN: CPT | Mod: 25,S$GLB,, | Performed by: DERMATOLOGY

## 2024-01-18 PROCEDURE — 1126F AMNT PAIN NOTED NONE PRSNT: CPT | Mod: CPTII,S$GLB,, | Performed by: DERMATOLOGY

## 2024-01-18 RX ORDER — KETOCONAZOLE 20 MG/ML
SHAMPOO, SUSPENSION TOPICAL
Qty: 120 ML | Refills: 5 | Status: SHIPPED | OUTPATIENT
Start: 2024-01-18

## 2024-01-18 RX ORDER — HYDROCORTISONE 25 MG/G
CREAM TOPICAL
Qty: 28 G | Refills: 1 | Status: SHIPPED | OUTPATIENT
Start: 2024-01-18

## 2024-01-18 NOTE — PROGRESS NOTES
Subjective:      Patient ID:  Megha Ladd is a 78 y.o. female who presents for   Chief Complaint   Patient presents with    Follow-up     Hx of NMSC     LOV 7/3/23 - NUB, seb derm, rosacea     Final Pathologic Diagnosis  A.  SKIN, LEFT LATERAL FOREHEAD LESION, SHAVE BIOPSY:  - Basal cell carcinoma, superficial and nodular-type, transected.    B.  SKIN, LEFT LATERAL CHEEK LESION, SHAVE BIOPSY:  - Basal cell carcinoma, superficial and nodular-type, transected.    Patient here today for skin check UBSE   Complains of spot on left chin x months, growing  Also complains of itching on left forehead where Mohs was done. States she had multiple spitting stitches in the area.     Requesting refill of SM triple cream    Derm Hx:  BCC, left lateral forehead, mohs Dr. K 8/8/23  BCC, left lateral cheek, mohs Dr. GO 8/8/23  BCC nasal dorsum 10/2022 Dr. Newell  NMSC R nasal root/medial canthus, Mohs Dr Lund  Has no fhx of MM      Current Outpatient Medications:   ·  acetaminophen (TYLENOL) 500 MG tablet, Take 1,000 mg by mouth 3 (three) times daily., Disp: , Rfl:   ·  azelastine (ASTELIN) 137 mcg (0.1 %) nasal spray, One to two sprays each nostril twice a day as needed for congestion (Patient taking differently: 2 sprays by Nasal route 2 (two) times daily. One to two sprays each nostril twice a day as needed for congestion), Disp: 30 mL, Rfl: 10  ·  cartilage-collagen-bor-hyalur 40-5-3.3 mg Tab, Take 1 tablet by mouth once daily., Disp: , Rfl:   ·  cetirizine (ZYRTEC) 10 MG tablet, Take 10 mg by mouth daily as needed for Allergies., Disp: , Rfl:   ·  cloNIDine (CATAPRES) 0.1 MG tablet, Take 1 tablet if systolic above 180 or diastolic above 105. Can repeat after 2 hours if bp remains above 180 or 105. (Patient taking differently: Take 0.1 mg by mouth daily as needed. Take 1 tablet if systolic above 180 or diastolic above 105. Can repeat after 2 hours if bp remains above 180 or 105.), Disp: 60 tablet, Rfl: 2  ·  enalapril  (VASOTEC) 20 MG tablet, Take 1 tablet (20 mg total) by mouth 2 (two) times daily., Disp: 180 tablet, Rfl: 3  ·  fluticasone (FLONASE) 50 mcg/actuation nasal spray, SPRAY 1 SPRAY IN EACH NOSTRIL 2 TIMES A DAY, Disp: 16 g, Rfl: 11  ·  fluticasone propion-salmeterol 115-21 mcg/dose (ADVAIR HFA) 115-21 mcg/actuation HFAA inhaler, Inhale 2 puffs into the lungs 2 (two) times daily. Controller, Disp: 36 g, Rfl: 3  ·  ketoconazole (NIZORAL) 2 % shampoo, Wash hair with medicated shampoo at least 2x/week - let sit on scalp at least 5 minutes prior to rinsing (Patient taking differently: Apply 1 application  topically twice a week. Wash hair with medicated shampoo at least 2x/week - let sit on scalp at least 5 minutes prior to rinsing), Disp: 120 mL, Rfl: 5  ·  loperamide (IMODIUM) 2 mg capsule, Take 2 mg by mouth 4 (four) times daily as needed for Diarrhea., Disp: , Rfl:   ·  meclizine (ANTIVERT) 12.5 mg tablet, Take 1 tablet (12.5 mg total) by mouth 3 (three) times daily as needed for Dizziness., Disp: 60 tablet, Rfl: 2  ·  metaxalone (SKELAXIN) 800 MG tablet, Take 800 mg by mouth 3 (three) times daily. Every 6 hours PRN, Disp: , Rfl:   ·  metoprolol tartrate (LOPRESSOR) 50 MG tablet, TAKE 1 & 1/2 (ONE & ONE-HALF) TABLETS BY MOUTH TWICE DAILY (Patient taking differently: Take 75 mg by mouth 2 (two) times daily. TAKE 1 & 1/2 (ONE & ONE-HALF) TABLETS BY MOUTH TWICE DAILY), Disp: 270 tablet, Rfl: 3  ·  montelukast (SINGULAIR) 10 mg tablet, Take 1 tablet (10 mg total) by mouth every evening., Disp: 90 tablet, Rfl: 3  ·  pantoprazole (PROTONIX) 20 MG tablet, Take 1 tablet (20 mg total) by mouth once daily., Disp: 90 tablet, Rfl: 3  ·  peg 400-propylene glycol 0.4-0.3 % Drop, Place 2 drops into both eyes 2 (two) times daily. , Disp: , Rfl:   ·  pregabalin (LYRICA) 75 MG capsule, Take 75 mg by mouth 2 (two) times daily., Disp: , Rfl:   ·  SODIUM CHLORIDE (SIMPLY SALINE NASL), by Nasal route 2 (two) times daily as needed. , Disp: ,  Rfl:   ·  tacrolimus (PROTOPIC) 0.1 % ointment, AAA face daily to BID PRN rash, Disp: 30 g, Rfl: 1  ·  tramadol (ULTRAM) 50 mg tablet, Take 50 mg by mouth every 12 (twelve) hours as needed for Pain. Every 6 hours, Disp: , Rfl:   ·  triamterene-hydrochlorothiazide 37.5-25 mg (MAXZIDE-25) 37.5-25 mg per tablet, Take 0.5 tablets by mouth once daily., Disp: 45 tablet, Rfl: 12  ·  UNABLE TO FIND, Take 1 tablet by mouth once daily. Ultra triple action joint health, Disp: , Rfl:            Review of Systems   Constitutional:  Negative for fever, chills and fatigue.   Respiratory:  Negative for cough and shortness of breath.    Gastrointestinal:  Negative for nausea and vomiting.   Skin:  Positive for activity-related sunscreen use and wears hat. Negative for itching, rash and daily sunscreen use.       Objective:   Physical Exam   Constitutional: She appears well-developed and well-nourished. No distress.   Neurological: She is alert and oriented to person, place, and time. She is not disoriented.   Psychiatric: She has a normal mood and affect.   Skin:   Areas Examined (abnormalities noted in diagram):   Scalp / Hair Palpated and Inspected  Head / Face Inspection Performed  Neck Inspection Performed  Chest / Axilla Inspection Performed  Abdomen Inspection Performed  Back Inspection Performed  RUE Inspected  LUE Inspection Performed  Nails and Digits Inspection Performed                 Diagram Legend     Erythematous scaling macule/papule c/w actinic keratosis       Vascular papule c/w angioma      Pigmented verrucoid papule/plaque c/w seborrheic keratosis      Yellow umbilicated papule c/w sebaceous hyperplasia      Irregularly shaped tan macule c/w lentigo     1-2 mm smooth white papules consistent with Milia      Movable subcutaneous cyst with punctum c/w epidermal inclusion cyst      Subcutaneous movable cyst c/w pilar cyst      Firm pink to brown papule c/w dermatofibroma      Pedunculated fleshy papule(s) c/w skin  tag(s)      Evenly pigmented macule c/w junctional nevus     Mildly variegated pigmented, slightly irregular-bordered macule c/w mildly atypical nevus      Flesh colored to evenly pigmented papule c/w intradermal nevus       Pink pearly papule/plaque c/w basal cell carcinoma      Erythematous hyperkeratotic cursted plaque c/w SCC      Surgical scar with no sign of skin cancer recurrence      Open and closed comedones      Inflammatory papules and pustules      Verrucoid papule consistent consistent with wart     Erythematous eczematous patches and plaques     Dystrophic onycholytic nail with subungual debris c/w onychomycosis     Umbilicated papule    Erythematous-base heme-crusted tan verrucoid plaque consistent with inflamed seborrheic keratosis     Erythematous Silvery Scaling Plaque c/w Psoriasis     See annotation      Assessment / Plan:        Rosacea  -     metroNIDAZOLE (NORITATE) 1 % cream; Compound azelaic acid 15% + ivermectin 1% + metronidazole 1% cream. Apply to face once or twice daily.  Dispense: 30 g; Refill: 5  Doing well with triple cream, refill skin medicinals compounded    Seborrheic dermatitis  -     ketoconazole (NIZORAL) 2 % shampoo; Wash hair with medicated shampoo at least 2x/week - let sit on scalp at least 5 minutes prior to rinsing  Dispense: 120 mL; Refill: 5  -     hydrocortisone 2.5 % cream; Thin film to AA onface daily PRN flare; use short term only  Dispense: 28 g; Refill: 1  Sent keto shampoo and protopic ointment in the past, does not recall ever filling    Common wart  Procedure note for destruction via shave debulking:    Discussed risks of procedure including but not limited to infection, persistence of lesion, recurrence of lesion, and scar. Verbal consent obtained. Area cleaned with alcohol and anesthetized with 1% lidocaine with epinephrine. Lesion(s) shaved with sharp razor then base destroyed with hyfrecation. No complications.    Encounter for follow-up surveillance of  skin cancer  Area of previous NMSC (mulitple) examined. Site swell healed with no signs of recurrence.  Upper body skin examination performed today including at least 9 points as noted in physical examination. No lesions suspicious for malignancy noted.    Multiple benign nevi  Careful dermoscopy evaluation of nevi performed with none identified as needing biopsy today  Monitor for new mole or moles that are becoming bigger, darker, irritated, or developing irregular borders.     Seborrheic keratoses  These are benign inherited growths without a malignant potential. Reassurance given to patient. No treatment is necessary.              Follow up in about 6 months (around 7/18/2024).

## 2024-02-22 ENCOUNTER — OFFICE VISIT (OUTPATIENT)
Dept: FAMILY MEDICINE | Facility: CLINIC | Age: 79
End: 2024-02-22
Payer: MEDICARE

## 2024-02-22 VITALS
DIASTOLIC BLOOD PRESSURE: 62 MMHG | WEIGHT: 169.75 LBS | BODY MASS INDEX: 26.64 KG/M2 | TEMPERATURE: 97 F | HEART RATE: 70 BPM | HEIGHT: 67 IN | OXYGEN SATURATION: 96 % | SYSTOLIC BLOOD PRESSURE: 138 MMHG

## 2024-02-22 DIAGNOSIS — J01.00 ACUTE MAXILLARY SINUSITIS, RECURRENCE NOT SPECIFIED: Primary | ICD-10-CM

## 2024-02-22 PROCEDURE — 99213 OFFICE O/P EST LOW 20 MIN: CPT | Mod: HCNC,S$GLB,, | Performed by: FAMILY MEDICINE

## 2024-02-22 PROCEDURE — 99999 PR PBB SHADOW E&M-EST. PATIENT-LVL V: CPT | Mod: PBBFAC,HCNC,, | Performed by: FAMILY MEDICINE

## 2024-02-22 PROCEDURE — 3078F DIAST BP <80 MM HG: CPT | Mod: HCNC,CPTII,S$GLB, | Performed by: FAMILY MEDICINE

## 2024-02-22 PROCEDURE — 1126F AMNT PAIN NOTED NONE PRSNT: CPT | Mod: HCNC,CPTII,S$GLB, | Performed by: FAMILY MEDICINE

## 2024-02-22 PROCEDURE — 1159F MED LIST DOCD IN RCRD: CPT | Mod: HCNC,CPTII,S$GLB, | Performed by: FAMILY MEDICINE

## 2024-02-22 PROCEDURE — 3075F SYST BP GE 130 - 139MM HG: CPT | Mod: HCNC,CPTII,S$GLB, | Performed by: FAMILY MEDICINE

## 2024-02-22 RX ORDER — DOXYCYCLINE 100 MG/1
100 CAPSULE ORAL 2 TIMES DAILY
Qty: 20 CAPSULE | Refills: 0 | Status: SHIPPED | OUTPATIENT
Start: 2024-02-22 | End: 2024-02-22

## 2024-02-22 RX ORDER — BENZONATATE 100 MG/1
100 CAPSULE ORAL 3 TIMES DAILY PRN
Qty: 45 CAPSULE | Refills: 1 | Status: SHIPPED | OUTPATIENT
Start: 2024-02-22

## 2024-02-22 RX ORDER — METHYLPREDNISOLONE 4 MG/1
TABLET ORAL
Qty: 1 EACH | Refills: 0 | Status: SHIPPED | OUTPATIENT
Start: 2024-02-22 | End: 2024-05-15

## 2024-02-22 RX ORDER — DOXYCYCLINE 100 MG/1
100 CAPSULE ORAL 2 TIMES DAILY
Qty: 20 CAPSULE | Refills: 0 | Status: SHIPPED | OUTPATIENT
Start: 2024-02-22 | End: 2024-03-27 | Stop reason: SINTOL

## 2024-02-22 NOTE — PATIENT INSTRUCTIONS
Push fluids intake.  Drink plenty of water.     Benadryl at bedtime can help as well - 25 mg dose.    Contact your PCP if any worsening or for any new concerns as we discussed.

## 2024-02-22 NOTE — PROGRESS NOTES
Subjective:       Patient ID: Megha Ladd is a 78 y.o. female.    Chief Complaint: No chief complaint on file.    Patient here for UC visit.  One month of initial URI symptoms and now congestion, sinus pain/pressure radiating into teeth and yellow-green PND with cough 2T same.  No SOB, wheezing or pleuritic pain.  On routine meds, pushing fluids and using Sudafed, Flonase and Tylenol.  Home Covid was negative.        Review of Systems   Constitutional:  Negative for fever.   Respiratory:  Negative for shortness of breath.    Cardiovascular:  Negative for chest pain.   Gastrointestinal:  Negative for abdominal pain and nausea.   Skin:  Negative for rash.   All other systems reviewed and are negative.      Objective:      Physical Exam  Constitutional:       General: She is not in acute distress.     Appearance: She is well-developed.   HENT:      Right Ear: Tympanic membrane normal. Tympanic membrane is not erythematous.      Left Ear: Tympanic membrane normal. Tympanic membrane is not erythematous.      Nose: Mucosal edema present.      Right Sinus: Maxillary sinus tenderness present.      Left Sinus: Maxillary sinus tenderness present.      Mouth/Throat:      Pharynx: Posterior oropharyngeal erythema present.   Cardiovascular:      Rate and Rhythm: Normal rate and regular rhythm.      Heart sounds: No murmur heard.  Pulmonary:      Effort: Pulmonary effort is normal.      Breath sounds: Normal breath sounds.   Musculoskeletal:      Cervical back: Neck supple.   Lymphadenopathy:      Cervical: No cervical adenopathy.   Skin:     General: Skin is warm and dry.         Assessment:       1. Acute maxillary sinusitis, recurrence not specified        Plan:       Acute maxillary sinusitis, recurrence not specified  -     Discontinue: doxycycline (MONODOX) 100 MG capsule; Take 1 capsule (100 mg total) by mouth 2 (two) times daily.  Dispense: 20 capsule; Refill: 0  -     methylPREDNISolone (MEDROL DOSEPACK) 4 mg tablet;  use as directed  Dispense: 1 each; Refill: 0  -     benzonatate (TESSALON) 100 MG capsule; Take 1 capsule (100 mg total) by mouth 3 (three) times daily as needed for Cough.  Dispense: 45 capsule; Refill: 1    Other orders  -     doxycycline (MONODOX) 100 MG capsule; Take 1 capsule (100 mg total) by mouth 2 (two) times daily.  Dispense: 20 capsule; Refill: 0      Patient Instructions   Push fluids intake.  Drink plenty of water.     Benadryl at bedtime can help as well - 25 mg dose.    Contact your PCP if any worsening or for any new concerns as we discussed.

## 2024-03-26 ENCOUNTER — OFFICE VISIT (OUTPATIENT)
Dept: URGENT CARE | Facility: CLINIC | Age: 79
End: 2024-03-26
Payer: MEDICARE

## 2024-03-26 VITALS
WEIGHT: 169 LBS | RESPIRATION RATE: 18 BRPM | OXYGEN SATURATION: 96 % | HEART RATE: 64 BPM | SYSTOLIC BLOOD PRESSURE: 153 MMHG | BODY MASS INDEX: 26.53 KG/M2 | HEIGHT: 67 IN | DIASTOLIC BLOOD PRESSURE: 65 MMHG | TEMPERATURE: 99 F

## 2024-03-26 DIAGNOSIS — J02.9 SORE THROAT: ICD-10-CM

## 2024-03-26 DIAGNOSIS — R05.1 ACUTE COUGH: Primary | ICD-10-CM

## 2024-03-26 LAB
CTP QC/QA: YES
S PYO RRNA THROAT QL PROBE: NEGATIVE

## 2024-03-26 PROCEDURE — 87880 STREP A ASSAY W/OPTIC: CPT | Mod: QW,,,

## 2024-03-26 PROCEDURE — 71046 X-RAY EXAM CHEST 2 VIEWS: CPT | Mod: S$GLB,,, | Performed by: RADIOLOGY

## 2024-03-26 PROCEDURE — 99214 OFFICE O/P EST MOD 30 MIN: CPT | Mod: S$GLB,,,

## 2024-03-26 RX ORDER — DOXYCYCLINE 100 MG/1
100 CAPSULE ORAL 2 TIMES DAILY
Qty: 14 CAPSULE | Refills: 0 | Status: SHIPPED | OUTPATIENT
Start: 2024-03-26 | End: 2024-03-26

## 2024-03-26 RX ORDER — MONTELUKAST SODIUM 10 MG/1
10 TABLET ORAL NIGHTLY
Qty: 30 TABLET | Refills: 0 | Status: SHIPPED | OUTPATIENT
Start: 2024-03-26 | End: 2024-04-25

## 2024-03-26 RX ORDER — DOXYCYCLINE 100 MG/1
100 CAPSULE ORAL 2 TIMES DAILY
Qty: 14 CAPSULE | Refills: 0 | Status: SHIPPED | OUTPATIENT
Start: 2024-03-26 | End: 2024-03-27 | Stop reason: SDUPTHER

## 2024-03-26 NOTE — PROGRESS NOTES
"Subjective:      Patient ID: Megha Ladd is a 78 y.o. female.    Vitals:  height is 5' 7" (1.702 m) and weight is 76.7 kg (169 lb). Her temperature is 98.5 °F (36.9 °C). Her blood pressure is 153/65 (abnormal) and her pulse is 64. Her respiration is 18 and oxygen saturation is 96%.     Chief Complaint: No chief complaint on file.    In clinic with a chief complaint low-grade fever 99.4 T-max, headache, congestion, dry cough, and nausea that began Saturday and rapidly worsened on Sunday.  Has been taking Tylenol, Zyrtec, Flonase, and Advair.  Multiple ill contacts.    Sore Throat   This is a new problem. The current episode started in the past 7 days. The problem has been unchanged. There has been no fever. Associated symptoms include congestion, coughing and headaches. Pertinent negatives include no diarrhea, shortness of breath or vomiting. She has tried acetaminophen (tylenol zyrtec and flonase) for the symptoms. The treatment provided mild relief.       Constitution: Negative for fever.   HENT:  Positive for congestion and sore throat.    Neck: neck negative.   Cardiovascular: Negative.    Eyes: Negative.    Respiratory:  Positive for cough and sputum production. Negative for shortness of breath.    Gastrointestinal:  Positive for nausea. Negative for vomiting and diarrhea.   Genitourinary: Negative.    Musculoskeletal:  Positive for muscle ache.   Allergic/Immunologic: Positive for environmental allergies, seasonal allergies, immunizations up-to-date and flu shot.   Neurological:  Positive for headaches.      Objective:     Physical Exam   Constitutional: She is oriented to person, place, and time. She appears well-developed. She is cooperative.   HENT:   Head: Normocephalic and atraumatic.   Ears:   Right Ear: Hearing, tympanic membrane, external ear and ear canal normal.   Left Ear: Hearing, tympanic membrane, external ear and ear canal normal.   Nose: Nose normal. No mucosal edema or nasal deformity. No " epistaxis. Right sinus exhibits no maxillary sinus tenderness and no frontal sinus tenderness. Left sinus exhibits no maxillary sinus tenderness and no frontal sinus tenderness.   Mouth/Throat: Uvula is midline, oropharynx is clear and moist and mucous membranes are normal. Mucous membranes are moist. No trismus in the jaw. Normal dentition. No uvula swelling. Oropharynx is clear.   Eyes: Conjunctivae and lids are normal. Pupils are equal, round, and reactive to light. Extraocular movement intact   Neck: Trachea normal and phonation normal. Neck supple.   Cardiovascular: Normal rate, regular rhythm, normal heart sounds and normal pulses.   Pulmonary/Chest: Effort normal. No respiratory distress. She has rales in the right lower field.   Abdominal: Normal appearance. Soft. flat abdomen   Musculoskeletal: Normal range of motion.         General: Normal range of motion.   Lymphadenopathy:     She has no cervical adenopathy.   Neurological: no focal deficit. She is alert, oriented to person, place, and time and at baseline. She exhibits normal muscle tone.   Skin: Skin is warm, dry and intact. Capillary refill takes 2 to 3 seconds.   Psychiatric: Her speech is normal and behavior is normal. Mood, judgment and thought content normal.   Nursing note and vitals reviewed.      Assessment:     1. Acute cough    2. Sore throat        Plan:       Acute cough  -     X-Ray Chest PA And Lateral; Future; Expected date: 03/26/2024  -     montelukast (SINGULAIR) 10 mg tablet; Take 1 tablet (10 mg total) by mouth every evening.  Dispense: 30 tablet; Refill: 0    Sore throat  -     POCT rapid strep A           Narrative & Impression  EXAMINATION:  XR CHEST PA AND LATERAL     CLINICAL HISTORY:  Acute cough     TECHNIQUE:  PA and lateral views of the chest were performed.     COMPARISON:  10/25/2023     FINDINGS:  The heart is not enlarged.  The lungs are clear of infiltrate.  There is no pleural effusion.  There is rightward convexity  of the thoracic spine.  Stable cardiomediastinal silhouette.     Impression:     No acute cardiopulmonary disease        Electronically signed by: Nancy Cantu MD  Date:                                            03/26/2024  Time:                                           15:32

## 2024-03-26 NOTE — PATIENT INSTRUCTIONS
Take doxy with a full glass of water do not lie flat for 1 hour after.  Protect skin by using sunscreen and wear sunglasses.  You will be more susceptible to sunburns while taking doxy

## 2024-03-27 RX ORDER — DOXYCYCLINE 100 MG/1
100 CAPSULE ORAL 2 TIMES DAILY
Qty: 14 CAPSULE | Refills: 0 | Status: SHIPPED | OUTPATIENT
Start: 2024-03-27 | End: 2024-03-27

## 2024-03-27 RX ORDER — DOXYCYCLINE 100 MG/1
100 CAPSULE ORAL 2 TIMES DAILY
Qty: 14 CAPSULE | Refills: 0 | Status: SHIPPED | OUTPATIENT
Start: 2024-03-27 | End: 2024-04-03

## 2024-03-29 NOTE — TELEPHONE ENCOUNTER
Elevate to rest when possible  Steroid dose provided in the office for pain  Start prednisone TOMORROW morning as directed  Tylenol as needed for pain  Ice or topical pain medication as needed--icy hot, biofreeze for example.    If fever or worsening swelling and redness, you should be seen again   Patient had an apt with Dr Jj 6-20-17. Since we called saying she needed a blood pressure check she asked them to take it. 1709/90 pulse 64

## 2024-05-14 ENCOUNTER — TELEPHONE (OUTPATIENT)
Dept: FAMILY MEDICINE | Facility: CLINIC | Age: 79
End: 2024-05-14
Payer: MEDICARE

## 2024-05-14 NOTE — TELEPHONE ENCOUNTER
Spoke to     Appointment scheduled on 5/15/2024 with Dr Lang        ----- Message from Niyah Parmar sent at 5/14/2024 10:09 AM CDT -----  Type: Needs Medical Advice  Who Called:  pt's  Gregoria  Best Call Back Number: [phone]    Additional Information: Gregoria is calling in regards to letting the office know about the pt's current medical condition. Gregoria states that the pt has been having a chronic cough she has not been able to get rid of. Needs to speak to the nurse. Please call back and advise. Thanks!

## 2024-05-15 ENCOUNTER — OFFICE VISIT (OUTPATIENT)
Dept: FAMILY MEDICINE | Facility: CLINIC | Age: 79
End: 2024-05-15
Payer: MEDICARE

## 2024-05-15 DIAGNOSIS — R05.8 DRY COUGH: Primary | ICD-10-CM

## 2024-05-15 DIAGNOSIS — K21.9 GASTROESOPHAGEAL REFLUX DISEASE, UNSPECIFIED WHETHER ESOPHAGITIS PRESENT: ICD-10-CM

## 2024-05-15 PROCEDURE — 1101F PT FALLS ASSESS-DOCD LE1/YR: CPT | Mod: HCNC,CPTII,S$GLB,

## 2024-05-15 PROCEDURE — 1160F RVW MEDS BY RX/DR IN RCRD: CPT | Mod: HCNC,CPTII,S$GLB,

## 2024-05-15 PROCEDURE — 99213 OFFICE O/P EST LOW 20 MIN: CPT | Mod: HCNC,S$GLB,,

## 2024-05-15 PROCEDURE — 1159F MED LIST DOCD IN RCRD: CPT | Mod: HCNC,CPTII,S$GLB,

## 2024-05-15 PROCEDURE — 3288F FALL RISK ASSESSMENT DOCD: CPT | Mod: HCNC,CPTII,S$GLB,

## 2024-05-15 PROCEDURE — 99999 PR PBB SHADOW E&M-EST. PATIENT-LVL IV: CPT | Mod: PBBFAC,HCNC,,

## 2024-05-15 NOTE — PATIENT INSTRUCTIONS
Sixto Cleveland,     If you are due for any health screening(s) below please notify me so we can arrange them to be ordered and scheduled. Most healthy patients at your age complete them, but you are free to accept or refuse.     If you can't do it, I'll definitely understand. If you can, I'd certainly appreciate it!    All of your core healthy metrics are met.

## 2024-07-10 ENCOUNTER — PATIENT MESSAGE (OUTPATIENT)
Dept: DERMATOLOGY | Facility: CLINIC | Age: 79
End: 2024-07-10
Payer: MEDICARE

## 2024-07-15 ENCOUNTER — TELEPHONE (OUTPATIENT)
Dept: DERMATOLOGY | Facility: CLINIC | Age: 79
End: 2024-07-15
Payer: MEDICARE

## 2024-07-15 NOTE — TELEPHONE ENCOUNTER
Patient rescheduled     ----- Message from Mare Morley sent at 7/15/2024 10:06 AM CDT -----  Regarding: Appointment Reschedule Request  Contact: patient at [phone]  Type: Appointment Reschedule Request    Name of Caller:  at [phone]      Additional Information: needs to change his and his wife's appointments due to conflict. Please call and advise. Thank you

## 2024-09-03 ENCOUNTER — OFFICE VISIT (OUTPATIENT)
Dept: FAMILY MEDICINE | Facility: CLINIC | Age: 79
End: 2024-09-03
Attending: FAMILY MEDICINE
Payer: MEDICARE

## 2024-09-03 VITALS
HEART RATE: 64 BPM | OXYGEN SATURATION: 96 % | SYSTOLIC BLOOD PRESSURE: 114 MMHG | DIASTOLIC BLOOD PRESSURE: 76 MMHG | TEMPERATURE: 98 F | HEIGHT: 67 IN | BODY MASS INDEX: 27.01 KG/M2 | WEIGHT: 172.06 LBS

## 2024-09-03 DIAGNOSIS — R05.9 COUGH, UNSPECIFIED TYPE: ICD-10-CM

## 2024-09-03 DIAGNOSIS — Z00.00 ENCOUNTER FOR PREVENTIVE HEALTH EXAMINATION: Primary | ICD-10-CM

## 2024-09-03 DIAGNOSIS — J45.30 MILD PERSISTENT ASTHMA, UNSPECIFIED WHETHER COMPLICATED: ICD-10-CM

## 2024-09-03 DIAGNOSIS — K21.9 GASTROESOPHAGEAL REFLUX DISEASE, UNSPECIFIED WHETHER ESOPHAGITIS PRESENT: ICD-10-CM

## 2024-09-03 DIAGNOSIS — E03.9 HYPOTHYROIDISM, UNSPECIFIED TYPE: ICD-10-CM

## 2024-09-03 DIAGNOSIS — I10 PRIMARY HYPERTENSION: ICD-10-CM

## 2024-09-03 DIAGNOSIS — R42 DIZZINESS: ICD-10-CM

## 2024-09-03 DIAGNOSIS — E78.5 HYPERLIPIDEMIA, UNSPECIFIED HYPERLIPIDEMIA TYPE: ICD-10-CM

## 2024-09-03 DIAGNOSIS — I10 ESSENTIAL HYPERTENSION: ICD-10-CM

## 2024-09-03 DIAGNOSIS — J30.9 CHRONIC ALLERGIC RHINITIS: ICD-10-CM

## 2024-09-03 DIAGNOSIS — I77.1 TORTUOUS AORTA: ICD-10-CM

## 2024-09-03 DIAGNOSIS — G89.29 OTHER CHRONIC PAIN: ICD-10-CM

## 2024-09-03 PROCEDURE — 99397 PER PM REEVAL EST PAT 65+ YR: CPT | Mod: HCNC,S$GLB,, | Performed by: FAMILY MEDICINE

## 2024-09-03 PROCEDURE — 1125F AMNT PAIN NOTED PAIN PRSNT: CPT | Mod: HCNC,CPTII,S$GLB, | Performed by: FAMILY MEDICINE

## 2024-09-03 PROCEDURE — 3078F DIAST BP <80 MM HG: CPT | Mod: HCNC,CPTII,S$GLB, | Performed by: FAMILY MEDICINE

## 2024-09-03 PROCEDURE — 99999 PR PBB SHADOW E&M-EST. PATIENT-LVL V: CPT | Mod: PBBFAC,HCNC,, | Performed by: FAMILY MEDICINE

## 2024-09-03 PROCEDURE — 1160F RVW MEDS BY RX/DR IN RCRD: CPT | Mod: HCNC,CPTII,S$GLB, | Performed by: FAMILY MEDICINE

## 2024-09-03 PROCEDURE — 1101F PT FALLS ASSESS-DOCD LE1/YR: CPT | Mod: HCNC,CPTII,S$GLB, | Performed by: FAMILY MEDICINE

## 2024-09-03 PROCEDURE — 3288F FALL RISK ASSESSMENT DOCD: CPT | Mod: HCNC,CPTII,S$GLB, | Performed by: FAMILY MEDICINE

## 2024-09-03 PROCEDURE — 1159F MED LIST DOCD IN RCRD: CPT | Mod: HCNC,CPTII,S$GLB, | Performed by: FAMILY MEDICINE

## 2024-09-03 PROCEDURE — 3074F SYST BP LT 130 MM HG: CPT | Mod: HCNC,CPTII,S$GLB, | Performed by: FAMILY MEDICINE

## 2024-09-03 RX ORDER — MONTELUKAST SODIUM 10 MG/1
10 TABLET ORAL NIGHTLY
Qty: 90 TABLET | Refills: 3 | Status: SHIPPED | OUTPATIENT
Start: 2024-09-03

## 2024-09-03 RX ORDER — METOPROLOL TARTRATE 50 MG/1
TABLET ORAL
Qty: 270 TABLET | Refills: 3 | Status: SHIPPED | OUTPATIENT
Start: 2024-09-03

## 2024-09-03 RX ORDER — LOSARTAN POTASSIUM 100 MG/1
100 TABLET ORAL DAILY
Qty: 30 TABLET | Refills: 3 | Status: SHIPPED | OUTPATIENT
Start: 2024-09-03 | End: 2025-09-03

## 2024-09-03 RX ORDER — PANTOPRAZOLE SODIUM 20 MG/1
20 TABLET, DELAYED RELEASE ORAL DAILY
Qty: 90 TABLET | Refills: 3 | Status: SHIPPED | OUTPATIENT
Start: 2024-09-03 | End: 2025-09-03

## 2024-09-03 RX ORDER — TRIAMTERENE/HYDROCHLOROTHIAZID 37.5-25 MG
0.5 TABLET ORAL DAILY
Qty: 45 TABLET | Refills: 12 | Status: SHIPPED | OUTPATIENT
Start: 2024-09-03

## 2024-09-03 RX ORDER — MECLIZINE HCL 12.5 MG 12.5 MG/1
12.5 TABLET ORAL 3 TIMES DAILY PRN
Qty: 60 TABLET | Refills: 2 | Status: SHIPPED | OUTPATIENT
Start: 2024-09-03

## 2024-09-03 NOTE — PROGRESS NOTES
Subjective:       Patient ID: Megha Ladd is a 78 y.o. female.    Chief Complaint: Annual Exam    78-year-old female with a history of hypertension, hyperlipidemia, hypothyroidism not currently on any levothyroxine supplementation, reflux, tortuous aorta, asthma, insomnia and intermittent vertigo presents for an annual exam.  She is not fasting for lab today.  She is complaining of a chronic cough that is mostly occurring at night without overt signs of reflux but taking Protonix 20 mg daily and not always with signs postnasal drainage.  She has been on enalapril for quite some time and has never been on an ARB.  She needs refills on a number her medications and is due for lab.    Past Medical History:  No date: Asthma  No date: Basal cell carcinoma  No date: GERD (gastroesophageal reflux disease)  No date: Hyperlipidemia  No date: Hypertension  No date: Hypothyroid  No date: Lumbago  No date: Neuromuscular disorder  No date: Sinusitis  No date: Ulcer    Past Surgical History:  No date: APPENDECTOMY  No date:  SECTION  No date: LUMBAR EPIDURAL INJECTION  No date: PARTIAL HYSTERECTOMY    Review of patient's family history indicates:  Problem: Heart disease      Relation: Father          Name:               Age of Onset: (Not Specified)  Problem: Early death      Relation: Father          Name:               Age of Onset: 51  Problem: Heart disease      Relation: Sister          Name: 2              Age of Onset: (Not Specified)  Problem: Hypertension      Relation: Sister          Name: 2              Age of Onset: (Not Specified)  Problem: Emphysema      Relation: Sister          Name: 2              Age of Onset: (Not Specified)  Problem: Hypertension      Relation: Brother          Name:               Age of Onset: (Not Specified)  Problem: Ulcers      Relation: Brother          Name:               Age of Onset: (Not Specified)  Problem: Miscarriages / Stillbirths      Relation: Daughter          Name:                Age of Onset: (Not Specified)              Comment: still birth  Problem: Allergy (severe)      Relation: Daughter          Name:               Age of Onset: (Not Specified)  Problem: Allergic rhinitis      Relation: Neg Hx          Name:               Age of Onset: (Not Specified)  Problem: Allergies      Relation: Neg Hx          Name:               Age of Onset: (Not Specified)  Problem: Angioedema      Relation: Neg Hx          Name:               Age of Onset: (Not Specified)  Problem: Asthma      Relation: Neg Hx          Name:               Age of Onset: (Not Specified)  Problem: Atopy      Relation: Neg Hx          Name:               Age of Onset: (Not Specified)  Problem: Eczema      Relation: Neg Hx          Name:               Age of Onset: (Not Specified)  Problem: Immunodeficiency      Relation: Neg Hx          Name:               Age of Onset: (Not Specified)  Problem: Rhinitis      Relation: Neg Hx          Name:               Age of Onset: (Not Specified)  Problem: Urticaria      Relation: Neg Hx          Name:               Age of Onset: (Not Specified)    Social History    Tobacco Use      Smoking status: Never      Smokeless tobacco: Never    Alcohol use: No    Drug use: No    Current Outpatient Medications on File Prior to Visit:  acetaminophen (TYLENOL) 500 MG tablet, Take 1,000 mg by mouth 2 (two) times a day., Disp: , Rfl:   benzonatate (TESSALON) 100 MG capsule, Take 1 capsule (100 mg total) by mouth 3 (three) times daily as needed for Cough., Disp: 45 capsule, Rfl: 1  cetirizine (ZYRTEC) 10 MG tablet, Take 10 mg by mouth daily as needed for Allergies., Disp: , Rfl:   enalapril (VASOTEC) 20 MG tablet, Take 1 tablet (20 mg total) by mouth 2 (two) times daily., Disp: 180 tablet, Rfl: 3  fluticasone (FLONASE) 50 mcg/actuation nasal spray, SPRAY 1 SPRAY IN EACH NOSTRIL 2 TIMES A DAY, Disp: 16 g, Rfl: 11  fluticasone propion-salmeterol 115-21 mcg/dose (ADVAIR HFA) 115-21  mcg/actuation HFAA inhaler, Inhale 2 puffs into the lungs 2 (two) times daily. Controller, Disp: 36 g, Rfl: 3  hydrocortisone 2.5 % cream, Thin film to AA onface daily PRN flare; use short term only, Disp: 28 g, Rfl: 1  ketoconazole (NIZORAL) 2 % shampoo, Wash hair with medicated shampoo at least 2x/week - let sit on scalp at least 5 minutes prior to rinsing, Disp: 120 mL, Rfl: 5  loperamide (IMODIUM) 2 mg capsule, Take 2 mg by mouth 4 (four) times daily as needed for Diarrhea., Disp: , Rfl:   metaxalone (SKELAXIN) 800 MG tablet, Take 800 mg by mouth 3 (three) times daily. Every 6 hours PRN, Disp: , Rfl:   metroNIDAZOLE (NORITATE) 1 % cream, Compound azelaic acid 15% + ivermectin 1% + metronidazole 1% cream. Apply to face once or twice daily., Disp: 30 g, Rfl: 5  peg 400-propylene glycol 0.4-0.3 % Drop, Place 2 drops into both eyes 2 (two) times daily. , Disp: , Rfl:   pregabalin (LYRICA) 75 MG capsule, Take 75 mg by mouth 2 (two) times daily., Disp: , Rfl:   SODIUM CHLORIDE (SIMPLY SALINE NASL), by Nasal route 2 (two) times daily as needed. , Disp: , Rfl:   tacrolimus (PROTOPIC) 0.1 % ointment, AAA face daily to BID PRN rash, Disp: 30 g, Rfl: 1  tramadol (ULTRAM) 50 mg tablet, Take 50 mg by mouth every 12 (twelve) hours as needed for Pain. Every 6 hours, Disp: , Rfl:   UNABLE TO FIND, Take 1 tablet by mouth once daily. Ultra triple action joint health, Disp: , Rfl:   [DISCONTINUED] meclizine (ANTIVERT) 12.5 mg tablet, Take 1 tablet (12.5 mg total) by mouth 3 (three) times daily as needed for Dizziness., Disp: 60 tablet, Rfl: 2  [DISCONTINUED] metoprolol tartrate (LOPRESSOR) 50 MG tablet, TAKE 1 & 1/2 (ONE & ONE-HALF) TABLETS BY MOUTH TWICE DAILY (Patient taking differently: Take 75 mg by mouth 2 (two) times daily. TAKE 1 & 1/2 (ONE & ONE-HALF) TABLETS BY MOUTH TWICE DAILY), Disp: 270 tablet, Rfl: 3  [DISCONTINUED] montelukast (SINGULAIR) 10 mg tablet, Take 1 tablet (10 mg total) by mouth every evening., Disp: 90  tablet, Rfl: 3  [DISCONTINUED] pantoprazole (PROTONIX) 20 MG tablet, Take 1 tablet (20 mg total) by mouth once daily., Disp: 90 tablet, Rfl: 3  [DISCONTINUED] triamterene-hydrochlorothiazide 37.5-25 mg (MAXZIDE-25) 37.5-25 mg per tablet, Take 0.5 tablets by mouth once daily., Disp: 45 tablet, Rfl: 12  cloNIDine (CATAPRES) 0.1 MG tablet, Take 1 tablet if systolic above 180 or diastolic above 105. Can repeat after 2 hours if bp remains above 180 or 105. (Patient not taking: Reported on 9/3/2024), Disp: 60 tablet, Rfl: 2  [DISCONTINUED] azelastine (ASTELIN) 137 mcg (0.1 %) nasal spray, One to two sprays each nostril twice a day as needed for congestion (Patient taking differently: 2 sprays by Nasal route 2 (two) times daily. One to two sprays each nostril twice a day as needed for congestion), Disp: 30 mL, Rfl: 10  [DISCONTINUED] cartilage-collagen-bor-hyalur 40-5-3.3 mg Tab, Take 1 tablet by mouth once daily., Disp: , Rfl:     No current facility-administered medications on file prior to visit.          Review of Systems   Constitutional:  Negative for chills, diaphoresis, fatigue, fever and unexpected weight change.   HENT:  Negative for congestion, ear pain, hearing loss, postnasal drip and sinus pressure.    Eyes:  Negative for itching and visual disturbance.   Respiratory:  Positive for cough. Negative for chest tightness, shortness of breath and wheezing.    Cardiovascular:  Negative for chest pain, palpitations and leg swelling.   Gastrointestinal:  Negative for abdominal pain, blood in stool, constipation, diarrhea, nausea and vomiting.   Genitourinary:  Negative for dysuria, frequency and hematuria.   Musculoskeletal:  Negative for arthralgias, back pain, joint swelling and myalgias.   Neurological:  Positive for dizziness. Negative for headaches.   Hematological:  Negative for adenopathy.   Psychiatric/Behavioral:  Negative for sleep disturbance. The patient is not nervous/anxious.        Objective:       Physical Exam  Vitals and nursing note reviewed.   Constitutional:       General: She is not in acute distress.     Appearance: Normal appearance. She is well-developed and normal weight. She is not ill-appearing, toxic-appearing or diaphoretic.      Comments: Good blood pressure control   Normal pulse with regular rhythm  Normal weight for age with a BMI of 26.9 she is up 7.8 lb from her last physical September 1, 2023   HENT:      Head: Normocephalic and atraumatic.      Right Ear: Tympanic membrane, ear canal and external ear normal. There is no impacted cerumen.      Left Ear: Tympanic membrane, ear canal and external ear normal. There is no impacted cerumen.      Ears:      Comments: Bilateral hearing aids     Nose: Congestion (Mild) present. No rhinorrhea.      Mouth/Throat:      Mouth: Mucous membranes are moist.      Pharynx: Oropharynx is clear. No oropharyngeal exudate or posterior oropharyngeal erythema.   Eyes:      General: No scleral icterus.        Right eye: No discharge.         Left eye: No discharge.      Conjunctiva/sclera: Conjunctivae normal.      Pupils: Pupils are equal, round, and reactive to light.   Neck:      Thyroid: No thyromegaly.      Vascular: No carotid bruit or JVD.   Cardiovascular:      Rate and Rhythm: Normal rate and regular rhythm.      Pulses: Normal pulses.      Heart sounds: Normal heart sounds. No murmur heard.     No friction rub. No gallop.   Pulmonary:      Effort: Pulmonary effort is normal. No respiratory distress.      Breath sounds: Normal breath sounds. No stridor. No wheezing, rhonchi or rales.   Chest:      Chest wall: No tenderness.   Abdominal:      General: Abdomen is flat. Bowel sounds are normal. There is no distension.      Palpations: Abdomen is soft. There is no mass.      Tenderness: There is no abdominal tenderness. There is no guarding or rebound.      Hernia: No hernia is present.   Musculoskeletal:         General: No swelling, tenderness,  deformity or signs of injury. Normal range of motion.      Cervical back: Normal range of motion and neck supple. No rigidity or tenderness.      Right lower leg: No edema.      Left lower leg: No edema.   Lymphadenopathy:      Cervical: No cervical adenopathy.   Skin:     General: Skin is warm and dry.      Coloration: Skin is not jaundiced or pale.      Findings: No bruising, erythema, lesion or rash.   Neurological:      General: No focal deficit present.      Mental Status: She is alert and oriented to person, place, and time. Mental status is at baseline.      Cranial Nerves: No cranial nerve deficit.      Sensory: No sensory deficit.      Motor: No weakness.      Coordination: Coordination normal.      Gait: Gait normal.      Deep Tendon Reflexes: Reflexes are normal and symmetric. Reflexes normal.   Psychiatric:         Mood and Affect: Mood normal.         Behavior: Behavior normal.         Thought Content: Thought content normal.         Judgment: Judgment normal.         Assessment:       1. Encounter for preventive health examination    2. Primary hypertension    3. Hyperlipidemia, unspecified hyperlipidemia type    4. Hypothyroidism, unspecified type    5. Cough, unspecified type    6. Gastroesophageal reflux disease, unspecified whether esophagitis present    7. 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 Mild persistent asthma, unspecified whether complicated    9. Other chronic pain    10. Essential hypertension    11. Dizziness    12. Chronic allergic rhinitis    13. BMI 26.0-26.9,adult        Plan:       1. Encounter for preventive health examination  - Comprehensive Metabolic Panel; Future  - Lipid Panel; Future  - CBC Auto Differential; Future    2. Primary hypertension  Substitute losartan 100 mg daily for the enalapril 40 mg taken 20 twice daily.  A 30 day supply was sent in, if the cough resolves she can stay on the losartan, if not she could choose whichever one she prefers to remain on.  - Comprehensive  Metabolic Panel; Future  - Lipid Panel; Future  - CBC Auto Differential; Future  - losartan (COZAAR) 100 MG tablet; Take 1 tablet (100 mg total) by mouth once daily.  Dispense: 30 tablet; Refill: 3  - triamterene-hydrochlorothiazide 37.5-25 mg (MAXZIDE-25) 37.5-25 mg per tablet; Take 0.5 tablets by mouth once daily.  Dispense: 45 tablet; Refill: 12    3. Hyperlipidemia, unspecified hyperlipidemia type  Schedule lipid panel fasting  - Comprehensive Metabolic Panel; Future  - Lipid Panel; Future    4. Hypothyroidism, unspecified type  Not currently on thyroid supplement, scheduled TSH  - TSH; Future    5. Cough, unspecified type  Lungs clear no overt postnasal drainage although there is some mild chronic rhinitis    6. Gastroesophageal reflux disease, unspecified whether esophagitis present  Asymptomatic on Protonix 20 mg daily  - pantoprazole (PROTONIX) 20 MG tablet; Take 1 tablet (20 mg total) by mouth once daily.  Dispense: 90 tablet; Refill: 3    7. 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 Mild persistent asthma, unspecified whether complicated  Refill Singulair needed  - montelukast (SINGULAIR) 10 mg tablet; Take 1 tablet (10 mg total) by mouth every evening.  Dispense: 90 tablet; Refill: 3    9. Other chronic pain  Goes to pain Clinic    10. Essential hypertension  See above  - metoprolol tartrate (LOPRESSOR) 50 MG tablet; TAKE 1 & 1/2 (ONE & ONE-HALF) TABLETS (75mg) BY MOUTH TWICE DAILY  Dispense: 270 tablet; Refill: 3    11. Dizziness  Refill meclizine  - meclizine (ANTIVERT) 12.5 mg tablet; Take 1 tablet (12.5 mg total) by mouth 3 (three) times daily as needed for Dizziness.  Dispense: 60 tablet; Refill: 2    12. Chronic allergic rhinitis  See above  - montelukast (SINGULAIR) 10 mg tablet; Take 1 tablet (10 mg total) by mouth every evening.  Dispense: 90 tablet; Refill: 3    13. BMI 26.0-26.9,adult  Good weight for age, no  changes needed

## 2024-09-05 ENCOUNTER — LAB VISIT (OUTPATIENT)
Dept: LAB | Facility: HOSPITAL | Age: 79
End: 2024-09-05
Attending: FAMILY MEDICINE
Payer: MEDICARE

## 2024-09-05 DIAGNOSIS — Z00.00 ENCOUNTER FOR PREVENTIVE HEALTH EXAMINATION: ICD-10-CM

## 2024-09-05 DIAGNOSIS — I10 PRIMARY HYPERTENSION: ICD-10-CM

## 2024-09-05 DIAGNOSIS — E78.5 HYPERLIPIDEMIA, UNSPECIFIED HYPERLIPIDEMIA TYPE: ICD-10-CM

## 2024-09-05 DIAGNOSIS — E03.9 HYPOTHYROIDISM, UNSPECIFIED TYPE: ICD-10-CM

## 2024-09-05 LAB
ALBUMIN SERPL BCP-MCNC: 3.7 G/DL (ref 3.5–5.2)
ALP SERPL-CCNC: 72 U/L (ref 55–135)
ALT SERPL W/O P-5'-P-CCNC: 9 U/L (ref 10–44)
ANION GAP SERPL CALC-SCNC: 10 MMOL/L (ref 8–16)
AST SERPL-CCNC: 17 U/L (ref 10–40)
BASOPHILS # BLD AUTO: 0.07 K/UL (ref 0–0.2)
BASOPHILS NFR BLD: 0.6 % (ref 0–1.9)
BILIRUB SERPL-MCNC: 0.6 MG/DL (ref 0.1–1)
BUN SERPL-MCNC: 13 MG/DL (ref 8–23)
CALCIUM SERPL-MCNC: 9.4 MG/DL (ref 8.7–10.5)
CHLORIDE SERPL-SCNC: 95 MMOL/L (ref 95–110)
CHOLEST SERPL-MCNC: 213 MG/DL (ref 120–199)
CHOLEST/HDLC SERPL: 4.3 {RATIO} (ref 2–5)
CO2 SERPL-SCNC: 29 MMOL/L (ref 23–29)
CREAT SERPL-MCNC: 0.9 MG/DL (ref 0.5–1.4)
DIFFERENTIAL METHOD BLD: ABNORMAL
EOSINOPHIL # BLD AUTO: 0.3 K/UL (ref 0–0.5)
EOSINOPHIL NFR BLD: 2.6 % (ref 0–8)
ERYTHROCYTE [DISTWIDTH] IN BLOOD BY AUTOMATED COUNT: 14 % (ref 11.5–14.5)
EST. GFR  (NO RACE VARIABLE): >60 ML/MIN/1.73 M^2
GLUCOSE SERPL-MCNC: 89 MG/DL (ref 70–110)
HCT VFR BLD AUTO: 38 % (ref 37–48.5)
HDLC SERPL-MCNC: 49 MG/DL (ref 40–75)
HDLC SERPL: 23 % (ref 20–50)
HGB BLD-MCNC: 12.2 G/DL (ref 12–16)
IMM GRANULOCYTES # BLD AUTO: 0.09 K/UL (ref 0–0.04)
IMM GRANULOCYTES NFR BLD AUTO: 0.8 % (ref 0–0.5)
LDLC SERPL CALC-MCNC: 141.6 MG/DL (ref 63–159)
LYMPHOCYTES # BLD AUTO: 4.8 K/UL (ref 1–4.8)
LYMPHOCYTES NFR BLD: 41.7 % (ref 18–48)
MCH RBC QN AUTO: 31.2 PG (ref 27–31)
MCHC RBC AUTO-ENTMCNC: 32.1 G/DL (ref 32–36)
MCV RBC AUTO: 97 FL (ref 82–98)
MONOCYTES # BLD AUTO: 0.8 K/UL (ref 0.3–1)
MONOCYTES NFR BLD: 7.3 % (ref 4–15)
NEUTROPHILS # BLD AUTO: 5.4 K/UL (ref 1.8–7.7)
NEUTROPHILS NFR BLD: 47 % (ref 38–73)
NONHDLC SERPL-MCNC: 164 MG/DL
NRBC BLD-RTO: 0 /100 WBC
PLATELET # BLD AUTO: 304 K/UL (ref 150–450)
PMV BLD AUTO: 10.7 FL (ref 9.2–12.9)
POTASSIUM SERPL-SCNC: 3.7 MMOL/L (ref 3.5–5.1)
PROT SERPL-MCNC: 6.9 G/DL (ref 6–8.4)
RBC # BLD AUTO: 3.91 M/UL (ref 4–5.4)
SODIUM SERPL-SCNC: 134 MMOL/L (ref 136–145)
TRIGL SERPL-MCNC: 112 MG/DL (ref 30–150)
TSH SERPL DL<=0.005 MIU/L-ACNC: 3.13 UIU/ML (ref 0.4–4)
WBC # BLD AUTO: 11.41 K/UL (ref 3.9–12.7)

## 2024-09-05 PROCEDURE — 84443 ASSAY THYROID STIM HORMONE: CPT | Mod: HCNC | Performed by: FAMILY MEDICINE

## 2024-09-05 PROCEDURE — 80053 COMPREHEN METABOLIC PANEL: CPT | Mod: HCNC | Performed by: FAMILY MEDICINE

## 2024-09-05 PROCEDURE — 85025 COMPLETE CBC W/AUTO DIFF WBC: CPT | Mod: HCNC | Performed by: FAMILY MEDICINE

## 2024-09-05 PROCEDURE — 80061 LIPID PANEL: CPT | Mod: HCNC | Performed by: FAMILY MEDICINE

## 2024-09-05 PROCEDURE — 36415 COLL VENOUS BLD VENIPUNCTURE: CPT | Mod: HCNC,PO | Performed by: FAMILY MEDICINE

## 2024-09-26 ENCOUNTER — TELEPHONE (OUTPATIENT)
Dept: DERMATOLOGY | Facility: CLINIC | Age: 79
End: 2024-09-26
Payer: MEDICARE

## 2024-09-26 NOTE — TELEPHONE ENCOUNTER
----- Message from Helga Castellon sent at 9/26/2024  2:01 PM CDT -----  Type: Needs Medical Advice  Who Called:  pt     Best Call Back Number: [phone] (home)     Additional Information: pt needs to know if she will still have appt on 10/10 please advise

## 2024-10-10 ENCOUNTER — TELEPHONE (OUTPATIENT)
Dept: FAMILY MEDICINE | Facility: CLINIC | Age: 79
End: 2024-10-10
Payer: MEDICARE

## 2024-10-11 ENCOUNTER — OFFICE VISIT (OUTPATIENT)
Dept: FAMILY MEDICINE | Facility: CLINIC | Age: 79
End: 2024-10-11
Payer: MEDICARE

## 2024-10-11 VITALS
OXYGEN SATURATION: 98 % | SYSTOLIC BLOOD PRESSURE: 138 MMHG | BODY MASS INDEX: 26.23 KG/M2 | WEIGHT: 167.13 LBS | RESPIRATION RATE: 18 BRPM | HEIGHT: 67 IN | HEART RATE: 60 BPM | DIASTOLIC BLOOD PRESSURE: 86 MMHG

## 2024-10-11 DIAGNOSIS — I10 ESSENTIAL HYPERTENSION: Primary | ICD-10-CM

## 2024-10-11 PROCEDURE — 99999 PR PBB SHADOW E&M-EST. PATIENT-LVL V: CPT | Mod: PBBFAC,HCNC,, | Performed by: STUDENT IN AN ORGANIZED HEALTH CARE EDUCATION/TRAINING PROGRAM

## 2024-10-11 RX ORDER — CLINDAMYCIN HYDROCHLORIDE 150 MG/1
150 CAPSULE ORAL 3 TIMES DAILY
COMMUNITY
Start: 2024-09-10

## 2024-10-11 RX ORDER — VALSARTAN 80 MG/1
80 TABLET ORAL DAILY
Qty: 30 TABLET | Refills: 6 | Status: SHIPPED | OUTPATIENT
Start: 2024-10-11 | End: 2025-10-11

## 2024-10-11 NOTE — PROGRESS NOTES
"[facility]   OFFICE VISIT NOTE    Patient Name: Megha Ladd  YOB: 1945    PRESENTING HISTORY     History of Present Illness:  Ms. Megha Ladd is a 79 y.o. female with medical conditions of chronic pain, asthma, HTN, HLD, heart palpitations, hypothyroidism, GERD, constipation     Meds:   Triamterene-HCTZ 37.5 mg-25 mg daily, tramadol, lyrica, pantoprazole, montelukast, metoprolol tartrate, metaxalone (muscle relaxant), losartan 100 mg, imodium, advair, enalapril 20 mg BID, clonidine not taking     Meds: maxzide, metoprolol, and enalapril     Last labs Sept 2024 -- wnl     Review of Systems   Constitutional:  Negative for chills and fever.   Respiratory:  Negative for shortness of breath.    Cardiovascular:  Negative for chest pain.   Gastrointestinal:  Negative for nausea and vomiting.          OBJECTIVE:   Vital Signs:  Vitals:    10/11/24 0936   BP: 138/86   Pulse: 60   Resp: 18   SpO2: 98%   Weight: 75.8 kg (167 lb 1.7 oz)   Height: 5' 7" (1.702 m)           Physical Exam  Constitutional:       General: She is not in acute distress.     Appearance: She is not ill-appearing or toxic-appearing.   HENT:      Head: Normocephalic and atraumatic.      Mouth/Throat:      Mouth: Mucous membranes are moist.      Pharynx: Uvula midline. No pharyngeal swelling.   Cardiovascular:      Rate and Rhythm: Normal rate and regular rhythm.   Pulmonary:      Effort: Pulmonary effort is normal. No tachypnea, bradypnea, accessory muscle usage, prolonged expiration or respiratory distress.      Breath sounds: Normal breath sounds. No stridor. No wheezing, rhonchi or rales.   Musculoskeletal:      Right lower leg: Edema present.      Left lower leg: Edema present.   Neurological:      General: No focal deficit present.      Mental Status: She is alert.   Psychiatric:         Mood and Affect: Mood normal.         Behavior: Behavior normal.         ASSESSMENT & PLAN:     Essential hypertension  -    "  valsartan (DIOVAN) 80 MG tablet; Take 1 tablet (80 mg total) by mouth once daily.  Dispense: 30 tablet; Refill: 6  Patient's current regimen includes metoprolol and triamterene-HCTZ  She was having side effect of cough with enalapril and was switched to Losartan, which caused diarrhea.  She then switched back to enalapril which caused cough  Plan:  - considered placing her on CCB but patient has slight edema in b/l lower extremities  - will try valsartan at 80 mg daily   - NV in 1 month for repeat BP check  - est care visit with me in 3-4 months         Zhanna Gandara MD  Family Medicine  [facility]     This note was created using [employer] voice recognition software that occasionally misinterprets phrases or words

## 2024-10-11 NOTE — PATIENT INSTRUCTIONS
Please   Continue  - metoprolol  - triamterene-HCTZ    Stop   - losartan   - enalapril    Start   - valsartan                  
IV discontinued, cath removed intact

## 2024-10-15 DIAGNOSIS — K21.9 GASTROESOPHAGEAL REFLUX DISEASE, UNSPECIFIED WHETHER ESOPHAGITIS PRESENT: ICD-10-CM

## 2024-10-15 RX ORDER — PANTOPRAZOLE SODIUM 20 MG/1
20 TABLET, DELAYED RELEASE ORAL DAILY
Qty: 90 TABLET | Refills: 3 | Status: SHIPPED | OUTPATIENT
Start: 2024-10-15 | End: 2025-10-15

## 2024-10-15 NOTE — TELEPHONE ENCOUNTER
----- Message from Chiquita sent at 10/15/2024  3:19 PM CDT -----  Contact:   Type:  RX Refill Request    Who Called:     Refill or New Rx:  Refill  RX Name and Strength:  pantoprazole (PROTONIX) 20 MG tablet  How is the patient currently taking it? (ex. 1XDay):  as directed  Is this a 30 day or 90 day RX:  as directed  Preferred Pharmacy with phone number:      [facility] [address]  [place] - [address] [facility]  [address]  [place] [address]  Phone: [phone] Fax: [phone]      Local or Mail Order:  Local  Ordering Provider:  Jackie Jc Call Back Number:  [phone]  Additional Information:  Pt need a refill.please call and advise

## 2024-10-15 NOTE — TELEPHONE ENCOUNTER
No care due was identified.  [facility] Embedded Care Due Messages. Reference number: [id number].   10/15/2024 3:43:54 PM CDT

## 2024-11-08 ENCOUNTER — TELEPHONE (OUTPATIENT)
Dept: FAMILY MEDICINE | Facility: CLINIC | Age: 79
End: 2024-11-08
Payer: MEDICARE

## 2024-11-08 NOTE — TELEPHONE ENCOUNTER
Delivered Dr. Dowling message to patient via phone. Pt verbalized understanding. No further questions.

## 2024-11-08 NOTE — TELEPHONE ENCOUNTER
----- Message from Zhanna Gandara MD sent at 11/7/2024  8:39 PM CST -----  Contact: Gregoria (spouse)  Please ask her to HOLD valsartan   Check blood pressure daily and share the readings with me in 1 week via e-visit (I already placed the order and she will get this e-visit message in 1 week)   Thank you  ----- Message -----  From: Juan Pablo Dsouza MA  Sent: 11/7/2024   4:16 PM CST  To: Zhanna Gandara MD      ----- Message -----  From: Melissa Pierre  Sent: 11/7/2024   3:55 PM CST  To: Juliocesar Liu Staff    Type:  Needs Medical Advice    Who Called: Gregoria     Symptoms (please be specific):  coughing, stomach cramps     How long has patient had these symptoms:  since she started taking it    Pharmacy name and phone #:       [facility] [address] [place], [place] - [address]  [address] [address]  Phone: [phone] Fax: [phone]    Would the patient rather a call back or a response via [facility]? Call back    Best Call Back Number: [phone]    Additional Information: seems like the valsartan (DIOVAN) 80 MG tablet is causing the same coughing and stomach issue as the losartan    What are the other options?    Please call to advise    Thanks

## 2024-11-30 DIAGNOSIS — J45.30 MILD PERSISTENT ASTHMA, UNSPECIFIED WHETHER COMPLICATED: ICD-10-CM

## 2024-11-30 NOTE — TELEPHONE ENCOUNTER
No care due was identified.  Health system Embedded Care Due Messages. Reference number: [id number].   11/30/2024 2:55:43 PM CST

## 2024-12-01 NOTE — TELEPHONE ENCOUNTER
Refill Routing Note   Medication(s) are not appropriate for processing by [facility] for the following reason(s):        Allergy or intolerance    [facility] action(s):  Defer               Appointments  past 12m or future 3m with PCP    Date Provider   Last Visit   9/3/2024 Alfred Mejia MD   Next Visit   Visit date not found Alfred Mejia MD   ED visits in past 90 days: 0        Note composed:12:40 PM 12/01/2024

## 2024-12-02 RX ORDER — FLUTICASONE PROPIONATE AND SALMETEROL XINAFOATE 115; 21 UG/1; UG/1
AEROSOL, METERED RESPIRATORY (INHALATION)
Qty: 36 G | Refills: 3 | Status: SHIPPED | OUTPATIENT
Start: 2024-12-02

## 2024-12-03 DIAGNOSIS — J45.30 MILD PERSISTENT ASTHMA, UNSPECIFIED WHETHER COMPLICATED: ICD-10-CM

## 2024-12-03 RX ORDER — FLUTICASONE PROPIONATE AND SALMETEROL XINAFOATE 115; 21 UG/1; UG/1
AEROSOL, METERED RESPIRATORY (INHALATION)
Qty: 36 G | Refills: 3 | Status: CANCELLED | OUTPATIENT
Start: 2024-12-03

## 2024-12-03 NOTE — TELEPHONE ENCOUNTER
----- Message from Yumiko sent at 12/3/2024 11:08 AM CST -----  Type:  Pharmacy Calling to Clarify an RX    Name of Caller:pt     Pharmacy Name:  [id number] [place] - [address]  [address] [address]  Phone: [phone] Fax: [phone]       Prescription Name:ADVAIR -21 mcg/actuation HFAA inhaler    What do they need to clarify?:says they haven't received it     Best Call Back Number:[phone]       Additional Information:pharmacy says prescription hasn't been received.      Please call back to advise. Thank you.

## 2024-12-03 NOTE — TELEPHONE ENCOUNTER
No care due was identified.  [facility] Embedded Care Due Messages. Reference number: [id number].   12/03/2024 11:55:37 AM CST

## 2024-12-03 NOTE — TELEPHONE ENCOUNTER
Called [facility], they state that they did not receive the refill.  Called in adviar 115/21 36 g with 3 refills.  Inhale 2 puffs into the lungs twice a day.

## 2025-02-20 ENCOUNTER — HOSPITAL ENCOUNTER (EMERGENCY)
Facility: HOSPITAL | Age: 80
Discharge: HOME | End: 2025-02-20
Attending: EMERGENCY MEDICINE
Payer: MEDICARE

## 2025-02-20 ENCOUNTER — TELEPHONE (OUTPATIENT)
Dept: FAMILY MEDICINE | Facility: CLINIC | Age: 80
End: 2025-02-20
Payer: MEDICARE

## 2025-02-20 VITALS
WEIGHT: 163 LBS | TEMPERATURE: 98 F | HEART RATE: 80 BPM | HEIGHT: 67 IN | DIASTOLIC BLOOD PRESSURE: 70 MMHG | BODY MASS INDEX: 25.58 KG/M2 | OXYGEN SATURATION: 100 % | SYSTOLIC BLOOD PRESSURE: 143 MMHG | RESPIRATION RATE: 16 BRPM

## 2025-02-20 DIAGNOSIS — I16.0 HYPERTENSIVE URGENCY: Primary | ICD-10-CM

## 2025-02-20 LAB
ALBUMIN SERPL BCP-MCNC: 4.4 G/DL (ref 3.5–5.2)
ALP SERPL-CCNC: 79 U/L (ref 55–135)
ALT SERPL W/O P-5'-P-CCNC: 6 U/L (ref 10–44)
ANION GAP SERPL CALC-SCNC: 10 MMOL/L (ref 8–16)
AST SERPL-CCNC: 20 U/L (ref 10–40)
BASOPHILS # BLD AUTO: 0.09 K/UL (ref 0–0.2)
BASOPHILS NFR BLD: 0.7 % (ref 0–1.9)
BILIRUB SERPL-MCNC: 1 MG/DL (ref 0.1–1)
BNP SERPL-MCNC: 264 PG/ML (ref 0–99)
BUN SERPL-MCNC: 16 MG/DL (ref 8–23)
CALCIUM SERPL-MCNC: 9.5 MG/DL (ref 8.7–10.5)
CHLORIDE SERPL-SCNC: 98 MMOL/L (ref 95–110)
CO2 SERPL-SCNC: 29 MMOL/L (ref 23–29)
CREAT SERPL-MCNC: 0.9 MG/DL (ref 0.5–1.4)
DIFFERENTIAL METHOD BLD: ABNORMAL
EOSINOPHIL # BLD AUTO: 0.3 K/UL (ref 0–0.5)
EOSINOPHIL NFR BLD: 2.6 % (ref 0–8)
ERYTHROCYTE [DISTWIDTH] IN BLOOD BY AUTOMATED COUNT: 13.6 % (ref 11.5–14.5)
EST. GFR  (NO RACE VARIABLE): >60 ML/MIN/1.73 M^2
GLUCOSE SERPL-MCNC: 86 MG/DL (ref 70–110)
HCT VFR BLD AUTO: 41.5 % (ref 37–48.5)
HGB BLD-MCNC: 13.7 G/DL (ref 12–16)
IMM GRANULOCYTES # BLD AUTO: 0.05 K/UL (ref 0–0.04)
IMM GRANULOCYTES NFR BLD AUTO: 0.4 % (ref 0–0.5)
INR PPP: 1 (ref 0.8–1.2)
LYMPHOCYTES # BLD AUTO: 3.2 K/UL (ref 1–4.8)
LYMPHOCYTES NFR BLD: 25.7 % (ref 18–48)
MAGNESIUM SERPL-MCNC: 1.9 MG/DL (ref 1.6–2.6)
MCH RBC QN AUTO: 31.1 PG (ref 27–31)
MCHC RBC AUTO-ENTMCNC: 33 G/DL (ref 32–36)
MCV RBC AUTO: 94 FL (ref 82–98)
MONOCYTES # BLD AUTO: 1 K/UL (ref 0.3–1)
MONOCYTES NFR BLD: 8.3 % (ref 4–15)
NEUTROPHILS # BLD AUTO: 7.7 K/UL (ref 1.8–7.7)
NEUTROPHILS NFR BLD: 62.3 % (ref 38–73)
NRBC BLD-RTO: 0 /100 WBC
PLATELET # BLD AUTO: 275 K/UL (ref 150–450)
PMV BLD AUTO: 10.9 FL (ref 9.2–12.9)
POTASSIUM SERPL-SCNC: 4.5 MMOL/L (ref 3.5–5.1)
PROT SERPL-MCNC: 7.3 G/DL (ref 6–8.4)
PROTHROMBIN TIME: 11.2 SEC (ref 9–12.5)
RBC # BLD AUTO: 4.4 M/UL (ref 4–5.4)
SODIUM SERPL-SCNC: 137 MMOL/L (ref 136–145)
TROPONIN I SERPL HS-MCNC: 4.8 PG/ML (ref 0–14.9)
TROPONIN I SERPL HS-MCNC: 5.2 PG/ML (ref 0–14.9)
WBC # BLD AUTO: 12.35 K/UL (ref 3.9–12.7)

## 2025-02-20 PROCEDURE — 83735 ASSAY OF MAGNESIUM: CPT | Performed by: EMERGENCY MEDICINE

## 2025-02-20 PROCEDURE — 96374 THER/PROPH/DIAG INJ IV PUSH: CPT

## 2025-02-20 PROCEDURE — 93005 ELECTROCARDIOGRAM TRACING: CPT | Performed by: GENERAL PRACTICE

## 2025-02-20 PROCEDURE — 80053 COMPREHEN METABOLIC PANEL: CPT | Performed by: EMERGENCY MEDICINE

## 2025-02-20 PROCEDURE — 63600175 PHARM REV CODE 636 W HCPCS: Performed by: EMERGENCY MEDICINE

## 2025-02-20 PROCEDURE — 85025 COMPLETE CBC W/AUTO DIFF WBC: CPT | Performed by: EMERGENCY MEDICINE

## 2025-02-20 PROCEDURE — 85610 PROTHROMBIN TIME: CPT | Performed by: EMERGENCY MEDICINE

## 2025-02-20 PROCEDURE — 99285 EMERGENCY DEPT VISIT HI MDM: CPT | Mod: 25

## 2025-02-20 PROCEDURE — 93010 ELECTROCARDIOGRAM REPORT: CPT | Mod: ,,, | Performed by: GENERAL PRACTICE

## 2025-02-20 PROCEDURE — 83880 ASSAY OF NATRIURETIC PEPTIDE: CPT | Performed by: EMERGENCY MEDICINE

## 2025-02-20 PROCEDURE — 84484 ASSAY OF TROPONIN QUANT: CPT | Mod: 91 | Performed by: EMERGENCY MEDICINE

## 2025-02-20 RX ORDER — HYDRALAZINE HYDROCHLORIDE 20 MG/ML
5 INJECTION INTRAMUSCULAR; INTRAVENOUS ONCE
Status: COMPLETED | OUTPATIENT
Start: 2025-02-20 | End: 2025-02-20

## 2025-02-20 RX ADMIN — HYDRALAZINE HYDROCHLORIDE 5 MG: 20 INJECTION INTRAMUSCULAR; INTRAVENOUS at 01:02

## 2025-02-20 NOTE — TELEPHONE ENCOUNTER
Patient walked in clinic requesting to speak to a nurse. Spoke to patient states she had a dentist surgery scheduled for today but could not have it done due to her blood pressure stating her Blood pressure was 217/94 at her appointment. Spoke with Dr. Gandara advised patient to go to Emergency Room. Verbalized understanding patient states she is going directly to ED.

## 2025-02-20 NOTE — ED PROVIDER NOTES
Encounter Date: 2/20/2025       History     Chief Complaint   Patient presents with    Hypertension     Patient was doing preop for oral surgery.  Patient was 219/97 at the preop appt.  They told her to come to ED for checkup.     79-year-old female with history of hypertension, asthma, hyperlipidemia, gastroesophageal reflux, hypothyroidism.  Patient with cardiac pacemaker.  Was being seen by oral surgery earlier today in which she was due to have a molar extraction procedure performed however was found to have elevated blood pressure.  While in it in his office found with blood pressure 219/100.  Patient states has been compliant with the medications she believes she took metoprolol earlier today      Review of patient's allergies indicates:   Allergen Reactions    Zoster vaccine live Other (See Comments)     Headache, neck shoulder, body flu sx, sore throat, nausea, dry cough, fatigue     Amlodipine Diarrhea, Nausea Only and Other (See Comments)     Joint pain    Augmentin [amoxicillin-pot clavulanate]     Azithromycin      Other reaction(s): Unknown    Budesonide      Other reaction(s): heartburn  Other reaction(s): Rash    Cephalexin      Other reaction(s): Unknown    Ciprofloxacin      Other reaction(s): Unknown    Erythromycin      Other reaction(s): Unknown    Esomeprazole magnesium      Other reaction(s): Headache    Felodipine      Nausea, raw throat, ulcer tongue, myalgia    Levofloxacin      Other reaction(s): tendonitis    Talwin compound      Other reaction(s): Vomiting  Other reaction(s): Hallucinations    Hydrocodone-acetaminophen Rash     Other reaction(s): Nausea     Past Medical History:   Diagnosis Date    Asthma     Basal cell carcinoma     GERD (gastroesophageal reflux disease)     Hyperlipidemia     Hypertension     Hypothyroid     Lumbago     Neuromuscular disorder     Sinusitis     Ulcer      Past Surgical History:   Procedure Laterality Date    ADENOIDECTOMY  1952    Tonsils and adenoids  removed    APPENDECTOMY       SECTION      HYSTERECTOMY  ?    Partial hysterectomy    LUMBAR EPIDURAL INJECTION      PARTIAL HYSTERECTOMY      TONSILLECTOMY      Tonsils and adenoids removed    TUBAL LIGATION       Family History   Problem Relation Name Age of Onset    Heart disease Father Tae RAYMUNDO Ovidio     Early death Father Tae RADHAUmer Ovidio 51    Heart disease Sister Sadie Kyle     Hypertension Sister Sadie Kyle     Emphysema Sister Sadie Kyle     Arthritis Sister Sadie Kyle     COPD Sister Sadie Kyle     Hypertension Brother Tae Nolan Jr.     Ulcers Brother Tae Nolan Jr.     Depression Brother Tae Nolan Jr.     Miscarriages / Stillbirths Daughter          still birth    Allergy (severe) Daughter      Arthritis Mother Anita Nolan     Depression Mother Anita Nolan     Hearing loss Mother Anita Nolan     Arthritis Sister Nusrat Nickerson     Heart disease Sister Nusrat Nickerson     Allergic rhinitis Neg Hx      Allergies Neg Hx      Angioedema Neg Hx      Asthma Neg Hx      Atopy Neg Hx      Eczema Neg Hx      Immunodeficiency Neg Hx      Rhinitis Neg Hx      Urticaria Neg Hx       Social History[1]  Review of Systems   Constitutional:  Negative for fever.   HENT:  Negative for sore throat.    Respiratory:  Negative for shortness of breath.    Cardiovascular:  Negative for chest pain.   Gastrointestinal:  Negative for nausea and vomiting.   Genitourinary:  Negative for dysuria.   Musculoskeletal:  Negative for back pain.   Skin:  Negative for rash.   Neurological:  Negative for weakness.   Hematological:  Does not bruise/bleed easily.       Physical Exam     Initial Vitals [25 1217]   BP Pulse Resp Temp SpO2   (!) 215/98 69 18 97.8 °F (36.6 °C) 98 %      MAP       --         Physical Exam    Nursing note and vitals reviewed.  Constitutional: She appears well-developed and well-nourished.   Elderly appearing female no acute  distress   HENT:   Head: Normocephalic and atraumatic.   Nose: Nose normal. Mouth/Throat: Oropharynx is clear and moist.   Eyes: Conjunctivae and EOM are normal. Pupils are equal, round, and reactive to light.   Neck: Neck supple. No thyromegaly present. No tracheal deviation present.   Normal range of motion.  Cardiovascular:  Normal rate, regular rhythm, normal heart sounds and intact distal pulses.     Exam reveals no gallop and no friction rub.       No murmur heard.  Pulmonary/Chest: Breath sounds normal. No stridor. No respiratory distress.   Abdominal: Abdomen is soft. Bowel sounds are normal. She exhibits no mass. There is no rebound and no guarding.   Musculoskeletal:         General: No edema. Normal range of motion.      Cervical back: Normal range of motion and neck supple.     Lymphadenopathy:     She has no cervical adenopathy.   Neurological: She is alert and oriented to person, place, and time. She has normal strength and normal reflexes. GCS score is 15. GCS eye subscore is 4. GCS verbal subscore is 5. GCS motor subscore is 6.   Skin: Skin is warm and dry. Capillary refill takes less than 2 seconds.   Psychiatric: She has a normal mood and affect.         ED Course   Procedures  Labs Reviewed   CBC W/ AUTO DIFFERENTIAL - Abnormal       Result Value    WBC 12.35      RBC 4.40      Hemoglobin 13.7      Hematocrit 41.5      MCV 94      MCH 31.1 (*)     MCHC 33.0      RDW 13.6      Platelets 275      MPV 10.9      Immature Granulocytes 0.4      Gran # (ANC) 7.7      Immature Grans (Abs) 0.05 (*)     Lymph # 3.2      Mono # 1.0      Eos # 0.3      Baso # 0.09      nRBC 0      Gran % 62.3      Lymph % 25.7      Mono % 8.3      Eosinophil % 2.6      Basophil % 0.7      Differential Method Automated     COMPREHENSIVE METABOLIC PANEL - Abnormal    Sodium 137      Potassium 4.5      Chloride 98      CO2 29      Glucose 86      BUN 16      Creatinine 0.9      Calcium 9.5      Total Protein 7.3      Albumin 4.4       Total Bilirubin 1.0      Alkaline Phosphatase 79      AST 20      ALT 6 (*)     eGFR >60.0      Anion Gap 10     B-TYPE NATRIURETIC PEPTIDE - Abnormal     (*)    MAGNESIUM    Magnesium 1.9     TROPONIN I HIGH SENSITIVITY    Troponin I High Sensitivity 5.2     TROPONIN I HIGH SENSITIVITY    Troponin I High Sensitivity 4.8     PROTIME-INR    Prothrombin Time 11.2      INR 1.0          ECG Results              EKG 12-lead (In process)        Collection Time Result Time QRS Duration OHS QTC Calculation    02/20/25 12:09:41 02/20/25 12:47:39 76 475                     In process by Interface, Lab In [facility] (02/20/25 12:47:48)                   Narrative:    Test Reason : R07.9,    Vent. Rate :  71 BPM     Atrial Rate :  71 BPM     P-R Int : 182 ms          QRS Dur :  76 ms      QT Int : 438 ms       P-R-T Axes :  80  51  88 degrees    QTcB Int : 475 ms    Sinus rhythm with Fusion complexes and Premature atrial complexes with  Aberrant conduction  Nonspecific ST abnormality  Abnormal ECG  No previous ECGs available    Referred By: AAAREFERRAL SELF           Confirmed By:                                   Imaging Results              X-Ray Chest AP Portable (Final result)  Result time 02/20/25 13:20:09      Final result by Abhilash Rosario MD (02/20/25 13:20:09)                   Impression:      No acute cardiopulmonary abnormality.      Electronically signed by: Abhilash Rosario  Date:    02/20/2025  Time:    13:20               Narrative:    EXAMINATION:  XR CHEST AP PORTABLE    CLINICAL HISTORY:  hypertension;    FINDINGS:  Portable chest at 1306 compared with 03/26/2024 shows normal cardiomediastinal silhouette.    Lungs are clear. Pulmonary vasculature is normal. Convex left thoracolumbar spine curvature unchanged partially visualized.                                       Medications   hydrALAZINE injection 5 mg (5 mg Intravenous Given 2/20/25 1311)     Medical Decision Making  Risk  Prescription drug  management.               ED Course as of 02/20/25 2111   Thu Feb 20, 2025   1738 Patient seen evaluated emergency department.  Patient was given hydralazine 5 mg IV x1 also given 75 mg metoprolol p.o..  Patient did have overall improvement in blood pressure at time of discharge blood pressure found to be 125/60.  Patient found to be asymptomatic.  Labs reviewed including normal cardiac markers x2 with  patient without dyspnea however.  Chest x-ray without cephalization.  Labs including electrolytes were normal.  Patient told to follow with primary care provider next week.  Continue blood pressure monitoring.  Return to emergency department if problems persist worsen or additional concerns. [RM]      ED Course User Index  [RM] Alfred Hoffman MD               Medical Decision Making:   Initial Assessment:   79-year-old female with history of hypertension, asthma, hyperlipidemia, gastroesophageal reflux, hypothyroidism.  Patient with cardiac pacemaker.  Was being seen by oral surgery earlier today in which she was due to have a molar extraction procedure performed however was found to have elevated blood pressure.  While in it in his office found with blood pressure 219/100.  Patient states has been compliant with the medications she believes she took metoprolol earlier today      Differential Diagnosis:   Accelerated hypertension, hypertensive urgency, hypertensive emergency  Clinical Tests:   Lab Tests: Ordered and Reviewed  Radiological Study: Ordered and Reviewed  Medical Tests: Ordered and Reviewed             Clinical Impression:  Final diagnoses:  [I16.0] Hypertensive urgency (Primary)          ED Disposition Condition    Discharge Stable          ED Prescriptions    None       Follow-up Information       Follow up With Specialties Details Why Contact Info    Alfred Mejia MD Family Medicine Schedule an appointment as soon as possible for a visit in 1 week For recheck/continuing care [address]  [facility] [address]  [place] [address]  [phone]                   [1]   Social History  Tobacco Use    Smoking status: Never    Smokeless tobacco: Never   Substance Use Topics    Alcohol use: No    Drug use: No        Alfred Hoffman MD  02/20/25 2118

## 2025-02-25 ENCOUNTER — TELEPHONE (OUTPATIENT)
Dept: FAMILY MEDICINE | Facility: CLINIC | Age: 80
End: 2025-02-25
Payer: MEDICARE

## 2025-02-25 NOTE — TELEPHONE ENCOUNTER
----- Message from Ann sent at 2/25/2025  9:38 AM CST -----  Regarding: Reschedule  Contact: pt  Type:  Reschedule Appointment RequestCaller is requesting to reschedule appointment.  Name of Caller:ptSymptoms: hosp f/u for high BPWould the patient rather a call back or a response via [facility]? Call [facility] Call Back Number:[phone] Information:  pt had a hosp f/u for tomorrow but can not go that early.  Asking for a late afternoon appt any day.

## 2025-02-26 ENCOUNTER — OFFICE VISIT (OUTPATIENT)
Dept: FAMILY MEDICINE | Facility: CLINIC | Age: 80
End: 2025-02-26
Payer: MEDICARE

## 2025-02-26 VITALS
HEART RATE: 76 BPM | BODY MASS INDEX: 25.81 KG/M2 | SYSTOLIC BLOOD PRESSURE: 132 MMHG | WEIGHT: 164.44 LBS | OXYGEN SATURATION: 97 % | DIASTOLIC BLOOD PRESSURE: 74 MMHG | HEIGHT: 67 IN | TEMPERATURE: 98 F | RESPIRATION RATE: 16 BRPM

## 2025-02-26 DIAGNOSIS — Z23 ENCOUNTER FOR IMMUNIZATION: ICD-10-CM

## 2025-02-26 DIAGNOSIS — I10 PRIMARY HYPERTENSION: Primary | ICD-10-CM

## 2025-02-26 LAB
OHS QRS DURATION: 76 MS
OHS QTC CALCULATION: 475 MS

## 2025-02-26 PROCEDURE — 99999 PR PBB SHADOW E&M-EST. PATIENT-LVL V: CPT | Mod: PBBFAC,,,

## 2025-02-26 RX ORDER — HYDROCHLOROTHIAZIDE 12.5 MG/1
12.5 TABLET ORAL DAILY
Qty: 30 TABLET | Refills: 2 | Status: SHIPPED | OUTPATIENT
Start: 2025-02-26 | End: 2025-05-27

## 2025-02-26 RX ORDER — CLONIDINE HYDROCHLORIDE 0.1 MG/1
TABLET ORAL
Qty: 30 TABLET | Refills: 0 | Status: SHIPPED | OUTPATIENT
Start: 2025-02-26

## 2025-02-26 NOTE — PROGRESS NOTES
Subjective:       Patient ID:  [id number]     Chief Complaint: Follow-up and Hypertension      History of Present Illness    Ms. Ladd presents today for follow up after emergency room visit for elevated blood pressure at oral surgeon's office.  Due to the significantly elevated blood pressure, she was advised to go to the emergency room where her blood pressure was successfully controlled. She takes Metoprolol 75 mg twice. She recently discontinued Triamterene/HCTZ due to urinary incontinence side effects. She has  Clonidine from 2018 that was prescribed for as-needed use. However, has rarely taken. She previously took Enalapril but discontinued due to persistent cough following bronchitis episode, which resolved immediately after discontinuation. Subsequent trials of Losartan and Valsartan resulted in severe diarrhea. No other concerns today.         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      Tortuous aorta- on xray 2023    Primary insomnia 2020    Asthma 2014    Good hypertension control 2014    Hyperlipidemia 2014    Constipation - functional 2013     Dx updated per 2019 IMO Load      GERD (gastroesophageal reflux disease) 2012    Hyponatremia 2012    Alopecia 2012    Hypothyroid 2012    Fatigue 2012    HTN (hypertension) 2012      Review of patient's allergies indicates:   Allergen Reactions    Zoster vaccine live Other (See Comments)     Headache, neck shoulder, body flu sx, sore throat, nausea, dry cough, fatigue     Amlodipine Diarrhea,  Nausea Only and Other (See Comments)     Joint pain    Augmentin [amoxicillin-pot clavulanate]     Azithromycin      Other reaction(s): Unknown    Budesonide      Other reaction(s): heartburn  Other reaction(s): Rash    Cephalexin      Other reaction(s): Unknown    Ciprofloxacin      Other reaction(s): Unknown    Erythromycin      Other reaction(s): Unknown    Esomeprazole magnesium      Other reaction(s): Headache    Felodipine      Nausea, raw throat, ulcer tongue, myalgia    Levofloxacin      Other reaction(s): tendonitis    Talwin compound      Other reaction(s): Vomiting  Other reaction(s): Hallucinations    Hydrocodone-acetaminophen Rash     Other reaction(s): Nausea       Current Medications[1]    Lab Results   Component Value Date    WBC 12.35 02/20/2025    HGB 13.7 02/20/2025    HCT 41.5 02/20/2025     02/20/2025    CHOL 213 (H) 09/05/2024    TRIG 112 09/05/2024    HDL 49 09/05/2024    ALT 6 (L) 02/20/2025    AST 20 02/20/2025     02/20/2025    K 4.5 02/20/2025    CL 98 02/20/2025    CREATININE 0.9 02/20/2025    BUN 16 02/20/2025    CO2 29 02/20/2025    TSH 3.131 09/05/2024    INR 1.0 02/20/2025    HGBA1C 5.1 09/23/2020       Review of Systems   Constitutional:  Negative for fatigue and fever.   HENT: Negative.  Negative for congestion, sneezing and sore throat.    Eyes: Negative.    Respiratory:  Negative for cough, shortness of breath and wheezing.    Cardiovascular:  Negative for chest pain, palpitations and leg swelling.   Gastrointestinal:  Negative for abdominal pain, nausea and vomiting.   Genitourinary: Negative.    Musculoskeletal: Negative.  Negative for arthralgias.   Skin: Negative.  Negative for rash.   Neurological:  Negative for dizziness, weakness, light-headedness, numbness and headaches.   Hematological: Negative.    Psychiatric/Behavioral: Negative.         Objective:      Physical Exam  Constitutional:       Appearance: Normal appearance.   HENT:      Head: Normocephalic and  atraumatic.      Nose: Nose normal.   Eyes:      Extraocular Movements: Extraocular movements intact.   Cardiovascular:      Rate and Rhythm: Normal rate and regular rhythm.   Pulmonary:      Effort: Pulmonary effort is normal.      Breath sounds: Normal breath sounds.   Musculoskeletal:         General: Normal range of motion.      Cervical back: Normal range of motion.   Skin:     General: Skin is warm and dry.   Neurological:      General: No focal deficit present.      Mental Status: She is alert and oriented to person, place, and time.   Psychiatric:         Mood and Affect: Mood normal.         Assessment:       1. Primary hypertension    2. Encounter for immunization        Plan:       Megha was seen today for follow-up and hypertension.    Diagnoses and all orders for this visit:    Primary hypertension  -     hydroCHLOROthiazide 12.5 MG Tab; Take 1 tablet (12.5 mg total) by mouth once daily.  -     cloNIDine (CATAPRES) 0.1 MG tablet; Take 1 tablet if systolic above 180 or diastolic above 105. Can repeat after 2 hours if bp remains above 180 or 105.   - Educated the patient on target blood pressure goal of <140/90 mmHg.  - Instructed the patient to monitor blood pressure at home regularly.  - Prescribed Hydrochlorothiazide 12.5mg daily in the morning to replace Triamterene/HCTZ.  - Continued Metoprolol 75mg twice daily (morning and afternoon).  - Continued Clonidine 30 tablets for PRN use only when blood pressure is elevated.  - Discontinued Triamterene/HCTZ.  - Scheduled follow up in 1 month to reassess blood pressure control.    Encounter for immunization  -     influenza (adjuvanted) (Fluad) 45 mcg/0.5 mL IM vaccine (> or = 64 yo) 0.5 mL         Future Appointments       Date Provider Specialty Appt Notes    3/7/2025  Family Medicine Nurse visit to calibrate BP machine and BP check    3/28/2025 Kelly Cornelius PA-C Family Medicine 1 mo follow up               Portions of this note were dictated using  voice recognition software and may contain dictation related errors in spelling / grammar / syntax not discovered on text review.     Kelly Cornelius PA-C         [1]   Current Outpatient Medications:     acetaminophen (TYLENOL) 500 MG tablet, Take 500 mg by mouth 2 (two) times a day., Disp: , Rfl:     ADVAIR -21 mcg/actuation HFAA inhaler, INHALE 2 PUFFS INTO THE LUNGS 2 TIMES DAILY. CONTROLLER, Disp: 36 g, Rfl: 3    cetirizine (ZYRTEC) 10 MG tablet, Take 10 mg by mouth daily as needed for Allergies., Disp: , Rfl:     fluticasone (FLONASE) 50 mcg/actuation nasal spray, SPRAY 1 SPRAY IN EACH NOSTRIL 2 TIMES A DAY (Patient taking differently: 1 spray by Each Nostril route every evening.), Disp: 16 g, Rfl: 11    hydrocortisone 2.5 % cream, Thin film to AA onface daily PRN flare; use short term only, Disp: 28 g, Rfl: 1    ketoconazole (NIZORAL) 2 % shampoo, Wash hair with medicated shampoo at least 2x/week - let sit on scalp at least 5 minutes prior to rinsing, Disp: 120 mL, Rfl: 5    meclizine (ANTIVERT) 12.5 mg tablet, Take 1 tablet (12.5 mg total) by mouth 3 (three) times daily as needed for Dizziness., Disp: 60 tablet, Rfl: 2    metaxalone (SKELAXIN) 800 MG tablet, Take 800 mg by mouth every 6 (six) hours as needed (muscle pain)., Disp: , Rfl:     metoprolol tartrate (LOPRESSOR) 50 MG tablet, TAKE 1 & 1/2 (ONE & ONE-HALF) TABLETS (75mg) BY MOUTH TWICE DAILY (Patient taking differently: Take 75 mg by mouth 2 (two) times daily. TAKE 1 & 1/2 (ONE & ONE-HALF) TABLETS (75mg) BY MOUTH TWICE DAILY), Disp: 270 tablet, Rfl: 3    metroNIDAZOLE (NORITATE) 1 % cream, Compound azelaic acid 15% + ivermectin 1% + metronidazole 1% cream. Apply to face once or twice daily., Disp: 30 g, Rfl: 5    montelukast (SINGULAIR) 10 mg tablet, Take 1 tablet (10 mg total) by mouth every evening., Disp: 90 tablet, Rfl: 3    pantoprazole (PROTONIX) 20 MG tablet, Take 1 tablet (20 mg total) by mouth once daily., Disp: 90 tablet, Rfl: 3     peg 400-propylene glycol 0.4-0.3 % Drop, Place 2 drops into both eyes 2 (two) times daily as needed (dry eyes)., Disp: , Rfl:     pregabalin (LYRICA) 75 MG capsule, Take 75 mg by mouth 2 (two) times daily., Disp: , Rfl:     SODIUM CHLORIDE (SIMPLY SALINE NASL), 1 spray by Nasal route 2 (two) times daily as needed (allergies)., Disp: , Rfl:     tacrolimus (PROTOPIC) 0.1 % ointment, AAA face daily to BID PRN rash, Disp: 30 g, Rfl: 1    tramadol (ULTRAM) 50 mg tablet, Take 50 mg by mouth every 12 (twelve) hours as needed for Pain., Disp: , Rfl:     UNABLE TO FIND, Take 1 tablet by mouth once daily. Ultra triple action joint [facility], Disp: , Rfl:     cloNIDine (CATAPRES) 0.1 MG tablet, Take 1 tablet if systolic above 180 or diastolic above 105. Can repeat after 2 hours if bp remains above 180 or 105., Disp: 30 tablet, Rfl: 0    hydroCHLOROthiazide 12.5 MG Tab, Take 1 tablet (12.5 mg total) by mouth once daily., Disp: 30 tablet, Rfl: 2  No current facility-administered medications for this visit.

## 2025-03-07 ENCOUNTER — CLINICAL SUPPORT (OUTPATIENT)
Dept: FAMILY MEDICINE | Facility: CLINIC | Age: 80
End: 2025-03-07
Payer: MEDICARE

## 2025-03-07 ENCOUNTER — TELEPHONE (OUTPATIENT)
Dept: FAMILY MEDICINE | Facility: CLINIC | Age: 80
End: 2025-03-07

## 2025-03-07 VITALS — HEART RATE: 71 BPM | SYSTOLIC BLOOD PRESSURE: 172 MMHG | DIASTOLIC BLOOD PRESSURE: 84 MMHG

## 2025-03-07 DIAGNOSIS — I10 PRIMARY HYPERTENSION: Primary | ICD-10-CM

## 2025-03-07 PROCEDURE — 99999 PR PBB SHADOW E&M-EST. PATIENT-LVL II: CPT | Mod: PBBFAC,,,

## 2025-03-07 NOTE — PROGRESS NOTES
Megha Ladd 79 y.o. female is here today for Blood Pressure check.   History of HTN yes.    Review of patient's allergies indicates:   Allergen Reactions    Zoster vaccine live Other (See Comments)     Headache, neck shoulder, body flu sx, sore throat, nausea, dry cough, fatigue     Amlodipine Diarrhea, Nausea Only and Other (See Comments)     Joint pain    Augmentin [amoxicillin-pot clavulanate]     Azithromycin      Other reaction(s): Unknown    Budesonide      Other reaction(s): heartburn  Other reaction(s): Rash    Cephalexin      Other reaction(s): Unknown    Ciprofloxacin      Other reaction(s): Unknown    Erythromycin      Other reaction(s): Unknown    Esomeprazole magnesium      Other reaction(s): Headache    Felodipine      Nausea, raw throat, ulcer tongue, myalgia    Levofloxacin      Other reaction(s): tendonitis    Talwin compound      Other reaction(s): Vomiting  Other reaction(s): Hallucinations    Hydrocodone-acetaminophen Rash     Other reaction(s): Nausea     Creatinine   Date Value Ref Range Status   02/20/2025 0.9 0.5 - 1.4 mg/dL Final     Sodium   Date Value Ref Range Status   02/20/2025 137 136 - 145 mmol/L Final     Potassium   Date Value Ref Range Status   02/20/2025 4.5 3.5 - 5.1 mmol/L Final   ]  Patient verifies taking blood pressure medications on a regular basis at the same time of the day.   Current Medications[1]  Does patient have record of home blood pressure readings yes. Readings have been averaging 177/107.   Last dose of blood pressure medication was taken at 3PM.  Patient is asymptomatic.       /90  , Pulse: 77 .    Blood pressure reading after 15 minutes was 172/84, Pulse 71.   notified.            [1]   Current Outpatient Medications:     acetaminophen (TYLENOL) 500 MG tablet, Take 500 mg by mouth 2 (two) times a day., Disp: , Rfl:     ADVAIR -21 mcg/actuation HFAA inhaler, INHALE 2 PUFFS INTO THE LUNGS 2 TIMES DAILY. CONTROLLER, Disp: 36 g, Rfl:  3    cetirizine (ZYRTEC) 10 MG tablet, Take 10 mg by mouth daily as needed for Allergies., Disp: , Rfl:     cloNIDine (CATAPRES) 0.1 MG tablet, Take 1 tablet if systolic above 180 or diastolic above 105. Can repeat after 2 hours if bp remains above 180 or 105., Disp: 30 tablet, Rfl: 0    fluticasone (FLONASE) 50 mcg/actuation nasal spray, SPRAY 1 SPRAY IN EACH NOSTRIL 2 TIMES A DAY (Patient taking differently: 1 spray by Each Nostril route every evening.), Disp: 16 g, Rfl: 11    hydroCHLOROthiazide 12.5 MG Tab, Take 1 tablet (12.5 mg total) by mouth once daily., Disp: 30 tablet, Rfl: 2    hydrocortisone 2.5 % cream, Thin film to AA onface daily PRN flare; use short term only, Disp: 28 g, Rfl: 1    ketoconazole (NIZORAL) 2 % shampoo, Wash hair with medicated shampoo at least 2x/week - let sit on scalp at least 5 minutes prior to rinsing, Disp: 120 mL, Rfl: 5    meclizine (ANTIVERT) 12.5 mg tablet, Take 1 tablet (12.5 mg total) by mouth 3 (three) times daily as needed for Dizziness., Disp: 60 tablet, Rfl: 2    metaxalone (SKELAXIN) 800 MG tablet, Take 800 mg by mouth every 6 (six) hours as needed (muscle pain)., Disp: , Rfl:     metoprolol tartrate (LOPRESSOR) 50 MG tablet, TAKE 1 & 1/2 (ONE & ONE-HALF) TABLETS (75mg) BY MOUTH TWICE DAILY (Patient taking differently: Take 75 mg by mouth 2 (two) times daily. TAKE 1 & 1/2 (ONE & ONE-HALF) TABLETS (75mg) BY MOUTH TWICE DAILY), Disp: 270 tablet, Rfl: 3    metroNIDAZOLE (NORITATE) 1 % cream, Compound azelaic acid 15% + ivermectin 1% + metronidazole 1% cream. Apply to face once or twice daily., Disp: 30 g, Rfl: 5    montelukast (SINGULAIR) 10 mg tablet, Take 1 tablet (10 mg total) by mouth every evening., Disp: 90 tablet, Rfl: 3    pantoprazole (PROTONIX) 20 MG tablet, Take 1 tablet (20 mg total) by mouth once daily., Disp: 90 tablet, Rfl: 3    peg 400-propylene glycol 0.4-0.3 % Drop, Place 2 drops into both eyes 2 (two) times daily as needed (dry eyes)., Disp: , Rfl:      pregabalin (LYRICA) 75 MG capsule, Take 75 mg by mouth 2 (two) times daily., Disp: , Rfl:     SODIUM CHLORIDE (SIMPLY SALINE NASL), 1 spray by Nasal route 2 (two) times daily as needed (allergies)., Disp: , Rfl:     tacrolimus (PROTOPIC) 0.1 % ointment, AAA face daily to BID PRN rash, Disp: 30 g, Rfl: 1    tramadol (ULTRAM) 50 mg tablet, Take 50 mg by mouth every 12 (twelve) hours as needed for Pain., Disp: , Rfl:     UNABLE TO FIND, Take 1 tablet by mouth once daily. Ultra triple action joint [facility], Disp: , Rfl:

## 2025-03-07 NOTE — TELEPHONE ENCOUNTER
Pt seen in clinic today for BP check. Pt states that she is taking all medications. Pts blood pressure today in clinic is 184/90 pulse 77  Blood pressure reading after 15 minutes was 172/84, Pulse 71..  Please advise if you would like to change anything.      Thanks.  Courtney

## 2025-03-07 NOTE — TELEPHONE ENCOUNTER
Please have her increase HCTZ to 25mg daily. Take two 12.5 mg tablets and keep her upcoming appt with Me. Thanks

## 2025-03-12 ENCOUNTER — CLINICAL SUPPORT (OUTPATIENT)
Dept: CARDIOLOGY | Facility: HOSPITAL | Age: 80
End: 2025-03-12
Attending: EMERGENCY MEDICINE
Payer: MEDICARE

## 2025-03-12 ENCOUNTER — HOSPITAL ENCOUNTER (INPATIENT)
Facility: HOSPITAL | Age: 80
LOS: 1 days | Discharge: HOME HEALTH | DRG: 310 | End: 2025-03-14
Attending: EMERGENCY MEDICINE | Admitting: HOSPITALIST
Payer: MEDICARE

## 2025-03-12 VITALS — BODY MASS INDEX: 25.74 KG/M2 | WEIGHT: 164 LBS | HEIGHT: 67 IN

## 2025-03-12 DIAGNOSIS — I48.91 A-FIB: ICD-10-CM

## 2025-03-12 DIAGNOSIS — R07.9 CHEST PAIN: ICD-10-CM

## 2025-03-12 DIAGNOSIS — R79.89 ELEVATED TROPONIN: Primary | ICD-10-CM

## 2025-03-12 DIAGNOSIS — I10 PRIMARY HYPERTENSION: ICD-10-CM

## 2025-03-12 DIAGNOSIS — I49.3 PVC (PREMATURE VENTRICULAR CONTRACTION): ICD-10-CM

## 2025-03-12 PROBLEM — E87.6 HYPOKALEMIA: Status: ACTIVE | Noted: 2025-03-12

## 2025-03-12 PROBLEM — I48.92 ATRIAL FIBRILLATION AND FLUTTER: Status: ACTIVE | Noted: 2025-03-12

## 2025-03-12 PROBLEM — E83.42 HYPOMAGNESEMIA: Status: ACTIVE | Noted: 2025-03-12

## 2025-03-12 LAB
ALBUMIN SERPL BCP-MCNC: 3.7 G/DL (ref 3.5–5.2)
ALP SERPL-CCNC: 74 U/L (ref 55–135)
ALT SERPL W/O P-5'-P-CCNC: 5 U/L (ref 10–44)
ANION GAP SERPL CALC-SCNC: 11 MMOL/L (ref 8–16)
AORTIC ROOT ANNULUS: 3 CM
AORTIC VALVE CUSP SEPERATION: 1.8 CM
APICAL FOUR CHAMBER EJECTION FRACTION: 69 %
APICAL TWO CHAMBER EJECTION FRACTION: 57 %
ASCENDING AORTA: 2.9 CM
AST SERPL-CCNC: 14 U/L (ref 10–40)
AV INDEX (PROSTH): 0.75
AV MEAN GRADIENT: 3 MMHG
AV PEAK GRADIENT: 5 MMHG
AV REGURGITATION PRESSURE HALF TIME: 598 MS
AV VALVE AREA BY VELOCITY RATIO: 2.3 CM²
AV VALVE AREA: 2.4 CM²
AV VELOCITY RATIO: 0.73
BASOPHILS # BLD AUTO: 0.07 K/UL (ref 0–0.2)
BASOPHILS NFR BLD: 0.6 % (ref 0–1.9)
BILIRUB SERPL-MCNC: 0.7 MG/DL (ref 0.1–1)
BNP SERPL-MCNC: 470 PG/ML (ref 0–99)
BSA FOR ECHO PROCEDURE: 1.88 M2
BUN SERPL-MCNC: 14 MG/DL (ref 8–23)
CALCIUM SERPL-MCNC: 9 MG/DL (ref 8.7–10.5)
CHLORIDE SERPL-SCNC: 95 MMOL/L (ref 95–110)
CO2 SERPL-SCNC: 29 MMOL/L (ref 23–29)
CREAT SERPL-MCNC: 0.8 MG/DL (ref 0.5–1.4)
CV ECHO LV RWT: 0.31 CM
DIFFERENTIAL METHOD BLD: ABNORMAL
DOP CALC AO PEAK VEL: 1.1 M/S
DOP CALC AO VTI: 27.7 CM
DOP CALC LVOT AREA: 3.1 CM2
DOP CALC LVOT DIAMETER: 2 CM
DOP CALC LVOT PEAK VEL: 0.8 M/S
DOP CALC LVOT STROKE VOLUME: 65.3 CM3
DOP CALC MV VTI: 27.5 CM
DOP CALCLVOT PEAK VEL VTI: 20.8 CM
E WAVE DECELERATION TIME: 243 MSEC
E/A RATIO: 0.91
E/E' RATIO: 7 M/S
ECHO LV POSTERIOR WALL: 0.7 CM (ref 0.6–1.1)
EOSINOPHIL # BLD AUTO: 0.3 K/UL (ref 0–0.5)
EOSINOPHIL NFR BLD: 2.8 % (ref 0–8)
ERYTHROCYTE [DISTWIDTH] IN BLOOD BY AUTOMATED COUNT: 13.6 % (ref 11.5–14.5)
EST. GFR  (NO RACE VARIABLE): >60 ML/MIN/1.73 M^2
FRACTIONAL SHORTENING: 28.9 % (ref 28–44)
GLOBAL LONGITUIDAL STRAIN: -15.8 %
GLUCOSE SERPL-MCNC: 140 MG/DL (ref 70–110)
HCT VFR BLD AUTO: 36.2 % (ref 37–48.5)
HGB BLD-MCNC: 11.8 G/DL (ref 12–16)
IMM GRANULOCYTES # BLD AUTO: 0.04 K/UL (ref 0–0.04)
IMM GRANULOCYTES NFR BLD AUTO: 0.3 % (ref 0–0.5)
INTERVENTRICULAR SEPTUM: 1.2 CM (ref 0.6–1.1)
IVC DIAMETER: 1.7 CM
LEFT ATRIUM AREA SYSTOLIC (APICAL 4 CHAMBER): 20.3 CM2
LEFT ATRIUM SIZE: 3.1 CM
LEFT INTERNAL DIMENSION IN SYSTOLE: 3.2 CM (ref 2.1–4)
LEFT VENTRICLE DIASTOLIC VOLUME INDEX: 49.46 ML/M2
LEFT VENTRICLE DIASTOLIC VOLUME: 92 ML
LEFT VENTRICLE END DIASTOLIC VOLUME APICAL 2 CHAMBER: 64.5 ML
LEFT VENTRICLE END DIASTOLIC VOLUME APICAL 4 CHAMBER: 80.9 ML
LEFT VENTRICLE END SYSTOLIC VOLUME APICAL 4 CHAMBER: 51.1 ML
LEFT VENTRICLE MASS INDEX: 76.8 G/M2
LEFT VENTRICLE SYSTOLIC VOLUME INDEX: 22 ML/M2
LEFT VENTRICLE SYSTOLIC VOLUME: 41 ML
LEFT VENTRICULAR INTERNAL DIMENSION IN DIASTOLE: 4.5 CM (ref 3.5–6)
LEFT VENTRICULAR MASS: 142.9 G
LV LATERAL E/E' RATIO: 6.2 M/S
LV SEPTAL E/E' RATIO: 7.6 M/S
LVED V (TEICH): 92.4 ML
LVES V (TEICH): 41 ML
LVOT MG: 1 MMHG
LVOT MV: 0.56 CM/S
LYMPHOCYTES # BLD AUTO: 3.3 K/UL (ref 1–4.8)
LYMPHOCYTES NFR BLD: 26.5 % (ref 18–48)
MAGNESIUM SERPL-MCNC: 1.5 MG/DL (ref 1.6–2.6)
MAGNESIUM SERPL-MCNC: 2.1 MG/DL (ref 1.6–2.6)
MCH RBC QN AUTO: 30.6 PG (ref 27–31)
MCHC RBC AUTO-ENTMCNC: 32.6 G/DL (ref 32–36)
MCV RBC AUTO: 94 FL (ref 82–98)
MONOCYTES # BLD AUTO: 0.9 K/UL (ref 0.3–1)
MONOCYTES NFR BLD: 6.9 % (ref 4–15)
MV MEAN GRADIENT: 2 MMHG
MV PEAK A VEL: 0.75 M/S
MV PEAK E VEL: 0.68 M/S
MV PEAK GRADIENT: 4 MMHG
MV STENOSIS PRESSURE HALF TIME: 48 MS
MV VALVE AREA BY CONTINUITY EQUATION: 2.37 CM2
MV VALVE AREA P 1/2 METHOD: 4.58 CM2
NEUTROPHILS # BLD AUTO: 7.7 K/UL (ref 1.8–7.7)
NEUTROPHILS NFR BLD: 62.9 % (ref 38–73)
NRBC BLD-RTO: 0 /100 WBC
OHS CV RV/LV RATIO: 0.42 CM
OHS LV EJECTION FRACTION SIMPSONS BIPLANE MOD: 63 %
PISA AR MAX VEL: 4.58 M/S
PISA TR MAX VEL: 2.7 M/S
PLATELET # BLD AUTO: 270 K/UL (ref 150–450)
PMV BLD AUTO: 11.3 FL (ref 9.2–12.9)
POTASSIUM SERPL-SCNC: 3 MMOL/L (ref 3.5–5.1)
POTASSIUM SERPL-SCNC: 3.6 MMOL/L (ref 3.5–5.1)
PROT SERPL-MCNC: 6.6 G/DL (ref 6–8.4)
PV MV: 0.4 M/S
PV PEAK GRADIENT: 1 MMHG
PV PEAK VELOCITY: 0.57 M/S
RA PRESSURE ESTIMATED: 3 MMHG
RBC # BLD AUTO: 3.86 M/UL (ref 4–5.4)
RIGHT ATRIUM VOLUME AREA LENGTH APICAL 4 CHAMBER: 20.1 ML
RIGHT VENTRICLE DIASTOLIC BASEL DIMENSION: 1.9 CM
RIGHT VENTRICULAR END-DIASTOLIC DIMENSION: 1.9 CM
RV TB RVSP: 6 MMHG
RV TISSUE DOPPLER FREE WALL SYSTOLIC VELOCITY 1 (APICAL 4 CHAMBER VIEW): 9.68 CM/S
SINUS: 2.9 CM
SODIUM SERPL-SCNC: 135 MMOL/L (ref 136–145)
STJ: 2.2 CM
TDI LATERAL: 0.11 M/S
TDI SEPTAL: 0.09 M/S
TDI: 0.1 M/S
TR MAX PG: 29 MMHG
TRICUSPID ANNULAR PLANE SYSTOLIC EXCURSION: 1.9 CM
TROPONIN I SERPL HS-MCNC: 195.8 PG/ML (ref 0–14.9)
TROPONIN I SERPL HS-MCNC: 355.3 PG/ML (ref 0–14.9)
TROPONIN I SERPL HS-MCNC: 388.4 PG/ML (ref 0–14.9)
TROPONIN I SERPL HS-MCNC: 8.8 PG/ML (ref 0–14.9)
TV REST PULMONARY ARTERY PRESSURE: 32 MMHG
WBC # BLD AUTO: 12.28 K/UL (ref 3.9–12.7)
Z-SCORE OF LEFT VENTRICULAR DIMENSION IN END DIASTOLE: -1.4
Z-SCORE OF LEFT VENTRICULAR DIMENSION IN END SYSTOLE: 0.02

## 2025-03-12 PROCEDURE — 63600175 PHARM REV CODE 636 W HCPCS

## 2025-03-12 PROCEDURE — 93356 MYOCRD STRAIN IMG SPCKL TRCK: CPT

## 2025-03-12 PROCEDURE — 99285 EMERGENCY DEPT VISIT HI MDM: CPT | Mod: 25

## 2025-03-12 PROCEDURE — 83735 ASSAY OF MAGNESIUM: CPT | Mod: 91

## 2025-03-12 PROCEDURE — 63600175 PHARM REV CODE 636 W HCPCS: Performed by: EMERGENCY MEDICINE

## 2025-03-12 PROCEDURE — 83880 ASSAY OF NATRIURETIC PEPTIDE: CPT | Performed by: EMERGENCY MEDICINE

## 2025-03-12 PROCEDURE — 85025 COMPLETE CBC W/AUTO DIFF WBC: CPT | Performed by: EMERGENCY MEDICINE

## 2025-03-12 PROCEDURE — 84484 ASSAY OF TROPONIN QUANT: CPT | Mod: 91 | Performed by: NURSE PRACTITIONER

## 2025-03-12 PROCEDURE — G0378 HOSPITAL OBSERVATION PER HR: HCPCS

## 2025-03-12 PROCEDURE — 83735 ASSAY OF MAGNESIUM: CPT | Performed by: EMERGENCY MEDICINE

## 2025-03-12 PROCEDURE — 25000003 PHARM REV CODE 250: Performed by: EMERGENCY MEDICINE

## 2025-03-12 PROCEDURE — 93356 MYOCRD STRAIN IMG SPCKL TRCK: CPT | Mod: ,,, | Performed by: INTERNAL MEDICINE

## 2025-03-12 PROCEDURE — 93005 ELECTROCARDIOGRAM TRACING: CPT | Performed by: GENERAL PRACTICE

## 2025-03-12 PROCEDURE — 93306 TTE W/DOPPLER COMPLETE: CPT | Mod: 26,,, | Performed by: INTERNAL MEDICINE

## 2025-03-12 PROCEDURE — 96365 THER/PROPH/DIAG IV INF INIT: CPT

## 2025-03-12 PROCEDURE — 84132 ASSAY OF SERUM POTASSIUM: CPT

## 2025-03-12 PROCEDURE — 99223 1ST HOSP IP/OBS HIGH 75: CPT | Mod: ,,, | Performed by: STUDENT IN AN ORGANIZED HEALTH CARE EDUCATION/TRAINING PROGRAM

## 2025-03-12 PROCEDURE — 25000003 PHARM REV CODE 250

## 2025-03-12 PROCEDURE — 96375 TX/PRO/DX INJ NEW DRUG ADDON: CPT

## 2025-03-12 PROCEDURE — 25000003 PHARM REV CODE 250: Performed by: STUDENT IN AN ORGANIZED HEALTH CARE EDUCATION/TRAINING PROGRAM

## 2025-03-12 PROCEDURE — 96372 THER/PROPH/DIAG INJ SC/IM: CPT

## 2025-03-12 PROCEDURE — 84484 ASSAY OF TROPONIN QUANT: CPT | Mod: 91

## 2025-03-12 PROCEDURE — 96366 THER/PROPH/DIAG IV INF ADDON: CPT

## 2025-03-12 PROCEDURE — 84484 ASSAY OF TROPONIN QUANT: CPT | Performed by: EMERGENCY MEDICINE

## 2025-03-12 PROCEDURE — 93010 ELECTROCARDIOGRAM REPORT: CPT | Mod: ,,, | Performed by: GENERAL PRACTICE

## 2025-03-12 PROCEDURE — 80053 COMPREHEN METABOLIC PANEL: CPT | Performed by: EMERGENCY MEDICINE

## 2025-03-12 PROCEDURE — 96372 THER/PROPH/DIAG INJ SC/IM: CPT | Performed by: EMERGENCY MEDICINE

## 2025-03-12 RX ORDER — GLUCAGON 1 MG
1 KIT INJECTION
Status: DISCONTINUED | OUTPATIENT
Start: 2025-03-12 | End: 2025-03-14 | Stop reason: HOSPADM

## 2025-03-12 RX ORDER — IBUPROFEN 200 MG
24 TABLET ORAL
Status: DISCONTINUED | OUTPATIENT
Start: 2025-03-12 | End: 2025-03-14 | Stop reason: HOSPADM

## 2025-03-12 RX ORDER — SODIUM CHLORIDE 0.9 % (FLUSH) 0.9 %
3 SYRINGE (ML) INJECTION
Status: DISCONTINUED | OUTPATIENT
Start: 2025-03-12 | End: 2025-03-14 | Stop reason: HOSPADM

## 2025-03-12 RX ORDER — CARVEDILOL 12.5 MG/1
12.5 TABLET ORAL 2 TIMES DAILY
Status: DISCONTINUED | OUTPATIENT
Start: 2025-03-12 | End: 2025-03-12

## 2025-03-12 RX ORDER — ENOXAPARIN SODIUM 100 MG/ML
1 INJECTION SUBCUTANEOUS
Status: COMPLETED | OUTPATIENT
Start: 2025-03-12 | End: 2025-03-12

## 2025-03-12 RX ORDER — LANOLIN ALCOHOL/MO/W.PET/CERES
800 CREAM (GRAM) TOPICAL
Status: DISCONTINUED | OUTPATIENT
Start: 2025-03-12 | End: 2025-03-14 | Stop reason: HOSPADM

## 2025-03-12 RX ORDER — ENOXAPARIN SODIUM 100 MG/ML
1 INJECTION SUBCUTANEOUS EVERY 12 HOURS
Status: DISCONTINUED | OUTPATIENT
Start: 2025-03-12 | End: 2025-03-14 | Stop reason: HOSPADM

## 2025-03-12 RX ORDER — IBUPROFEN 200 MG
16 TABLET ORAL
Status: DISCONTINUED | OUTPATIENT
Start: 2025-03-12 | End: 2025-03-14 | Stop reason: HOSPADM

## 2025-03-12 RX ORDER — ONDANSETRON HYDROCHLORIDE 2 MG/ML
4 INJECTION, SOLUTION INTRAVENOUS EVERY 6 HOURS PRN
Status: DISCONTINUED | OUTPATIENT
Start: 2025-03-12 | End: 2025-03-14 | Stop reason: HOSPADM

## 2025-03-12 RX ORDER — MAGNESIUM SULFATE HEPTAHYDRATE 40 MG/ML
2 INJECTION, SOLUTION INTRAVENOUS ONCE
Status: COMPLETED | OUTPATIENT
Start: 2025-03-12 | End: 2025-03-12

## 2025-03-12 RX ORDER — TALC
8 POWDER (GRAM) TOPICAL NIGHTLY PRN
Status: DISCONTINUED | OUTPATIENT
Start: 2025-03-12 | End: 2025-03-14 | Stop reason: HOSPADM

## 2025-03-12 RX ORDER — AMOXICILLIN 250 MG
1 CAPSULE ORAL 2 TIMES DAILY
Status: DISCONTINUED | OUTPATIENT
Start: 2025-03-12 | End: 2025-03-14 | Stop reason: HOSPADM

## 2025-03-12 RX ORDER — ACETAMINOPHEN 325 MG/1
650 TABLET ORAL EVERY 4 HOURS PRN
Status: DISCONTINUED | OUTPATIENT
Start: 2025-03-12 | End: 2025-03-14 | Stop reason: HOSPADM

## 2025-03-12 RX ORDER — FAMOTIDINE 10 MG/ML
20 INJECTION, SOLUTION INTRAVENOUS 2 TIMES DAILY
Status: DISCONTINUED | OUTPATIENT
Start: 2025-03-12 | End: 2025-03-13

## 2025-03-12 RX ORDER — NALOXONE HCL 0.4 MG/ML
0.02 VIAL (ML) INJECTION
Status: DISCONTINUED | OUTPATIENT
Start: 2025-03-12 | End: 2025-03-14 | Stop reason: HOSPADM

## 2025-03-12 RX ORDER — CARVEDILOL 12.5 MG/1
12.5 TABLET ORAL 2 TIMES DAILY
Status: DISCONTINUED | OUTPATIENT
Start: 2025-03-12 | End: 2025-03-13

## 2025-03-12 RX ORDER — HYDROCHLOROTHIAZIDE 12.5 MG/1
12.5 TABLET ORAL DAILY
Status: DISCONTINUED | OUTPATIENT
Start: 2025-03-12 | End: 2025-03-13

## 2025-03-12 RX ORDER — MONTELUKAST SODIUM 10 MG/1
10 TABLET ORAL NIGHTLY
Status: DISCONTINUED | OUTPATIENT
Start: 2025-03-12 | End: 2025-03-14 | Stop reason: HOSPADM

## 2025-03-12 RX ADMIN — FAMOTIDINE 20 MG: 10 INJECTION, SOLUTION INTRAVENOUS at 09:03

## 2025-03-12 RX ADMIN — ENOXAPARIN SODIUM 70 MG: 100 INJECTION SUBCUTANEOUS at 08:03

## 2025-03-12 RX ADMIN — HYDROCHLOROTHIAZIDE 12.5 MG: 12.5 TABLET ORAL at 12:03

## 2025-03-12 RX ADMIN — CARVEDILOL 12.5 MG: 12.5 TABLET, FILM COATED ORAL at 08:03

## 2025-03-12 RX ADMIN — MONTELUKAST 10 MG: 10 TABLET, FILM COATED ORAL at 08:03

## 2025-03-12 RX ADMIN — MAGNESIUM SULFATE HEPTAHYDRATE 2 G: 40 INJECTION, SOLUTION INTRAVENOUS at 09:03

## 2025-03-12 RX ADMIN — ENOXAPARIN SODIUM 70 MG: 80 INJECTION SUBCUTANEOUS at 08:03

## 2025-03-12 RX ADMIN — CARVEDILOL 12.5 MG: 12.5 TABLET, FILM COATED ORAL at 04:03

## 2025-03-12 RX ADMIN — SENNOSIDES AND DOCUSATE SODIUM 1 TABLET: 50; 8.6 TABLET ORAL at 09:03

## 2025-03-12 RX ADMIN — POTASSIUM BICARBONATE 60 MEQ: 782 TABLET, EFFERVESCENT ORAL at 07:03

## 2025-03-12 NOTE — ED PROVIDER NOTES
Encounter Date: 3/12/2025       History     Chief Complaint   Patient presents with    Chest Pain     X1 hr with palpitations. 324 aspirin, 1 spray NTG, 20 cardizem      79-year-old female with a history of hypertension presents with atrial fibrillation.  Symptoms began at 4:00 a.m..  Patient woke up with bilateral shoulder pain and collarbone discomfort.  Heart rate was elevated.  Family called EMS because the patient was ill-appearing and diaphoretic.  They gave her a full-dose aspirin at home.  EMS gave IV diltiazem and nitroglycerin and her symptoms resolved.  She is asymptomatic at this time.  No history of AFib.  No cardiac history.    The history is provided by the patient, a relative and the EMS personnel.     Review of patient's allergies indicates:   Allergen Reactions    Zoster vaccine live Other (See Comments)     Headache, neck shoulder, body flu sx, sore throat, nausea, dry cough, fatigue     Amlodipine Diarrhea, Nausea Only and Other (See Comments)     Joint pain    Augmentin [amoxicillin-pot clavulanate]     Azithromycin      Other reaction(s): Unknown    Budesonide      Other reaction(s): heartburn  Other reaction(s): Rash    Cephalexin      Other reaction(s): Unknown    Ciprofloxacin      Other reaction(s): Unknown    Erythromycin      Other reaction(s): Unknown    Esomeprazole magnesium      Other reaction(s): Headache    Felodipine      Nausea, raw throat, ulcer tongue, myalgia    Levofloxacin      Other reaction(s): tendonitis    Talwin compound      Other reaction(s): Vomiting  Other reaction(s): Hallucinations    Hydrocodone-acetaminophen Rash     Other reaction(s): Nausea     Past Medical History:   Diagnosis Date    Asthma     Basal cell carcinoma     GERD (gastroesophageal reflux disease)     Hyperlipidemia     Hypertension     Hypothyroid     Lumbago     Neuromuscular disorder     Sinusitis     Ulcer      Past Surgical History:   Procedure Laterality Date    ADENOIDECTOMY  1952    Tonsils  and adenoids removed    APPENDECTOMY       SECTION      HYSTERECTOMY  ?    Partial hysterectomy    LUMBAR EPIDURAL INJECTION      PARTIAL HYSTERECTOMY      TONSILLECTOMY      Tonsils and adenoids removed    TUBAL LIGATION       Family History   Problem Relation Name Age of Onset    Heart disease Father Tae RAYMUNDO Ovidio     Early death Father Tae GARCIAUmer Ovidio 51    Heart disease Sister Sadie Kyle     Hypertension Sister Sadie Kyle     Emphysema Sister Sadie Kyle     Arthritis Sister Sadie Kyle     COPD Sister Sadie Kyle     Hypertension Brother Tae Nolan Jr.     Ulcers Brother Tae Nolan Jr.     Depression Brother Tae Nolan Jr.     Miscarriages / Stillbirths Daughter          still birth    Allergy (severe) Daughter      Arthritis Mother Anita Nolan     Depression Mother Anita Nolan     Hearing loss Mother Anita Nolan     Arthritis Sister Nusrat Nickerson     Heart disease Sister Nusrat Nickerson     Allergic rhinitis Neg Hx      Allergies Neg Hx      Angioedema Neg Hx      Asthma Neg Hx      Atopy Neg Hx      Eczema Neg Hx      Immunodeficiency Neg Hx      Rhinitis Neg Hx      Urticaria Neg Hx       Social History[1]  Review of Systems   Constitutional:  Positive for diaphoresis.   Respiratory:  Positive for shortness of breath.    Cardiovascular:  Positive for chest pain and palpitations.       Physical Exam     Initial Vitals   BP Pulse Resp Temp SpO2   25 0600 25 0557 25 0557 25 0557 25 0557   123/64 66 16 98.1 °F (36.7 °C) 96 %      MAP       --                Physical Exam    Nursing note and vitals reviewed.  Constitutional: She appears well-developed and well-nourished. She is not diaphoretic. No distress.   HENT:   Head: Normocephalic and atraumatic.   Eyes: EOM are normal.   Neck: Neck supple.   Normal range of motion.  Cardiovascular:  Normal rate, regular rhythm and normal heart sounds.     Exam  reveals no gallop and no friction rub.       No murmur heard.  Pulmonary/Chest: Breath sounds normal. No respiratory distress. She has no wheezes. She has no rhonchi. She has no rales.   Abdominal: She exhibits no distension. There is no abdominal tenderness. There is no rebound and no guarding.   Musculoskeletal:         General: Normal range of motion.      Cervical back: Normal range of motion and neck supple.     Neurological: She is alert and oriented to person, place, and time.   Skin: Skin is warm and dry.   Psychiatric: She has a normal mood and affect. Her behavior is normal. Judgment and thought content normal.         ED Course   Procedures  Labs Reviewed   MAGNESIUM   CBC W/ AUTO DIFFERENTIAL   COMPREHENSIVE METABOLIC PANEL   TROPONIN I HIGH SENSITIVITY   B-TYPE NATRIURETIC PEPTIDE          Imaging Results    None          Medications - No data to display  Medical Decision Making  79-year-old female history of hypertension presents from home for diaphoresis, bilateral shoulder pain and clavicle pain that woke her at 4:00 a.m..  No history of similar symptoms.  She did not have any chest heaviness or pressure.  Family called 911.  Family reports diaphoresis on scene.  EMS states to me that her heart rate was high as high as 180 beats per minute in atrial fibrillation with a rapid response.  She was given IV diltiazem with a good response.  She is now in sinus rhythm.  Her symptoms have resolved.  First troponin is negative.  Hospital Medicine will admit. [EF]      Amount and/or Complexity of Data Reviewed  Labs: ordered. Decision-making details documented in ED Course.  Radiology: ordered. Decision-making details documented in ED Course.  ECG/medicine tests: ordered and independent interpretation performed. Decision-making details documented in ED Course.    Risk  Prescription drug management.  Decision regarding hospitalization.               ED Course as of 03/12/25 1315   Wed Mar 12, 2025   0614 BP:  123/64 [EF]   0614 Temp: 98.1 °F (36.7 °C) [EF]   0614 Temp Source: Oral [EF]   0614 Pulse: 66 [EF]   0614 Resp: 16 [EF]   0614 SpO2: 96 % [EF]   0630 Sinus bradycardia 57 beats per minute left axis no ST elevation or depression PVC independently interpreted [EF]   0710 X-Ray Chest AP Portable [EF]   0734 Troponin I High Sensitivity: 8.8 [EF]   0734 Sodium(!): 135 [EF]   0734 Potassium(!): 3.0 [EF]   0825 Dr leon to admit [EF]   0825 79-year-old female history of hypertension presents from home for diaphoresis, bilateral shoulder pain and clavicle pain that woke her at 4:00 a.m..  No history of similar symptoms.  She did not have any chest heaviness or pressure.  Family called 911.  Family reports diaphoresis on scene.  EMS states to me that her heart rate was high as high as 180 beats per minute in atrial fibrillation with a rapid response.  She was given IV diltiazem with a good response.  She is now in sinus rhythm.  Her symptoms have resolved.  First troponin is negative.  Hospital Medicine will admit. [EF]      ED Course User Index  [EF] Aaron Marshall MD                           Clinical Impression:  Final diagnoses:  [R07.9] Chest pain                   [1]   Social History  Tobacco Use    Smoking status: Never    Smokeless tobacco: Never   Substance Use Topics    Alcohol use: No    Drug use: No        Aaron Marshall MD  03/12/25 [id number]

## 2025-03-12 NOTE — ASSESSMENT & PLAN NOTE
Patient has Abnormal Magnesium: hypomagnesemia. Will continue to monitor electrolytes closely. Will replace the affected electrolytes and repeat labs to be done after interventions completed. The patient's magnesium results have been reviewed and are listed below.  Recent Labs   Lab 03/12/25  0625   MG 1.5*

## 2025-03-12 NOTE — PLAN OF CARE
[place] [facility] - Emergency Dept  Initial Discharge Assessment       Primary Care Provider: Zhanna Gandara MD    Admission Diagnosis: Chest pain [R07.9]    Admission Date: 3/12/2025  Expected Discharge Date: 3/14/2025    Transition of Care Barriers: None    Payor: [facility] MCARE / Plan: [employer] MEDICARE ADVANTAGE / Product Type: Medicare Advantage /     Extended Emergency Contact Information  Primary Emergency Contact: JuleeGregoria  Address: [address] [place] [address] [place] [address] [place]  Mobile Phone: [phone]  Relation: Spouse  Preferred language: English   needed? No  Secondary Emergency Contact: JuleeFatemeh stroud  Mobile Phone: [phone]  Relation: Daughter  Preferred language: English   needed? No    Discharge Plan A: Home  Discharge Plan B: Home with family      [id number] - [place] - [address]  [address]  [place] [address]  Phone: [phone] Fax: [phone]    [facility] - [facility] [address] [facility]  [address] [facility] [address]  Phone: [phone] Fax: [phone]    [id number] - [place] - [address]  [address]  [place] [address]  Phone: [phone] Fax: [phone]    [id number] - [place] - [address] [facility] [address] [facility]  [address]   [place] [address]  Phone: [phone] Fax: [phone]    [id number] - [place] - [address]  [address]  [place] [address]  Phone: [phone] Fax: [phone]      Initial Assessment (most recent)       Adult Discharge Assessment - 03/12/25 1303          Discharge Assessment    Assessment Type Discharge Planning Assessment     Confirmed/corrected address, phone number and insurance Yes     Confirmed Demographics Correct on Facesheet     Source of Information patient     When was your last doctors appointment?  02/26/25     Does patient/caregiver understand observation status Yes     Reason For Admission chest pain     People in Home spouse     Do you expect to return to your current living situation? Yes     Do you have help at home or someone to help you manage your care at home? No     Prior to hospitilization cognitive status: Alert/Oriented     Current cognitive status: Alert/Oriented     Walking or Climbing Stairs Difficulty yes     Walking or Climbing Stairs ambulation difficulty, requires equipment     Mobility Management cane     Dressing/Bathing Difficulty no     Home Accessibility stairs to enter home;wheelchair accessible     Number of Stairs, Main Entrance three     Surface of Stairs, Main Entrance concrete     Stair Railings, Main Entrance none     Home Layout Able to live on 1st floor     Equipment Currently Used at Home cane, straight;shower chair;walker, rolling;rollator     Readmission within 30 days? No     Patient currently being followed by outpatient case management? No     Do you currently have service(s) that help you manage your care at home? No     Do you take prescription medications? Yes     Do you have prescription coverage? Yes     Coverage [facility]     Do you have any problems affording any of your prescribed medications? No     Is the patient taking medications as prescribed? yes     Who is going to help you get home at discharge? family     How do you get to doctors appointments? family or friend will provide     Are you on dialysis? No     Do you take coumadin? No     Discharge Plan A Home     Discharge Plan B Home with family     DME Needed Upon Discharge  none     Discharge Plan discussed with: Patient     Transition of Care Barriers None

## 2025-03-12 NOTE — HPI
Megha Ladd is a 79 y.o. y.o. female with a PMHx of asthma, GERD, HTN who presented to the ED with chest pain and palpitations onset x 4:00 a.m..  She was described chest pain as an aching and pulling sensation that started in the center of her chest and went to shoulders and neck.  Pain was constant and waxed and waned in intensity.  She also reported associated nausea, shortness of breath, palpitations and diaphoresis.  Pain was 8/10 at its worst and denies pain currently.  Patient was found to be in AFib RVR via EMS.  She received diltiazem and nitroglycerin in transit.  Chest pain was relieved with nitro.  Upon arrival was in normal sinus rhythm.  Denies chest pain currently.  States she was followed by cardiologist many years ago but is not seen by Cardiology currently.  Nonsmoker.  Denies EtOH use. ED workup was significant for hypokalemia and hypomagnesemia. Cardiology was consulted. Hospital Medicine was consulted for further workup and management of the patient.

## 2025-03-12 NOTE — PLAN OF CARE
03/12/25 1312   SANDOVAL Message   Medicare Outpatient and Observation Notification regarding financial responsibility Given to patient/caregiver;Explained to patient/caregiver;Signed/date by patient/caregiver   Date SANDOVAL was signed 03/12/25   Time SANDOVAL was signed [id number]

## 2025-03-12 NOTE — ASSESSMENT & PLAN NOTE
Acute:  - cardiology consulted: appreciate recommendations  - tele  - initial trop: 8.8  - repeat trop: 195.8  - on FD Lovenox for AF continue per cards recs   - ECHO ordered

## 2025-03-12 NOTE — PT/OT/SLP PROGRESS
Physical Therapy      Patient Name:  Megha Ladd   MRN:  [id number]    Patient not seen today secondary to Other (Comment) (Pt in ED w/ c/o chest pain, Troponin elevated (195.8) and with no H&P or Cardiac note in record yet. Will attempt PT eval in am if cleared.). Will follow-up 3/13/25.

## 2025-03-12 NOTE — ASSESSMENT & PLAN NOTE
Patient's blood pressure range in the last 24 hours was: BP  Min: 123/64  Max: 163/78.The patient's inpatient anti-hypertensive regimen is listed below:  Current Antihypertensives  hydroCHLOROthiazide tablet 12.5 mg, Daily, Oral  metoprolol tartrate tablet 75 mg, 2 times daily, Oral    Plan  - BP is controlled, no changes needed to their regimen

## 2025-03-12 NOTE — CONSULTS
[facility]  Department of Cardiology  Consult Note      PATIENT NAME: Megha Ladd  MRN: [id number]  TODAY'S DATE: 03/12/2025  ADMIT DATE: 3/12/2025                          CONSULT REQUESTED BY: Shanda Davis DO    SUBJECTIVE     PRINCIPAL PROBLEM: <principal problem not specified>      REASON FOR CONSULT:  New onset AF and CP      HPI:  Pt is 80 yo female with PMH: HTN, hypothyroidism, HLD who presented to ER this AM with c/o palpitations, clamminess, and shortness of breath. Pt states she took her usual BP meds at 3:30 am (she usually goes to bed at 4 am she states); around 4 am she suddenly developed pain in both shoulder blades, collar bones and bilateral jaw. The pain radiated down both arms. She had concurrent nausea. Intensity was 8/10. She was experiencing the pain for 45 min to 1 hour so EMS was called. She received Ntg and pain was alleviated. Additionally she had developed palpitations, clamminess and pulse ox readings showed irregular and fast heart rate up to 180 bpm. IV diltiazem administered per EMS. Aspirin also administered via EMS and pt took two baby aspirin as well.    Pt denies any actual chest pain. She denies fluid retention. She has had no recurrent pain since being in ER. She ambulated to bathroom w/o dyspnea or CP    Pt denies smoking or ETOH use. She denies prior history of Afib. No personal heart disease. Her father and sister have CAD/CABG and MI history;     Daughter states she doesn't eat much, about once daily and she has lost weight. She denies fluid retention, vomiting, diarrhea, recent URI    No afib strips noted from EMS or on EKG. Multi focal PVCs noted with SR. K+ was 3.0 and Mag was 1.5 on arrival    Review of patient's allergies indicates:   Allergen Reactions    Zoster vaccine live Other (See Comments)     Headache, neck shoulder, body flu sx, sore throat, nausea, dry cough, fatigue     Amlodipine Diarrhea, Nausea Only and Other (See Comments)     Joint  pain    Augmentin [amoxicillin-pot clavulanate]     Azithromycin      Other reaction(s): Unknown    Budesonide      Other reaction(s): heartburn  Other reaction(s): Rash    Cephalexin      Other reaction(s): Unknown    Ciprofloxacin      Other reaction(s): Unknown    Erythromycin      Other reaction(s): Unknown    Esomeprazole magnesium      Other reaction(s): Headache    Felodipine      Nausea, raw throat, ulcer tongue, myalgia    Levofloxacin      Other reaction(s): tendonitis    Talwin compound      Other reaction(s): Vomiting  Other reaction(s): Hallucinations    Hydrocodone-acetaminophen Rash     Other reaction(s): Nausea       Past Medical History:   Diagnosis Date    Asthma     Basal cell carcinoma     GERD (gastroesophageal reflux disease)     Hyperlipidemia     Hypertension     Hypothyroid     Lumbago     Neuromuscular disorder     Sinusitis     Ulcer      Past Surgical History:   Procedure Laterality Date    ADENOIDECTOMY      Tonsils and adenoids removed    APPENDECTOMY       SECTION      HYSTERECTOMY  ?    Partial hysterectomy    LUMBAR EPIDURAL INJECTION      PARTIAL HYSTERECTOMY      TONSILLECTOMY      Tonsils and adenoids removed    TUBAL LIGATION       Social History[1]     REVIEW OF SYSTEMS  Negative except as mentioned in HPI    OBJECTIVE     VITAL SIGNS (Most Recent)  Temp: 98.1 °F (36.7 °C) (25)  Pulse: 66 (25)  Resp: 16 (25)  BP: 123/64 (25 0600)  SpO2: 96 % (25)    VENTILATION STATUS  Resp: 16 (25)  SpO2: 96 % (25)           I & O (Last 24H):No intake or output data in the 24 hours ending 25 0951    WEIGHTS  Wt Readings from Last 3 Encounters:   25 0557 74.4 kg (164 lb)   25 1513 74.6 kg (164 lb 7.4 oz)   25 1217 73.9 kg (163 lb)       PHYSICAL EXAM    GENERAL  pleasant elderly female resting comfortably in no apparent distress.  HEENT: Normocephalic.    NECK: No JVD.  "  CARDIAC: Regular rate and rhythm. S1 is normal.S2 is normal. + faint systolic murmur; NSR on tele, occasional PVC  CHEST ANATOMY: normal. No chest wall pain  LUNGS: Clear to auscultation. No wheezing or rhonchi..   ABDOMEN: Soft .  Normal bowel sounds.    EXTREMITIES: No edema  CENTRAL NERVOUS SYSTEM: AAO x 3; memory intact  SKIN: No rash     HOME MEDICATIONS:Medications Ordered Prior to Encounter[2]    SCHEDULED MEDS:   famotidine (PF)  20 mg Intravenous BID    magnesium sulfate 2 g IVPB  2 g Intravenous Once    senna-docusate 8.6-50 mg  1 tablet Oral BID       CONTINUOUS INFUSIONS:    PRN MEDS:  Current Facility-Administered Medications:     acetaminophen, 650 mg, Oral, Q4H PRN    dextrose 50%, 12.5 g, Intravenous, PRN    dextrose 50%, 25 g, Intravenous, PRN    glucagon (human recombinant), 1 mg, Intramuscular, PRN    glucose, 16 g, Oral, PRN    glucose, 24 g, Oral, PRN    magnesium oxide, 800 mg, Oral, PRN    magnesium oxide, 800 mg, Oral, PRN    melatonin, 9 mg, Oral, Nightly PRN    naloxone, 0.02 mg, Intravenous, PRN    ondansetron, 4 mg, Intravenous, Q6H PRN    potassium bicarbonate, 35 mEq, Oral, PRN    potassium bicarbonate, 50 mEq, Oral, PRN    potassium bicarbonate, 60 mEq, Oral, PRN    sodium chloride 0.9%, 3 mL, Intravenous, PRN    LABS AND DIAGNOSTICS     CBC LAST 3 DAYS  Recent Labs   Lab 03/12/25  0625   WBC 12.28   RBC 3.86*   HGB 11.8*   HCT 36.2*   MCV 94   MCH 30.6   MCHC 32.6   RDW 13.6      MPV 11.3   GRAN 62.9  7.7   LYMPH 26.5  3.3   MONO 6.9  0.9   BASO 0.07   NRBC 0       COAGULATION LAST 3 DAYS  No results for input(s): "LABPT", "INR", "APTT" in the last 168 hours.    CHEMISTRY LAST 3 DAYS  Recent Labs   Lab 03/12/25  0625   *   K 3.0*   CL 95   CO2 29   ANIONGAP 11   BUN 14   CREATININE 0.8   *   CALCIUM 9.0   MG 1.5*   ALBUMIN 3.7   PROT 6.6   ALKPHOS 74   ALT 5*   AST 14   BILITOT 0.7       CARDIAC PROFILE LAST 3 DAYS  No results for input(s): "BNP", "CPK", " ""CPKMB", "LDH", "TROPONINI" in the last 168 hours.    ENDOCRINE LAST 3 DAYS  No results for input(s): "TSH", "PROCAL" in the last 168 hours.    LAST ARTERIAL BLOOD GAS  ABG  No results for input(s): "PH", "PO2", "PCO2", "HCO3", "BE" in the last 168 hours.    LAST 7 DAYS MICROBIOLOGY   Microbiology Results (last 7 days)       ** No results found for the last 168 hours. **            MOST RECENT IMAGING  X-Ray Chest AP Portable  Narrative: EXAMINATION:  XR CHEST AP PORTABLE    CLINICAL HISTORY:  Chest Pain;    COMPARISON:  02/20/2025    FINDINGS:  Cardiac silhouette size is borderline enlarged for portable technique and stable compared to prior.  Atherosclerotic calcification of the aorta.  No pulmonary edema or confluent airspace disease.  No large pleural effusion or pneumothorax.  No acute osseous abnormality.  Impression: Stable chest radiograph.  No acute pulmonary process.    Electronically signed by: Lenny Blackburn  Date:    03/12/2025  Time:    06:57      ECHOCARDIOGRAM RESULTS (last 5)  Results for orders placed during the hospital encounter of 10/25/23    Echo    Interpretation Summary    Left Ventricle: The left ventricle is normal in size. IVSd is 1.24 cm. LVPWs is 0.85 cm. Normal wall thickness. Normal wall motion. There is normal systolic function with a visually estimated ejection fraction of 55 - 60%. There is normal diastolic function. E/A ratio is 0.81.    Right Ventricle: Normal right ventricular cavity size. Wall thickness is normal. Right ventricle wall motion  is normal. Systolic function is normal.    Aortic Valve: There is moderate aortic valve sclerosis. There is moderate aortic regurgitation.    Mitral Valve: There is mild to moderate regurgitation.    Tricuspid Valve: There is mild regurgitation.    Pulmonic Valve: There is mild regurgitation.    IVC/SVC: Normal venous pressure at 3 mmHg.      CURRENT/PREVIOUS VISIT EKG  Results for orders placed or performed during the hospital encounter of " 03/12/25   EKG 12-lead    Collection Time: 03/12/25  5:57 AM   Result Value Ref Range    QRS Duration 82 ms    OHS QTC Calculation 447 ms    Narrative    Test Reason : R07.9,    Vent. Rate :  57 BPM     Atrial Rate :  57 BPM     P-R Int : 210 ms          QRS Dur :  82 ms      QT Int : 460 ms       P-R-T Axes :  31  -5   6 degrees    QTcB Int : 447 ms    Sinus bradycardia with 1st degree A-V block with occasional Premature  ventricular complexes  LVH with repolarization abnormality ( R in aVL )  Abnormal ECG  When compared with ECG of 20-Feb-2025 12:09,  Fusion complexes are no longer Present  Premature ventricular complexes are now Present  Premature atrial complexes are no longer Present  Questionable change in The axis  T wave inversion now evident in Inferior leads  Nonspecific T wave abnormality now evident in Lateral leads    Referred By: AAAREFERRAL SELF           Confirmed By:            ASSESSMENT/PLAN:     There are no active hospital problems to display for this patient.      ASSESSMENT & PLAN:   Questionable Atrial Fibrillation   Hypokalemia  Hypomagnesemia  Hypertension    RECOMMENDATIONS:  2nd troponin elevated. Continue to trend troponin levels  If troponin continues to rise will consider angiogram  Otherwise will plan for stress test in AM  Obtain echocardiogram to evaluate LV and valvular function    . No hx CHF. CXR was clear. No peripheral edema    EKG and telemetry shows NSR with PVCs  Maintain on continuous telemetry and observe in hospital overnight  If no atrial fib captured on telemetry, will order outpatient cardiac monitor    Magnesium 1.5/ Potassium 3.0 on arrival; replacements given in ER  Check and replace potassium and magnesium daily. Goal for potassium is >/= 4.0, and goal for magnesium is >/= 2.0   Considering changing HCTZ to potassium sparing diuretic     Cardiology will continue to follow    MCKINLEY Heller, CVNP-BC  [facility]  Department of  Cardiology  Date of Service: 03/12/2025                 [1]   Social History  Tobacco Use    Smoking status: Never    Smokeless tobacco: Never   Substance Use Topics    Alcohol use: No    Drug use: No   [2]   No current facility-administered medications on file prior to encounter.     Current Outpatient Medications on File Prior to Encounter   Medication Sig Dispense Refill    acetaminophen (TYLENOL) 500 MG tablet Take 500 mg by mouth 2 (two) times a day.      ADVAIR -21 mcg/actuation HFAA inhaler INHALE 2 PUFFS INTO THE LUNGS 2 TIMES DAILY. CONTROLLER 36 g 3    cetirizine (ZYRTEC) 10 MG tablet Take 10 mg by mouth daily as needed for Allergies.      cloNIDine (CATAPRES) 0.1 MG tablet Take 1 tablet if systolic above 180 or diastolic above 105. Can repeat after 2 hours if bp remains above 180 or 105. 30 tablet 0    fluticasone (FLONASE) 50 mcg/actuation nasal spray SPRAY 1 SPRAY IN EACH NOSTRIL 2 TIMES A DAY (Patient taking differently: 1 spray by Each Nostril route every evening.) 16 g 11    hydroCHLOROthiazide 12.5 MG Tab Take 1 tablet (12.5 mg total) by mouth once daily. 30 tablet 2    hydrocortisone 2.5 % cream Thin film to AA onface daily PRN flare; use short term only 28 g 1    ketoconazole (NIZORAL) 2 % shampoo Wash hair with medicated shampoo at least 2x/week - let sit on scalp at least 5 minutes prior to rinsing 120 mL 5    meclizine (ANTIVERT) 12.5 mg tablet Take 1 tablet (12.5 mg total) by mouth 3 (three) times daily as needed for Dizziness. 60 tablet 2    metaxalone (SKELAXIN) 800 MG tablet Take 800 mg by mouth every 6 (six) hours as needed (muscle pain).      metoprolol tartrate (LOPRESSOR) 50 MG tablet TAKE 1 & 1/2 (ONE & ONE-HALF) TABLETS (75mg) BY MOUTH TWICE DAILY (Patient taking differently: Take 75 mg by mouth 2 (two) times daily. TAKE 1 & 1/2 (ONE & ONE-HALF) TABLETS (75mg) BY MOUTH TWICE DAILY) 270 tablet 3    metroNIDAZOLE (NORITATE) 1 % cream Compound azelaic acid 15% + ivermectin 1% +  metronidazole 1% cream. Apply to face once or twice daily. 30 g 5    montelukast (SINGULAIR) 10 mg tablet Take 1 tablet (10 mg total) by mouth every evening. 90 tablet 3    pantoprazole (PROTONIX) 20 MG tablet Take 1 tablet (20 mg total) by mouth once daily. 90 tablet 3    peg 400-propylene glycol 0.4-0.3 % Drop Place 2 drops into both eyes 2 (two) times daily as needed (dry eyes).      pregabalin (LYRICA) 75 MG capsule Take 75 mg by mouth 2 (two) times daily.      SODIUM CHLORIDE (SIMPLY SALINE NASL) 1 spray by Nasal route 2 (two) times daily as needed (allergies).      tacrolimus (PROTOPIC) 0.1 % ointment AAA face daily to BID PRN rash 30 g 1    tramadol (ULTRAM) 50 mg tablet Take 50 mg by mouth every 12 (twelve) hours as needed for Pain.      UNABLE TO FIND Take 1 tablet by mouth once daily. Ultra triple action joint health

## 2025-03-12 NOTE — PROGRESS NOTES
Attempted echo at 9am, patient was unavailable at this time. Provider is in room with patient at this time.  Will try back.

## 2025-03-12 NOTE — ASSESSMENT & PLAN NOTE
Acute, new onset  - cardiology consulted: appreciate recommendations  - tele  - FD lovenox ordered  - spontaneously converted to NSR after dilt was given by EMS  - continue home metoprolol  - ECHO ordered

## 2025-03-12 NOTE — H&P
Formerly [facility] - Emergency Dept  Hospital Medicine  History & Physical    Patient Name: Megha Ladd  MRN: [id number]  Patient Class: OP- Observation  Admission Date: 3/12/2025  Attending Physician: Shanda Davis DO   Primary Care Provider: Zhanna Gandara MD         Patient information was obtained from patient, past medical records, and ER records.     Subjective:     Principal Problem:Atrial fibrillation and flutter    Chief Complaint:   Chief Complaint   Patient presents with    Chest Pain     X1 hr with palpitations. 324 aspirin, 1 spray NTG, 20 cardizem         HPI: Megha Ladd is a 79 y.o. y.o. female with a PMHx of asthma, GERD, HTN who presented to the ED with chest pain and palpitations onset x 4:00 a.m..  She was described chest pain as an aching and pulling sensation that started in the center of her chest and went to shoulders and neck.  Pain was constant and waxed and waned in intensity.  She also reported associated nausea, shortness of breath, palpitations and diaphoresis.  Pain was 8/10 at its worst and denies pain currently.  Patient was found to be in AFib RVR via EMS.  She received diltiazem and nitroglycerin in transit.  Chest pain was relieved with nitro.  Upon arrival was in normal sinus rhythm.  Denies chest pain currently.  States she was followed by cardiologist many years ago but is not seen by Cardiology currently.  Nonsmoker.  Denies EtOH use. ED workup was significant for hypokalemia and hypomagnesemia. Cardiology was consulted. Hospital Medicine was consulted for further workup and management of the patient.              Past Medical History:   Diagnosis Date    Asthma     Basal cell carcinoma     GERD (gastroesophageal reflux disease)     Hyperlipidemia     Hypertension     Hypothyroid     Lumbago     Neuromuscular disorder     Sinusitis     Ulcer        Past Surgical History:   Procedure Laterality Date    ADENOIDECTOMY  1952    Tonsils and adenoids removed     APPENDECTOMY       SECTION      HYSTERECTOMY  ?    Partial hysterectomy    LUMBAR EPIDURAL INJECTION      PARTIAL HYSTERECTOMY      TONSILLECTOMY      Tonsils and adenoids removed    TUBAL LIGATION         Review of patient's allergies indicates:   Allergen Reactions    Zoster vaccine live Other (See Comments)     Headache, neck shoulder, body flu sx, sore throat, nausea, dry cough, fatigue     Amlodipine Diarrhea, Nausea Only and Other (See Comments)     Joint pain    Augmentin [amoxicillin-pot clavulanate]     Azithromycin      Other reaction(s): Unknown    Budesonide      Other reaction(s): heartburn  Other reaction(s): Rash    Cephalexin      Other reaction(s): Unknown    Ciprofloxacin      Other reaction(s): Unknown    Erythromycin      Other reaction(s): Unknown    Esomeprazole magnesium      Other reaction(s): Headache    Felodipine      Nausea, raw throat, ulcer tongue, myalgia    Levofloxacin      Other reaction(s): tendonitis    Talwin compound      Other reaction(s): Vomiting  Other reaction(s): Hallucinations    Hydrocodone-acetaminophen Rash     Other reaction(s): Nausea       No current facility-administered medications on file prior to encounter.     Current Outpatient Medications on File Prior to Encounter   Medication Sig    acetaminophen (TYLENOL) 500 MG tablet Take 500 mg by mouth 2 (two) times a day.    ADVAIR -21 mcg/actuation HFAA inhaler INHALE 2 PUFFS INTO THE LUNGS 2 TIMES DAILY. CONTROLLER (Patient taking differently: Inhale 2 puffs into the lungs 2 (two) times a day.)    cetirizine (ZYRTEC) 10 MG tablet Take 10 mg by mouth daily as needed for Allergies.    cloNIDine (CATAPRES) 0.1 MG tablet Take 1 tablet if systolic above 180 or diastolic above 105. Can repeat after 2 hours if bp remains above 180 or 105. (Patient taking differently: Take 0.1 mg by mouth once as needed (/  +). Take 1 tablet if systolic above 180 or diastolic above 105. Can repeat  after 2 hours if bp remains above 180 or 105.)    fluticasone (FLONASE) 50 mcg/actuation nasal spray SPRAY 1 SPRAY IN EACH NOSTRIL 2 TIMES A DAY (Patient taking differently: 1 spray by Each Nostril route every evening.)    hydroCHLOROthiazide 12.5 MG Tab Take 1 tablet (12.5 mg total) by mouth once daily.    meclizine (ANTIVERT) 12.5 mg tablet Take 1 tablet (12.5 mg total) by mouth 3 (three) times daily as needed for Dizziness.    metaxalone (SKELAXIN) 800 MG tablet Take 800 mg by mouth every 6 (six) hours as needed (muscle pain).    metoprolol tartrate (LOPRESSOR) 50 MG tablet TAKE 1 & 1/2 (ONE & ONE-HALF) TABLETS (75mg) BY MOUTH TWICE DAILY (Patient taking differently: Take 75 mg by mouth 2 (two) times daily. TAKE 1 & 1/2 (ONE & ONE-HALF) TABLETS (75mg) BY MOUTH TWICE DAILY)    montelukast (SINGULAIR) 10 mg tablet Take 1 tablet (10 mg total) by mouth every evening.    pantoprazole (PROTONIX) 20 MG tablet Take 1 tablet (20 mg total) by mouth once daily.    peg 400-propylene glycol 0.4-0.3 % Drop Place 2 drops into both eyes 2 (two) times daily as needed (dry eyes).    pregabalin (LYRICA) 75 MG capsule Take 75 mg by mouth 2 (two) times daily.    SODIUM CHLORIDE (SIMPLY SALINE NASL) 1 spray by Nasal route 2 (two) times daily as needed (allergies).    tramadol (ULTRAM) 50 mg tablet Take 50 mg by mouth every 12 (twelve) hours as needed for Pain.    ketoconazole (NIZORAL) 2 % shampoo Wash hair with medicated shampoo at least 2x/week - let sit on scalp at least 5 minutes prior to rinsing (Patient not taking: Reported on 3/12/2025)    [DISCONTINUED] hydrocortisone 2.5 % cream Thin film to AA onface daily PRN flare; use short term only    [DISCONTINUED] metroNIDAZOLE (NORITATE) 1 % cream Compound azelaic acid 15% + ivermectin 1% + metronidazole 1% cream. Apply to face once or twice daily.    [DISCONTINUED] tacrolimus (PROTOPIC) 0.1 % ointment AAA face daily to BID PRN rash    [DISCONTINUED] UNABLE TO FIND Take 1 tablet by  mouth once daily. Ultra triple action joint health     Family History       Problem Relation (Age of Onset)    Allergy (severe) Daughter    Arthritis Sister, Mother, Sister    COPD Sister    Depression Brother, Mother    Early death Father (51)    Emphysema Sister    Hearing loss Mother    Heart disease Father, Sister, Sister    Hypertension Sister, Brother    Miscarriages / Stillbirths Daughter    Ulcers Brother          Tobacco Use    Smoking status: Never    Smokeless tobacco: Never   Substance and Sexual Activity    Alcohol use: No    Drug use: No    Sexual activity: Not Currently     Partners: Male     Birth control/protection: Post-menopausal     Review of Systems   Constitutional:  Negative for fever.   Respiratory:  Positive for shortness of breath.    Cardiovascular:  Positive for chest pain and palpitations.   Gastrointestinal:  Negative for abdominal pain and nausea.   Genitourinary:  Negative for difficulty urinating, dysuria and hematuria.   Neurological:  Negative for light-headedness and headaches.     Objective:     Vital Signs (Most Recent):  Temp: 98.1 °F (36.7 °C) (03/12/25 0557)  Pulse: 61 (03/12/25 1127)  Resp: 20 (03/12/25 1127)  BP: (!) 155/72 (03/12/25 1101)  SpO2: 97 % (03/12/25 1127) Vital Signs (24h Range):  Temp:  [98.1 °F (36.7 °C)] 98.1 °F (36.7 °C)  Pulse:  [58-66] 61  Resp:  [16-23] 20  SpO2:  [96 %-99 %] 97 %  BP: (123-163)/(64-78) 155/72     Weight: 74.4 kg (164 lb)  Body mass index is 25.69 kg/m².     Physical Exam  Vitals and nursing note reviewed.   Constitutional:       General: She is not in acute distress.     Appearance: Normal appearance. She is normal weight.   HENT:      Head: Normocephalic and atraumatic.      Mouth/Throat:      Mouth: Mucous membranes are moist.      Pharynx: Oropharynx is clear.   Eyes:      Extraocular Movements: Extraocular movements intact.   Cardiovascular:      Rate and Rhythm: Normal rate and regular rhythm.   Pulmonary:      Effort: Pulmonary  effort is normal.      Breath sounds: Normal breath sounds.   Abdominal:      General: Bowel sounds are normal.      Palpations: Abdomen is soft.      Tenderness: There is no abdominal tenderness. There is no guarding or rebound.   Musculoskeletal:      Cervical back: Normal range of motion and neck supple.   Skin:     General: Skin is warm.   Neurological:      General: No focal deficit present.      Mental Status: She is alert and oriented to person, place, and time. Mental status is at baseline.   Psychiatric:         Mood and Affect: Mood normal.         Behavior: Behavior normal.                Significant Labs: All pertinent labs within the past 24 hours have been reviewed.  CBC:   Recent Labs   Lab 03/12/25  0625   WBC 12.28   HGB 11.8*   HCT 36.2*        CMP:   Recent Labs   Lab 03/12/25  0625   *   K 3.0*   CL 95   CO2 29   *   BUN 14   CREATININE 0.8   CALCIUM 9.0   PROT 6.6   ALBUMIN 3.7   BILITOT 0.7   ALKPHOS 74   AST 14   ALT 5*   ANIONGAP 11     Cardiac Markers:   Recent Labs   Lab 03/12/25  0625   *     Magnesium:   Recent Labs   Lab 03/12/25  0625   MG 1.5*     Troponin:   Recent Labs   Lab 03/12/25  0625 03/12/25  0858   TROPONINIHS 8.8 195.8*       Significant Imaging: I have reviewed all pertinent imaging results/findings within the past 24 hours.    CXR:  Stable chest radiograph.  No acute pulmonary process.   Assessment/Plan:     * Atrial fibrillation and flutter  Acute, new onset  - cardiology consulted: appreciate recommendations  - tele  - FD lovenox ordered  - spontaneously converted to NSR after dilt was given by EMS  - continue home metoprolol  - ECHO ordered          Chest pain  Acute:  - cardiology consulted: appreciate recommendations  - tele  - initial trop: 8.8  - repeat trop: 195.8  - on FD Lovenox for AF continue per cards recs   - ECHO ordered      Hypomagnesemia  Patient has Abnormal Magnesium: hypomagnesemia. Will continue to monitor electrolytes  closely. Will replace the affected electrolytes and repeat labs to be done after interventions completed. The patient's magnesium results have been reviewed and are listed below.  Recent Labs   Lab 03/12/25  0625   MG 1.5*        Hypokalemia  Patient's most recent potassium results are listed below.   Recent Labs     03/12/25  0625   K 3.0*     Plan  - Replete potassium per protocol  - Monitor potassium Daily  - Patient's hypokalemia is stable  - K replaced in ED    GERD (gastroesophageal reflux disease)  Noted:  - Pepcid ordered       HTN (hypertension)  Patient's blood pressure range in the last 24 hours was: BP  Min: 123/64  Max: 163/78.The patient's inpatient anti-hypertensive regimen is listed below:  Current Antihypertensives  hydroCHLOROthiazide tablet 12.5 mg, Daily, Oral  metoprolol tartrate tablet 75 mg, 2 times daily, Oral    Plan  - BP is controlled, no changes needed to their regimen        VTE Risk Mitigation (From admission, onward)           Ordered     enoxaparin injection 70 mg  Every 12 hours         03/12/25 1131                         On 03/12/2025, patient should be placed in hospital observation services under my care in collaboration with Dr. Davis.      Page Samayoa PA-C  Department of Hospital Medicine  [facility] - Emergency Dept

## 2025-03-12 NOTE — PHARMACY MED REC
"              .        Admission Medication History     The home medication history was taken by Kamila Tena.    You may go to "Admission" then "Reconcile Home Medications" tabs to review and/or act upon these items.     The home medication list has been updated by the Pharmacy department.   Please read ALL comments highlighted in yellow.   Please address this information as you see fit.    Feel free to contact us if you have any questions or require assistance.      The medications listed below were removed from the home medication list. Please reorder if appropriate:  Patient reports no longer taking the following medication(s):  Hydrocortisone Cream  Metronidazole Cream  Tacrolimus Ointment  Ultra Triple Action Joint Health    Medications listed below were obtained from: Patient/family and Analytic software- [employer]  Medications Ordered Prior to Encounter[1]    Potential issues to be addressed PRIOR TO DISCHARGE  Patient reported not taking the following medications: (Ketoconazole Shampoo). These medications remain on the home medication list. Please address accordingly.     Kamila Tena  EXT 1921           [1]   No current facility-administered medications on file prior to encounter.     Current Outpatient Medications on File Prior to Encounter   Medication Sig Dispense Refill    acetaminophen (TYLENOL) 500 MG tablet Take 500 mg by mouth 2 (two) times a day.      ADVAIR -21 mcg/actuation HFAA inhaler INHALE 2 PUFFS INTO THE LUNGS 2 TIMES DAILY. CONTROLLER (Patient taking differently: Inhale 2 puffs into the lungs 2 (two) times a day.) 36 g 3    cetirizine (ZYRTEC) 10 MG tablet Take 10 mg by mouth daily as needed for Allergies.      cloNIDine (CATAPRES) 0.1 MG tablet Take 1 tablet if systolic above 180 or diastolic above 105. Can repeat after 2 hours if bp remains above 180 or 105. (Patient taking differently: Take 0.1 mg by mouth once as needed (/  +). Take 1 tablet if systolic " above 180 or diastolic above 105. Can repeat after 2 hours if bp remains above 180 or 105.) 30 tablet 0    fluticasone (FLONASE) 50 mcg/actuation nasal spray SPRAY 1 SPRAY IN EACH NOSTRIL 2 TIMES A DAY (Patient taking differently: 1 spray by Each Nostril route every evening.) 16 g 11    hydroCHLOROthiazide 12.5 MG Tab Take 1 tablet (12.5 mg total) by mouth once daily. 30 tablet 2    meclizine (ANTIVERT) 12.5 mg tablet Take 1 tablet (12.5 mg total) by mouth 3 (three) times daily as needed for Dizziness. 60 tablet 2    metaxalone (SKELAXIN) 800 MG tablet Take 800 mg by mouth every 6 (six) hours as needed (muscle pain).      metoprolol tartrate (LOPRESSOR) 50 MG tablet TAKE 1 & 1/2 (ONE & ONE-HALF) TABLETS (75mg) BY MOUTH TWICE DAILY (Patient taking differently: Take 75 mg by mouth 2 (two) times daily. TAKE 1 & 1/2 (ONE & ONE-HALF) TABLETS (75mg) BY MOUTH TWICE DAILY) 270 tablet 3    montelukast (SINGULAIR) 10 mg tablet Take 1 tablet (10 mg total) by mouth every evening. 90 tablet 3    pantoprazole (PROTONIX) 20 MG tablet Take 1 tablet (20 mg total) by mouth once daily. 90 tablet 3    peg 400-propylene glycol 0.4-0.3 % Drop Place 2 drops into both eyes 2 (two) times daily as needed (dry eyes).      pregabalin (LYRICA) 75 MG capsule Take 75 mg by mouth 2 (two) times daily.      SODIUM CHLORIDE (SIMPLY SALINE NASL) 1 spray by Nasal route 2 (two) times daily as needed (allergies).      tramadol (ULTRAM) 50 mg tablet Take 50 mg by mouth every 12 (twelve) hours as needed for Pain.      ketoconazole (NIZORAL) 2 % shampoo Wash hair with medicated shampoo at least 2x/week - let sit on scalp at least 5 minutes prior to rinsing (Patient not taking: Reported on 3/12/2025) 120 mL 5    [DISCONTINUED] hydrocortisone 2.5 % cream Thin film to AA onface daily PRN flare; use short term only 28 g 1    [DISCONTINUED] metroNIDAZOLE (NORITATE) 1 % cream Compound azelaic acid 15% + ivermectin 1% + metronidazole 1% cream. Apply to face once  or twice daily. 30 g 5    [DISCONTINUED] tacrolimus (PROTOPIC) 0.1 % ointment AAA face daily to BID PRN rash 30 g 1    [DISCONTINUED] UNABLE TO FIND Take 1 tablet by mouth once daily. Ultra triple action joint health

## 2025-03-12 NOTE — MED STUDENT SUBJECTIVE & OBJECTIVE
[facility] medicine   History and Physical       Patient Name: Megha Ladd  : 1945   Date of Admission: 3/12/25  MRN: [id number]  Attending physician: Shanda Davis DO  PCP: Zhanna Gandara MD    Patient information was obtained from patient, past medical records, ER records, and ER MD.   Subjective    Chief Complaint: Bilateral shoulder blade pain radiation to collarbones, jaw, spine, and hip     HPI:  78 yo female with PMH of HTN, asthma, fibromyalgia, and GERD presents to the ED with complaints of intermittent bilateral shoulder blade pain that radiates between her shoulder blades, down her spine, to her collarbones and jaw, and down her hip. She describes the pain as achey and like a pulling sensation. She reports that the pain started this morning around 4 am along with palpitations and SOB. She says she did take her metoprolol before these symptoms appeared. Patient rates the pain 8/10 at its worst and 6-7/10 at other times. Patient states walking makes pain worse. She says this has never happened before. Patient reports nausea with sitting up, SOB, palpitations, and being diaphoretic during this episode. She denies fever, chills. Patient denies hx of smoking. Patient took 4 aspirin at home and received IV diltiazem and SL NTG in EMS. She states SL NTG improved her pain significantly and now does not feel any symptoms.     PMH:  Active Ambulatory Problems     Diagnosis Date Noted    Alopecia 2012    Hypothyroid 2012    Fatigue 2012    HTN (hypertension) 2012    GERD (gastroesophageal reflux disease) 2012    Hyponatremia 2012    Constipation - functional 2013    Asthma 2014    Good hypertension control 2014    Hyperlipidemia 2014    Primary insomnia 2020    Tortuous aorta- on xray 2023    Chronic pain 2023    Chest pain 10/25/2023    Heart palpitations 10/25/2023     Resolved Ambulatory Problems      Diagnosis Date Noted    Breast cancer screening 2012    Hyperlipemia 2012    Abdominal pain, other specified site 2013     Past Medical History:   Diagnosis Date    Asthma     Basal cell carcinoma     Hypertension     Lumbago     Neuromuscular disorder     Sinusitis     Ulcer        PSH:  Past Surgical History:   Procedure Laterality Date    ADENOIDECTOMY      Tonsils and adenoids removed    APPENDECTOMY       SECTION      HYSTERECTOMY  ?    Partial hysterectomy    LUMBAR EPIDURAL INJECTION      PARTIAL HYSTERECTOMY      TONSILLECTOMY      Tonsils and adenoids removed    TUBAL LIGATION       Review of patient's allergies indicates:   Allergen Reactions    Zoster vaccine live Other (See Comments)     Headache, neck shoulder, body flu sx, sore throat, nausea, dry cough, fatigue     Amlodipine Diarrhea, Nausea Only and Other (See Comments)     Joint pain    Augmentin [amoxicillin-pot clavulanate]     Azithromycin      Other reaction(s): Unknown    Budesonide      Other reaction(s): heartburn  Other reaction(s): Rash    Cephalexin      Other reaction(s): Unknown    Ciprofloxacin      Other reaction(s): Unknown    Erythromycin      Other reaction(s): Unknown    Esomeprazole magnesium      Other reaction(s): Headache    Felodipine      Nausea, raw throat, ulcer tongue, myalgia    Levofloxacin      Other reaction(s): tendonitis    Talwin compound      Other reaction(s): Vomiting  Other reaction(s): Hallucinations    Hydrocodone-acetaminophen Rash     Other reaction(s): Nausea        Family History   Problem Relation Name Age of Onset    Heart disease Father Tae Nolan     Early death Father Tae Nolan 51    Heart disease Sister Sadie Kyle     Hypertension Sister Sadie Kyle     Emphysema Sister Sadie Kyle     Arthritis Sister Sadie Kyle     COPD Sister Sadie Kyle     Hypertension Brother Tae Nolan Jr.     Ulcers Brother Tae Nolan Jr.      Depression Brother Tae GARCIAUmer Nolan Jr.     Miscarriages / Stillbirths Daughter          still birth    Allergy (severe) Daughter      Arthritis Mother Anita Nolan     Depression Mother Anita Nolan     Hearing loss Mother Anita Nolan     Arthritis Sister Nusrat Nickerson     Heart disease Sister Nusrat Nickerson     Allergic rhinitis Neg Hx      Allergies Neg Hx      Angioedema Neg Hx      Asthma Neg Hx      Atopy Neg Hx      Eczema Neg Hx      Immunodeficiency Neg Hx      Rhinitis Neg Hx      Urticaria Neg Hx          SH:  Denies smoking   Denies alcohol use    Review of Systems   Constitutional:  Positive for diaphoresis. Negative for chills and fever.   Respiratory:  Positive for shortness of breath. Negative for cough.    Cardiovascular:  Positive for chest pain and palpitations.   Gastrointestinal:  Positive for nausea. Negative for vomiting.      Objective      Vitals:    03/12/25 1200   BP: (!) 153/70   Pulse: 64   Resp: (!) 23   Temp:       Weight: 74.4 kg   Body mass index: 25.69 kg/m^2    Physical Exam  Constitutional:       Appearance: Normal appearance.   HENT:      Head: Normocephalic and atraumatic.      Mouth/Throat:      Mouth: Mucous membranes are moist.   Eyes:      Extraocular Movements: Extraocular movements intact.   Cardiovascular:      Rate and Rhythm: Normal rate and regular rhythm.   Pulmonary:      Effort: Pulmonary effort is normal.      Breath sounds: Normal breath sounds.   Skin:     General: Skin is warm and dry.   Neurological:      General: No focal deficit present.      Mental Status: She is alert and oriented to person, place, and time.   Psychiatric:         Mood and Affect: Mood normal.         Behavior: Behavior normal.         Thought Content: Thought content normal.      Significant labs: I have reviewed all significant labs in the past 24 hours    CBC Latest Reference Range & Units 03/12/25 06:25   WBC 3.90 - 12.70 K/uL 12.28   RBC 4.00 - 5.40 M/uL 3.86 (L)    Hemoglobin 12.0 - 16.0 g/dL 11.8 (L)   Hematocrit 37.0 - 48.5 % 36.2 (L)   MCV 82 - 98 fL 94   MCH 27.0 - 31.0 pg 30.6   MCHC 32.0 - 36.0 g/dL 32.6   RDW 11.5 - 14.5 % 13.6   Platelet Count 150 - 450 K/uL 270   MPV 9.2 - 12.9 fL 11.3   Gran % 38.0 - 73.0 % 62.9   Lymph % 18.0 - 48.0 % 26.5   Mono % 4.0 - 15.0 % 6.9   Eos % 0.0 - 8.0 % 2.8   Basophil % 0.0 - 1.9 % 0.6   Immature Granulocytes 0.0 - 0.5 % 0.3   Gran # (ANC) 1.8 - 7.7 K/uL 7.7   Lymph # 1.0 - 4.8 K/uL 3.3   Mono # 0.3 - 1.0 K/uL 0.9   Eos # 0.0 - 0.5 K/uL 0.3   Baso # 0.00 - 0.20 K/uL 0.07   Immature Grans (Abs) 0.00 - 0.04 K/uL 0.04   nRBC 0 /100 WBC 0   Chemp Panel  3/12/25   Sodium 136 - 145 mmol/L 135 (L)   Potassium 3.5 - 5.1 mmol/L 3.0 (L)   Chloride 95 - 110 mmol/L 95   CO2 23 - 29 mmol/L 29   Anion Gap 8 - 16 mmol/L 11   BUN 8 - 23 mg/dL 14   Creatinine 0.5 - 1.4 mg/dL 0.8   eGFR >60 mL/min/1.73 m^2 >60.0   Glucose 70 - 110 mg/dL 140 (H)   Calcium 8.7 - 10.5 mg/dL 9.0   Magnesium  1.6 - 2.6 mg/dL 1.5 (L)   ALP 55 - 135 U/L 74   PROTEIN TOTAL 6.0 - 8.4 g/dL 6.6   Albumin 3.5 - 5.2 g/dL 3.7   BILIRUBIN TOTAL 0.1 - 1.0 mg/dL 0.7   AST 10 - 40 U/L 14   ALT 10 - 44 U/L 5 (L)   Cardiac Profile   3/12/25   BNP 0 - 99 pg/mL 470 (H)   Troponin I High Sensitivity 0.0 - 14.9 pg/mL 8.8   (L): Data is abnormally low  (H): Data is abnormally high    Imaging: All pertinent imaging in the past 24 hours has been reviewed    XR chest AP    Status: Final result       Next appt: 03/14/2025 at 04:00 PM in Family Medicine (Kelly Cornelius PA-C)    Test Result Released: Yes (not seen)    0 Result Notes  Details    Reading Physician Reading Date Result Priority   Lenny Blackburn MD  [phone]  3/12/2025 STAT     Narrative & Impression  EXAMINATION:  XR CHEST AP PORTABLE     CLINICAL HISTORY:  Chest Pain;     COMPARISON:  02/20/2025     FINDINGS:  Cardiac silhouette size is borderline enlarged for portable technique and stable compared to prior.   Atherosclerotic calcification of the aorta.  No pulmonary edema or confluent airspace disease.  No large pleural effusion or pneumothorax.  No acute osseous abnormality.     Impression:     Stable chest radiograph.  No acute pulmonary process.        Electronically signed by:Lenny Blackburn  Date:                                            03/12/2025  Time:                                           06:57        Exam Ended: 03/12/25 06:40 CDT Last Resulted: 03/12/25 06:57 CDT     Assessment/Plan:    Chest Pain  - getting admitted for new onset AFIB with RVR and chest pain with elevated troponins  - SL NTG given in EMS    - consult with cardiology placed; cardiology recommends stress test in am  - Echo ordered; awaiting results  - trend troponins x 4 hours     Cardiac Profile   3/12/25  06:25 am 03/12/25  08:58   BNP 0 - 99 pg/mL 470 (H)    Troponin I High Sensitivity 0.0 - 14.9 pg/mL 8.8 195.8 (H)     New onset Afib   - diltiazem IV given in EMS  - currently not in Afib  - consult with cardiology   - hold nightly flonase   - monitor vital signs Q2 hours     Hypokalemia  - Start IV potassium after administration of IV mag sulfate   - Monitor CBC daily   - cards recommends changing diuretic to potassium sparing    Hypomagnesemia   - Start IV magnesium sulfate   - monitor CBC daily     Hypertension  - continue home regimen    - check vitals Q8 hours     Asthma  - continue home regimen     GERD  - continue home regimen      ROBBIE RamosS

## 2025-03-12 NOTE — SUBJECTIVE & OBJECTIVE
Past Medical History:   Diagnosis Date    Asthma     Basal cell carcinoma     GERD (gastroesophageal reflux disease)     Hyperlipidemia     Hypertension     Hypothyroid     Lumbago     Neuromuscular disorder     Sinusitis     Ulcer        Past Surgical History:   Procedure Laterality Date    ADENOIDECTOMY      Tonsils and adenoids removed    APPENDECTOMY       SECTION      HYSTERECTOMY  ?    Partial hysterectomy    LUMBAR EPIDURAL INJECTION      PARTIAL HYSTERECTOMY      TONSILLECTOMY      Tonsils and adenoids removed    TUBAL LIGATION         Review of patient's allergies indicates:   Allergen Reactions    Zoster vaccine live Other (See Comments)     Headache, neck shoulder, body flu sx, sore throat, nausea, dry cough, fatigue     Amlodipine Diarrhea, Nausea Only and Other (See Comments)     Joint pain    Augmentin [amoxicillin-pot clavulanate]     Azithromycin      Other reaction(s): Unknown    Budesonide      Other reaction(s): heartburn  Other reaction(s): Rash    Cephalexin      Other reaction(s): Unknown    Ciprofloxacin      Other reaction(s): Unknown    Erythromycin      Other reaction(s): Unknown    Esomeprazole magnesium      Other reaction(s): Headache    Felodipine      Nausea, raw throat, ulcer tongue, myalgia    Levofloxacin      Other reaction(s): tendonitis    Talwin compound      Other reaction(s): Vomiting  Other reaction(s): Hallucinations    Hydrocodone-acetaminophen Rash     Other reaction(s): Nausea       No current facility-administered medications on file prior to encounter.     Current Outpatient Medications on File Prior to Encounter   Medication Sig    acetaminophen (TYLENOL) 500 MG tablet Take 500 mg by mouth 2 (two) times a day.    ADVAIR -21 mcg/actuation HFAA inhaler INHALE 2 PUFFS INTO THE LUNGS 2 TIMES DAILY. CONTROLLER (Patient taking differently: Inhale 2 puffs into the lungs 2 (two) times a day.)    cetirizine (ZYRTEC) 10 MG tablet Take 10 mg by  mouth daily as needed for Allergies.    cloNIDine (CATAPRES) 0.1 MG tablet Take 1 tablet if systolic above 180 or diastolic above 105. Can repeat after 2 hours if bp remains above 180 or 105. (Patient taking differently: Take 0.1 mg by mouth once as needed (/  +). Take 1 tablet if systolic above 180 or diastolic above 105. Can repeat after 2 hours if bp remains above 180 or 105.)    fluticasone (FLONASE) 50 mcg/actuation nasal spray SPRAY 1 SPRAY IN EACH NOSTRIL 2 TIMES A DAY (Patient taking differently: 1 spray by Each Nostril route every evening.)    hydroCHLOROthiazide 12.5 MG Tab Take 1 tablet (12.5 mg total) by mouth once daily.    meclizine (ANTIVERT) 12.5 mg tablet Take 1 tablet (12.5 mg total) by mouth 3 (three) times daily as needed for Dizziness.    metaxalone (SKELAXIN) 800 MG tablet Take 800 mg by mouth every 6 (six) hours as needed (muscle pain).    metoprolol tartrate (LOPRESSOR) 50 MG tablet TAKE 1 & 1/2 (ONE & ONE-HALF) TABLETS (75mg) BY MOUTH TWICE DAILY (Patient taking differently: Take 75 mg by mouth 2 (two) times daily. TAKE 1 & 1/2 (ONE & ONE-HALF) TABLETS (75mg) BY MOUTH TWICE DAILY)    montelukast (SINGULAIR) 10 mg tablet Take 1 tablet (10 mg total) by mouth every evening.    pantoprazole (PROTONIX) 20 MG tablet Take 1 tablet (20 mg total) by mouth once daily.    peg 400-propylene glycol 0.4-0.3 % Drop Place 2 drops into both eyes 2 (two) times daily as needed (dry eyes).    pregabalin (LYRICA) 75 MG capsule Take 75 mg by mouth 2 (two) times daily.    SODIUM CHLORIDE (SIMPLY SALINE NASL) 1 spray by Nasal route 2 (two) times daily as needed (allergies).    tramadol (ULTRAM) 50 mg tablet Take 50 mg by mouth every 12 (twelve) hours as needed for Pain.    ketoconazole (NIZORAL) 2 % shampoo Wash hair with medicated shampoo at least 2x/week - let sit on scalp at least 5 minutes prior to rinsing (Patient not taking: Reported on 3/12/2025)    [DISCONTINUED] hydrocortisone 2.5 % cream  Thin film to AA onface daily PRN flare; use short term only    [DISCONTINUED] metroNIDAZOLE (NORITATE) 1 % cream Compound azelaic acid 15% + ivermectin 1% + metronidazole 1% cream. Apply to face once or twice daily.    [DISCONTINUED] tacrolimus (PROTOPIC) 0.1 % ointment AAA face daily to BID PRN rash    [DISCONTINUED] UNABLE TO FIND Take 1 tablet by mouth once daily. Ultra triple action joint health     Family History       Problem Relation (Age of Onset)    Allergy (severe) Daughter    Arthritis Sister, Mother, Sister    COPD Sister    Depression Brother, Mother    Early death Father (51)    Emphysema Sister    Hearing loss Mother    Heart disease Father, Sister, Sister    Hypertension Sister, Brother    Miscarriages / Stillbirths Daughter    Ulcers Brother          Tobacco Use    Smoking status: Never    Smokeless tobacco: Never   Substance and Sexual Activity    Alcohol use: No    Drug use: No    Sexual activity: Not Currently     Partners: Male     Birth control/protection: Post-menopausal     Review of Systems   Constitutional:  Negative for fever.   Respiratory:  Positive for shortness of breath.    Cardiovascular:  Positive for chest pain and palpitations.   Gastrointestinal:  Negative for abdominal pain and nausea.   Genitourinary:  Negative for difficulty urinating, dysuria and hematuria.   Neurological:  Negative for light-headedness and headaches.     Objective:     Vital Signs (Most Recent):  Temp: 98.1 °F (36.7 °C) (03/12/25 0557)  Pulse: 61 (03/12/25 1127)  Resp: 20 (03/12/25 1127)  BP: (!) 155/72 (03/12/25 1101)  SpO2: 97 % (03/12/25 1127) Vital Signs (24h Range):  Temp:  [98.1 °F (36.7 °C)] 98.1 °F (36.7 °C)  Pulse:  [58-66] 61  Resp:  [16-23] 20  SpO2:  [96 %-99 %] 97 %  BP: (123-163)/(64-78) 155/72     Weight: 74.4 kg (164 lb)  Body mass index is 25.69 kg/m².     Physical Exam  Vitals and nursing note reviewed.   Constitutional:       General: She is not in acute distress.     Appearance: Normal  appearance. She is normal weight.   HENT:      Head: Normocephalic and atraumatic.      Mouth/Throat:      Mouth: Mucous membranes are moist.      Pharynx: Oropharynx is clear.   Eyes:      Extraocular Movements: Extraocular movements intact.   Cardiovascular:      Rate and Rhythm: Normal rate and regular rhythm.   Pulmonary:      Effort: Pulmonary effort is normal.      Breath sounds: Normal breath sounds.   Abdominal:      General: Bowel sounds are normal.      Palpations: Abdomen is soft.      Tenderness: There is no abdominal tenderness. There is no guarding or rebound.   Musculoskeletal:      Cervical back: Normal range of motion and neck supple.   Skin:     General: Skin is warm.   Neurological:      General: No focal deficit present.      Mental Status: She is alert and oriented to person, place, and time. Mental status is at baseline.   Psychiatric:         Mood and Affect: Mood normal.         Behavior: Behavior normal.                Significant Labs: All pertinent labs within the past 24 hours have been reviewed.  CBC:   Recent Labs   Lab 03/12/25  0625   WBC 12.28   HGB 11.8*   HCT 36.2*        CMP:   Recent Labs   Lab 03/12/25  0625   *   K 3.0*   CL 95   CO2 29   *   BUN 14   CREATININE 0.8   CALCIUM 9.0   PROT 6.6   ALBUMIN 3.7   BILITOT 0.7   ALKPHOS 74   AST 14   ALT 5*   ANIONGAP 11     Cardiac Markers:   Recent Labs   Lab 03/12/25  0625   *     Magnesium:   Recent Labs   Lab 03/12/25  0625   MG 1.5*     Troponin:   Recent Labs   Lab 03/12/25  0625 03/12/25  0858   TROPONINIHS 8.8 195.8*       Significant Imaging: I have reviewed all pertinent imaging results/findings within the past 24 hours.    CXR:  Stable chest radiograph.  No acute pulmonary process.

## 2025-03-12 NOTE — ASSESSMENT & PLAN NOTE
Patient's most recent potassium results are listed below.   Recent Labs     03/12/25  0625   K 3.0*     Plan  - Replete potassium per protocol  - Monitor potassium Daily  - Patient's hypokalemia is stable  - K replaced in ED

## 2025-03-12 NOTE — PT/OT/SLP PROGRESS
Occupational Therapy      Patient Name:  Megha Ladd   MRN:  [id number]    Patient not seen today secondary to Other (Comment) (awaiting for H&P note or cardiac note. Pt's troponin elevated and with chest pain). Will follow-up next available date.    3/12/2025

## 2025-03-13 ENCOUNTER — CLINICAL SUPPORT (OUTPATIENT)
Dept: CARDIOLOGY | Facility: HOSPITAL | Age: 80
End: 2025-03-13
Attending: EMERGENCY MEDICINE
Payer: MEDICARE

## 2025-03-13 ENCOUNTER — TELEPHONE (OUTPATIENT)
Dept: FAMILY MEDICINE | Facility: CLINIC | Age: 80
End: 2025-03-13
Payer: MEDICARE

## 2025-03-13 LAB
ALBUMIN SERPL BCP-MCNC: 3.4 G/DL (ref 3.5–5.2)
ALP SERPL-CCNC: 63 U/L (ref 55–135)
ALT SERPL W/O P-5'-P-CCNC: 5 U/L (ref 10–44)
ANION GAP SERPL CALC-SCNC: 8 MMOL/L (ref 8–16)
AST SERPL-CCNC: 12 U/L (ref 10–40)
BASOPHILS # BLD AUTO: 0.06 K/UL (ref 0–0.2)
BASOPHILS NFR BLD: 0.6 % (ref 0–1.9)
BILIRUB SERPL-MCNC: 0.8 MG/DL (ref 0.1–1)
BUN SERPL-MCNC: 16 MG/DL (ref 8–23)
CALCIUM SERPL-MCNC: 8.7 MG/DL (ref 8.7–10.5)
CHLORIDE SERPL-SCNC: 95 MMOL/L (ref 95–110)
CO2 SERPL-SCNC: 31 MMOL/L (ref 23–29)
CREAT SERPL-MCNC: 1 MG/DL (ref 0.5–1.4)
CV PHARM DOSE: 0.4 MG
CV STRESS BASE HR: 68 BPM
DIASTOLIC BLOOD PRESSURE: 101 MMHG
DIFFERENTIAL METHOD BLD: ABNORMAL
EOSINOPHIL # BLD AUTO: 0.4 K/UL (ref 0–0.5)
EOSINOPHIL NFR BLD: 4 % (ref 0–8)
ERYTHROCYTE [DISTWIDTH] IN BLOOD BY AUTOMATED COUNT: 13.4 % (ref 11.5–14.5)
EST. GFR  (NO RACE VARIABLE): 57.3 ML/MIN/1.73 M^2
GLUCOSE SERPL-MCNC: 95 MG/DL (ref 70–110)
HCT VFR BLD AUTO: 32.9 % (ref 37–48.5)
HGB BLD-MCNC: 10.9 G/DL (ref 12–16)
IMM GRANULOCYTES # BLD AUTO: 0.03 K/UL (ref 0–0.04)
IMM GRANULOCYTES NFR BLD AUTO: 0.3 % (ref 0–0.5)
LYMPHOCYTES # BLD AUTO: 4.5 K/UL (ref 1–4.8)
LYMPHOCYTES NFR BLD: 41.8 % (ref 18–48)
MAGNESIUM SERPL-MCNC: 1.9 MG/DL (ref 1.6–2.6)
MCH RBC QN AUTO: 30.7 PG (ref 27–31)
MCHC RBC AUTO-ENTMCNC: 33.1 G/DL (ref 32–36)
MCV RBC AUTO: 93 FL (ref 82–98)
MONOCYTES # BLD AUTO: 0.8 K/UL (ref 0.3–1)
MONOCYTES NFR BLD: 7.5 % (ref 4–15)
NEUTROPHILS # BLD AUTO: 4.9 K/UL (ref 1.8–7.7)
NEUTROPHILS NFR BLD: 45.8 % (ref 38–73)
NRBC BLD-RTO: 0 /100 WBC
OHS CV CPX 1 MINUTE RECOVERY HEART RATE: 80 BPM
OHS CV CPX 85 PERCENT MAX PREDICTED HEART RATE MALE: 120
OHS CV CPX MAX PREDICTED HEART RATE: 141
OHS CV CPX PATIENT IS FEMALE: 1
OHS CV CPX PATIENT IS MALE: 0
OHS CV CPX PEAK DIASTOLIC BLOOD PRESSURE: 97 MMHG
OHS CV CPX PEAK HEAR RATE: 89 BPM
OHS CV CPX PEAK RATE PRESSURE PRODUCT: NORMAL
OHS CV CPX PEAK SYSTOLIC BLOOD PRESSURE: 183 MMHG
OHS CV CPX PERCENT MAX PREDICTED HEART RATE ACHIEVED: 65
OHS CV CPX RATE PRESSURE PRODUCT PRESENTING: NORMAL
PLATELET # BLD AUTO: 239 K/UL (ref 150–450)
PMV BLD AUTO: 10.8 FL (ref 9.2–12.9)
POTASSIUM SERPL-SCNC: 3.7 MMOL/L (ref 3.5–5.1)
PROT SERPL-MCNC: 5.9 G/DL (ref 6–8.4)
RBC # BLD AUTO: 3.55 M/UL (ref 4–5.4)
SODIUM SERPL-SCNC: 134 MMOL/L (ref 136–145)
SYSTOLIC BLOOD PRESSURE: 150 MMHG
WBC # BLD AUTO: 10.72 K/UL (ref 3.9–12.7)

## 2025-03-13 PROCEDURE — 97535 SELF CARE MNGMENT TRAINING: CPT

## 2025-03-13 PROCEDURE — 99233 SBSQ HOSP IP/OBS HIGH 50: CPT | Mod: 25,,, | Performed by: STUDENT IN AN ORGANIZED HEALTH CARE EDUCATION/TRAINING PROGRAM

## 2025-03-13 PROCEDURE — 63600175 PHARM REV CODE 636 W HCPCS: Performed by: HOSPITALIST

## 2025-03-13 PROCEDURE — A9502 TC99M TETROFOSMIN: HCPCS | Performed by: HOSPITALIST

## 2025-03-13 PROCEDURE — 36415 COLL VENOUS BLD VENIPUNCTURE: CPT

## 2025-03-13 PROCEDURE — 80053 COMPREHEN METABOLIC PANEL: CPT

## 2025-03-13 PROCEDURE — 25000003 PHARM REV CODE 250: Performed by: NURSE PRACTITIONER

## 2025-03-13 PROCEDURE — 85025 COMPLETE CBC W/AUTO DIFF WBC: CPT

## 2025-03-13 PROCEDURE — 25000003 PHARM REV CODE 250

## 2025-03-13 PROCEDURE — 12000002 HC ACUTE/MED SURGE SEMI-PRIVATE ROOM

## 2025-03-13 PROCEDURE — 97165 OT EVAL LOW COMPLEX 30 MIN: CPT

## 2025-03-13 PROCEDURE — 96372 THER/PROPH/DIAG INJ SC/IM: CPT

## 2025-03-13 PROCEDURE — 93017 CV STRESS TEST TRACING ONLY: CPT

## 2025-03-13 PROCEDURE — 25000003 PHARM REV CODE 250: Performed by: FAMILY MEDICINE

## 2025-03-13 PROCEDURE — 96376 TX/PRO/DX INJ SAME DRUG ADON: CPT

## 2025-03-13 PROCEDURE — 83735 ASSAY OF MAGNESIUM: CPT

## 2025-03-13 PROCEDURE — 63600175 PHARM REV CODE 636 W HCPCS

## 2025-03-13 RX ORDER — CARVEDILOL 25 MG/1
25 TABLET ORAL 2 TIMES DAILY
Status: DISCONTINUED | OUTPATIENT
Start: 2025-03-13 | End: 2025-03-14 | Stop reason: HOSPADM

## 2025-03-13 RX ORDER — HYDROCHLOROTHIAZIDE 12.5 MG/1
25 TABLET ORAL DAILY
Status: DISCONTINUED | OUTPATIENT
Start: 2025-03-14 | End: 2025-03-14 | Stop reason: HOSPADM

## 2025-03-13 RX ORDER — CARVEDILOL 12.5 MG/1
12.5 TABLET ORAL ONCE
Status: COMPLETED | OUTPATIENT
Start: 2025-03-13 | End: 2025-03-13

## 2025-03-13 RX ORDER — FAMOTIDINE 10 MG/ML
20 INJECTION, SOLUTION INTRAVENOUS DAILY
Status: DISCONTINUED | OUTPATIENT
Start: 2025-03-13 | End: 2025-03-14 | Stop reason: HOSPADM

## 2025-03-13 RX ORDER — REGADENOSON 0.08 MG/ML
0.4 INJECTION, SOLUTION INTRAVENOUS
Status: COMPLETED | OUTPATIENT
Start: 2025-03-13 | End: 2025-03-13

## 2025-03-13 RX ADMIN — REGADENOSON 0.4 MG: 0.08 INJECTION, SOLUTION INTRAVENOUS at 08:03

## 2025-03-13 RX ADMIN — MONTELUKAST 10 MG: 10 TABLET, FILM COATED ORAL at 08:03

## 2025-03-13 RX ADMIN — TETROFOSMIN 10.1 MILLICURIE: 1.38 INJECTION, POWDER, LYOPHILIZED, FOR SOLUTION INTRAVENOUS at 07:03

## 2025-03-13 RX ADMIN — ENOXAPARIN SODIUM 70 MG: 80 INJECTION SUBCUTANEOUS at 09:03

## 2025-03-13 RX ADMIN — CARVEDILOL 25 MG: 25 TABLET, FILM COATED ORAL at 08:03

## 2025-03-13 RX ADMIN — FAMOTIDINE 20 MG: 10 INJECTION, SOLUTION INTRAVENOUS at 09:03

## 2025-03-13 RX ADMIN — TETROFOSMIN 26.6 MILLICURIE: 1.38 INJECTION, POWDER, LYOPHILIZED, FOR SOLUTION INTRAVENOUS at 08:03

## 2025-03-13 RX ADMIN — SENNOSIDES AND DOCUSATE SODIUM 1 TABLET: 50; 8.6 TABLET ORAL at 09:03

## 2025-03-13 RX ADMIN — ENOXAPARIN SODIUM 70 MG: 80 INJECTION SUBCUTANEOUS at 08:03

## 2025-03-13 RX ADMIN — ACETAMINOPHEN 650 MG: 325 TABLET ORAL at 09:03

## 2025-03-13 RX ADMIN — CARVEDILOL 12.5 MG: 12.5 TABLET, FILM COATED ORAL at 11:03

## 2025-03-13 RX ADMIN — HYDROCHLOROTHIAZIDE 12.5 MG: 12.5 TABLET ORAL at 09:03

## 2025-03-13 RX ADMIN — CARVEDILOL 12.5 MG: 12.5 TABLET, FILM COATED ORAL at 09:03

## 2025-03-13 NOTE — ASSESSMENT & PLAN NOTE
Patient has Abnormal Magnesium: hypomagnesemia. Will continue to monitor electrolytes closely. Will replace the affected electrolytes and repeat labs to be done after interventions completed. The patient's magnesium results have been reviewed and are listed below.  Recent Labs   Lab 03/13/25  0436   MG 1.9    Replete per PRN protocol

## 2025-03-13 NOTE — ASSESSMENT & PLAN NOTE
- cardiology consulted: appreciate recommendations  - tele monitoring   - troponin trend: 8.8 -> 195.8 -> 388 -> 355  - on FD Lovenox for AF continue per cards recs   - ECHO reviewed, normal EF  - Nuclear stress test 3/13/25 negative for reversible ischemia  - PRN NTG, EKG

## 2025-03-13 NOTE — PROGRESS NOTES
[facility]  Department of Cardiology  Progress Note      PATIENT NAME: Megha Ladd  MRN: [id number]  TODAY'S DATE: 03/13/2025  ADMIT DATE: 3/12/2025                          CONSULT REQUESTED BY: Oralia Cortes MD    SUBJECTIVE     PRINCIPAL PROBLEM: Atrial fibrillation and flutter      REASON FOR CONSULT:  New onset AF and CP    INTERVAL HISTORY:  3/13/25  NSR w/ freq PVCs on tele; AM /100 today  No c/o overnight; in lab for stress test  Slight ST depression in II, III, AVF  Troponin trended down      HPI:  Pt is 78 yo female with PMH: HTN, hypothyroidism, HLD who presented to ER this AM with c/o palpitations, clamminess, and shortness of breath. Pt states she took her usual BP meds at 3:30 am (she usually goes to bed at 4 am she states); around 4 am she suddenly developed pain in both shoulder blades, collar bones and bilateral jaw. The pain radiated down both arms. She had concurrent nausea. Intensity was 8/10. She was experiencing the pain for 45 min to 1 hour so EMS was called. She received Ntg and pain was alleviated. Additionally she had developed palpitations, clamminess and pulse ox readings showed irregular and fast heart rate up to 180 bpm. IV diltiazem administered per EMS. Aspirin also administered via EMS and pt took two baby aspirin as well.    Pt denies any actual chest pain. She denies fluid retention. She has had no recurrent pain since being in ER. She ambulated to bathroom w/o dyspnea or CP    Pt denies smoking or ETOH use. She denies prior history of Afib. No personal heart disease. Her father and sister have CAD/CABG and MI history;     Daughter states she doesn't eat much, about once daily and she has lost weight. She denies fluid retention, vomiting, diarrhea, recent URI    No afib strips noted from EMS or on EKG. Multi focal PVCs noted with SR. K+ was 3.0 and Mag was 1.5 on arrival    Review of patient's allergies indicates:   Allergen Reactions    Zoster vaccine live  Other (See Comments)     Headache, neck shoulder, body flu sx, sore throat, nausea, dry cough, fatigue     Amlodipine Diarrhea, Nausea Only and Other (See Comments)     Joint pain    Augmentin [amoxicillin-pot clavulanate]     Azithromycin      Other reaction(s): Unknown    Budesonide      Other reaction(s): heartburn  Other reaction(s): Rash    Cephalexin      Other reaction(s): Unknown    Ciprofloxacin      Other reaction(s): Unknown    Erythromycin      Other reaction(s): Unknown    Esomeprazole magnesium      Other reaction(s): Headache    Felodipine      Nausea, raw throat, ulcer tongue, myalgia    Levofloxacin      Other reaction(s): tendonitis    Talwin compound      Other reaction(s): Vomiting  Other reaction(s): Hallucinations    Hydrocodone-acetaminophen Rash     Other reaction(s): Nausea       Past Medical History:   Diagnosis Date    Asthma     Basal cell carcinoma     GERD (gastroesophageal reflux disease)     Hyperlipidemia     Hypertension     Hypothyroid     Lumbago     Neuromuscular disorder     Sinusitis     Ulcer      Past Surgical History:   Procedure Laterality Date    ADENOIDECTOMY      Tonsils and adenoids removed    APPENDECTOMY       SECTION      HYSTERECTOMY  ?    Partial hysterectomy    LUMBAR EPIDURAL INJECTION      PARTIAL HYSTERECTOMY      TONSILLECTOMY      Tonsils and adenoids removed    TUBAL LIGATION       Social History[1]     REVIEW OF SYSTEMS  Negative except as mentioned in HPI    OBJECTIVE     VITAL SIGNS (Most Recent)  Temp: 97.5 °F (36.4 °C) (25)  Pulse: 68 (25)  Resp: 18 (25)  BP: (!) 152/73 (25)  SpO2: (!) 93 % (25)    VENTILATION STATUS  Resp: 18 (25)  SpO2: (!) 93 % (25)           I & O (Last 24H):  Intake/Output Summary (Last 24 hours) at 3/13/2025 0847  Last data filed at 3/13/2025 0707  Gross per 24 hour   Intake 440 ml   Output 1 ml   Net 439 ml       WEIGHTS  Wt  Readings from Last 3 Encounters:   03/12/25 1519 74.4 kg (164 lb 1.6 oz)   03/12/25 0557 74.4 kg (164 lb)   03/12/25 0945 74.4 kg (164 lb 0.4 oz)   02/26/25 1513 74.6 kg (164 lb 7.4 oz)       PHYSICAL EXAM    GENERAL  pleasant elderly female resting comfortably in no apparent distress.  HEENT: Normocephalic.    NECK: No JVD.   CARDIAC: Regular rate and rhythm. S1 is normal.S2 is normal. + faint systolic murmur; NSR on tele, occasional PVC  CHEST ANATOMY: normal. No chest wall pain  LUNGS: Clear to auscultation. No wheezing or rhonchi..   ABDOMEN: Soft .  Normal bowel sounds.    EXTREMITIES: No edema  CENTRAL NERVOUS SYSTEM: AAO x 3; memory intact  SKIN: No rash     HOME MEDICATIONS:Medications Ordered Prior to Encounter[2]    SCHEDULED MEDS:   carvediloL  12.5 mg Oral BID    enoxparin  1 mg/kg Subcutaneous Q12H (treatment, non-standard time)    famotidine (PF)  20 mg Intravenous Daily    hydroCHLOROthiazide  12.5 mg Oral Daily    montelukast  10 mg Oral QHS    senna-docusate 8.6-50 mg  1 tablet Oral BID       CONTINUOUS INFUSIONS:    PRN MEDS:  Current Facility-Administered Medications:     acetaminophen, 650 mg, Oral, Q4H PRN    dextrose 50%, 12.5 g, Intravenous, PRN    dextrose 50%, 25 g, Intravenous, PRN    glucagon (human recombinant), 1 mg, Intramuscular, PRN    glucose, 16 g, Oral, PRN    glucose, 24 g, Oral, PRN    magnesium oxide, 800 mg, Oral, PRN    magnesium oxide, 800 mg, Oral, PRN    melatonin, 9 mg, Oral, Nightly PRN    naloxone, 0.02 mg, Intravenous, PRN    ondansetron, 4 mg, Intravenous, Q6H PRN    potassium bicarbonate, 35 mEq, Oral, PRN    potassium bicarbonate, 50 mEq, Oral, PRN    potassium bicarbonate, 60 mEq, Oral, PRN    sodium chloride 0.9%, 3 mL, Intravenous, PRN    LABS AND DIAGNOSTICS     CBC LAST 3 DAYS  Recent Labs   Lab 03/12/25  0625   WBC 12.28   RBC 3.86*   HGB 11.8*   HCT 36.2*   MCV 94   MCH 30.6   MCHC 32.6   RDW 13.6      MPV 11.3   GRAN 62.9  7.7   LYMPH 26.5  3.3  "  MONO 6.9  0.9   BASO 0.07   NRBC 0       COAGULATION LAST 3 DAYS  No results for input(s): "LABPT", "INR", "APTT" in the last 168 hours.    CHEMISTRY LAST 3 DAYS  Recent Labs   Lab 03/12/25  0625 03/12/25  1620 03/13/25  0436   *  --  134*   K 3.0* 3.6 3.7   CL 95  --  95   CO2 29  --  31*   ANIONGAP 11  --  8   BUN 14  --  16   CREATININE 0.8  --  1.0   *  --  95   CALCIUM 9.0  --  8.7   MG 1.5* 2.1 1.9   ALBUMIN 3.7  --  3.4*   PROT 6.6  --  5.9*   ALKPHOS 74  --  63   ALT 5*  --  5*   AST 14  --  12   BILITOT 0.7  --  0.8       CARDIAC PROFILE LAST 3 DAYS  Recent Labs   Lab 03/12/25  0625   *       ENDOCRINE LAST 3 DAYS  No results for input(s): "TSH", "PROCAL" in the last 168 hours.    LAST ARTERIAL BLOOD GAS  ABG  No results for input(s): "PH", "PO2", "PCO2", "HCO3", "BE" in the last 168 hours.    LAST 7 DAYS MICROBIOLOGY   Microbiology Results (last 7 days)       ** No results found for the last 168 hours. **            MOST RECENT IMAGING  Echo    Left Ventricle: The left ventricle is normal in size. Mild septal   thickening. There is normal systolic function with a visually estimated   ejection fraction of 60 - 65%. Global longitudinal strain is --15.8%.    Right Ventricle: The right ventricle is normal in size. Systolic   function is normal.    Aortic Valve: There is mild aortic regurgitation.    Mitral Valve: There is mild regurgitation.    IVC/SVC: Normal venous pressure at 3 mmHg.  X-Ray Chest AP Portable  Narrative: EXAMINATION:  XR CHEST AP PORTABLE    CLINICAL HISTORY:  Chest Pain;    COMPARISON:  02/20/2025    FINDINGS:  Cardiac silhouette size is borderline enlarged for portable technique and stable compared to prior.  Atherosclerotic calcification of the aorta.  No pulmonary edema or confluent airspace disease.  No large pleural effusion or pneumothorax.  No acute osseous abnormality.  Impression: Stable chest radiograph.  No acute pulmonary process.    Electronically signed " by: Lenny Blackburn  Date:    03/12/2025  Time:    06:57      ECHOCARDIOGRAM RESULTS (last 5)  Results for orders placed during the hospital encounter of 10/25/23    Echo    Interpretation Summary    Left Ventricle: The left ventricle is normal in size. IVSd is 1.24 cm. LVPWs is 0.85 cm. Normal wall thickness. Normal wall motion. There is normal systolic function with a visually estimated ejection fraction of 55 - 60%. There is normal diastolic function. E/A ratio is 0.81.    Right Ventricle: Normal right ventricular cavity size. Wall thickness is normal. Right ventricle wall motion  is normal. Systolic function is normal.    Aortic Valve: There is moderate aortic valve sclerosis. There is moderate aortic regurgitation.    Mitral Valve: There is mild to moderate regurgitation.    Tricuspid Valve: There is mild regurgitation.    Pulmonic Valve: There is mild regurgitation.    IVC/SVC: Normal venous pressure at 3 mmHg.      CURRENT/PREVIOUS VISIT EKG  Results for orders placed or performed during the hospital encounter of 03/12/25   EKG 12-lead    Collection Time: 03/12/25 11:42 AM   Result Value Ref Range    QRS Duration 72 ms    [facility] QTC Calculation 462 ms    Narrative    Test Reason : R79.89,    Vent. Rate :  60 BPM     Atrial Rate :  60 BPM     P-R Int : 206 ms          QRS Dur :  72 ms      QT Int : 462 ms       P-R-T Axes :  62  -3  -3 degrees    QTcB Int : 462 ms    Normal sinus rhythm  Normal ECG  When compared with ECG of 12-Mar-2025 05:57,  Premature ventricular complexes are no longer Present  Nonspecific T wave abnormality no longer evident in Lateral leads    Referred By: AAAREFERRAL SELF           Confirmed By:            ASSESSMENT/PLAN:     Active Hospital Problems    Diagnosis    *Atrial fibrillation and flutter    Hypokalemia    Hypomagnesemia    Chest pain    GERD (gastroesophageal reflux disease)    HTN (hypertension)       ASSESSMENT & PLAN:   Questionable Atrial Fibrillation    Hypokalemia  Hypomagnesemia  Hypertension    RECOMMENDATIONS:  Troponin has trended down  .No hx CHF. CXR clear. No peripheral edema  In lab for stress test today  Echocardiogram w/ normal findings  EKG and telemetry shows NSR with PVCs  Maintain on continuous telemetry   If no atrial fib captured on telemetry, will order outpatient cardiac monitor    Magnesium 1.9/ Potassium 3.7 today. Check and replace potassium and magnesium daily. Goal for potassium is >/= 4.0, and goal for magnesium is >/= 2.0     Changed metoprolol to Coreg  Cardiology will continue to follow    MCKINLEY Heller, RAHUL-BC  [facility]  Department of Cardiology  Date of Service: 03/13/2025                 [1]   Social History  Tobacco Use    Smoking status: Never    Smokeless tobacco: Never   Substance Use Topics    Alcohol use: No    Drug use: No   [2]   No current facility-administered medications on file prior to encounter.     Current Outpatient Medications on File Prior to Encounter   Medication Sig Dispense Refill    acetaminophen (TYLENOL) 500 MG tablet Take 500 mg by mouth 2 (two) times a day.      ADVAIR -21 mcg/actuation HFAA inhaler INHALE 2 PUFFS INTO THE LUNGS 2 TIMES DAILY. CONTROLLER (Patient taking differently: Inhale 2 puffs into the lungs 2 (two) times a day.) 36 g 3    cetirizine (ZYRTEC) 10 MG tablet Take 10 mg by mouth daily as needed for Allergies.      cloNIDine (CATAPRES) 0.1 MG tablet Take 1 tablet if systolic above 180 or diastolic above 105. Can repeat after 2 hours if bp remains above 180 or 105. (Patient taking differently: Take 0.1 mg by mouth once as needed (/  +). Take 1 tablet if systolic above 180 or diastolic above 105. Can repeat after 2 hours if bp remains above 180 or 105.) 30 tablet 0    fluticasone (FLONASE) 50 mcg/actuation nasal spray SPRAY 1 SPRAY IN EACH NOSTRIL 2 TIMES A DAY (Patient taking differently: 1 spray by Each Nostril route every evening.) 16 g  11    hydroCHLOROthiazide 12.5 MG Tab Take 1 tablet (12.5 mg total) by mouth once daily. 30 tablet 2    meclizine (ANTIVERT) 12.5 mg tablet Take 1 tablet (12.5 mg total) by mouth 3 (three) times daily as needed for Dizziness. 60 tablet 2    metaxalone (SKELAXIN) 800 MG tablet Take 800 mg by mouth every 6 (six) hours as needed (muscle pain).      metoprolol tartrate (LOPRESSOR) 50 MG tablet TAKE 1 & 1/2 (ONE & ONE-HALF) TABLETS (75mg) BY MOUTH TWICE DAILY (Patient taking differently: Take 75 mg by mouth 2 (two) times daily. TAKE 1 & 1/2 (ONE & ONE-HALF) TABLETS (75mg) BY MOUTH TWICE DAILY) 270 tablet 3    montelukast (SINGULAIR) 10 mg tablet Take 1 tablet (10 mg total) by mouth every evening. 90 tablet 3    pantoprazole (PROTONIX) 20 MG tablet Take 1 tablet (20 mg total) by mouth once daily. 90 tablet 3    peg 400-propylene glycol 0.4-0.3 % Drop Place 2 drops into both eyes 2 (two) times daily as needed (dry eyes).      pregabalin (LYRICA) 75 MG capsule Take 75 mg by mouth 2 (two) times daily.      SODIUM CHLORIDE (SIMPLY SALINE NASL) 1 spray by Nasal route 2 (two) times daily as needed (allergies).      tramadol (ULTRAM) 50 mg tablet Take 50 mg by mouth every 12 (twelve) hours as needed for Pain.      ketoconazole (NIZORAL) 2 % shampoo Wash hair with medicated shampoo at least 2x/week - let sit on scalp at least 5 minutes prior to rinsing (Patient not taking: Reported on 3/12/2025) 120 mL 5

## 2025-03-13 NOTE — TELEPHONE ENCOUNTER
Patient is currently admitted to hospital.  Message forwarded to Dr. Gandara as per patient's 's request.         Carissa Hoffmann [facility] Staff     ----- Message from Carissa sent at 3/13/2025 11:09 AM CDT -----  Name of Who is Calling:ROWDY MORENO [[id number]]    What is the request in detail:Pt  would like to let the doctor know the pt is in the hospital at the moment.    [facility] the [facility]Call    What Number to Call Back if not in [facility]:Telephone Information:Mobile  [phone]

## 2025-03-13 NOTE — ASSESSMENT & PLAN NOTE
- cardiology consulted: appreciate recommendations  - tele monitoring   - FD lovenox ordered  - spontaneously converted to NSR after diltiazem was given by EMS  - Continue metoprolol --- changed to carvedilol per cardiology   - Replete electrolytes to maintain goal K >4 and Mg >2

## 2025-03-13 NOTE — HOSPITAL COURSE
Patient monitored closely during hospitalization.  Cardiology was consulted for chest pain and possible episode of atrial fibrillation.  Patient has remained in sinus rhythm since admission.  Her troponin was trended with peak at 388, then down trended.  She underwent a nuclear stress test which was negative for reversible ischemia.  An echocardiogram was performed which showed normal EF.  Patient was hypertensive, Cardiology has made adjustments to her antihypertensive medications.  Her electrolytes were replaced as needed.  Her BP improved.  She was cleared for discharge by Cardiology.  Will order cardiac monitor outpatient.  She is to follow up with PCP and Cardiologist in 1 week.  Patient seen and examined on day of discharge and deemed stable for discharge home.

## 2025-03-13 NOTE — PROGRESS NOTES
[facility] Medicine  Progress Note    Patient Name: Megha Ladd  MRN: [id number]  Patient Class: IP- Inpatient   Admission Date: 3/12/2025  Length of Stay: 0 days  Attending Physician: Oralia Cortes MD  Primary Care Provider: Zhanna Gandara MD        Subjective     Principal Problem:Atrial fibrillation and flutter        HPI:  Megha Ladd is a 79 y.o. y.o. female with a PMHx of asthma, GERD, HTN who presented to the ED with chest pain and palpitations onset x 4:00 a.m..  She was described chest pain as an aching and pulling sensation that started in the center of her chest and went to shoulders and neck.  Pain was constant and waxed and waned in intensity.  She also reported associated nausea, shortness of breath, palpitations and diaphoresis.  Pain was 8/10 at its worst and denies pain currently.  Patient was found to be in AFib RVR via EMS.  She received diltiazem and nitroglycerin in transit.  Chest pain was relieved with nitro.  Upon arrival was in normal sinus rhythm.  Denies chest pain currently.  States she was followed by cardiologist many years ago but is not seen by Cardiology currently.  Nonsmoker.  Denies EtOH use. ED workup was significant for hypokalemia and hypomagnesemia. Cardiology was consulted. Hospital Medicine was consulted for further workup and management of the patient.              Overview/Hospital Course:  Patient monitored closely during hospitalization.  Cardiology was consulted for chest pain and possible episode of atrial fibrillation.  Patient has remained in sinus rhythm since admission.  Her troponin was trended with peak at 388, then down trended.  She underwent a nuclear stress test which was negative for reversible ischemia.  An echocardiogram was performed which showed normal EF.  Patient was hypertensive, Cardiology has made adjustments to her antihypertensive medications.  Her electrolytes were replaced as needed.    Interval History: Patient was seen  and examined at bedside.  NAD.  She reports that her chest pain and shortness of breath has resolved.  She has ambulated to restroom without issue.   Nuclear stress test today negative.   Remains hypertensive.  Cardiology made further change in her meds by increasing Coreg dose.  Will monitor overnight for response.     Review of Systems   Respiratory:  Negative for shortness of breath.    Cardiovascular:  Negative for chest pain.   Gastrointestinal:  Negative for abdominal pain, nausea and vomiting.   Neurological:  Negative for dizziness and headaches.     Objective:     Vital Signs (Most Recent):  Temp: 97.6 °F (36.4 °C) (03/13/25 1123)  Pulse: 75 (03/13/25 1123)  Resp: 18 (03/13/25 1123)  BP: (!) 150/60 (03/13/25 1152)  SpO2: 98 % (03/13/25 1123) Vital Signs (24h Range):  Temp:  [97.5 °F (36.4 °C)-97.8 °F (36.6 °C)] 97.6 °F (36.4 °C)  Pulse:  [64-77] 75  Resp:  [16-19] 18  SpO2:  [93 %-98 %] 98 %  BP: (117-181)/(60-84) 150/60     Weight: 74.4 kg (164 lb 1.6 oz)  Body mass index is 25.7 kg/m².    Intake/Output Summary (Last 24 hours) at 3/13/2025 1349  Last data filed at 3/13/2025 1253  Gross per 24 hour   Intake 630 ml   Output 1 ml   Net 629 ml         Physical Exam  Vitals and nursing note reviewed.   Constitutional:       General: She is not in acute distress.  Cardiovascular:      Rate and Rhythm: Normal rate and regular rhythm.   Pulmonary:      Effort: Pulmonary effort is normal. No respiratory distress.      Breath sounds: Normal breath sounds.   Abdominal:      Palpations: Abdomen is soft.      Tenderness: There is no abdominal tenderness.   Musculoskeletal:      Right lower leg: No edema.      Left lower leg: No edema.   Skin:     General: Skin is warm and dry.   Neurological:      Mental Status: She is alert and oriented to person, place, and time.               Significant Labs: All pertinent labs within the past 24 hours have been reviewed.  CBC:   Recent Labs   Lab 03/12/25  0625 03/13/25  9763    WBC 12.28 10.72   HGB 11.8* 10.9*   HCT 36.2* 32.9*    239     CMP:   Recent Labs   Lab 03/12/25  0625 03/12/25  1620 03/13/25  0436   *  --  134*   K 3.0* 3.6 3.7   CL 95  --  95   CO2 29  --  31*   *  --  95   BUN 14  --  16   CREATININE 0.8  --  1.0   CALCIUM 9.0  --  8.7   PROT 6.6  --  5.9*   ALBUMIN 3.7  --  3.4*   BILITOT 0.7  --  0.8   ALKPHOS 74  --  63   AST 14  --  12   ALT 5*  --  5*   ANIONGAP 11  --  8     Magnesium:   Recent Labs   Lab 03/12/25  0625 03/12/25  1620 03/13/25  0436   MG 1.5* 2.1 1.9     Troponin:   Recent Labs   Lab 03/12/25  0858 03/12/25  1234 03/12/25  1620   TROPONINIHS 195.8* 388.4* 355.3*       Significant Imaging: I have reviewed all pertinent imaging results/findings within the past 24 hours.      Assessment & Plan  Atrial fibrillation and flutter  - cardiology consulted: appreciate recommendations  - tele monitoring   - FD lovenox ordered  - spontaneously converted to NSR after diltiazem was given by EMS  - Continue metoprolol --- changed to carvedilol per cardiology   - Replete electrolytes to maintain goal K >4 and Mg >2        HTN (hypertension)  Patient's blood pressure range in the last 24 hours was: BP  Min: 117/79  Max: 181/81.The patient's inpatient anti-hypertensive regimen is listed below:  Current Antihypertensives  hydroCHLOROthiazide tablet 12.5 mg, Daily, Oral  carvediloL tablet 25 mg, 2 times daily, Oral    Plan  - BP is uncontrolled, will adjust as follows: Coreg dose increased     GERD (gastroesophageal reflux disease)  Pepcid     Chest pain  - cardiology consulted: appreciate recommendations  - tele monitoring   - troponin trend: 8.8 -> 195.8 -> 388 -> 355  - on FD Lovenox for AF continue per cards recs   - ECHO reviewed, normal EF  - Nuclear stress test 3/13/25 negative for reversible ischemia  - PRN NTG, EKG     Hypokalemia  Patient's most recent potassium results are listed below.   Recent Labs     03/12/25  0625 03/12/25  6390  03/13/25  0436   K 3.0* 3.6 3.7     Plan  - Replete potassium per protocol  - Monitor potassium Daily  - Patient's hypokalemia is stable  Hypomagnesemia  Patient has Abnormal Magnesium: hypomagnesemia. Will continue to monitor electrolytes closely. Will replace the affected electrolytes and repeat labs to be done after interventions completed. The patient's magnesium results have been reviewed and are listed below.  Recent Labs   Lab 03/13/25  0436   MG 1.9    Replete per PRN protocol   VTE Risk Mitigation (From admission, onward)           Ordered     enoxaparin injection 70 mg  Every 12 hours         03/12/25 1131                    Discharge Planning   SHEYLA: 3/14/2025     Code Status: Full Code   Medical Readiness for Discharge Date:   Discharge Plan A: Home                    Melissa Garcia NP  Department of Hospital Medicine   [facility]

## 2025-03-13 NOTE — ASSESSMENT & PLAN NOTE
Patient's most recent potassium results are listed below.   Recent Labs     03/12/25  0625 03/12/25  1620 03/13/25  0436   K 3.0* 3.6 3.7     Plan  - Replete potassium per protocol  - Monitor potassium Daily  - Patient's hypokalemia is stable

## 2025-03-13 NOTE — ASSESSMENT & PLAN NOTE
LUMBAR SPINE EVALUATION:   Referring Physician: Dr. Ronnell Ewing  Diagnosis: Chronic bilateral low back pain without sciatica (M54.5,G89.29)  Neck pain (M54.2)     Date of Service: 8/1/2018   Date of Onset: February 2018     PATIENT SUMMARY   The patient is a Pepcid      feelings and feels safe in her environment. She vaguely reported symptoms of PTSD. Seeking professional assistance from Neetu Stearns was discussed and the patient was receptive. She was given information to schedule an appt. PAS C/s 8/10;  Location: alarm system, resulting in hypersensitive nerves.  Metaphors incorporated to foster deep learning and paradigm shift for patient      Patient was instructed in and issued a HEP for: cervical stability; suboccipital release with tennis balls   Charges: PT Ev Date____________________    Certification From: 5/1/9339  To:10/30/2018

## 2025-03-13 NOTE — PT/OT/SLP EVAL
Occupational Therapy   Evaluation    Name: Megha Ladd  MRN: [id number]  Admitting Diagnosis: Atrial fibrillation and flutter  Recent Surgery: * No surgery found *      Recommendations:     Discharge Recommendations: Low Intensity Therapy  Discharge Equipment Recommendations:  none  Barriers to discharge:   (Increased assist with ADLs, IADLs, and mobility)    Assessment:     Megha Ladd is a 79 y.o. female with a medical diagnosis of Atrial fibrillation and flutter.  Pt is agreeable to OT evaluation this AM. Performance deficits affecting function: weakness, impaired endurance, impaired self care skills, impaired functional mobility, gait instability, impaired balance, decreased upper extremity function, decreased safety awareness, decreased lower extremity function. Pt's daughter (who lives out of state) voiced concerns about pt's self-care. Pt notes that she is able to care for herself and receives strong support from spouse and friends.     Rehab Prognosis: Fair; patient would benefit from acute skilled OT services to address these deficits and reach maximum level of function.       Plan:     Patient to be seen 3 x/week to address the above listed problems via self-care/home management, therapeutic activities, therapeutic exercises  Plan of Care Expires: 04/13/25  Plan of Care Reviewed with: patient, daughter, spouse    Subjective     Chief Complaint: BUE pain due to prolonged shldr ER during stress test  Patient/Family Comments/goals: Maintain independence and function    Occupational Profile:  Living Environment: Pt lives with spouse in [facility] with 3 NATALI; has a tub and WIS with a shower chair  Previous level of function: Pt reports mod I prior to admission  Roles and Routines: limited homemaker (receives meals from friends or microwaves food)  Equipment Used at Home: cane, straight, shower chair, walker, rolling, rollator  Assistance upon Discharge: yes, from spouse    Pain/Comfort:  Pain Rating 1:  (not  rated)  Location - Side 1: Bilateral  Location 1: arm  Pain Addressed 1: Reposition, Distraction, Cessation of Activity    Patients cultural, spiritual, Lutheran conflicts given the current situation: no    Objective:     Communicated with: nursing prior to session.  Patient found HOB elevated with telemetry, bed alarm upon OT entry to room.    General Precautions: Standard, fall  Orthopedic Precautions: N/A  Braces: N/A  Respiratory Status: Room air    Occupational Performance:    Bed Mobility:    Patient completed Supine to Sit with contact guard assistance    Functional Mobility/Transfers:  Patient completed Sit <> Stand Transfer with stand by assistance  with  straight cane   Patient completed Toilet Transfer Step Transfer technique with stand by assistance with  straight cane  Functional Mobility: Pt amb to bathroom with SBA and SPC with no LOB or SOB.    Activities of Daily Living:  Feeding:  independence per pt  Grooming: stand by assistance to wash hands standing at sink with SPC  Lower Body Dressing: stand by assistance to don/doff socks seated EOB  Toileting: stand by assistance for hygiene and brief management after voiding at toilet    Cognitive/Visual Perceptual:  Cognitive/Psychosocial Skills:     -       Follows Commands/attention:Follows one-step commands  -       Communication: clear/fluent  -       Memory: No Deficits noted  -       Safety awareness/insight to disability: impaired   -       Mood/Affect/Coping skills/emotional control: Appropriate to situation, Cooperative, and Pleasant    Physical Exam:  Balance:    -       SBA seated balance EOB; SBA standing balance with SPC  Upper Extremity Range of Motion:     -       Right Upper Extremity: WFL  -       Left Upper Extremity: WFL  Upper Extremity Strength:    -       Right Upper Extremity: WFL  -       Left Upper Extremity: WFL   Strength:    -       Right Upper Extremity: fair   -       Left Upper Extremity: fair     [facility] 6 Click  ADL:  AMPAC Total Score: 20    Treatment & Education:  Pt educated on role of OT/POC, importance of OOB/EOB activity, use of call bell, and safety during ADLs, transfers, and functional mobility.    Patient left sitting edge of bed with all lines intact, call button in reach, and nursing/family present    GOALS:   Multidisciplinary Problems       Occupational Therapy Goals          Problem: Occupational Therapy    Goal Priority Disciplines Outcome Interventions   Occupational Therapy Goal     OT, PT/OT     Description: Goals to be met by: 25     Patient will increase functional independence with ADLs by performing:    UE Dressing with Supervision.  LE Dressing with Supervision.  Grooming while standing at sink with Supervision.  Toileting from toilet with Supervision for hygiene and clothing management.   Toilet transfer to toilet with Supervision.                         History:     Past Medical History:   Diagnosis Date    Asthma     Basal cell carcinoma     GERD (gastroesophageal reflux disease)     Hyperlipidemia     Hypertension     Hypothyroid     Lumbago     Neuromuscular disorder     Sinusitis     Ulcer          Past Surgical History:   Procedure Laterality Date    ADENOIDECTOMY      Tonsils and adenoids removed    APPENDECTOMY       SECTION      HYSTERECTOMY  ?    Partial hysterectomy    LUMBAR EPIDURAL INJECTION      PARTIAL HYSTERECTOMY      TONSILLECTOMY      Tonsils and adenoids removed    TUBAL LIGATION         Time Tracking:     OT Date of Treatment: 25  OT Start Time: 1058  OT Stop Time: 1114  OT Total Time (min): 16 min    Billable Minutes:Evaluation 8  Self Care/Home Management 8    3/13/2025

## 2025-03-13 NOTE — PROGRESS NOTES
formerly [facility]  Department of Cardiology  Progress Note      PATIENT NAME: Megha Ladd  MRN: [id number]  TODAY'S DATE: 03/13/2025  ADMIT DATE: 3/12/2025                          CONSULT REQUESTED BY: Oralia Cortes MD    SUBJECTIVE     PRINCIPAL PROBLEM: Atrial fibrillation and flutter      REASON FOR CONSULT:  New onset AF and CP    INTERVAL HISTORY:  3/13/25  NS on tele w/ frequent PVC; BP trend elevated  Stress test today;     3/12/25  NSR w/ freq PVCs on tele; AM /100 today  No c/o overnight; in lab for stress test  Slight ST depression in II, III, AVF  Troponin trended down      HPI:  Pt is 80 yo female with PMH: HTN, hypothyroidism, HLD who presented to ER this AM with c/o palpitations, clamminess, and shortness of breath. Pt states she took her usual BP meds at 3:30 am (she usually goes to bed at 4 am she states); around 4 am she suddenly developed pain in both shoulder blades, collar bones and bilateral jaw. The pain radiated down both arms. She had concurrent nausea. Intensity was 8/10. She was experiencing the pain for 45 min to 1 hour so EMS was called. She received Ntg and pain was alleviated. Additionally she had developed palpitations, clamminess and pulse ox readings showed irregular and fast heart rate up to 180 bpm. IV diltiazem administered per EMS. Aspirin also administered via EMS and pt took two baby aspirin as well.    Pt denies any actual chest pain. She denies fluid retention. She has had no recurrent pain since being in ER. She ambulated to bathroom w/o dyspnea or CP    Pt denies smoking or ETOH use. She denies prior history of Afib. No personal heart disease. Her father and sister have CAD/CABG and MI history;     Daughter states she doesn't eat much, about once daily and she has lost weight. She denies fluid retention, vomiting, diarrhea, recent URI    No afib strips noted from EMS or on EKG. Multi focal PVCs noted with SR. K+ was 3.0 and Mag was 1.5 on arrival    Review of  patient's allergies indicates:   Allergen Reactions    Zoster vaccine live Other (See Comments)     Headache, neck shoulder, body flu sx, sore throat, nausea, dry cough, fatigue     Amlodipine Diarrhea, Nausea Only and Other (See Comments)     Joint pain    Augmentin [amoxicillin-pot clavulanate]     Azithromycin      Other reaction(s): Unknown    Budesonide      Other reaction(s): heartburn  Other reaction(s): Rash    Cephalexin      Other reaction(s): Unknown    Ciprofloxacin      Other reaction(s): Unknown    Erythromycin      Other reaction(s): Unknown    Esomeprazole magnesium      Other reaction(s): Headache    Felodipine      Nausea, raw throat, ulcer tongue, myalgia    Levofloxacin      Other reaction(s): tendonitis    Talwin compound      Other reaction(s): Vomiting  Other reaction(s): Hallucinations    Hydrocodone-acetaminophen Rash     Other reaction(s): Nausea       Past Medical History:   Diagnosis Date    Asthma     Basal cell carcinoma     GERD (gastroesophageal reflux disease)     Hyperlipidemia     Hypertension     Hypothyroid     Lumbago     Neuromuscular disorder     Sinusitis     Ulcer      Past Surgical History:   Procedure Laterality Date    ADENOIDECTOMY      Tonsils and adenoids removed    APPENDECTOMY       SECTION      HYSTERECTOMY  ?    Partial hysterectomy    LUMBAR EPIDURAL INJECTION      PARTIAL HYSTERECTOMY      TONSILLECTOMY      Tonsils and adenoids removed    TUBAL LIGATION       Social History[1]     REVIEW OF SYSTEMS  Negative except as mentioned in HPI    OBJECTIVE     VITAL SIGNS (Most Recent)  Temp: 97.6 °F (36.4 °C) (25 1123)  Pulse: 75 (25 1123)  Resp: 18 (25)  BP: (!) 150/60 (25 1152)  SpO2: 98 % (25 1123)    VENTILATION STATUS  Resp: 18 (25 1123)  SpO2: 98 % (25 1123)           I & O (Last 24H):  Intake/Output Summary (Last 24 hours) at 3/13/2025 1231  Last data filed at 3/13/2025 0707  Gross per 24 hour    Intake 390 ml   Output 1 ml   Net 389 ml       WEIGHTS  Wt Readings from Last 3 Encounters:   03/12/25 1519 74.4 kg (164 lb 1.6 oz)   03/12/25 0557 74.4 kg (164 lb)   03/12/25 0945 74.4 kg (164 lb 0.4 oz)   02/26/25 1513 74.6 kg (164 lb 7.4 oz)       PHYSICAL EXAM    GENERAL  pleasant elderly female resting comfortably in no apparent distress.  HEENT: Normocephalic.    NECK: No JVD.   CARDIAC: Regular rate and rhythm. S1 is normal.S2 is normal. + faint systolic murmur; NSR on tele, frequent PVC  CHEST ANATOMY: normal. No chest wall pain  LUNGS: Clear to auscultation. No wheezing or rhonchi..   ABDOMEN: Soft .  Normal bowel sounds.    EXTREMITIES: No edema  CENTRAL NERVOUS SYSTEM: AAO x 3; memory intact; [place]  SKIN: No rash     HOME MEDICATIONS:Medications Ordered Prior to Encounter[2]    SCHEDULED MEDS:   carvediloL  25 mg Oral BID    enoxparin  1 mg/kg Subcutaneous Q12H (treatment, non-standard time)    famotidine (PF)  20 mg Intravenous Daily    hydroCHLOROthiazide  12.5 mg Oral Daily    montelukast  10 mg Oral QHS    senna-docusate 8.6-50 mg  1 tablet Oral BID       CONTINUOUS INFUSIONS:    PRN MEDS:  Current Facility-Administered Medications:     acetaminophen, 650 mg, Oral, Q4H PRN    dextrose 50%, 12.5 g, Intravenous, PRN    dextrose 50%, 25 g, Intravenous, PRN    glucagon (human recombinant), 1 mg, Intramuscular, PRN    glucose, 16 g, Oral, PRN    glucose, 24 g, Oral, PRN    magnesium oxide, 800 mg, Oral, PRN    magnesium oxide, 800 mg, Oral, PRN    melatonin, 9 mg, Oral, Nightly PRN    naloxone, 0.02 mg, Intravenous, PRN    ondansetron, 4 mg, Intravenous, Q6H PRN    potassium bicarbonate, 35 mEq, Oral, PRN    potassium bicarbonate, 50 mEq, Oral, PRN    potassium bicarbonate, 60 mEq, Oral, PRN    sodium chloride 0.9%, 3 mL, Intravenous, PRN    LABS AND DIAGNOSTICS     CBC LAST 3 DAYS  Recent Labs   Lab 03/12/25  0625 03/13/25  0436   WBC 12.28 10.72   RBC 3.86* 3.55*   HGB 11.8* 10.9*   HCT 36.2* 32.9*   MCV  "94 93   MCH 30.6 30.7   MCHC 32.6 33.1   RDW 13.6 13.4    239   MPV 11.3 10.8   GRAN 62.9  7.7 45.8  4.9   LYMPH 26.5  3.3 41.8  4.5   MONO 6.9  0.9 7.5  0.8   BASO 0.07 0.06   NRBC 0 0       COAGULATION LAST 3 DAYS  No results for input(s): "LABPT", "INR", "APTT" in the last 168 hours.    CHEMISTRY LAST 3 DAYS  Recent Labs   Lab 03/12/25  0625 03/12/25  1620 03/13/25  0436   *  --  134*   K 3.0* 3.6 3.7   CL 95  --  95   CO2 29  --  31*   ANIONGAP 11  --  8   BUN 14  --  16   CREATININE 0.8  --  1.0   *  --  95   CALCIUM 9.0  --  8.7   MG 1.5* 2.1 1.9   ALBUMIN 3.7  --  3.4*   PROT 6.6  --  5.9*   ALKPHOS 74  --  63   ALT 5*  --  5*   AST 14  --  12   BILITOT 0.7  --  0.8       CARDIAC PROFILE LAST 3 DAYS  Recent Labs   Lab 03/12/25  0625   *       ENDOCRINE LAST 3 DAYS  No results for input(s): "TSH", "PROCAL" in the last 168 hours.    LAST ARTERIAL BLOOD GAS  ABG  No results for input(s): "PH", "PO2", "PCO2", "HCO3", "BE" in the last 168 hours.    LAST 7 DAYS MICROBIOLOGY   Microbiology Results (last 7 days)       ** No results found for the last 168 hours. **            MOST RECENT IMAGING  NM Myocardial Perfusion Spect Multi Pharmacologic  Narrative: CLINICAL HISTORY:  ([id number])78 y/o  (1945) F    CAD screening, intermediate CAD risk, not treadmill candidate;    TECHNIQUE:  (A#[id number], exam time 3/13/2025 9:14)    NM MYOCARDIAL PERFUSION SPECT MULTI PHARM [id number]    A total of 10.1 millicuries was utilized for the rest portion of the examination with 26.6 millicuries for the stress portion of the examination following a 1 day protocol. The patient was stressed with Lexiscan, 0.4 milligrams intravenously and achieved a maximum heart rate of 88 beats per minute.    COMPARISON:  None available.    FINDINGS:  There is relative normal and homogeneous radiotracer uptake by the left ventricular myocardium with no photopenic defects to suggest pharmacologically induced " reversible ischemia or infarct.    The polar map images confirm the planar SPECT findings, noting a small defect in the mid lateral left ventricular myocardial wall and punctate defect in the anterior septal left ventricular myocardial wall corresponding to less than 16% of the overall left ventricular volume without reversibility suggestive of either artifact or tiny punctate remote infarcts.    There are no wall motion abnormalities with no abnormal transient left ventricular dilatation on stress images.    Ejection fraction is calculated at 80 %.  Impression: 1. No convincing evidence of pharmacologically induced reversible ischemia or infarct.    2. Estimated ejection fraction of 80 %.    Electronically signed by: Swapnil Gar  Date:    03/13/2025  Time:    09:18  Nuclear Stress Test    The ECG portion of the study is negative for ischemia.    The nuclear portion of this study will be reported separately.      ECHOCARDIOGRAM RESULTS (last 5)  Results for orders placed during the hospital encounter of 10/25/23    Echo    Interpretation Summary    Left Ventricle: The left ventricle is normal in size. IVSd is 1.24 cm. LVPWs is 0.85 cm. Normal wall thickness. Normal wall motion. There is normal systolic function with a visually estimated ejection fraction of 55 - 60%. There is normal diastolic function. E/A ratio is 0.81.    Right Ventricle: Normal right ventricular cavity size. Wall thickness is normal. Right ventricle wall motion  is normal. Systolic function is normal.    Aortic Valve: There is moderate aortic valve sclerosis. There is moderate aortic regurgitation.    Mitral Valve: There is mild to moderate regurgitation.    Tricuspid Valve: There is mild regurgitation.    Pulmonic Valve: There is mild regurgitation.    IVC/SVC: Normal venous pressure at 3 mmHg.      CURRENT/PREVIOUS VISIT EKG  Results for orders placed or performed during the hospital encounter of 03/12/25   EKG 12-lead    Collection Time:  03/12/25 11:42 AM   Result Value Ref Range    QRS Duration 72 ms    OHS QTC Calculation 462 ms    Narrative    Test Reason : R79.89,    Vent. Rate :  60 BPM     Atrial Rate :  60 BPM     P-R Int : 206 ms          QRS Dur :  72 ms      QT Int : 462 ms       P-R-T Axes :  62  -3  -3 degrees    QTcB Int : 462 ms    Normal sinus rhythm  Normal ECG  When compared with ECG of 12-Mar-2025 05:57,  Premature ventricular complexes are no longer Present  Nonspecific T wave abnormality no longer evident in Lateral leads    Referred By: AAAREFERRAL SELF           Confirmed By:            ASSESSMENT/PLAN:     Active Hospital Problems    Diagnosis    *Atrial fibrillation and flutter    Hypokalemia    Hypomagnesemia    Chest pain    GERD (gastroesophageal reflux disease)    HTN (hypertension)       ASSESSMENT & PLAN:   Questionable Atrial Fibrillation   Hypokalemia  Hypomagnesemia  Hypertension    RECOMMENDATIONS:  Nuclear stress test was negative for reversible ischemia  Pt continues to have elevated BP and frequent PVCs on telemetry  Changed metoprolol to Coreg yesterday. Increased Coreg to 25 mg BID today    Echocardiogram w/ normal findings  No atrial fibrillation noted on telemetry thus far  Advise outpatient cardiac event monitor x 2 weeks    Magnesium 1.9/ Potassium 3.7 today. Check and replace potassium and magnesium daily. Goal for potassium is >/= 4.0, and goal for magnesium is >/= 2.0           MCKINLEY Heller, CVNP-[facility]  Department of Cardiology  Date of Service: 03/13/2025                 [1]   Social History  Tobacco Use    Smoking status: Never    Smokeless tobacco: Never   Substance Use Topics    Alcohol use: No    Drug use: No   [2]   No current facility-administered medications on file prior to encounter.     Current Outpatient Medications on File Prior to Encounter   Medication Sig Dispense Refill    acetaminophen (TYLENOL) 500 MG tablet Take 500 mg by mouth 2 (two) times a day.       ADVAIR -21 mcg/actuation HFAA inhaler INHALE 2 PUFFS INTO THE LUNGS 2 TIMES DAILY. CONTROLLER (Patient taking differently: Inhale 2 puffs into the lungs 2 (two) times a day.) 36 g 3    cetirizine (ZYRTEC) 10 MG tablet Take 10 mg by mouth daily as needed for Allergies.      cloNIDine (CATAPRES) 0.1 MG tablet Take 1 tablet if systolic above 180 or diastolic above 105. Can repeat after 2 hours if bp remains above 180 or 105. (Patient taking differently: Take 0.1 mg by mouth once as needed (/  +). Take 1 tablet if systolic above 180 or diastolic above 105. Can repeat after 2 hours if bp remains above 180 or 105.) 30 tablet 0    fluticasone (FLONASE) 50 mcg/actuation nasal spray SPRAY 1 SPRAY IN EACH NOSTRIL 2 TIMES A DAY (Patient taking differently: 1 spray by Each Nostril route every evening.) 16 g 11    hydroCHLOROthiazide 12.5 MG Tab Take 1 tablet (12.5 mg total) by mouth once daily. 30 tablet 2    meclizine (ANTIVERT) 12.5 mg tablet Take 1 tablet (12.5 mg total) by mouth 3 (three) times daily as needed for Dizziness. 60 tablet 2    metaxalone (SKELAXIN) 800 MG tablet Take 800 mg by mouth every 6 (six) hours as needed (muscle pain).      metoprolol tartrate (LOPRESSOR) 50 MG tablet TAKE 1 & 1/2 (ONE & ONE-HALF) TABLETS (75mg) BY MOUTH TWICE DAILY (Patient taking differently: Take 75 mg by mouth 2 (two) times daily. TAKE 1 & 1/2 (ONE & ONE-HALF) TABLETS (75mg) BY MOUTH TWICE DAILY) 270 tablet 3    montelukast (SINGULAIR) 10 mg tablet Take 1 tablet (10 mg total) by mouth every evening. 90 tablet 3    pantoprazole (PROTONIX) 20 MG tablet Take 1 tablet (20 mg total) by mouth once daily. 90 tablet 3    peg 400-propylene glycol 0.4-0.3 % Drop Place 2 drops into both eyes 2 (two) times daily as needed (dry eyes).      pregabalin (LYRICA) 75 MG capsule Take 75 mg by mouth 2 (two) times daily.      SODIUM CHLORIDE (SIMPLY SALINE NASL) 1 spray by Nasal route 2 (two) times daily as needed (allergies).       tramadol (ULTRAM) 50 mg tablet Take 50 mg by mouth every 12 (twelve) hours as needed for Pain.      ketoconazole (NIZORAL) 2 % shampoo Wash hair with medicated shampoo at least 2x/week - let sit on scalp at least 5 minutes prior to rinsing (Patient not taking: Reported on 3/12/2025) 120 mL 5

## 2025-03-13 NOTE — ASSESSMENT & PLAN NOTE
Patient's blood pressure range in the last 24 hours was: BP  Min: 117/79  Max: 181/81.The patient's inpatient anti-hypertensive regimen is listed below:  Current Antihypertensives  hydroCHLOROthiazide tablet 12.5 mg, Daily, Oral  carvediloL tablet 25 mg, 2 times daily, Oral    Plan  - BP is uncontrolled, will adjust as follows: Coreg dose increased

## 2025-03-13 NOTE — PROGRESS NOTES
"Pharmacist Renal Dose Adjustment Note    Megha Ladd is a 79 y.o. female being treated with the medication Famotidine    Patient Data:    Vital Signs (Most Recent):  Temp: 97.5 °F (36.4 °C) (03/13/25 0344)  Pulse: 68 (03/13/25 0344)  Resp: 18 (03/13/25 0344)  BP: (!) 152/73 (03/13/25 0344)  SpO2: (!) 93 % (03/13/25 0344) Vital Signs (72h Range):  Temp:  [97.5 °F (36.4 °C)-98.1 °F (36.7 °C)]   Pulse:  [58-73]   Resp:  [16-23]   BP: (117-176)/(64-95)   SpO2:  [93 %-99 %]        Ht: 5' 7" (1.702 m)  Wt: 74.4 kg (164 lb 1.6 oz)  Estimated Creatinine Clearance: 48 mL/min (based on SCr of 1 mg/dL).  Body mass index is 25.7 kg/m².    Per [facility] renal dosing protocol:     Previous Order: Famotidine 20 mg twice daily    Will be changed to:     New Order: Famotidine 20 mg daily,    Due to: Renal clearance    Renal dose adjustments performed as noted above.    We will continue monitoring and adjusting as necessary.    Pharmacist: Ronnell Shaw [facility]  Ext: [phone]     "

## 2025-03-13 NOTE — SUBJECTIVE & OBJECTIVE
Interval History: Patient was seen and examined at bedside.  NAD.  She reports that her chest pain and shortness of breath has resolved.  She has ambulated to restroom without issue.   Nuclear stress test today negative.   Remains hypertensive.  Cardiology made further change in her meds by increasing Coreg dose.  Will monitor overnight for response.     Review of Systems   Respiratory:  Negative for shortness of breath.    Cardiovascular:  Negative for chest pain.   Gastrointestinal:  Negative for abdominal pain, nausea and vomiting.   Neurological:  Negative for dizziness and headaches.     Objective:     Vital Signs (Most Recent):  Temp: 97.6 °F (36.4 °C) (03/13/25 1123)  Pulse: 75 (03/13/25 1123)  Resp: 18 (03/13/25 1123)  BP: (!) 150/60 (03/13/25 1152)  SpO2: 98 % (03/13/25 1123) Vital Signs (24h Range):  Temp:  [97.5 °F (36.4 °C)-97.8 °F (36.6 °C)] 97.6 °F (36.4 °C)  Pulse:  [64-77] 75  Resp:  [16-19] 18  SpO2:  [93 %-98 %] 98 %  BP: (117-181)/(60-84) 150/60     Weight: 74.4 kg (164 lb 1.6 oz)  Body mass index is 25.7 kg/m².    Intake/Output Summary (Last 24 hours) at 3/13/2025 1349  Last data filed at 3/13/2025 1253  Gross per 24 hour   Intake 630 ml   Output 1 ml   Net 629 ml         Physical Exam  Vitals and nursing note reviewed.   Constitutional:       General: She is not in acute distress.  Cardiovascular:      Rate and Rhythm: Normal rate and regular rhythm.   Pulmonary:      Effort: Pulmonary effort is normal. No respiratory distress.      Breath sounds: Normal breath sounds.   Abdominal:      Palpations: Abdomen is soft.      Tenderness: There is no abdominal tenderness.   Musculoskeletal:      Right lower leg: No edema.      Left lower leg: No edema.   Skin:     General: Skin is warm and dry.   Neurological:      Mental Status: She is alert and oriented to person, place, and time.               Significant Labs: All pertinent labs within the past 24 hours have been reviewed.  CBC:   Recent Labs   Lab  03/12/25  0625 03/13/25  0436   WBC 12.28 10.72   HGB 11.8* 10.9*   HCT 36.2* 32.9*    239     CMP:   Recent Labs   Lab 03/12/25  0625 03/12/25  1620 03/13/25  0436   *  --  134*   K 3.0* 3.6 3.7   CL 95  --  95   CO2 29  --  31*   *  --  95   BUN 14  --  16   CREATININE 0.8  --  1.0   CALCIUM 9.0  --  8.7   PROT 6.6  --  5.9*   ALBUMIN 3.7  --  3.4*   BILITOT 0.7  --  0.8   ALKPHOS 74  --  63   AST 14  --  12   ALT 5*  --  5*   ANIONGAP 11  --  8     Magnesium:   Recent Labs   Lab 03/12/25  0625 03/12/25  1620 03/13/25  0436   MG 1.5* 2.1 1.9     Troponin:   Recent Labs   Lab 03/12/25  0858 03/12/25  1234 03/12/25  1620   TROPONINIHS 195.8* 388.4* 355.3*       Significant Imaging: I have reviewed all pertinent imaging results/findings within the past 24 hours.

## 2025-03-13 NOTE — PT/OT/SLP PROGRESS
Physical Therapy      Patient Name:  Megha Ladd   MRN:  [id number]    Patient not seen today secondary to first attempt patient declined participation due to headache after returning from stress test. Second attempt patient fatigue and requests PT return tomorrow. Will follow-up 03/14/25.

## 2025-03-14 ENCOUNTER — TELEPHONE (OUTPATIENT)
Dept: CARDIOLOGY | Facility: CLINIC | Age: 80
End: 2025-03-14
Payer: MEDICARE

## 2025-03-14 VITALS
HEIGHT: 67 IN | WEIGHT: 164.13 LBS | TEMPERATURE: 98 F | BODY MASS INDEX: 25.76 KG/M2 | SYSTOLIC BLOOD PRESSURE: 167 MMHG | DIASTOLIC BLOOD PRESSURE: 89 MMHG | OXYGEN SATURATION: 94 % | HEART RATE: 62 BPM | RESPIRATION RATE: 14 BRPM

## 2025-03-14 LAB
ANION GAP SERPL CALC-SCNC: 7 MMOL/L (ref 8–16)
BASOPHILS # BLD AUTO: 0.07 K/UL (ref 0–0.2)
BASOPHILS NFR BLD: 0.8 % (ref 0–1.9)
BUN SERPL-MCNC: 11 MG/DL (ref 8–23)
CALCIUM SERPL-MCNC: 9.3 MG/DL (ref 8.7–10.5)
CHLORIDE SERPL-SCNC: 96 MMOL/L (ref 95–110)
CO2 SERPL-SCNC: 30 MMOL/L (ref 23–29)
CREAT SERPL-MCNC: 0.8 MG/DL (ref 0.5–1.4)
DIFFERENTIAL METHOD BLD: ABNORMAL
EOSINOPHIL # BLD AUTO: 0.3 K/UL (ref 0–0.5)
EOSINOPHIL NFR BLD: 3.4 % (ref 0–8)
ERYTHROCYTE [DISTWIDTH] IN BLOOD BY AUTOMATED COUNT: 13.4 % (ref 11.5–14.5)
EST. GFR  (NO RACE VARIABLE): >60 ML/MIN/1.73 M^2
GLUCOSE SERPL-MCNC: 93 MG/DL (ref 70–110)
HCT VFR BLD AUTO: 35.3 % (ref 37–48.5)
HGB BLD-MCNC: 11.9 G/DL (ref 12–16)
IMM GRANULOCYTES # BLD AUTO: 0.03 K/UL (ref 0–0.04)
IMM GRANULOCYTES NFR BLD AUTO: 0.4 % (ref 0–0.5)
LYMPHOCYTES # BLD AUTO: 3.3 K/UL (ref 1–4.8)
LYMPHOCYTES NFR BLD: 40 % (ref 18–48)
MAGNESIUM SERPL-MCNC: 1.7 MG/DL (ref 1.6–2.6)
MCH RBC QN AUTO: 30.8 PG (ref 27–31)
MCHC RBC AUTO-ENTMCNC: 33.7 G/DL (ref 32–36)
MCV RBC AUTO: 92 FL (ref 82–98)
MONOCYTES # BLD AUTO: 0.7 K/UL (ref 0.3–1)
MONOCYTES NFR BLD: 8.6 % (ref 4–15)
NEUTROPHILS # BLD AUTO: 3.9 K/UL (ref 1.8–7.7)
NEUTROPHILS NFR BLD: 46.8 % (ref 38–73)
NRBC BLD-RTO: 0 /100 WBC
PLATELET # BLD AUTO: 253 K/UL (ref 150–450)
PMV BLD AUTO: 11.2 FL (ref 9.2–12.9)
POTASSIUM SERPL-SCNC: 4.4 MMOL/L (ref 3.5–5.1)
RBC # BLD AUTO: 3.86 M/UL (ref 4–5.4)
SODIUM SERPL-SCNC: 133 MMOL/L (ref 136–145)
WBC # BLD AUTO: 8.35 K/UL (ref 3.9–12.7)

## 2025-03-14 PROCEDURE — 25000003 PHARM REV CODE 250

## 2025-03-14 PROCEDURE — 85025 COMPLETE CBC W/AUTO DIFF WBC: CPT | Performed by: NURSE PRACTITIONER

## 2025-03-14 PROCEDURE — 80048 BASIC METABOLIC PNL TOTAL CA: CPT | Performed by: NURSE PRACTITIONER

## 2025-03-14 PROCEDURE — 36415 COLL VENOUS BLD VENIPUNCTURE: CPT | Performed by: NURSE PRACTITIONER

## 2025-03-14 PROCEDURE — 99233 SBSQ HOSP IP/OBS HIGH 50: CPT | Mod: ,,, | Performed by: STUDENT IN AN ORGANIZED HEALTH CARE EDUCATION/TRAINING PROGRAM

## 2025-03-14 PROCEDURE — 83735 ASSAY OF MAGNESIUM: CPT | Performed by: NURSE PRACTITIONER

## 2025-03-14 PROCEDURE — 25000003 PHARM REV CODE 250: Performed by: FAMILY MEDICINE

## 2025-03-14 PROCEDURE — 25000003 PHARM REV CODE 250: Performed by: STUDENT IN AN ORGANIZED HEALTH CARE EDUCATION/TRAINING PROGRAM

## 2025-03-14 PROCEDURE — 25000003 PHARM REV CODE 250: Performed by: NURSE PRACTITIONER

## 2025-03-14 PROCEDURE — 63600175 PHARM REV CODE 636 W HCPCS

## 2025-03-14 RX ORDER — CARVEDILOL 25 MG/1
25 TABLET ORAL 2 TIMES DAILY
Qty: 60 TABLET | Refills: 1 | Status: SHIPPED | OUTPATIENT
Start: 2025-03-14 | End: 2025-05-13

## 2025-03-14 RX ORDER — HYDROCHLOROTHIAZIDE 25 MG/1
25 TABLET ORAL DAILY
Qty: 30 TABLET | Refills: 1 | Status: SHIPPED | OUTPATIENT
Start: 2025-03-14 | End: 2025-05-13

## 2025-03-14 RX ADMIN — POTASSIUM BICARBONATE 50 MEQ: 978 TABLET, EFFERVESCENT ORAL at 09:03

## 2025-03-14 RX ADMIN — ONDANSETRON 4 MG: 2 INJECTION INTRAMUSCULAR; INTRAVENOUS at 09:03

## 2025-03-14 RX ADMIN — SENNOSIDES AND DOCUSATE SODIUM 1 TABLET: 50; 8.6 TABLET ORAL at 09:03

## 2025-03-14 RX ADMIN — CARVEDILOL 25 MG: 25 TABLET, FILM COATED ORAL at 09:03

## 2025-03-14 RX ADMIN — ENOXAPARIN SODIUM 70 MG: 80 INJECTION SUBCUTANEOUS at 09:03

## 2025-03-14 RX ADMIN — FAMOTIDINE 20 MG: 10 INJECTION, SOLUTION INTRAVENOUS at 09:03

## 2025-03-14 RX ADMIN — HYDROCHLOROTHIAZIDE 25 MG: 12.5 TABLET ORAL at 09:03

## 2025-03-14 NOTE — PLAN OF CARE
I provided the patient a choice of post acute providers and offered a list of CMS rated home health agencies.      Patient has declined to select a preferred provider and elects placement with the first accepting in network provider that is available to provide services as ordered by the physician.      Referral forwarded to [facility]

## 2025-03-14 NOTE — ASSESSMENT & PLAN NOTE
Patient's blood pressure range in the last 24 hours was: BP  Min: 144/83  Max: 162/68.The patient's inpatient anti-hypertensive regimen is listed below:  Current Antihypertensives  carvediloL tablet 25 mg, 2 times daily, Oral  hydroCHLOROthiazide tablet 25 mg, Daily, Oral  hydrochlorothiazide (HYDRODIURIL) tablet, Daily, Oral  carvedilol (COREG) tablet, 2 times daily, Oral    Plan  - BP is uncontrolled, will adjust as follows: Coreg dose increased

## 2025-03-14 NOTE — TELEPHONE ENCOUNTER
----- Message from Jammie sent at 3/14/2025 11:10 AM CDT -----  Type:  Sooner Appointment RequestCaller is requesting a sooner appointment.  Caller declined first available appointment listed below.  Caller will not accept being placed on the waitlist and is requesting a message be sent to doctor.Name of Caller:  ptWhen is the first available appointment?  N/ASymptoms:  Hospital f/uWould the patient rather a call back or a response via [facility]? [facility] Call Back Number:  [phone] Information:  pt is discharging today and needs a one week f/p. Please call back to advise. Thanks!

## 2025-03-14 NOTE — PLAN OF CARE
**Cleared for discharge from CM standpoint**    Accepted by [facility] - they will contact patient to schedule home visits     Hospital follow up scheduled 3/25 with Dr Zhanna Gandara - appt info on AVS    Attempted to schedule Cardiology follow up but was told clinic will contact patient with appt date/time     [facility]  Discharge Final Note    Primary Care Provider: Zhanna Gandara MD    Expected Discharge Date: 3/14/2025    Final Discharge Note (most recent)       Final Note - 03/14/25 1110          Final Note    Assessment Type Final Discharge Note     Anticipated Discharge Disposition Home-Health Care Svc     What phone number can be called within the next 1-3 days to see how you are doing after discharge? [phone]     Hospital Resources/Appts/Education Provided Provided patient/caregiver with written discharge plan information;Appointments scheduled and added to AVS;Post-Acute resouces added to AVS        Post-Acute Status    Post-Acute Authorization Home Health     Home Health Status Set-up Complete/Auth obtained     Discharge Delays None known at this time                    Follow-up providers       Drake Matson MD   Specialty: Cardiology    [address]  [address]  [place] [address]   Phone: [phone]       Next Steps: Follow up in 1 week(s)    Instructions: clinic will contact you with appt date/time              After-discharge care                Home Medical Care       *[facility]   Service: Home Health Services    [address] [address]   Phone: [phone]

## 2025-03-14 NOTE — NURSING
Pt verbalized understanding of discharge instructions. IV and tele box removed. Box returned to the monitor room. Pt wheeled down to private vehicle with all personal belongings.

## 2025-03-14 NOTE — ASSESSMENT & PLAN NOTE
Patient has Abnormal Magnesium: hypomagnesemia. Will continue to monitor electrolytes closely. Will replace the affected electrolytes and repeat labs to be done after interventions completed. The patient's magnesium results have been reviewed and are listed below.  Recent Labs   Lab 03/14/25  1002   MG 1.7    Replete per PRN protocol

## 2025-03-14 NOTE — ASSESSMENT & PLAN NOTE
Patient's most recent potassium results are listed below.   Recent Labs     03/12/25  1620 03/13/25  0436 03/14/25  1002   K 3.6 3.7 4.4     Plan  - Replete potassium per protocol  - Monitor potassium Daily  - Patient's hypokalemia is stable

## 2025-03-14 NOTE — PT/OT/SLP PROGRESS
Occupational Therapy      Patient Name:  Megha Ladd   MRN:  [id number]    Patient not seen today secondary to Other (Comment) (pending d/c). Will follow-up if d/c falls through.    3/14/2025

## 2025-03-14 NOTE — PT/OT/SLP PROGRESS
Physical Therapy      Patient Name:  Megha Ladd   MRN:  [id number]    Patient not seen today secondary to patient declines participation with PT evaluation due to nausea after potassium. Discharge orders are in. PT provided education on SPC versus RW use upon D/C. Patient requests HHPT as she feels too deconditioned to go to OPPT at this time. Spouse and daughter present and in agreement. Will follow-up 03/15/25 if discharge falls through.

## 2025-03-14 NOTE — PLAN OF CARE
Problem: Adult Inpatient Plan of Care  Goal: Plan of Care Review  3/14/2025 1440 by Le Devries RN  Outcome: Met  3/14/2025 1339 by Le Devries RN  Outcome: Progressing  Goal: Patient-Specific Goal (Individualized)  3/14/2025 1440 by Le Devries RN  Outcome: Met  3/14/2025 1339 by Le Devries RN  Outcome: Progressing  Goal: Absence of Hospital-Acquired Illness or Injury  3/14/2025 1440 by Le Devries RN  Outcome: Met  3/14/2025 1339 by Le Devries RN  Outcome: Progressing  Goal: Optimal Comfort and Wellbeing  3/14/2025 1440 by Le Devries RN  Outcome: Met  3/14/2025 1339 by Le Devries RN  Outcome: Progressing  Goal: Readiness for Transition of Care  3/14/2025 1440 by Le Devries RN  Outcome: Met  3/14/2025 1339 by Le Devries RN  Outcome: Progressing

## 2025-03-14 NOTE — PROGRESS NOTES
[facility]  Department of Cardiology  Progress Note      PATIENT NAME: Megha Ladd  MRN: [id number]  TODAY'S DATE: 03/14/2025  ADMIT DATE: 3/12/2025                          CONSULT REQUESTED BY: Oralia Cortes MD    SUBJECTIVE     PRINCIPAL PROBLEM: Atrial fibrillation and flutter      REASON FOR CONSULT:  New onset AF and CP    INTERVAL HISTORY:  3/14/25:  Feeling good today  -150s  Less PVCs on tele today    3/13/25  NS on tele w/ frequent PVC; BP trend elevated  Stress test today;     3/12/25  NSR w/ freq PVCs on tele; AM /100 today  No c/o overnight; in lab for stress test  Slight ST depression in II, III, AVF  Troponin trended down      HPI:  Pt is 80 yo female with PMH: HTN, hypothyroidism, HLD who presented to ER this AM with c/o palpitations, clamminess, and shortness of breath. Pt states she took her usual BP meds at 3:30 am (she usually goes to bed at 4 am she states); around 4 am she suddenly developed pain in both shoulder blades, collar bones and bilateral jaw. The pain radiated down both arms. She had concurrent nausea. Intensity was 8/10. She was experiencing the pain for 45 min to 1 hour so EMS was called. She received Ntg and pain was alleviated. Additionally she had developed palpitations, clamminess and pulse ox readings showed irregular and fast heart rate up to 180 bpm. IV diltiazem administered per EMS. Aspirin also administered via EMS and pt took two baby aspirin as well.    Pt denies any actual chest pain. She denies fluid retention. She has had no recurrent pain since being in ER. She ambulated to bathroom w/o dyspnea or CP    Pt denies smoking or ETOH use. She denies prior history of Afib. No personal heart disease. Her father and sister have CAD/CABG and MI history;     Daughter states she doesn't eat much, about once daily and she has lost weight. She denies fluid retention, vomiting, diarrhea, recent URI    No afib strips noted from EMS or on EKG. Multi focal  PVCs noted with SR. K+ was 3.0 and Mag was 1.5 on arrival    Review of patient's allergies indicates:   Allergen Reactions    Zoster vaccine live Other (See Comments)     Headache, neck shoulder, body flu sx, sore throat, nausea, dry cough, fatigue     Amlodipine Diarrhea, Nausea Only and Other (See Comments)     Joint pain    Augmentin [amoxicillin-pot clavulanate]     Azithromycin      Other reaction(s): Unknown    Budesonide      Other reaction(s): heartburn  Other reaction(s): Rash    Cephalexin      Other reaction(s): Unknown    Ciprofloxacin      Other reaction(s): Unknown    Erythromycin      Other reaction(s): Unknown    Esomeprazole magnesium      Other reaction(s): Headache    Felodipine      Nausea, raw throat, ulcer tongue, myalgia    Levofloxacin      Other reaction(s): tendonitis    Talwin compound      Other reaction(s): Vomiting  Other reaction(s): Hallucinations    Hydrocodone-acetaminophen Rash     Other reaction(s): Nausea       Past Medical History:   Diagnosis Date    Asthma     Basal cell carcinoma     GERD (gastroesophageal reflux disease)     Hyperlipidemia     Hypertension     Hypothyroid     Lumbago     Neuromuscular disorder     Sinusitis     Ulcer      Past Surgical History:   Procedure Laterality Date    ADENOIDECTOMY      Tonsils and adenoids removed    APPENDECTOMY       SECTION      HYSTERECTOMY  ?    Partial hysterectomy    LUMBAR EPIDURAL INJECTION      PARTIAL HYSTERECTOMY      TONSILLECTOMY      Tonsils and adenoids removed    TUBAL LIGATION       Social History[1]     REVIEW OF SYSTEMS  Negative except as mentioned in HPI    OBJECTIVE     VITAL SIGNS (Most Recent)  Temp: 98.2 °F (36.8 °C) (25 0710)  Pulse: 72 (2510)  Resp: 16 (25)  BP: (!) 146/81 (25 0710)  SpO2: 96 % (25 0710)    VENTILATION STATUS  Resp: 16 (258)  SpO2: 96 % (25 0710)           I & O (Last 24H):  Intake/Output Summary (Last 24 hours)  at 3/14/2025 0851  Last data filed at 3/14/2025 0317  Gross per 24 hour   Intake 600 ml   Output 0 ml   Net 600 ml       WEIGHTS  Wt Readings from Last 3 Encounters:   03/12/25 1519 74.4 kg (164 lb 1.6 oz)   03/12/25 0557 74.4 kg (164 lb)   03/12/25 0945 74.4 kg (164 lb 0.4 oz)   02/26/25 1513 74.6 kg (164 lb 7.4 oz)       PHYSICAL EXAM    GENERAL  pleasant elderly female resting comfortably in no apparent distress.  HEENT: Normocephalic.    NECK: No JVD.   CARDIAC: Regular rate and rhythm. S1 is normal.S2 is normal. + faint systolic murmur; NSR on tele, frequent PVC  CHEST ANATOMY: normal. No chest wall pain  LUNGS: Clear to auscultation. No wheezing or rhonchi..   ABDOMEN: Soft .  Normal bowel sounds.    EXTREMITIES: No edema  CENTRAL NERVOUS SYSTEM: AAO x 3; memory intact; [place]  SKIN: No rash     HOME MEDICATIONS:Medications Ordered Prior to Encounter[2]    SCHEDULED MEDS:   carvediloL  25 mg Oral BID    enoxparin  1 mg/kg Subcutaneous Q12H (treatment, non-standard time)    famotidine (PF)  20 mg Intravenous Daily    hydroCHLOROthiazide  25 mg Oral Daily    montelukast  10 mg Oral QHS    senna-docusate 8.6-50 mg  1 tablet Oral BID       CONTINUOUS INFUSIONS:    PRN MEDS:  Current Facility-Administered Medications:     acetaminophen, 650 mg, Oral, Q4H PRN    dextrose 50%, 12.5 g, Intravenous, PRN    dextrose 50%, 25 g, Intravenous, PRN    glucagon (human recombinant), 1 mg, Intramuscular, PRN    glucose, 16 g, Oral, PRN    glucose, 24 g, Oral, PRN    magnesium oxide, 800 mg, Oral, PRN    magnesium oxide, 800 mg, Oral, PRN    melatonin, 9 mg, Oral, Nightly PRN    naloxone, 0.02 mg, Intravenous, PRN    ondansetron, 4 mg, Intravenous, Q6H PRN    potassium bicarbonate, 35 mEq, Oral, PRN    potassium bicarbonate, 50 mEq, Oral, PRN    potassium bicarbonate, 60 mEq, Oral, PRN    sodium chloride 0.9%, 3 mL, Intravenous, PRN    LABS AND DIAGNOSTICS     CBC LAST 3 DAYS  Recent Labs   Lab 03/12/25  0625 03/13/25  0436   WBC  "12.28 10.72   RBC 3.86* 3.55*   HGB 11.8* 10.9*   HCT 36.2* 32.9*   MCV 94 93   MCH 30.6 30.7   MCHC 32.6 33.1   RDW 13.6 13.4    239   MPV 11.3 10.8   GRAN 62.9  7.7 45.8  4.9   LYMPH 26.5  3.3 41.8  4.5   MONO 6.9  0.9 7.5  0.8   BASO 0.07 0.06   NRBC 0 0       COAGULATION LAST 3 DAYS  No results for input(s): "LABPT", "INR", "APTT" in the last 168 hours.    CHEMISTRY LAST 3 DAYS  Recent Labs   Lab 03/12/25  0625 03/12/25  1620 03/13/25  0436   *  --  134*   K 3.0* 3.6 3.7   CL 95  --  95   CO2 29  --  31*   ANIONGAP 11  --  8   BUN 14  --  16   CREATININE 0.8  --  1.0   *  --  95   CALCIUM 9.0  --  8.7   MG 1.5* 2.1 1.9   ALBUMIN 3.7  --  3.4*   PROT 6.6  --  5.9*   ALKPHOS 74  --  63   ALT 5*  --  5*   AST 14  --  12   BILITOT 0.7  --  0.8       CARDIAC PROFILE LAST 3 DAYS  Recent Labs   Lab 03/12/25  0625   *       ENDOCRINE LAST 3 DAYS  No results for input(s): "TSH", "PROCAL" in the last 168 hours.    LAST ARTERIAL BLOOD GAS  ABG  No results for input(s): "PH", "PO2", "PCO2", "HCO3", "BE" in the last 168 hours.    LAST 7 DAYS MICROBIOLOGY   Microbiology Results (last 7 days)       ** No results found for the last 168 hours. **            MOST RECENT IMAGING  NM Myocardial Perfusion Spect Multi Pharmacologic  Narrative: CLINICAL HISTORY:  ([id number])80 y/o  (1945) F    CAD screening, intermediate CAD risk, not treadmill candidate;    TECHNIQUE:  (A#[id number], exam time 3/13/2025 9:14)    NM MYOCARDIAL PERFUSION SPECT MULTI PHARM [id number]    A total of 10.1 millicuries was utilized for the rest portion of the examination with 26.6 millicuries for the stress portion of the examination following a 1 day protocol. The patient was stressed with Lexiscan, 0.4 milligrams intravenously and achieved a maximum heart rate of 88 beats per minute.    COMPARISON:  None available.    FINDINGS:  There is relative normal and homogeneous radiotracer uptake by the left ventricular myocardium " with no photopenic defects to suggest pharmacologically induced reversible ischemia or infarct.    The polar map images confirm the planar SPECT findings, noting a small defect in the mid lateral left ventricular myocardial wall and punctate defect in the anterior septal left ventricular myocardial wall corresponding to less than 16% of the overall left ventricular volume without reversibility suggestive of either artifact or tiny punctate remote infarcts.    There are no wall motion abnormalities with no abnormal transient left ventricular dilatation on stress images.    Ejection fraction is calculated at 80 %.  Impression: 1. No convincing evidence of pharmacologically induced reversible ischemia or infarct.    2. Estimated ejection fraction of 80 %.    Electronically signed by: Swapnil Gar  Date:    03/13/2025  Time:    09:18  Nuclear Stress Test    The ECG portion of the study is negative for ischemia.    The nuclear portion of this study will be reported separately.      ECHOCARDIOGRAM RESULTS (last 5)  Results for orders placed during the hospital encounter of 10/25/23    Echo    Interpretation Summary    Left Ventricle: The left ventricle is normal in size. IVSd is 1.24 cm. LVPWs is 0.85 cm. Normal wall thickness. Normal wall motion. There is normal systolic function with a visually estimated ejection fraction of 55 - 60%. There is normal diastolic function. E/A ratio is 0.81.    Right Ventricle: Normal right ventricular cavity size. Wall thickness is normal. Right ventricle wall motion  is normal. Systolic function is normal.    Aortic Valve: There is moderate aortic valve sclerosis. There is moderate aortic regurgitation.    Mitral Valve: There is mild to moderate regurgitation.    Tricuspid Valve: There is mild regurgitation.    Pulmonic Valve: There is mild regurgitation.    IVC/SVC: Normal venous pressure at 3 mmHg.      CURRENT/PREVIOUS VISIT EKG  Results for orders placed or performed during the  hospital encounter of 03/12/25   EKG 12-lead    Collection Time: 03/12/25 11:42 AM   Result Value Ref Range    QRS Duration 72 ms    [facility] QTC Calculation 462 ms    Narrative    Test Reason : R79.89,    Vent. Rate :  60 BPM     Atrial Rate :  60 BPM     P-R Int : 206 ms          QRS Dur :  72 ms      QT Int : 462 ms       P-R-T Axes :  62  -3  -3 degrees    QTcB Int : 462 ms    Normal sinus rhythm  Normal ECG  When compared with ECG of 12-Mar-2025 05:57,  Premature ventricular complexes are no longer Present  Nonspecific T wave abnormality no longer evident in Lateral leads    Referred By: AAAREFERRAL SELF           Confirmed By:            ASSESSMENT/PLAN:     Active Hospital Problems    Diagnosis    *Atrial fibrillation and flutter    Hypokalemia    Hypomagnesemia    Chest pain    GERD (gastroesophageal reflux disease)    HTN (hypertension)       ASSESSMENT & PLAN:   Questionable Atrial Fibrillation   Hypokalemia  Hypomagnesemia  Hypertension    RECOMMENDATIONS:  Echocardiogram w/ normal findings  Nuclear stress test was negative for reversible ischemia  Less PVCs on telemetry with Coreg 25 mg BID; increased HCTZ to 25 mg daily  No atrial fibrillation noted on telemetry thus far  Advise outpatient cardiac event monitor x 3 days to evaluate PVC burden  Outpatient EP referral depending on monitor results    OK to discharge home today with follow up with cardiology outpatient          MCKINLEY Heller, CVNP-BC  [facility]  Department of Cardiology  Date of Service: 03/14/2025                 [1]   Social History  Tobacco Use    Smoking status: Never    Smokeless tobacco: Never   Substance Use Topics    Alcohol use: No    Drug use: No   [2]   No current facility-administered medications on file prior to encounter.     Current Outpatient Medications on File Prior to Encounter   Medication Sig Dispense Refill    acetaminophen (TYLENOL) 500 MG tablet Take 500 mg by mouth 2 (two) times a day.      ADVAIR  -21 mcg/actuation HFAA inhaler INHALE 2 PUFFS INTO THE LUNGS 2 TIMES DAILY. CONTROLLER (Patient taking differently: Inhale 2 puffs into the lungs 2 (two) times a day.) 36 g 3    cetirizine (ZYRTEC) 10 MG tablet Take 10 mg by mouth daily as needed for Allergies.      cloNIDine (CATAPRES) 0.1 MG tablet Take 1 tablet if systolic above 180 or diastolic above 105. Can repeat after 2 hours if bp remains above 180 or 105. (Patient taking differently: Take 0.1 mg by mouth once as needed (/  +). Take 1 tablet if systolic above 180 or diastolic above 105. Can repeat after 2 hours if bp remains above 180 or 105.) 30 tablet 0    fluticasone (FLONASE) 50 mcg/actuation nasal spray SPRAY 1 SPRAY IN EACH NOSTRIL 2 TIMES A DAY (Patient taking differently: 1 spray by Each Nostril route every evening.) 16 g 11    hydroCHLOROthiazide 12.5 MG Tab Take 1 tablet (12.5 mg total) by mouth once daily. 30 tablet 2    meclizine (ANTIVERT) 12.5 mg tablet Take 1 tablet (12.5 mg total) by mouth 3 (three) times daily as needed for Dizziness. 60 tablet 2    metaxalone (SKELAXIN) 800 MG tablet Take 800 mg by mouth every 6 (six) hours as needed (muscle pain).      metoprolol tartrate (LOPRESSOR) 50 MG tablet TAKE 1 & 1/2 (ONE & ONE-HALF) TABLETS (75mg) BY MOUTH TWICE DAILY (Patient taking differently: Take 75 mg by mouth 2 (two) times daily. TAKE 1 & 1/2 (ONE & ONE-HALF) TABLETS (75mg) BY MOUTH TWICE DAILY) 270 tablet 3    montelukast (SINGULAIR) 10 mg tablet Take 1 tablet (10 mg total) by mouth every evening. 90 tablet 3    pantoprazole (PROTONIX) 20 MG tablet Take 1 tablet (20 mg total) by mouth once daily. 90 tablet 3    peg 400-propylene glycol 0.4-0.3 % Drop Place 2 drops into both eyes 2 (two) times daily as needed (dry eyes).      pregabalin (LYRICA) 75 MG capsule Take 75 mg by mouth 2 (two) times daily.      SODIUM CHLORIDE (SIMPLY SALINE NASL) 1 spray by Nasal route 2 (two) times daily as needed (allergies).       tramadol (ULTRAM) 50 mg tablet Take 50 mg by mouth every 12 (twelve) hours as needed for Pain.      ketoconazole (NIZORAL) 2 % shampoo Wash hair with medicated shampoo at least 2x/week - let sit on scalp at least 5 minutes prior to rinsing (Patient not taking: Reported on 3/12/2025) 120 mL 5

## 2025-03-18 PROCEDURE — G0179 MD RECERTIFICATION HHA PT: HCPCS | Mod: ,,, | Performed by: STUDENT IN AN ORGANIZED HEALTH CARE EDUCATION/TRAINING PROGRAM

## 2025-03-26 ENCOUNTER — HOSPITAL ENCOUNTER (OUTPATIENT)
Dept: CARDIOLOGY | Facility: CLINIC | Age: 80
Discharge: HOME | End: 2025-03-26
Attending: NURSE PRACTITIONER
Payer: MEDICARE

## 2025-03-26 ENCOUNTER — DOCUMENT SCAN (OUTPATIENT)
Dept: HOME HEALTH SERVICES | Facility: HOSPITAL | Age: 80
End: 2025-03-26
Payer: MEDICARE

## 2025-03-26 ENCOUNTER — EXTERNAL HOME HEALTH (OUTPATIENT)
Dept: HOME HEALTH SERVICES | Facility: HOSPITAL | Age: 80
End: 2025-03-26
Payer: MEDICARE

## 2025-03-26 DIAGNOSIS — I49.3 PVC (PREMATURE VENTRICULAR CONTRACTION): ICD-10-CM

## 2025-03-26 DIAGNOSIS — R07.9 CHEST PAIN: ICD-10-CM

## 2025-03-27 ENCOUNTER — TELEPHONE (OUTPATIENT)
Dept: CARDIOLOGY | Facility: CLINIC | Age: 80
End: 2025-03-27
Payer: MEDICARE

## 2025-03-27 NOTE — TELEPHONE ENCOUNTER
----- Message from Jajaon sent at 3/27/2025  3:06 PM CDT -----  Type:  Needs Medical AdviceWho Called: Tate the patient rather a call back or a response via [facility]? Call [facility] Call Back Number: [phone] Additional Information: pt  st the monitor has came lose/the monitor is blinking. please call to discuss

## 2025-03-28 ENCOUNTER — TELEPHONE (OUTPATIENT)
Dept: CARDIOLOGY | Facility: CLINIC | Age: 80
End: 2025-03-28
Payer: MEDICARE

## 2025-03-28 ENCOUNTER — OFFICE VISIT (OUTPATIENT)
Dept: FAMILY MEDICINE | Facility: CLINIC | Age: 80
End: 2025-03-28
Payer: MEDICARE

## 2025-03-28 VITALS
DIASTOLIC BLOOD PRESSURE: 80 MMHG | BODY MASS INDEX: 26.68 KG/M2 | HEIGHT: 67 IN | HEART RATE: 70 BPM | RESPIRATION RATE: 12 BRPM | TEMPERATURE: 98 F | SYSTOLIC BLOOD PRESSURE: 128 MMHG | WEIGHT: 170 LBS | OXYGEN SATURATION: 99 %

## 2025-03-28 DIAGNOSIS — I10 PRIMARY HYPERTENSION: Primary | ICD-10-CM

## 2025-03-28 DIAGNOSIS — R32 URINARY INCONTINENCE, UNSPECIFIED TYPE: ICD-10-CM

## 2025-03-28 DIAGNOSIS — I48.92 ATRIAL FIBRILLATION AND FLUTTER: ICD-10-CM

## 2025-03-28 DIAGNOSIS — R79.89 ELEVATED TROPONIN: ICD-10-CM

## 2025-03-28 DIAGNOSIS — I48.91 ATRIAL FIBRILLATION AND FLUTTER: ICD-10-CM

## 2025-03-28 DIAGNOSIS — E87.6 HYPOKALEMIA: ICD-10-CM

## 2025-03-28 PROCEDURE — 99999 PR PBB SHADOW E&M-EST. PATIENT-LVL IV: CPT | Mod: PBBFAC,,,

## 2025-03-28 PROCEDURE — 81003 URINALYSIS AUTO W/O SCOPE: CPT

## 2025-03-28 RX ORDER — GLUCOSAMINE/CHONDRO SU A 500-400 MG
1 TABLET ORAL DAILY
COMMUNITY

## 2025-03-28 RX ORDER — POLYETHYLENE GLYCOL 400 AND PROPYLENE GLYCOL 4; 3 MG/ML; MG/ML
SOLUTION/ DROPS OPHTHALMIC
COMMUNITY

## 2025-03-28 NOTE — PROGRESS NOTES
Subjective:       Patient ID:  [id number]     Chief Complaint: Follow-up and Hospital Follow Up      History of Present Illness    Ms. Ladd presents today for follow up after recent hospitalization for chest pain and atrial fibrillation She experienced her first AFib episode with chest pain characterized by bilateral shoulder blade pain radiating down arms, along with collarbone and jaw pain. Troponin levels were elevated during hospitalization peaking at 388, and last measured at 355. She received Lovenox injections twice daily during her stay. Her blood pressure readings have stabilized since switching from Metoprolol to Carvedilol. She has clonidine available for use during significant blood pressure elevations. She has a history of poor response to various blood pressure medications, including coughing as a side effect from enalapril. Current medications include Carvedilol and hydrochlorothiazide 12.5 mg daily. She has four tablets of hydrochlorothiazide remaining. She reports sensitivities to multiple medications. She continues to experience urinary incontinence on hydrochlorothiazide 12.5 mg daily, though with some improvement. Incontinence worsened significantly when the dose was increased to 25 mg during hospitalization. She drinks a 20 oz bottle of water daily with additional fluid intake throughout the day. She supplements with daily Gatorade for electrolytes. She receives home health services including physical therapy, occupational therapy, and nursing care.   No CP or SOB.    Transitional Care Note  Admit date: 3/12/2025  Discharge date:   Date of interactive contact (2 business days post D/C):n/a  Hospitalized at: [facility]  Discharge diagnoses: HTN, Afib, hypokalemia, and hypomagnesium     Family and/or Caretaker present at visit?  Yes.  Diagnostic tests reviewed/disposition: No diagnosic tests pending after this hospitalization.  Medication changes: DC metoprolol and started carvedilol.   Home  "health/community services discussion/referrals: Patient has home health established at [facility] .   Establishment or re-establishment of referral orders for community resources: No other necessary community resources.   Discussion with other health care providers: No discussion with other health care providers necessary.      "[facility] Medicine  Discharge Summary        Patient Name: Megha Ladd  MRN: [id number]  DEANNA: [id number]  Patient Class: IP- Inpatient  Admission Date: 3/12/2025  Hospital Length of Stay: 1 days  Discharge Date and Time:  03/14/2025 2:48 PM  Attending Physician: Oralia Cortes MD   Discharging Provider: Melissa Garcia NP  Primary Care Provider: Zhanna Gandara MD     Primary Care Team: Networked reference to record PCT      HPI:   Megha Ladd is a 79 y.o. y.o. female with a PMHx of asthma, GERD, HTN who presented to the ED with chest pain and palpitations onset x 4:00 a.m..  She was described chest pain as an aching and pulling sensation that started in the center of her chest and went to shoulders and neck.  Pain was constant and waxed and waned in intensity.  She also reported associated nausea, shortness of breath, palpitations and diaphoresis.  Pain was 8/10 at its worst and denies pain currently.  Patient was found to be in AFib RVR via EMS.  She received diltiazem and nitroglycerin in transit.  Chest pain was relieved with nitro.  Upon arrival was in normal sinus rhythm.  Denies chest pain currently.  States she was followed by cardiologist many years ago but is not seen by Cardiology currently.  Nonsmoker.  Denies EtOH use. ED workup was significant for hypokalemia and hypomagnesemia. Cardiology was consulted. Hospital Medicine was consulted for further workup and management of the patient.                  * No surgery found *       Hospital Course:   Patient monitored closely during hospitalization.  Cardiology was consulted for chest pain and possible episode " of atrial fibrillation.  Patient has remained in sinus rhythm since admission.  Her troponin was trended with peak at 388, then down trended.  She underwent a nuclear stress test which was negative for reversible ischemia.  An echocardiogram was performed which showed normal EF.  Patient was hypertensive, Cardiology has made adjustments to her antihypertensive medications.  Her electrolytes were replaced as needed.  Her BP improved.  She was cleared for discharge by Cardiology.  Will order cardiac monitor outpatient.  She is to follow up with PCP and Cardiologist in 1 week.  Patient seen and examined on day of discharge and deemed stable for discharge home.         Goals of Care Treatment Preferences:  Code Status: Full Code           Consults:   Consults (From admission, onward)            Status Ordering Provider       Inpatient consult to Cardiology  Once        Provider:  Drake Matson MD Completed ORTEGO, ANNA C.                Assessment & Plan  Atrial fibrillation and flutter  - cardiology consulted: appreciate recommendations  - tele monitoring   - FD lovenox ordered  - spontaneously converted to NSR after diltiazem was given by EMS  - Continue metoprolol --- changed to carvedilol per cardiology   - Replete electrolytes to maintain goal K >4 and Mg >2           HTN (hypertension)  Patient's blood pressure range in the last 24 hours was: BP  Min: 144/83  Max: 162/68.The patient's inpatient anti-hypertensive regimen is listed below:  Current Antihypertensives  carvediloL tablet 25 mg, 2 times daily, Oral  hydroCHLOROthiazide tablet 25 mg, Daily, Oral  hydrochlorothiazide (HYDRODIURIL) tablet, Daily, Oral  carvedilol (COREG) tablet, 2 times daily, Oral     Plan  - BP is uncontrolled, will adjust as follows: Coreg dose increased      GERD (gastroesophageal reflux disease)  Pepcid      Chest pain  - cardiology consulted: appreciate recommendations  - tele monitoring   - troponin trend: 8.8 -> 195.8 -> 388  "-> 355  - on FD Lovenox for AF continue per cards recs   - ECHO reviewed, normal EF  - Nuclear stress test 3/13/25 negative for reversible ischemia  - PRN NTG, EKG      Hypokalemia  Patient's most recent potassium results are listed below.         Recent Labs     03/12/25  1620 03/13/25  0436 03/14/25  1002   K 3.6 3.7 4.4      Plan  - Replete potassium per protocol  - Monitor potassium Daily  - Patient's hypokalemia is stable  Hypomagnesemia  Patient has Abnormal Magnesium: hypomagnesemia. Will continue to monitor electrolytes closely. Will replace the affected electrolytes and repeat labs to be done after interventions completed. The patient's magnesium results have been reviewed and are listed below.      Recent Labs   Lab 03/14/25  1002   MG 1.7    Replete per PRN protocol         Final Active Diagnoses:     Diagnosis Date Noted POA    PRINCIPAL PROBLEM:  Atrial fibrillation and flutter [I48.91, I48.92] 03/12/2025 Yes    Hypokalemia [E87.6] 03/12/2025 Yes    Hypomagnesemia [E83.42] 03/12/2025 Yes    Chest pain [R07.9] 10/25/2023 Yes    GERD (gastroesophageal reflux disease) [K21.9] 05/22/2012 Yes    HTN (hypertension) [I10] 03/29/2012 Yes       Problems Resolved During this Admission:         Discharged Condition: stable     Disposition: Home or Self Care"      Past Medical History:   Diagnosis Date    Asthma     Basal cell carcinoma     GERD (gastroesophageal reflux disease)     Hyperlipidemia     Hypertension     Hypothyroid     Lumbago     Neuromuscular disorder     Sinusitis     Ulcer       Active Problem List with Overview Notes    Diagnosis Date Noted    Atrial fibrillation and flutter 03/12/2025    Hypokalemia 03/12/2025    Hypomagnesemia 03/12/2025    Chest pain 10/25/2023    Heart palpitations 10/25/2023    Chronic pain 09/01/2023     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      Tortuous aorta- on xray 05/16/2023    Primary insomnia 06/23/2020    Asthma 03/13/2014    Good hypertension control " 03/13/2014    Hyperlipidemia 03/13/2014    Constipation - functional 01/23/2013     Dx updated per 2019 IMO Load      GERD (gastroesophageal reflux disease) 05/22/2012    Hyponatremia 05/22/2012    Alopecia 03/29/2012    Hypothyroid 03/29/2012    Fatigue 03/29/2012    HTN (hypertension) 03/29/2012      Review of patient's allergies indicates:   Allergen Reactions    Zoster vaccine live Other (See Comments)     Headache, neck shoulder, body flu sx, sore throat, nausea, dry cough, fatigue     Amlodipine Diarrhea, Nausea Only and Other (See Comments)     Joint pain    Augmentin [amoxicillin-pot clavulanate]     Azithromycin      Other reaction(s): Unknown    Budesonide      Other reaction(s): heartburn  Other reaction(s): Rash    Cephalexin      Other reaction(s): Unknown    Ciprofloxacin      Other reaction(s): Unknown    Erythromycin      Other reaction(s): Unknown    Esomeprazole magnesium      Other reaction(s): Headache    Felodipine      Nausea, raw throat, ulcer tongue, myalgia    Levofloxacin      Other reaction(s): tendonitis    Talwin compound      Other reaction(s): Vomiting  Other reaction(s): Hallucinations    Hydrocodone-acetaminophen Rash     Other reaction(s): Nausea       Current Medications[1]    Lab Results   Component Value Date    WBC 8.35 03/14/2025    HGB 11.9 (L) 03/14/2025    HCT 35.3 (L) 03/14/2025     03/14/2025    CHOL 213 (H) 09/05/2024    TRIG 112 09/05/2024    HDL 49 09/05/2024    ALT 5 (L) 03/13/2025    AST 12 03/13/2025     (L) 03/14/2025    K 4.4 03/14/2025    CL 96 03/14/2025    CREATININE 0.8 03/14/2025    BUN 11 03/14/2025    CO2 30 (H) 03/14/2025    TSH 3.131 09/05/2024    INR 1.0 02/20/2025    HGBA1C 5.1 09/23/2020       Review of Systems   Constitutional:  Negative for fatigue and fever.   HENT: Negative.  Negative for congestion, sneezing and sore throat.    Eyes: Negative.    Respiratory:  Negative for cough, shortness of breath and wheezing.    Cardiovascular:   Negative for chest pain, palpitations and leg swelling.   Gastrointestinal:  Negative for abdominal pain, nausea and vomiting.   Genitourinary: Negative.    Musculoskeletal: Negative.  Negative for arthralgias.   Skin: Negative.  Negative for rash.   Neurological:  Negative for dizziness, weakness, light-headedness, numbness and headaches.   Hematological: Negative.    Psychiatric/Behavioral: Negative.         Objective:      Physical Exam  Constitutional:       Appearance: Normal appearance.   HENT:      Head: Normocephalic and atraumatic.      Nose: Nose normal.   Eyes:      Extraocular Movements: Extraocular movements intact.   Cardiovascular:      Rate and Rhythm: Normal rate and regular rhythm.   Pulmonary:      Effort: Pulmonary effort is normal.      Breath sounds: Normal breath sounds.   Musculoskeletal:         General: Normal range of motion.      Cervical back: Normal range of motion.   Skin:     General: Skin is warm and dry.   Neurological:      General: No focal deficit present.      Mental Status: She is alert and oriented to person, place, and time.   Psychiatric:         Mood and Affect: Mood normal.         Assessment:       1. Primary hypertension    2. Atrial fibrillation and flutter    3. Elevated troponin    4. Hypokalemia    5. Urinary incontinence, unspecified type        Plan:       Megha was seen today for follow-up and hospital follow up.    Diagnoses and all orders for this visit:    Primary hypertension   Discussed the importance of consistent blood pressure monitoring and the relative accuracy of different blood pressure measurement devices.  - Instructed the patient to monitor blood pressure at home when calm, around the same time daily, aiming for readings less than 140/90.  - Advised the patient to use an arm cuff blood pressure monitor for more accurate readings, especially if wrist monitor shows unusual results.  - Continued Carvedilol for blood pressure control.  - Continued  Clonidine to be taken only if blood pressure is significantly elevated.  - Noted that the patient's blood pressure has been well-controlled since discharge, with readings better than in the past 5 years.  - Evaluated current blood pressure control, with readings in the 120s/80s range.  - Stop HCTZ 2/2  incontinence issues - will have patient to continue to monitor at home .     Atrial fibrillation and flutter  - Explained the rationale for anticoagulants in atrial fibrillation patients and the risk of blood clots.  - Assessed that no atrial fibrillation was observed in the hospital, despite the diagnosis by EMS  - currently has a heart monitor for continuous monitoring.    Elevated troponin  - nuclear stress test was negative for reversible ischemia     Hypokalemia  -     Basic Metabolic Panel; Future    Urinary incontinence, unspecified type  -     Urinalysis, Reflex to Urine Culture  - urine in clinic with trace blood        FOLLOW-UP:  - Scheduled a follow-up visit with cardiology on April 7th.  - Scheduled follow up in approximately 3 months, or sooner if blood pressure starts to increase or if any concerns arise.        Future Appointments       Date Provider Specialty Appt Notes    4/1/2025  Lab .    4/7/2025 Ankit Joseph MD Cardiology [facility] f/u    7/1/2025 Zhanna Gandara MD Family Medicine 3 mo follow up               Portions of this note were dictated using voice recognition software and may contain dictation related errors in spelling / grammar / syntax not discovered on text review.     Kelly Cornelius PA-C         [1]   Current Outpatient Medications:     acetaminophen (TYLENOL) 500 MG tablet, Take 500 mg by mouth 2 (two) times a day., Disp: , Rfl:     ADVAIR -21 mcg/actuation HFAA inhaler, INHALE 2 PUFFS INTO THE LUNGS 2 TIMES DAILY. CONTROLLER (Patient taking differently: Inhale 2 puffs into the lungs 2 (two) times a day.), Disp: 36 g, Rfl: 3    carvediloL (COREG) 25 MG tablet,  Take 1 tablet (25 mg total) by mouth 2 (two) times daily., Disp: 60 tablet, Rfl: 1    cetirizine (ZYRTEC) 10 MG tablet, Take 10 mg by mouth daily as needed for Allergies., Disp: , Rfl:     cloNIDine (CATAPRES) 0.1 MG tablet, Take 1 tablet if systolic above 180 or diastolic above 105. Can repeat after 2 hours if bp remains above 180 or 105., Disp: 30 tablet, Rfl: 0    fluticasone (FLONASE) 50 mcg/actuation nasal spray, SPRAY 1 SPRAY IN EACH NOSTRIL 2 TIMES A DAY (Patient taking differently: 1 spray by Each Nostril route every evening.), Disp: 16 g, Rfl: 11    hydroCHLOROthiazide (HYDRODIURIL) 25 MG tablet, Take 1 tablet (25 mg total) by mouth once daily. (Patient taking differently: Take 12.5 mg by mouth once daily.), Disp: 30 tablet, Rfl: 1    meclizine (ANTIVERT) 12.5 mg tablet, Take 1 tablet (12.5 mg total) by mouth 3 (three) times daily as needed for Dizziness., Disp: 60 tablet, Rfl: 2    metaxalone (SKELAXIN) 800 MG tablet, Take 800 mg by mouth every 6 (six) hours as needed (muscle pain)., Disp: , Rfl:     montelukast (SINGULAIR) 10 mg tablet, Take 1 tablet (10 mg total) by mouth every evening., Disp: 90 tablet, Rfl: 3    pantoprazole (PROTONIX) 20 MG tablet, Take 1 tablet (20 mg total) by mouth once daily., Disp: 90 tablet, Rfl: 3    peg 400-propylene glycol (SYSTANE, PROPYLENE GLYCOL,) 0.4-0.3 % Drop, Apply to eye., Disp: , Rfl:     pregabalin (LYRICA) 75 MG capsule, Take 75 mg by mouth 2 (two) times daily., Disp: , Rfl:     SODIUM CHLORIDE (SIMPLY SALINE NASL), 1 spray by Nasal route 2 (two) times daily as needed (allergies)., Disp: , Rfl:     tramadol (ULTRAM) 50 mg tablet, Take 50 mg by mouth every 12 (twelve) hours as needed for Pain., Disp: , Rfl:     glucosamine-chondroitin 500-400 mg tablet, Take 1 tablet by mouth once daily., Disp: , Rfl:     ketoconazole (NIZORAL) 2 % shampoo, Wash hair with medicated shampoo at least 2x/week - let sit on scalp at least 5 minutes prior to rinsing (Patient not taking:  Reported on 3/12/2025), Disp: 120 mL, Rfl: 5    peg 400-propylene glycol 0.4-0.3 % Drop, Place 2 drops into both eyes 2 (two) times daily as needed (dry eyes). (Patient not taking: Reported on 3/28/2025), Disp: , Rfl:

## 2025-03-28 NOTE — TELEPHONE ENCOUNTER
03/28/25    Patient had a Zio monitor placed on 03/26/25 #[id number] and it fell off.  I applied another one today #[id number] and spoke with Arabella with [employer] and registered.

## 2025-03-29 LAB
BILIRUB UR QL STRIP.AUTO: NEGATIVE
CLARITY UR: CLEAR
COLOR UR AUTO: COLORLESS
GLUCOSE UR QL STRIP: NEGATIVE
HGB UR QL STRIP: NEGATIVE
KETONES UR QL STRIP: NEGATIVE
LEUKOCYTE ESTERASE UR QL STRIP: NEGATIVE
NITRITE UR QL STRIP: NEGATIVE
OHS QRS DURATION: 72 MS
OHS QRS DURATION: 82 MS
OHS QTC CALCULATION: 447 MS
OHS QTC CALCULATION: 462 MS
PH UR STRIP: 7 [PH]
PROT UR QL STRIP: NEGATIVE
SP GR UR STRIP: <1.005
UROBILINOGEN UR STRIP-ACNC: NEGATIVE EU/DL

## 2025-03-31 ENCOUNTER — RESULTS FOLLOW-UP (OUTPATIENT)
Dept: FAMILY MEDICINE | Facility: CLINIC | Age: 80
End: 2025-03-31

## 2025-04-01 ENCOUNTER — LAB VISIT (OUTPATIENT)
Dept: LAB | Facility: HOSPITAL | Age: 80
End: 2025-04-01
Payer: MEDICARE

## 2025-04-01 DIAGNOSIS — E87.6 HYPOKALEMIA: ICD-10-CM

## 2025-04-01 PROCEDURE — 80048 BASIC METABOLIC PNL TOTAL CA: CPT

## 2025-04-01 PROCEDURE — 36415 COLL VENOUS BLD VENIPUNCTURE: CPT | Mod: PO

## 2025-04-02 LAB
ANION GAP (OHS): 11 MMOL/L (ref 8–16)
BUN SERPL-MCNC: 14 MG/DL (ref 8–23)
CALCIUM SERPL-MCNC: 8.7 MG/DL (ref 8.7–10.5)
CHLORIDE SERPL-SCNC: 97 MMOL/L (ref 95–110)
CO2 SERPL-SCNC: 29 MMOL/L (ref 23–29)
CREAT SERPL-MCNC: 0.8 MG/DL (ref 0.5–1.4)
GFR SERPLBLD CREATININE-BSD FMLA CKD-EPI: >60 ML/MIN/1.73/M2
GLUCOSE SERPL-MCNC: 86 MG/DL (ref 70–110)
POTASSIUM SERPL-SCNC: 3.6 MMOL/L (ref 3.5–5.1)
SODIUM SERPL-SCNC: 137 MMOL/L (ref 136–145)

## 2025-04-03 NOTE — TELEPHONE ENCOUNTER
"Subjective   History was provided by the mother.  Garret Garcia is a 12 m.o. male who is brought in for this well-child visit.    General Health:   Patient Active Problem List   Diagnosis    Plagiocephaly    Egg allergy       Current Issues:   None acutely    Nutrition: whole milk, good eater, variety of foods  Dental: brushing regularly   Elimination: no issues  Sleep: sleeps through night, 1 nap daily   Childcare: home w/ parent  Car seat: rear-facing  Development:  Social Language and Self-Help:   Imitates new gestures  Verbal Language:   Says Swapnil or Mama specifically   Has one word other than Mama, Swapnil, or names   Gross Motor:   Cruises on furniture    Very close to taking first independent steps  Fine Motor:   Picks up food and eats it   Picks up small objects with 2 fingers pincer grasp    Past Medical History:   Diagnosis Date    Indirect hyperbilirubinemia 2024    , likely from breast milk jaundice and cephalohematoma        History reviewed. No pertinent surgical history.   Family History   Problem Relation Name Age of Onset    No Known Problems Maternal Grandmother          Copied from mother's family history at birth    No Known Problems Maternal Grandfather          Copied from mother's family history at birth      Social History     Social History Narrative    [facility] mom, dad, sister          Objective   Ht 0.718 m (2' 4.25\")   Wt 10 kg   HC 46.4 cm   BMI 19.46 kg/m²   Physical Exam  Constitutional:       General: He is active.   HENT:      Head: Normocephalic and atraumatic.      Right Ear: Tympanic membrane, ear canal and external ear normal.      Left Ear: Tympanic membrane, ear canal and external ear normal.      Nose: Nose normal.      Mouth/Throat:      Mouth: Mucous membranes are moist.      Pharynx: Oropharynx is clear.   Eyes:      General: Red reflex is present bilaterally.      Extraocular Movements: Extraocular movements intact.      Pupils: Pupils are equal, round, and " ----- Message from Radha Partida sent at 9/7/2022  2:17 PM CDT -----  Who Called: Patient    What is the reqeust in detail: Requesting call back to have additional lab orders placed onto her already schedule lab appointment scheduled for tomorrow. Patient is asking to add the full thyroid panel tests to her appointment. Please advise.     Can the clinic reply by [facility]? No    Best Call Back Number: [phone]    Additional Information:        "reactive to light.   Cardiovascular:      Rate and Rhythm: Normal rate and regular rhythm.      Pulses: Normal pulses.      Heart sounds: Normal heart sounds. No murmur heard.  Pulmonary:      Effort: Pulmonary effort is normal.      Breath sounds: Normal breath sounds.   Abdominal:      Palpations: Abdomen is soft. There is no mass.      Tenderness: There is no abdominal tenderness.   Genitourinary:     Penis: Normal.       Testes: Normal.   Musculoskeletal:         General: Normal range of motion.      Cervical back: Normal range of motion and neck supple.   Skin:     General: Skin is warm and dry.      Capillary Refill: Capillary refill takes less than 2 seconds.   Neurological:      General: No focal deficit present.      Mental Status: He is alert.      Gait: Gait normal.         Swyc-12 Mo Age Developmental Milestones-12 Mo Bank (Survey Of Well-Being Of Young Children V1.08)    4/3/2025 10:01 AM EDT - Filed by Patient Representative   Respondent Mother   PLEASE BE SURE TO ANSWER ALL THE QUESTIONS.   Picks up food and eats it Very Much   Pulls up to standing Very Much   Plays games like \"peek-a-alston\" or \"pat-a-cake\" Very Much   Calls you \"mama\" or \"edilma\" or similar name  Very Much   Looks around when you say things like \"Where's your bottle?\" or \"Where's your blanket?\" Not Yet   Copies sounds that you make Very Much   Walks across a room without help Not Yet   Follows directions - like \"Come here\" or \"Give me the ball\" Very Much   Runs Not Yet   Walks up stairs with help Very Much   Total Development Score (range: 0 - 20) 14 (Appears to meet age expectations)     Travel Screening    4/3/2025 10:01 AM EDT - Filed by Patient Representative   Do you have any of the following new or worsening symptoms? None of these   Have you recently been in contact with someone who was sick? No / Unsure           Assessment/Plan   Healthy 12 m.o. male here for [facility]    Growth and development WNL     Immunizations: MMR/VZV; defers " HepA today     Labs: screening CBC/Pb ordered    Dental: fluoride varnish applied     Discussed nutrition, sleep, development/behavior, car safety, oral health    Problem List Items Addressed This Visit       Egg allergy     Avoidance, working with A/I          Other Visit Diagnoses       Encounter for routine child health examination without abnormal findings    -  Primary    Relevant Orders    MMR vaccine, subcutaneous (MMR II) (Completed)    Varicella vaccine, subcutaneous (VARIVAX) (Completed)    CBC    Lead, Venous    Fluoride Application

## 2025-04-14 ENCOUNTER — TELEPHONE (OUTPATIENT)
Dept: FAMILY MEDICINE | Facility: CLINIC | Age: 80
End: 2025-04-14
Payer: MEDICARE

## 2025-04-14 NOTE — TELEPHONE ENCOUNTER
----- Message from Melissa sent at 4/14/2025  1:23 PM CDT -----  Contact: pt  Type:  Patient Returning CallWho Called: ptWho Left Message for Patient: officeDoes the patient know what this is regarding?: results?Would the patient rather a call back or a response via [facility]?  Call [facility] Call Back Number: [phone] Information: please call to Kevin

## 2025-04-15 NOTE — TELEPHONE ENCOUNTER
Patient is requesting a 90 day supply. Please advise.    LOV 03/28/25  LAB 04/01/25  Next OV 07/01/25

## 2025-04-15 NOTE — TELEPHONE ENCOUNTER
----- Message from Johanna sent at 4/15/2025  2:38 PM CDT -----  Type:  RX Refill RequestWho Called:   GregoriaRefill or New Rx:  refillRX Name and Strength:  carvediloL (COREG) 25 MG tabletHow is the patient currently taking it? (ex. 1XDay):  as directedIs this a 30 day or 90 day RX:  90Preferred [facility] phone [facility]:  mailOrdering Provider:  Eusebio the patient rather a call back or a response via [facility]? [facility] Call Back Number:  [phone] Information:  Asking for 90 day supply to be called into [facility] before the 30 day expires.    Please call back to advise, thank you!He wants the DR to know all the Pas they've seen have been great!

## 2025-04-16 ENCOUNTER — TELEPHONE (OUTPATIENT)
Dept: FAMILY MEDICINE | Facility: CLINIC | Age: 80
End: 2025-04-16
Payer: MEDICARE

## 2025-04-16 RX ORDER — CARVEDILOL 25 MG/1
25 TABLET ORAL 2 TIMES DAILY
Qty: 180 TABLET | Refills: 1 | Status: SHIPPED | OUTPATIENT
Start: 2025-04-16 | End: 2025-10-13

## 2025-04-16 NOTE — TELEPHONE ENCOUNTER
----- Message from Tech Amairosalio sent at 4/16/2025 10:24 AM CDT -----  Contact: [phone]  Type: Rx Clarification/ Additional Information/ QuestionsMedication:carvediloL (COREG) 25 MG tabletWhat questions do you have about the medication, if any?What information is needed? Update on refillPharmacy number: [facility] - [place], [place] - [address] [id number]: [phone] Fax: [phone]:

## 2025-04-16 NOTE — TELEPHONE ENCOUNTER
Called pharmacy- spoke with Fatemeh who states they received the rx for 90 day supply.  States it is too soon to fill the rx but would be refilled on 4/26/25.

## 2025-05-01 ENCOUNTER — OFFICE VISIT (OUTPATIENT)
Dept: CARDIOLOGY | Facility: CLINIC | Age: 80
End: 2025-05-01
Payer: MEDICARE

## 2025-05-01 VITALS
HEART RATE: 76 BPM | OXYGEN SATURATION: 95 % | WEIGHT: 171.44 LBS | SYSTOLIC BLOOD PRESSURE: 122 MMHG | HEIGHT: 67 IN | BODY MASS INDEX: 26.91 KG/M2 | DIASTOLIC BLOOD PRESSURE: 70 MMHG

## 2025-05-01 DIAGNOSIS — I48.91 ATRIAL FIBRILLATION AND FLUTTER: ICD-10-CM

## 2025-05-01 DIAGNOSIS — I10 PRIMARY HYPERTENSION: ICD-10-CM

## 2025-05-01 DIAGNOSIS — E78.2 MIXED HYPERLIPIDEMIA: ICD-10-CM

## 2025-05-01 DIAGNOSIS — Z88.9 MULTIPLE DRUG ALLERGIES: ICD-10-CM

## 2025-05-01 DIAGNOSIS — I48.92 ATRIAL FIBRILLATION AND FLUTTER: ICD-10-CM

## 2025-05-01 DIAGNOSIS — J45.30 MILD PERSISTENT ASTHMA, UNSPECIFIED WHETHER COMPLICATED: Primary | ICD-10-CM

## 2025-05-01 DIAGNOSIS — M79.7 FIBROMYALGIA: ICD-10-CM

## 2025-05-01 PROCEDURE — 1159F MED LIST DOCD IN RCRD: CPT | Mod: CPTII,S$GLB,, | Performed by: INTERNAL MEDICINE

## 2025-05-01 PROCEDURE — 99999 PR PBB SHADOW E&M-EST. PATIENT-LVL IV: CPT | Mod: PBBFAC,,, | Performed by: INTERNAL MEDICINE

## 2025-05-01 PROCEDURE — 3078F DIAST BP <80 MM HG: CPT | Mod: CPTII,S$GLB,, | Performed by: INTERNAL MEDICINE

## 2025-05-01 PROCEDURE — 1101F PT FALLS ASSESS-DOCD LE1/YR: CPT | Mod: CPTII,S$GLB,, | Performed by: INTERNAL MEDICINE

## 2025-05-01 PROCEDURE — 3288F FALL RISK ASSESSMENT DOCD: CPT | Mod: CPTII,S$GLB,, | Performed by: INTERNAL MEDICINE

## 2025-05-01 PROCEDURE — 3074F SYST BP LT 130 MM HG: CPT | Mod: CPTII,S$GLB,, | Performed by: INTERNAL MEDICINE

## 2025-05-01 PROCEDURE — 1160F RVW MEDS BY RX/DR IN RCRD: CPT | Mod: CPTII,S$GLB,, | Performed by: INTERNAL MEDICINE

## 2025-05-01 PROCEDURE — 1126F AMNT PAIN NOTED NONE PRSNT: CPT | Mod: CPTII,S$GLB,, | Performed by: INTERNAL MEDICINE

## 2025-05-01 PROCEDURE — 99215 OFFICE O/P EST HI 40 MIN: CPT | Mod: S$GLB,,, | Performed by: INTERNAL MEDICINE

## 2025-05-01 RX ORDER — EZETIMIBE 10 MG/1
10 TABLET ORAL DAILY
Qty: 90 TABLET | Refills: 3 | Status: SHIPPED | OUTPATIENT
Start: 2025-05-01 | End: 2026-05-01

## 2025-05-01 RX ORDER — CARVEDILOL 25 MG/1
25 TABLET ORAL 2 TIMES DAILY
Qty: 180 TABLET | Refills: 2 | Status: SHIPPED | OUTPATIENT
Start: 2025-05-01 | End: 2026-01-26

## 2025-05-01 RX ORDER — HYDRALAZINE HYDROCHLORIDE 10 MG/1
10 TABLET, FILM COATED ORAL 3 TIMES DAILY
Qty: 90 TABLET | Refills: 11 | Status: SHIPPED | OUTPATIENT
Start: 2025-05-01 | End: 2026-05-01

## 2025-05-01 NOTE — PROGRESS NOTES
Patient ID:  Megha Ladd is a 79 y.o. female who presents with Hospital Follow Up      History of Present Illness    CHIEF COMPLAINT:  Patient presents today for follow up after recent hospitalization    RECENT CARDIAC EVENT:  She experienced acute onset of symptoms at 3 AM with pain in bilateral shoulder blades radiating down both arms, up the neck, and around the jaw. She experienced near-syncope upon attempting to stand. She received EMS transport, and she received nitroglycerin and Diltiazem in the ambulance. Nuclear stress test during hospitalization was normal.    HYPERTENSION:  She requires Clonidine as needed for BP emergencies. She discontinued HCTZ due to urinary incontinence symptoms. A recent dental extraction attempt was cancelled due to BP reading over 200.    MEDICATION INTOLERANCES:  She reports medication intolerances to losartan and valsartan, both causing severe diarrhea with abdominal pain and cramping. She experienced persistent cough with enalapril that resolved upon discontinuation.    CURRENT MEDICAL CONDITIONS:  She has ongoing GERD managed with Pantoprazole, fibromyalgia and lumbar pain managed with Lyrica, and pulmonary symptoms managed with Montelukast and Advair inhaler. She reports allergies for the past 1-2 months with symptoms not yet under control.    DENTAL:  She requires tooth extraction due to infection causing pain with subsequent bridge placement.    CURRENT MEDICATIONS:  She takes Carvedilol twice daily (recently prescribed during hospitalization), Clonidine PRN, Montelukast, Pantoprazole, Lyrica, and Advair inhaler.      ROS:  Cardiovascular: -chest pain  Respiratory: +wheezing  Genitourinary: +urinary incontinence  Musculoskeletal: +joint pain, +muscle pain, +limb pain, +neck pain, +body aches, +back pain  Skin: +dry skin  Neurological: +near-syncope  Head: +facial pain  Allergic: +allergic reactions           Past Medical History:   Diagnosis Date    Asthma     Basal  cell carcinoma     GERD (gastroesophageal reflux disease)     Hyperlipidemia     Hypertension     Hypothyroid     Lumbago     Neuromuscular disorder     Sinusitis     Ulcer         Past Surgical History:   Procedure Laterality Date    ADENOIDECTOMY      Tonsils and adenoids removed    APPENDECTOMY       SECTION      HYSTERECTOMY  ?    Partial hysterectomy    LUMBAR EPIDURAL INJECTION      PARTIAL HYSTERECTOMY      TONSILLECTOMY      Tonsils and adenoids removed    TUBAL LIGATION            Current Outpatient Medications   Medication Instructions    acetaminophen (TYLENOL) 500 mg, 2 times daily    ADVAIR -21 mcg/actuation HFAA inhaler INHALE 2 PUFFS INTO THE LUNGS 2 TIMES DAILY. CONTROLLER    carvediloL (COREG) 25 mg, Oral, 2 times daily    cetirizine (ZYRTEC) 10 mg, Daily PRN    cloNIDine (CATAPRES) 0.1 MG tablet Take 1 tablet if systolic above 180 or diastolic above 105. Can repeat after 2 hours if bp remains above 180 or 105.    ezetimibe (ZETIA) 10 mg, Oral, Daily    fluticasone (FLONASE) 50 mcg/actuation nasal spray SPRAY 1 SPRAY IN EACH NOSTRIL 2 TIMES A DAY    glucosamine-chondroitin 500-400 mg tablet 1 tablet, Daily    hydrALAZINE (APRESOLINE) 10 mg, Oral, 3 times daily    hydroCHLOROthiazide (HYDRODIURIL) 25 mg, Oral, Daily    ketoconazole (NIZORAL) 2 % shampoo Wash hair with medicated shampoo at least 2x/week - let sit on scalp at least 5 minutes prior to rinsing    meclizine (ANTIVERT) 12.5 mg, Oral, 3 times daily PRN    metaxalone (SKELAXIN) 800 mg, Every 6 hours PRN    montelukast (SINGULAIR) 10 mg, Oral, Nightly    pantoprazole (PROTONIX) 20 mg, Oral, Daily    peg 400-propylene glycol (SYSTANE, PROPYLENE GLYCOL,) 0.4-0.3 % Drop Apply to eye.    peg 400-propylene glycol 0.4-0.3 % Drop 2 drops, 2 times daily PRN    pregabalin (LYRICA) 75 mg, 2 times daily    SODIUM CHLORIDE (SIMPLY SALINE NASL) 1 spray, 2 times daily PRN    traMADoL (ULTRAM) 50 mg, Every 12 hours PRN     "    Review of patient's allergies indicates:   Allergen Reactions    Zoster vaccine live Other (See Comments)     Headache, neck shoulder, body flu sx, sore throat, nausea, dry cough, fatigue     Amlodipine Diarrhea, Nausea Only and Other (See Comments)     Joint pain    Augmentin [amoxicillin-pot clavulanate]     Azithromycin      Other reaction(s): Unknown    Budesonide      Other reaction(s): heartburn  Other reaction(s): Rash    Cephalexin      Other reaction(s): Unknown    Ciprofloxacin      Other reaction(s): Unknown    Enalapril Other (See Comments)    Erythromycin      Other reaction(s): Unknown    Esomeprazole magnesium      Other reaction(s): Headache    Felodipine      Nausea, raw throat, ulcer tongue, myalgia    Levofloxacin      Other reaction(s): tendonitis    Losartan Diarrhea    Talwin compound      Other reaction(s): Vomiting  Other reaction(s): Hallucinations    Valsartan Diarrhea    Hydrocodone-acetaminophen Rash     Other reaction(s): Nausea        Review of Systems   Cardiovascular:  Negative for leg swelling and palpitations.   Respiratory:  Positive for wheezing.    Musculoskeletal:  Positive for back pain, joint pain and myalgias.        Objective:     Vitals:    05/01/25 1601   BP: 122/70   BP Location: Left arm   Patient Position: Sitting   Pulse: 76   SpO2: 95%   Weight: 77.7 kg (171 lb 6.5 oz)   Height: 5' 7" (1.702 m)      Physical Exam    General: No acute distress. Well-developed. Well-nourished.  Eyes: EOMI. Sclerae anicteric.  HENT: Normocephalic. Atraumatic. Nares patent. Moist oral mucosa.  Ears: Bilateral TMs clear. Bilateral EACs clear.  Cardiovascular: Regular rate. Regular rhythm. No murmurs. No rubs. No gallops. Normal S1, S2.  Respiratory: Normal respiratory effort. Clear to auscultation bilaterally. No rales. No rhonchi. No wheezing.  Abdomen: Soft. Non-tender. Non-distended. Normoactive bowel sounds.  Musculoskeletal: No  obvious deformity.  Extremities: No lower extremity " edema.  Neurological: Alert & oriented x3. No slurred speech. Normal gait.  Psychiatric: Normal mood. Normal affect. Good insight. Good judgment.  Skin: Warm. Dry skin. No rash.          BNP  @LABRCNTIP(BNP,BNPTRIAGEBLO)@  [facility]  Sodium   Date Value Ref Range Status   04/01/2025 137 136 - 145 mmol/L Final   03/14/2025 133 (L) 136 - 145 mmol/L Final     Potassium   Date Value Ref Range Status   04/01/2025 3.6 3.5 - 5.1 mmol/L Final   03/14/2025 4.4 3.5 - 5.1 mmol/L Final     Chloride   Date Value Ref Range Status   04/01/2025 97 95 - 110 mmol/L Final   03/14/2025 96 95 - 110 mmol/L Final     CO2   Date Value Ref Range Status   04/01/2025 29 23 - 29 mmol/L Final   03/14/2025 30 (H) 23 - 29 mmol/L Final     Glucose   Date Value Ref Range Status   04/01/2025 86 70 - 110 mg/dL Final   03/14/2025 93 70 - 110 mg/dL Final     BUN   Date Value Ref Range Status   04/01/2025 14 8 - 23 mg/dL Final     Creatinine   Date Value Ref Range Status   04/01/2025 0.8 0.5 - 1.4 mg/dL Final     Calcium   Date Value Ref Range Status   04/01/2025 8.7 8.7 - 10.5 mg/dL Final   03/14/2025 9.3 8.7 - 10.5 mg/dL Final     Total Protein   Date Value Ref Range Status   03/13/2025 5.9 (L) 6.0 - 8.4 g/dL Final     Albumin   Date Value Ref Range Status   03/13/2025 3.4 (L) 3.5 - 5.2 g/dL Final     Total Bilirubin   Date Value Ref Range Status   03/13/2025 0.8 0.1 - 1.0 mg/dL Final     Comment:     For infants and newborns, interpretation of results should be based  on gestational age, weight and in agreement with clinical  observations.    Premature Infant recommended reference ranges:  Up to 24 hours.............<8.0 mg/dL  Up to 48 hours............<12.0 mg/dL  3-5 days..................<15.0 mg/dL  6-29 days.................<15.0 mg/dL       Alkaline Phosphatase   Date Value Ref Range Status   03/13/2025 63 55 - 135 U/L Final     AST   Date Value Ref Range Status   03/13/2025 12 10 - 40 U/L Final     ALT   Date Value Ref Range Status   03/13/2025 5  (L) 10 - 44 U/L Final     Anion Gap   Date Value Ref Range Status   04/01/2025 11 8 - 16 mmol/L Final     eGFR if    Date Value Ref Range Status   03/11/2021 >60 >60 mL/min/1.73 m^2 Final     eGFR if non    Date Value Ref Range Status   03/11/2021 >60 >60 mL/min/1.73 m^2 Final     Comment:     Calculation used to obtain the estimated glomerular filtration  rate (eGFR) is the CKD-EPI equation.         @LABRCNTIP(BNP,BNPTRIAGEBLO)@  Lab Results   Component Value Date     04/01/2025    K 3.6 04/01/2025    CL 97 04/01/2025    CO2 29 04/01/2025    BUN 14 04/01/2025    CREATININE 0.8 04/01/2025    CALCIUM 8.7 04/01/2025    ANIONGAP 11 04/01/2025    ESTGFRAFRICA >60 03/11/2021    EGFRNONAA >60 03/11/2021      Lab Results   Component Value Date     (H) 03/12/2025     (H) 02/20/2025      Lab Results   Component Value Date    CHOL 213 (H) 09/05/2024    CHOL 207 (H) 10/26/2023    CHOL 201 (H) 09/05/2023     Lab Results   Component Value Date    HDL 49 09/05/2024    HDL 46 10/26/2023    HDL 48 09/05/2023     Lab Results   Component Value Date    LDLCALC 141.6 09/05/2024    LDLCALC 141.0 10/26/2023    LDLCALC 135.0 09/05/2023     Lab Results   Component Value Date    TRIG 112 09/05/2024    TRIG 100 10/26/2023    TRIG 90 09/05/2023     Lab Results   Component Value Date    CHOLHDL 23.0 09/05/2024    CHOLHDL 22.2 10/26/2023    CHOLHDL 23.9 09/05/2023      Lab Results   Component Value Date    TSH 3.131 09/05/2024    FREET4 0.65 (L) 10/26/2023     Lab Results   Component Value Date    HGBA1C 5.1 09/23/2020     Lab Results   Component Value Date    WBC 8.35 03/14/2025    HGB 11.9 (L) 03/14/2025    HCT 35.3 (L) 03/14/2025    MCV 92 03/14/2025     03/14/2025        Results for orders placed during the hospital encounter of 03/12/25    Echo    Interpretation Summary    Left Ventricle: The left ventricle is normal in size. Mild septal thickening. There is normal systolic  function with a visually estimated ejection fraction of 60 - 65%. Global longitudinal strain is --15.8%.    Right Ventricle: The right ventricle is normal in size. Systolic function is normal.    Aortic Valve: There is mild aortic regurgitation.    Mitral Valve: There is mild regurgitation.    IVC/SVC: Normal venous pressure at 3 mmHg.     No results found for this or any previous visit.       EKG  Results for orders placed or performed during the hospital encounter of 03/12/25   EKG 12-lead    Collection Time: 03/12/25 11:42 AM   Result Value Ref Range    QRS Duration 72 ms    [facility] QTC Calculation 462 ms    Narrative    Test Reason : R79.89,    Vent. Rate :  60 BPM     Atrial Rate :  60 BPM     P-R Int : 206 ms          QRS Dur :  72 ms      QT Int : 462 ms       P-R-T Axes :  62  -3  -3 degrees    QTcB Int : 462 ms    Normal sinus rhythm  Normal ECG  When compared with ECG of 12-Mar-2025 05:57,  Premature ventricular complexes are no longer Present  Nonspecific T wave abnormality no longer evident in Lateral leads  Confirmed by Jamari Rider ([id number]) on 3/29/2025 1:45:20 PM    Referred By: AAAREFERRAL SELF           Confirmed By: Jamari Rider        Stress  Results for orders placed during the hospital encounter of 03/12/25    Nuclear Stress Test    Interpretation Summary    The ECG portion of the study is negative for ischemia.    The nuclear portion of this study will be reported separately.           Assessment/Plan:          1. Mild persistent asthma, unspecified whether complicated    2. Atrial fibrillation and flutter    3. Primary hypertension    4. Mixed hyperlipidemia    5. Multiple drug allergies    6. Fibromyalgia      Assessment & Plan    I16.0 Hypertensive urgency  I10 Essential (primary) hypertension  J45.909 Unspecified asthma, uncomplicated  J30.9 Allergic rhinitis, unspecified  E78.5 Hyperlipidemia, unspecified  K21.9 Gastro-esophageal reflux disease without esophagitis  K04.7 Periapical abscess  without sinus  M79.7 Fibromyalgia  M54.50 Low back pain, unspecified  L85.3 Xerosis cutis  T88.7XXA Unspecified adverse effect of drug or medicament, initial encounter  Z88.8 Allergy status to other drugs, medicaments and biological substances    PLAN SUMMARY:  - Continue Singulaire tablet for lungs  - Start aspirin 81 mg daily for cardiovascular protection  - Discontinue HCTZ  - Continue Advair inhaler for asthma  - Continue Carvedilol  - Continue Lyrica for fibromyalgia and lumbar pain  - Start Hydralazine 10 mg twice daily for blood pressure control  - Continue Pantoprazole for gastroesophageal reflux  - Plan to add ezetimibe for cholesterol management after ensuring tolerance to Hydralazine  - Follow up in 1 month; patient to bring blood pressure machine    CARDIOLOGY:  - Nuclear stress test was normal following recent hospitalization for suspected cardiac event.  - Current medication regimen evaluated, including recent addition of Carvedilol from hospital stay.  - Continued Carvedilol.  - Started aspirin 81 mg daily for cardiovascular protection.    HYPERTENSION:  - Started Hydralazine 10 mg twice daily for blood pressure control, with instructions to increase to 3 times daily if needed.  - Advised use of clonidine before dental procedure and for emergency use if blood pressure is very high.  - Discontinued HCTZ.  - Patient to monitor pulse using pulse oximeter, reporting if greater than 100 at rest, check blood pressure at home and record readings.  - Patient to bring blood pressure machine to next visit.    HYPERLIPIDEMIA:  - Plan to add ezetimibe for cholesterol management after ensuring tolerance to Hydralazine, noting it works at the absorption level in the gut to lower cholesterol.    ASTHMA AND ALLERGIC RHINITIS:  - Continued Singulaire tablet for lungs.  - Continued Advair inhaler for asthma.    GASTROESOPHAGEAL REFLUX DISEASE:  - Continued Pantoprazole for gastroesophageal reflux.    FIBROMYALGIA AND  LOW BACK PAIN:  - Continued Lyrica for fibromyalgia and lumbar pain.    SKIN CARE:  - Emphasized the importance of skin care to prevent dryness and potential infections; patient to apply lotion.    FOLLOW-UP:  - Follow up in 1 month; patient to bring blood pressure machine to next visit.  - Contact the office if there are questions about remembering instructions.           This note was generated with the assistance of ambient listening technology. Verbal consent was obtained by the patient and accompanying visitor(s) for the recording of patient appointment to facilitate this note. I attest to having reviewed and edited the generated note for accuracy, though some syntax or spelling errors may persist. Please contact the author of this note for any clarification.

## 2025-06-14 ENCOUNTER — TELEPHONE (OUTPATIENT)
Dept: PHARMACY | Facility: CLINIC | Age: 80
End: 2025-06-14
Payer: MEDICARE

## 2025-06-14 NOTE — TELEPHONE ENCOUNTER
[facility]/Population Health Chart Review & Patient Outreach Details For Medication Adherence Project    Reason for Outreach Encounter: 3rd Party payor non-compliance report ([employer], [employer], C, etc)  2.  Patient Outreach Method: Reviewed patient chart   3.   Medication in question:    Hypertension Medications              carvediloL (COREG) 25 MG tablet Take 1 tablet (25 mg total) by mouth 2 (two) times daily.    cloNIDine (CATAPRES) 0.1 MG tablet Take 1 tablet if systolic above 180 or diastolic above 105. Can repeat after 2 hours if bp remains above 180 or 105.    hydrALAZINE (APRESOLINE) 10 MG tablet Take 1 tablet (10 mg total) by mouth 3 (three) times daily.    hydroCHLOROthiazide (HYDRODIURIL) 25 MG tablet Take 1 tablet (25 mg total) by mouth once daily.              VALSARTAN D/C    4.  Reviewed and or Updates Made To: Patient Chart  5. Outreach Outcomes and/or actions taken: Medication discontinued  Additional Notes:

## 2025-07-09 ENCOUNTER — LAB VISIT (OUTPATIENT)
Dept: LAB | Facility: HOSPITAL | Age: 80
End: 2025-07-09
Attending: INTERNAL MEDICINE
Payer: MEDICARE

## 2025-07-09 DIAGNOSIS — E78.2 MIXED HYPERLIPIDEMIA: ICD-10-CM

## 2025-07-09 DIAGNOSIS — I49.3 PVC (PREMATURE VENTRICULAR CONTRACTION): ICD-10-CM

## 2025-07-09 DIAGNOSIS — I48.91 ATRIAL FIBRILLATION AND FLUTTER: ICD-10-CM

## 2025-07-09 DIAGNOSIS — I48.91 ATRIAL FIBRILLATION AND FLUTTER: Primary | ICD-10-CM

## 2025-07-09 DIAGNOSIS — I48.92 ATRIAL FIBRILLATION AND FLUTTER: ICD-10-CM

## 2025-07-09 DIAGNOSIS — I48.92 ATRIAL FIBRILLATION AND FLUTTER: Primary | ICD-10-CM

## 2025-07-09 LAB
ANION GAP (SMH): 7 MMOL/L (ref 8–16)
BNP SERPL-MCNC: 325 PG/ML
BUN SERPL-MCNC: 13 MG/DL (ref 8–23)
CALCIUM SERPL-MCNC: 9.1 MG/DL (ref 8.7–10.5)
CHLORIDE SERPL-SCNC: 100 MMOL/L (ref 95–110)
CO2 SERPL-SCNC: 33 MMOL/L (ref 23–29)
CREAT SERPL-MCNC: 0.9 MG/DL (ref 0.5–1.4)
GFR SERPLBLD CREATININE-BSD FMLA CKD-EPI: >60 ML/MIN/1.73/M2
GLUCOSE SERPL-MCNC: 82 MG/DL (ref 70–110)
MAGNESIUM SERPL-MCNC: 1.8 MG/DL (ref 1.6–2.6)
POTASSIUM SERPL-SCNC: 3.6 MMOL/L (ref 3.5–5.1)
SODIUM SERPL-SCNC: 140 MMOL/L (ref 136–145)
T4 FREE SERPL-MCNC: 0.83 NG/DL (ref 0.71–1.51)
TSH SERPL-ACNC: 6.97 UIU/ML (ref 0.34–5.6)

## 2025-07-09 PROCEDURE — 84443 ASSAY THYROID STIM HORMONE: CPT

## 2025-07-09 PROCEDURE — 83880 ASSAY OF NATRIURETIC PEPTIDE: CPT

## 2025-07-09 PROCEDURE — 36415 COLL VENOUS BLD VENIPUNCTURE: CPT

## 2025-07-09 PROCEDURE — 82565 ASSAY OF CREATININE: CPT

## 2025-07-09 PROCEDURE — 84439 ASSAY OF FREE THYROXINE: CPT

## 2025-07-09 PROCEDURE — 83735 ASSAY OF MAGNESIUM: CPT

## 2025-07-11 ENCOUNTER — OFFICE VISIT (OUTPATIENT)
Dept: CARDIOLOGY | Facility: CLINIC | Age: 80
End: 2025-07-11
Payer: MEDICARE

## 2025-07-11 VITALS
DIASTOLIC BLOOD PRESSURE: 90 MMHG | SYSTOLIC BLOOD PRESSURE: 140 MMHG | BODY MASS INDEX: 12.3 KG/M2 | HEART RATE: 84 BPM | WEIGHT: 78.38 LBS | HEIGHT: 67 IN | OXYGEN SATURATION: 98 %

## 2025-07-11 DIAGNOSIS — I10 PRIMARY HYPERTENSION: Primary | ICD-10-CM

## 2025-07-11 DIAGNOSIS — E78.2 MIXED HYPERLIPIDEMIA: ICD-10-CM

## 2025-07-11 DIAGNOSIS — Z88.9 MULTIPLE DRUG ALLERGIES: ICD-10-CM

## 2025-07-11 DIAGNOSIS — I49.3 PVC (PREMATURE VENTRICULAR CONTRACTION): ICD-10-CM

## 2025-07-11 PROCEDURE — 99999 PR PBB SHADOW E&M-EST. PATIENT-LVL V: CPT | Mod: PBBFAC,,, | Performed by: INTERNAL MEDICINE

## 2025-07-11 RX ORDER — PRAZOSIN HYDROCHLORIDE 1 MG/1
1 CAPSULE ORAL 2 TIMES DAILY
Qty: 60 CAPSULE | Refills: 11 | Status: SHIPPED | OUTPATIENT
Start: 2025-07-11 | End: 2026-07-11

## 2025-07-11 NOTE — PROGRESS NOTES
Patient ID:  Megha Ladd is a 79 y.o. female who presents with Results (Labs) and Medication Management (Discuss meds)      History of Present Illness    CHIEF COMPLAINT:  Patient presents today for follow up of HTN.    HYPERTENSION:  She is currently managing BP with Carvedilol twice daily and Clonidine as needed when BP exceeds 180. Most recent BP was 189, indicating ongoing management challenges. She reports intolerance to multiple previous BP medications including Losartan and Valsartan which caused severe, sudden, and uncontrolled diarrhea requiring discontinuation.    ALLERGIES AND MEDICATION INTOLERANCES:  She reports allergies to amlodipine which caused diarrhea. She was unable to tolerate diuretics including HCTZ and triamterene due to significant incontinence issues. She has not started Zovia for cholesterol management due to concerns about potential stomach issues.    GASTROINTESTINAL:  She reports chronic GI issues characterized by alternating constipation and diarrhea. She uses Miralax for constipation management. She takes pantoprazole for abdominal pain, noting severe stomach discomfort when discontinued, though the medication itself may occasionally cause diarrhea. She has eliminated dairy products due to sensitivity, switching to lactose-free milk. She describes her abdomen as very sensitive, requiring careful dietary management.    DERMATOLOGIC:  She continues doxycycline for rosacea treatment, which typically triggers diarrhea episodes.      ROS:  Cardiovascular: -palpitations, -lower extremity edema  Gastrointestinal: +abdominal pain, +diarrhea, +constipation  Genitourinary: +urinary incontinence           Past Medical History:   Diagnosis Date    Asthma     Basal cell carcinoma     GERD (gastroesophageal reflux disease)     Hyperlipidemia     Hypertension     Hypothyroid     Lumbago     Neuromuscular disorder     Sinusitis     Ulcer         Past Surgical History:   Procedure Laterality  Date    ADENOIDECTOMY      Tonsils and adenoids removed    APPENDECTOMY       SECTION      HYSTERECTOMY  ?    Partial hysterectomy    LUMBAR EPIDURAL INJECTION      PARTIAL HYSTERECTOMY      TONSILLECTOMY      Tonsils and adenoids removed    TUBAL LIGATION            Current Outpatient Medications   Medication Instructions    acetaminophen (TYLENOL) 500 mg, 2 times daily    ADVAIR -21 mcg/actuation HFAA inhaler INHALE 2 PUFFS INTO THE LUNGS 2 TIMES DAILY. CONTROLLER    carvediloL (COREG) 25 mg, Oral, 2 times daily    cetirizine (ZYRTEC) 10 mg, Daily PRN    cloNIDine (CATAPRES) 0.1 MG tablet Take 1 tablet if systolic above 180 or diastolic above 105. Can repeat after 2 hours if bp remains above 180 or 105.    doxycycline (VIBRAMYCIN) 100 MG Cap Take 1 tablet twice daily.Take w/food and water. Stop if pregnant.    fluticasone (FLONASE) 50 mcg/actuation nasal spray SPRAY 1 SPRAY IN EACH NOSTRIL 2 TIMES A DAY    glucosamine-chondroitin 500-400 mg tablet 1 tablet, Daily    ketoconazole (NIZORAL) 2 % shampoo Wash hair with medicated shampoo at least 2x/week - let sit on scalp at least 5 minutes prior to rinsing    meclizine (ANTIVERT) 12.5 mg, Oral, 3 times daily PRN    metaxalone (SKELAXIN) 800 mg, Every 6 hours PRN    metronidazole 1% (METROGEL) 1 % Gel Apply to entire face once daily    montelukast (SINGULAIR) 10 mg, Oral, Nightly    pantoprazole (PROTONIX) 20 mg, Oral, Daily    peg 400-propylene glycol (SYSTANE, PROPYLENE GLYCOL,) 0.4-0.3 % Drop Apply to eye.    peg 400-propylene glycol 0.4-0.3 % Drop 2 drops, 2 times daily PRN    prazosin (MINIPRESS) 1 mg, Oral, 2 times daily    pregabalin (LYRICA) 75 mg, 2 times daily    SODIUM CHLORIDE (SIMPLY SALINE NASL) 1 spray, 2 times daily PRN    traMADoL (ULTRAM) 50 mg, Every 12 hours PRN        Review of patient's allergies indicates:   Allergen Reactions    Zoster vaccine live Other (See Comments)     Headache, neck shoulder, body flu sx,  "sore throat, nausea, dry cough, fatigue     Amlodipine Diarrhea, Nausea Only and Other (See Comments)     Joint pain    Augmentin [amoxicillin-pot clavulanate]     Azithromycin      Other reaction(s): Unknown    Budesonide      Other reaction(s): heartburn  Other reaction(s): Rash    Cephalexin      Other reaction(s): Unknown    Ciprofloxacin      Other reaction(s): Unknown    Enalapril Other (See Comments)    Erythromycin      Other reaction(s): Unknown    Esomeprazole magnesium      Other reaction(s): Headache    Felodipine      Nausea, raw throat, ulcer tongue, myalgia    Levofloxacin      Other reaction(s): tendonitis    Losartan Diarrhea    Talwin compound      Other reaction(s): Vomiting  Other reaction(s): Hallucinations    Valsartan Diarrhea    Hydrocodone-acetaminophen Rash     Other reaction(s): Nausea           Objective:     Vitals:    07/11/25 1003 07/11/25 1007   BP: (!) 182/100 (!) 140/90   BP Location: Right arm Left arm   Patient Position: Sitting    Pulse: 84    SpO2: 98%    Weight: 35.5 kg (78 lb 5.6 oz)    Height: 5' 7" (1.702 m)         Physical Exam    Vitals: BP: 189/140. Hypertensive. Pulse: 70-77.  General: No acute distress. Well-developed. Well-nourished.  Eyes: EOMI. Sclerae anicteric.  HENT: Normocephalic. Atraumatic. Nares patent. Moist oral mucosa.  Cardiovascular: Regular rate. Regular rhythm. No murmurs. No rubs. No gallops. Normal S1, S2.  Respiratory: Normal respiratory effort. Clear to auscultation bilaterally. No rales. No rhonchi. No wheezing.  Musculoskeletal: No  obvious deformity.  Extremities: No lower extremity edema.  Neurological: Alert & oriented x3. No slurred speech. Normal gait.  Psychiatric: Normal mood. Normal affect. Good insight. Good judgment.  Skin: Warm. Dry. No rash.         CARDIAC PROFILE  BNP   Date Value Ref Range Status   07/09/2025 325 (H) <=99 pg/mL Final     Comment:     Values of less than 100 pg/ml are consistent with non-CHF populations. "   03/12/2025 470 (H) 0 - 99 pg/mL Final     Comment:     Values of less than 100 pg/ml are consistent with non-CHF populations.   02/20/2025 264 (H) 0 - 99 pg/mL Final     Comment:     Values of less than 100 pg/ml are consistent with non-CHF populations.   10/25/2023 286 (H) 0 - 99 pg/mL Final     Comment:     Values of less than 100 pg/ml are consistent with non-CHF populations.     Troponin I High Sensitivity   Date Value Ref Range Status   03/12/2025 355.3 (HH) 0.0 - 14.9 pg/mL Final     Comment:     Troponin results differ between methods. Do not use   results between Troponin methods interchangeably.    Alkaline Phospatase levels above 400 U/L may   cause false positive results.    Access hsTnI should not be used for patients taking   Asfotase brennon (Strensiq).  Critical result [facility] 355.3 pg/mL called to and read back by Nakia Larios  RN/3000 at 12-Mar-2025 17:32 by [facility].  Result Rechecked     03/12/2025 388.4 (HH) 0.0 - 14.9 pg/mL Final     Comment:     Troponin results differ between methods. Do not use   results between Troponin methods interchangeably.    Alkaline Phospatase levels above 400 U/L may   cause false positive results.    Access hsTnI should not be used for patients taking   Asfotase brennon (Strensiq).  Critical result [facility] 388.4 pg/mL called to and read back by Vee Dumont  RN/ed at 12-Mar-2025 14:06 by [facility].  Result Rechecked     03/12/2025 195.8 (HH) 0.0 - 14.9 pg/mL Final     Comment:     Troponin results differ between methods. Do not use   results between Troponin methods interchangeably.    Alkaline Phospatase levels above 400 U/L may   cause false positive results.    Access hsTnI should not be used for patients taking   Asfotase brennon (Strensiq).  Critical result [facility] 195.8 pg/mL called to and read back by Rob Galeano  RN/ed at 12-Mar-2025 11:21 by [facility].  Result Rechecked       CMP  Sodium   Date Value Ref Range Status   07/09/2025 140 136 - 145  mmol/L Final     Potassium   Date Value Ref Range Status   07/09/2025 3.6 3.5 - 5.1 mmol/L Final     Chloride   Date Value Ref Range Status   07/09/2025 100 95 - 110 mmol/L Final     CO2   Date Value Ref Range Status   07/09/2025 33 (H) 23 - 29 mmol/L Final     Glucose   Date Value Ref Range Status   07/09/2025 82 70 - 110 mg/dL Final     BUN   Date Value Ref Range Status   07/09/2025 13 8 - 23 mg/dL Final   04/01/2025 14 8 - 23 mg/dL Final     Creatinine   Date Value Ref Range Status   07/09/2025 0.9 0.5 - 1.4 mg/dL Final   04/01/2025 0.8 0.5 - 1.4 mg/dL Final     Calcium   Date Value Ref Range Status   07/09/2025 9.1 8.7 - 10.5 mg/dL Final     Total Protein   Date Value Ref Range Status   03/13/2025 5.9 (L) 6.0 - 8.4 g/dL Final     Albumin   Date Value Ref Range Status   03/13/2025 3.4 (L) 3.5 - 5.2 g/dL Final     Total Bilirubin   Date Value Ref Range Status   03/13/2025 0.8 0.1 - 1.0 mg/dL Final     Comment:     For infants and newborns, interpretation of results should be based  on gestational age, weight and in agreement with clinical  observations.    Premature Infant recommended reference ranges:  Up to 24 hours.............<8.0 mg/dL  Up to 48 hours............<12.0 mg/dL  3-5 days..................<15.0 mg/dL  6-29 days.................<15.0 mg/dL       Alkaline Phosphatase   Date Value Ref Range Status   03/13/2025 63 55 - 135 U/L Final     AST   Date Value Ref Range Status   03/13/2025 12 10 - 40 U/L Final     ALT   Date Value Ref Range Status   03/13/2025 5 (L) 10 - 44 U/L Final     Anion Gap   Date Value Ref Range Status   07/09/2025 7 (L) 8 - 16 mmol/L Final   04/01/2025 11 8 - 16 mmol/L Final     eGFR   Date Value Ref Range Status   07/09/2025 >60 >60 mL/min/1.73/m2 Final   04/01/2025 >60 >60 mL/min/1.73/m2 Final     Comment:     Estimated GFR calculated using the CKD-EPI creatinine (2021) equation.   03/14/2025 >60.0 >60 mL/min/1.73 m^2 Final      Lab Results   Component Value Date    CHOL 213 (H)  09/05/2024    CHOL 207 (H) 10/26/2023    CHOL 201 (H) 09/05/2023     Lab Results   Component Value Date    HDL 49 09/05/2024    HDL 46 10/26/2023    HDL 48 09/05/2023     Lab Results   Component Value Date    LDLCALC 141.6 09/05/2024    LDLCALC 141.0 10/26/2023    LDLCALC 135.0 09/05/2023     Lab Results   Component Value Date    TRIG 112 09/05/2024    TRIG 100 10/26/2023    TRIG 90 09/05/2023     Lab Results   Component Value Date    CHOLHDL 23.0 09/05/2024    CHOLHDL 22.2 10/26/2023    CHOLHDL 23.9 09/05/2023      Lab Results   Component Value Date    TSH 6.972 (H) 07/09/2025    FREET4 0.83 07/09/2025     Lab Results   Component Value Date    HGBA1C 5.1 09/23/2020     Lab Results   Component Value Date    WBC 8.35 03/14/2025    HGB 11.9 (L) 03/14/2025    HCT 35.3 (L) 03/14/2025    MCV 92 03/14/2025     03/14/2025        Results for orders placed during the hospital encounter of 03/12/25    Echo    Interpretation Summary    Left Ventricle: The left ventricle is normal in size. Mild septal thickening. There is normal systolic function with a visually estimated ejection fraction of 60 - 65%. Global longitudinal strain is --15.8%.    Right Ventricle: The right ventricle is normal in size. Systolic function is normal.    Aortic Valve: There is mild aortic regurgitation.    Mitral Valve: There is mild regurgitation.    IVC/SVC: Normal venous pressure at 3 mmHg.     No results found for this or any previous visit.       EKG  Results for orders placed or performed during the hospital encounter of 03/12/25   EKG 12-lead    Collection Time: 03/12/25 11:42 AM   Result Value Ref Range    QRS Duration 72 ms    [facility]HS QTC Calculation 462 ms    Narrative    Test Reason : R79.89,    Vent. Rate :  60 BPM     Atrial Rate :  60 BPM     P-R Int : 206 ms          QRS Dur :  72 ms      QT Int : 462 ms       P-R-T Axes :  62  -3  -3 degrees    QTcB Int : 462 ms    Normal sinus rhythm  Normal ECG  When compared with ECG of  12-Mar-2025 05:57,  Premature ventricular complexes are no longer Present  Nonspecific T wave abnormality no longer evident in Lateral leads  Confirmed by Jamari Rider ([id number]) on 3/29/2025 1:45:20 PM    Referred By: AAAREFERRAL SELF           Confirmed By: Jamari Rider        Stress  Results for orders placed during the hospital encounter of 03/12/25    Nuclear Stress Test    Interpretation Summary    The ECG portion of the study is negative for ischemia.    The nuclear portion of this study will be reported separately.           Assessment/Plan:          1. Primary hypertension    2. Mixed hyperlipidemia    3. Multiple drug allergies    4. PVC (premature ventricular contraction)      Assessment & Plan    I16.9 Hypertensive crisis, unspecified  K58.2 Mixed irritable bowel syndrome  L71.9 Rosacea, unspecified  K21.9 Gastro-esophageal reflux disease without esophagitis  E73.9 Lactose intolerance, unspecified  Z88.0 Allergy status to penicillin    PLAN SUMMARY:  - Started prazosin (Minipress) 1 mg twice daily  - May increase prazosin to 2 mg twice daily if blood pressure remains high  - Continued Clonidine (Catapres) as needed when blood pressure exceeds 180  - Continued Carvedilol (Coreg) twice daily at current dose  - Referred to Dr. Salinas for constipation and diarrhea management  - Follow up in a few months    HYPERTENSIVE CRISIS:  - Blood pressure elevated (189/140), necessitating additional antihypertensive medication.  - Patient to monitor blood pressure at home with a reliable device.  - Started prazosin (Minipress) 1 mg twice daily due to complex medication sensitivities. Take first dose at night due to potential for initial blood pressure drop. After first dose, take every 12 hours. May increase to 2 mg twice daily if blood pressure remains high.  - Continued Carvedilol (Coreg) twice daily at current dose.  - Continued Clonidine (Catapres) as needed when blood pressure exceeds 180.    IRRITABLE BOWEL  SYNDROME:  - Referred to Dr. Salinas for management of constipation and diarrhea issues.    FOLLOW-UP:  - Follow up in a few months.           This note was generated with the assistance of ambient listening technology. Verbal consent was obtained by the patient and accompanying visitor(s) for the recording of patient appointment to facilitate this note. I attest to having reviewed and edited the generated note for accuracy, though some syntax or spelling errors may persist. Please contact the author of this note for any clarification.

## 2025-07-17 DIAGNOSIS — J45.30 MILD PERSISTENT ASTHMA, UNSPECIFIED WHETHER COMPLICATED: ICD-10-CM

## 2025-07-17 RX ORDER — FLUTICASONE PROPIONATE AND SALMETEROL XINAFOATE 115; 21 UG/1; UG/1
2 AEROSOL, METERED RESPIRATORY (INHALATION) EVERY 12 HOURS
Qty: 36 G | Refills: 3 | Status: SHIPPED | OUTPATIENT
Start: 2025-07-17

## 2025-07-17 NOTE — TELEPHONE ENCOUNTER
Copied from CRM #[id number]. Topic: Medications - Medication Refill  >> Jul 17, 2025 11:59 AM Stefanie wrote:  Type:  RX Refill Request    Who Called:   PT   Refill or New Rx:   REFILL  RX Name and Strength:    ADVAIR -21 mcg/actuation HFAA inhaler   How is the patient currently taking it? (ex. 1XDay):  AS ORDERED  Is this a 30 day or 90 day RX:  90  Preferred Pharmacy with phone number:    [id number] - [place] - [address]  [address] [address]  Phone: [phone] Fax: [phone]    [facility] - [facility] [address]  [address] [facility] [address] [address]  Phone: [phone] Fax: [phone]    [id number] - [place] - [address]  [address] [address]  Phone: [phone] Fax: [phone]      Local or Mail Order:   LOCAL  Ordering Provider:  MOUNIKA  Best Call Back Number:  [phone]  Additional Information:  Pharm said will need a PA

## 2025-07-21 DIAGNOSIS — J45.30 MILD PERSISTENT ASTHMA, UNSPECIFIED WHETHER COMPLICATED: ICD-10-CM

## 2025-07-21 NOTE — TELEPHONE ENCOUNTER
Copied from CRM #[id number]. Topic: Medications - Medication Refill  >> Jul 21, 2025  1:40 PM Razia wrote:  Type:  RX Refill Request    Who Called: pt/  Refill or New Rx:refill  RX Name and Strength: Disp Refills Start End   fluticasone propion-salmeterol 115-21 mcg/dose (ADVAIR HFA) 115-21 mcg/actuation HFAA inhaler 36 g 3 7/17/2025 -   Sig - Route: Inhale 2 puffs into the lungs every 12 (twelve) hours. Controller - Inhalation   Sent to pharmacy as: fluticasone propion-salmeterol 115-21 mcg/dose (ADVAIR HFA) 115-21 mcg/actuation HFAA inhaler   E-Prescribing Status: Receipt confirmed by pharmacy (7/17/20       How is the patient currently taking it? (ex. 1XDay):see above  Is this a 30 day or 90 day RX:see above  Pre  [id number] - [place] - [address]  [address] [address]  Phone: [phone] Fax: [phone]  Local or Mail Order:local  Ordering Provider:  Would the patient rather a call back or a response via [facility]? call  Best Call Back Number:[phone]  Additional Information: states needs new rx for generic brand above

## 2025-07-22 ENCOUNTER — TELEPHONE (OUTPATIENT)
Dept: FAMILY MEDICINE | Facility: CLINIC | Age: 80
End: 2025-07-22
Payer: MEDICARE

## 2025-07-22 RX ORDER — FLUTICASONE PROPIONATE AND SALMETEROL XINAFOATE 115; 21 UG/1; UG/1
2 AEROSOL, METERED RESPIRATORY (INHALATION) EVERY 12 HOURS
Qty: 36 G | Refills: 3 | Status: SHIPPED | OUTPATIENT
Start: 2025-07-22 | End: 2025-07-23

## 2025-07-22 NOTE — TELEPHONE ENCOUNTER
Copied from CRM #[id number]. Topic: General Inquiry - Patient Advice  >> Jul 22, 2025  9:58 AM Rona wrote:  Type:  Needs Medical Advice    Who Called: Pt    Pharmacy name and phone #:    [id number] - [place] - [address]  [address] [address]  Phone: [phone] Fax: [phone]     Would the patient rather a call back or a response via [facility]? Call  back   Best Call Back Number: [phone]   Additional Information: Pt  requesting a call back regarding patient's medication ( inhaler ) please call back to advise

## 2025-07-23 DIAGNOSIS — J45.30 MILD PERSISTENT ASTHMA, UNSPECIFIED WHETHER COMPLICATED: Primary | ICD-10-CM

## 2025-07-23 RX ORDER — FLUTICASONE PROPIONATE AND SALMETEROL 100; 50 UG/1; UG/1
1 POWDER RESPIRATORY (INHALATION) 2 TIMES DAILY
Qty: 60 EACH | Refills: 3 | Status: SHIPPED | OUTPATIENT
Start: 2025-07-23 | End: 2026-07-23

## 2025-08-01 ENCOUNTER — OFFICE VISIT (OUTPATIENT)
Dept: FAMILY MEDICINE | Facility: CLINIC | Age: 80
End: 2025-08-01
Payer: MEDICARE

## 2025-08-01 VITALS
BODY MASS INDEX: 26.57 KG/M2 | DIASTOLIC BLOOD PRESSURE: 86 MMHG | HEIGHT: 67 IN | SYSTOLIC BLOOD PRESSURE: 128 MMHG | WEIGHT: 169.31 LBS | OXYGEN SATURATION: 97 % | HEART RATE: 74 BPM

## 2025-08-01 DIAGNOSIS — K21.9 GASTROESOPHAGEAL REFLUX DISEASE, UNSPECIFIED WHETHER ESOPHAGITIS PRESENT: ICD-10-CM

## 2025-08-01 DIAGNOSIS — I10 PRIMARY HYPERTENSION: ICD-10-CM

## 2025-08-01 DIAGNOSIS — I48.91 ATRIAL FIBRILLATION AND FLUTTER: ICD-10-CM

## 2025-08-01 DIAGNOSIS — I48.92 ATRIAL FIBRILLATION AND FLUTTER: ICD-10-CM

## 2025-08-01 DIAGNOSIS — J45.30 MILD PERSISTENT ASTHMA, UNSPECIFIED WHETHER COMPLICATED: Primary | ICD-10-CM

## 2025-08-01 DIAGNOSIS — E78.5 HYPERLIPIDEMIA, UNSPECIFIED HYPERLIPIDEMIA TYPE: ICD-10-CM

## 2025-08-01 DIAGNOSIS — E03.9 HYPOTHYROIDISM, UNSPECIFIED TYPE: ICD-10-CM

## 2025-08-01 PROCEDURE — 99999 PR PBB SHADOW E&M-EST. PATIENT-LVL IV: CPT | Mod: PBBFAC,,, | Performed by: STUDENT IN AN ORGANIZED HEALTH CARE EDUCATION/TRAINING PROGRAM

## 2025-08-01 PROCEDURE — 1160F RVW MEDS BY RX/DR IN RCRD: CPT | Mod: CPTII,S$GLB,, | Performed by: STUDENT IN AN ORGANIZED HEALTH CARE EDUCATION/TRAINING PROGRAM

## 2025-08-01 PROCEDURE — 1125F AMNT PAIN NOTED PAIN PRSNT: CPT | Mod: CPTII,S$GLB,, | Performed by: STUDENT IN AN ORGANIZED HEALTH CARE EDUCATION/TRAINING PROGRAM

## 2025-08-01 PROCEDURE — 1101F PT FALLS ASSESS-DOCD LE1/YR: CPT | Mod: CPTII,S$GLB,, | Performed by: STUDENT IN AN ORGANIZED HEALTH CARE EDUCATION/TRAINING PROGRAM

## 2025-08-01 PROCEDURE — 3079F DIAST BP 80-89 MM HG: CPT | Mod: CPTII,S$GLB,, | Performed by: STUDENT IN AN ORGANIZED HEALTH CARE EDUCATION/TRAINING PROGRAM

## 2025-08-01 PROCEDURE — 3074F SYST BP LT 130 MM HG: CPT | Mod: CPTII,S$GLB,, | Performed by: STUDENT IN AN ORGANIZED HEALTH CARE EDUCATION/TRAINING PROGRAM

## 2025-08-01 PROCEDURE — 1159F MED LIST DOCD IN RCRD: CPT | Mod: CPTII,S$GLB,, | Performed by: STUDENT IN AN ORGANIZED HEALTH CARE EDUCATION/TRAINING PROGRAM

## 2025-08-01 PROCEDURE — G2211 COMPLEX E/M VISIT ADD ON: HCPCS | Mod: S$GLB,,, | Performed by: STUDENT IN AN ORGANIZED HEALTH CARE EDUCATION/TRAINING PROGRAM

## 2025-08-01 PROCEDURE — 99214 OFFICE O/P EST MOD 30 MIN: CPT | Mod: S$GLB,,, | Performed by: STUDENT IN AN ORGANIZED HEALTH CARE EDUCATION/TRAINING PROGRAM

## 2025-08-01 PROCEDURE — 3288F FALL RISK ASSESSMENT DOCD: CPT | Mod: CPTII,S$GLB,, | Performed by: STUDENT IN AN ORGANIZED HEALTH CARE EDUCATION/TRAINING PROGRAM

## 2025-08-01 RX ORDER — THYROID 15 MG/1
15 TABLET ORAL
Qty: 30 TABLET | Refills: 11 | Status: SHIPPED | OUTPATIENT
Start: 2025-08-01 | End: 2026-08-01

## 2025-08-01 RX ORDER — BUDESONIDE AND FORMOTEROL FUMARATE DIHYDRATE 80; 4.5 UG/1; UG/1
2 AEROSOL RESPIRATORY (INHALATION) 2 TIMES DAILY
Qty: 10.2 G | Refills: 5 | Status: SHIPPED | OUTPATIENT
Start: 2025-08-01 | End: 2026-08-01

## 2025-08-01 RX ORDER — BUDESONIDE AND FORMOTEROL FUMARATE DIHYDRATE 80; 4.5 UG/1; UG/1
2 AEROSOL RESPIRATORY (INHALATION)
Refills: 0 | Status: CANCELLED | OUTPATIENT
Start: 2025-08-01 | End: 2026-08-01

## 2025-08-01 RX ORDER — FLUTICASONE PROPIONATE AND SALMETEROL XINAFOATE 115; 21 UG/1; UG/1
2 AEROSOL, METERED RESPIRATORY (INHALATION) EVERY 12 HOURS
COMMUNITY
End: 2025-08-01

## 2025-08-05 ENCOUNTER — TELEPHONE (OUTPATIENT)
Dept: FAMILY MEDICINE | Facility: CLINIC | Age: 80
End: 2025-08-05
Payer: MEDICARE

## 2025-08-05 NOTE — TELEPHONE ENCOUNTER
Spoke with patient spouse notified him I spoke with [facility] pharmacy and they have a rx on hold they will get ready for patient. Verbalized understanding

## 2025-08-06 RX ORDER — CARVEDILOL 25 MG/1
25 TABLET ORAL 2 TIMES DAILY
Qty: 180 TABLET | Refills: 1 | Status: SHIPPED | OUTPATIENT
Start: 2025-08-06 | End: 2026-02-02

## 2025-09-02 RX ORDER — MONTELUKAST SODIUM 10 MG/1
10 TABLET ORAL NIGHTLY
Qty: 90 TABLET | Refills: 1 | Status: SHIPPED | OUTPATIENT
Start: 2025-09-02

## 2025-09-02 RX ORDER — MONTELUKAST SODIUM 10 MG/1
10 TABLET ORAL NIGHTLY
COMMUNITY
End: 2025-09-02 | Stop reason: SDUPTHER